# Patient Record
Sex: FEMALE | Race: WHITE | NOT HISPANIC OR LATINO | Employment: FULL TIME
[De-identification: names, ages, dates, MRNs, and addresses within clinical notes are randomized per-mention and may not be internally consistent; named-entity substitution may affect disease eponyms.]

---

## 2017-08-26 ENCOUNTER — HEALTH MAINTENANCE LETTER (OUTPATIENT)
Age: 32
End: 2017-08-26

## 2017-09-25 ASSESSMENT — ANXIETY QUESTIONNAIRES
7. FEELING AFRAID AS IF SOMETHING AWFUL MIGHT HAPPEN: NOT AT ALL
GAD7 TOTAL SCORE: 0
1. FEELING NERVOUS, ANXIOUS, OR ON EDGE: NOT AT ALL
7. FEELING AFRAID AS IF SOMETHING AWFUL MIGHT HAPPEN: NOT AT ALL
6. BECOMING EASILY ANNOYED OR IRRITABLE: NOT AT ALL
GAD7 TOTAL SCORE: 0
5. BEING SO RESTLESS THAT IT IS HARD TO SIT STILL: NOT AT ALL
GAD7 TOTAL SCORE: 0
4. TROUBLE RELAXING: NOT AT ALL
3. WORRYING TOO MUCH ABOUT DIFFERENT THINGS: NOT AT ALL
2. NOT BEING ABLE TO STOP OR CONTROL WORRYING: NOT AT ALL

## 2017-09-26 ENCOUNTER — OFFICE VISIT (OUTPATIENT)
Dept: OBGYN | Facility: CLINIC | Age: 32
End: 2017-09-26
Attending: OBSTETRICS & GYNECOLOGY
Payer: COMMERCIAL

## 2017-09-26 VITALS
HEIGHT: 64 IN | WEIGHT: 138.7 LBS | DIASTOLIC BLOOD PRESSURE: 71 MMHG | HEART RATE: 82 BPM | BODY MASS INDEX: 23.68 KG/M2 | SYSTOLIC BLOOD PRESSURE: 112 MMHG

## 2017-09-26 DIAGNOSIS — N95.2 ATROPHIC VAGINITIS: Primary | ICD-10-CM

## 2017-09-26 PROCEDURE — 99212 OFFICE O/P EST SF 10 MIN: CPT | Mod: ZF

## 2017-09-26 ASSESSMENT — ANXIETY QUESTIONNAIRES
GAD7 TOTAL SCORE: 0
6. BECOMING EASILY ANNOYED OR IRRITABLE: NOT AT ALL
7. FEELING AFRAID AS IF SOMETHING AWFUL MIGHT HAPPEN: NOT AT ALL
5. BEING SO RESTLESS THAT IT IS HARD TO SIT STILL: NOT AT ALL
1. FEELING NERVOUS, ANXIOUS, OR ON EDGE: NOT AT ALL
GAD7 TOTAL SCORE: 0
3. WORRYING TOO MUCH ABOUT DIFFERENT THINGS: NOT AT ALL
2. NOT BEING ABLE TO STOP OR CONTROL WORRYING: NOT AT ALL

## 2017-09-26 ASSESSMENT — PATIENT HEALTH QUESTIONNAIRE - PHQ9
SUM OF ALL RESPONSES TO PHQ QUESTIONS 1-9: 0
5. POOR APPETITE OR OVEREATING: NOT AT ALL

## 2017-09-26 NOTE — LETTER
9/26/2017       RE: Lesley Mahmood  [address] [facility] [address]     Dear Colleague,    Thank you for referring your patient, Lesley Mahmood, to the [facility] at [facility]. Please see a copy of my visit note below.    CC/HPI:   Lesley Mahmood is a 32 year old  who presents today for her annual exam. She is currently using IUD Mirena and would like to continue with this method of birth control. Considering another pregnancy in ~ 1-2 years.     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     Notes some vaginal irritation w/ IC.     HISTORIES:  Patient Active Problem List   Diagnosis     Vitamin D deficiency     Past Medical History:   Diagnosis Date     NO ACTIVE PROBLEMS (aka NONE)      Past Surgical History:   Procedure Laterality Date     birthmark removal  2001     EXTRACTION(S) DENTAL  1995     Current Outpatient Prescriptions   Medication Sig Dispense Refill     Nutritional Supplements (VITAMIN D BOOSTER PO) Take 5,000 Units by mouth       No Known Allergies  Social History     Social History     Marital status:      Spouse name: Mauro Pena     Number of children: N/A     Years of education: N/A     Occupational History     endocrine fellow      Social History Main Topics     Smoking status: Never Smoker     Smokeless tobacco: Never Used     Alcohol use 0.6 oz/week     1 Standard drinks or equivalent per week      Comment: stopped in preganncy     Drug use: No     Sexual activity: Yes     Partners: Male     Other Topics Concern      Service No     Blood Transfusions No     Caffeine Concern Yes     1 cup      Occupational Exposure No     Hobby Hazards No     Sleep Concern No     Stress Concern No     Weight Concern No     Special Diet No     Back Care No     Exercise Yes     yoga 2 times and walking     Bike Helmet Yes     Seat Belt Yes     Self-Exams Yes     occ     Social History Narrative    How much  "exercise per week? Yoga and walking    How much calcium per day? In foods and supplements       How much caffeine per day? 1    How much vitamin D per day? In foods    Do you/your family wear seatbelts?  Yes    Do you/your family use safety helmets? Yes    Do you/your family use sunscreen? Yes    Do you/your family keep firearms in the home? No    Do you/your family have a smoke detector(s)? Yes        Do you feel safe in your home? Yes    Has anyone ever touched you in an unwanted manner? No    Reviewed cmkim n  6-         Family History   Problem Relation Age of Onset     CANCER Father      CLL     Cardiovascular Maternal Grandmother      strokes     CEREBROVASCULAR DISEASE Paternal Grandmother      Other Cancer Paternal Grandfather      CLL     Hypertension Mother      Hypertension Maternal Grandmother      Hypertension Maternal Grandfather      Hypertension Paternal Grandmother      Hypertension Paternal Grandfather           Gyn Hx:   No LMP recorded. Patient is not currently having periods (Reason: IUD).  Menses:   Light in past 2 months.  Still nursing occasionally.     Review Of Systems:  C: NEGATIVE for fever, chills  E: NEGATIVE for vision changes   R: NEGATIVE for significant cough or SOB  CV: NEGATIVE for chest pain, palpitations   GI: NEGATIVE for nausea, abdominal pain, heartburn, or change in bowel habits  : NEGATIVE for frequency, dysuria, or hematuria  M: NEGATIVE for significant arthralgias or myalgia  N: NEGATIVE for weakness, dizziness or paresthesias or headache    EXAM:  /71  Pulse 82  Ht 1.626 m (5' 4.02\")  Wt 62.9 kg (138 lb 11.2 oz)  BMI 23.79 kg/m2  Body mass index is 23.79 kg/(m^2).    General - pleasant female in no acute distress.  Skin - no suspicious lesions or rashes  EENT-  PERRLA, euthyroid with out palpable nodules  Neck - supple without lymphadenopathy.  Lungs - non labored breathing.  Breasts- symmetrical . No dominant fixed or suspicious masses noted.  No " skin or nipple changes or axillary nodes.   Abdomen - soft, nontender, nondistended, no masses or organomegaly noted.  Musculoskeletal - no gross deformities.  Neurological - normal strength, sensation, and mental status.  Pelvic - EG: normal  female, vulva reveals no erythema or lesions.   BUS: within normal limits.  Vagina: thin and atrophic, no lesions polyps or suspicious discharge.     Cervix: no lesions, polyps discharge or CMT. IUD strings present.   Uterus: firm, anteverted, normal size and nontender.  Adnexa: no masses or tenderness.  Anus- normal, no lesions.  Rectovaginal - deferred.    ASSESSMENT/PLAN  31 yo , here for Annual preventative exam.     Atrophic vaginitis.     Additional teaching done at this visit regarding calcium (1200 mg per day), birth control and preconception. I have discussed with patient the harms and  benefits of medications, treatment options.     The patient will be notified of any results via [employer].    Hansa Beasley MD, FACOG  [facility]  OB/GYN    9/26/2017  2:11 PM

## 2017-09-26 NOTE — MR AVS SNAPSHOT
"              After Visit Summary   9/26/2017    Lesley Mahmood    MRN: [id number]           Patient Information     Date Of Birth          1985        Visit Information        Provider Department      9/26/2017 9:00 AM Hansa Beasley MD [facility]        Today's Diagnoses     Atrophic vaginitis    -  1       Follow-ups after your visit        Who to contact     Please call your clinic at [phone] to:    Ask questions about your health    Make or cancel appointments    Discuss your medicines    Learn about your test results    Speak to your doctor   If you have compliments or concerns about an experience at your clinic, or if you wish to file a complaint, please contact [facility] Patient Relations at [phone] or email us at [email or web address]         Additional Information About Your Visit        MyChart Information     "[employer]t gives you secure access to your electronic health record. If you see a primary care provider, you can also send messages to your care team and make appointments. If you have questions, please call your primary care clinic.  If you do not have a primary care provider, please call [phone] and they will assist you.      [email or web address] is an electronic gateway that provides easy, online access to your medical records. With [email or web address], you can request a clinic appointment, read your test results, renew a prescription or communicate with your care team.     To access your existing account, please contact your [facility] or call [phone] for assistance.        Care EveryWhere ID     This is your Care EveryWhere ID. This could be used by other organizations to access your [place] medical records  [id number]        Your Vitals Were     Pulse Height BMI (Body Mass Index)             82 1.626 m (5' 4.02\") 23.79 kg/m2          Blood Pressure from Last 3 Encounters:   09/26/17 112/71 "   04/05/16 108/67   03/04/16 112/72    Weight from Last 3 Encounters:   09/26/17 62.9 kg (138 lb 11.2 oz)   04/05/16 64.6 kg (142 lb 8 oz)   03/04/16 65.5 kg (144 lb 8 oz)              Today, you had the following     No orders found for display         Today's Medication Changes          These changes are accurate as of: 9/26/17  2:13 PM.  If you have any questions, ask your nurse or doctor.               Start taking these medicines.        Dose/Directions    conjugated estrogens cream   Commonly known as:  PREMARIN   Used for:  Atrophic vaginitis   Started by:  Hansa Beasley MD        Dose:  0.5 g   Start taking on:  9/28/2017   Place 0.5 g vaginally twice a week   Quantity:  30 g   Refills:  3         Stop taking these medicines if you haven't already. Please contact your care team if you have questions.     prenatal multivitamin plus iron 27-0.8 MG Tabs per tablet   Stopped by:  Hansa Beasley MD                Where to get your medicines      These medications were sent to [id number] - [place], [place] - [address] AT [facility] [address]  [address], [place] [address]     Phone:  [phone]     conjugated estrogens cream                Primary Care Provider    None Specified       No primary provider on file.        Equal Access to Services     [facility] : Matheus Slater, waaxda luqadaha, qaybta kaalshaheen saunders, liss boyce. So [facility] [phone].    ATENCIÓN: Si habla español, tiene a reyna disposición servicios gratuitos de asistencia lingüística. Noah al [phone].    We comply with applicable federal civil rights laws and [place] laws. We do not discriminate on the basis of race, color, national origin, age, disability sex, sexual orientation or gender identity.            Thank you!     Thank you for choosing [facility]  for your care. Our goal is always to provide you with excellent  care. Hearing back from our patients is one way we can continue to improve our services. Please take a few minutes to complete the written survey that you may receive in the mail after your visit with us. Thank you!             Your Updated Medication List - Protect others around you: Learn how to safely use, store and throw away your medicines at www.disposemymeds.org.          This list is accurate as of: 9/26/17  2:13 PM.  Always use your most recent med list.                   Brand Name Dispense Instructions for use Diagnosis    conjugated estrogens cream   Start taking on:  9/28/2017    PREMARIN    30 g    Place 0.5 g vaginally twice a week    Atrophic vaginitis       VITAMIN D BOOSTER PO      Take 5,000 Units by mouth

## 2017-09-26 NOTE — PROGRESS NOTES
CC/HPI:   Lesley Mahmood is a 32 year old  who presents today for her annual exam. She is currently using IUD Mirena and would like to continue with this method of birth control. Considering another pregnancy in ~ 1-2 years.     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     Notes some vaginal irritation w/ IC.     HISTORIES:  Patient Active Problem List   Diagnosis     Vitamin D deficiency     Past Medical History:   Diagnosis Date     NO ACTIVE PROBLEMS (aka NONE)      Past Surgical History:   Procedure Laterality Date     birthmark removal  2001     EXTRACTION(S) DENTAL  1995     Current Outpatient Prescriptions   Medication Sig Dispense Refill     Nutritional Supplements (VITAMIN D BOOSTER PO) Take 5,000 Units by mouth       No Known Allergies  Social History     Social History     Marital status:      Spouse name: Mauro Pena     Number of children: N/A     Years of education: N/A     Occupational History     endocrine fellow      Social History Main Topics     Smoking status: Never Smoker     Smokeless tobacco: Never Used     Alcohol use 0.6 oz/week     1 Standard drinks or equivalent per week      Comment: stopped in preganncy     Drug use: No     Sexual activity: Yes     Partners: Male     Other Topics Concern      Service No     Blood Transfusions No     Caffeine Concern Yes     1 cup      Occupational Exposure No     Hobby Hazards No     Sleep Concern No     Stress Concern No     Weight Concern No     Special Diet No     Back Care No     Exercise Yes     yoga 2 times and walking     Bike Helmet Yes     Seat Belt Yes     Self-Exams Yes     occ     Social History Narrative    How much exercise per week? Yoga and walking    How much calcium per day? In foods and supplements       How much caffeine per day? 1    How much vitamin D per day? In foods    Do you/your family wear seatbelts?  Yes    Do you/your family use safety helmets? Yes    Do you/your family use  "sunscreen? Yes    Do you/your family keep firearms in the home? No    Do you/your family have a smoke detector(s)? Yes        Do you feel safe in your home? Yes    Has anyone ever touched you in an unwanted manner? No    Reviewed cmkim sebastian  6-         Family History   Problem Relation Age of Onset     CANCER Father      CLL     Cardiovascular Maternal Grandmother      strokes     CEREBROVASCULAR DISEASE Paternal Grandmother      Other Cancer Paternal Grandfather      CLL     Hypertension Mother      Hypertension Maternal Grandmother      Hypertension Maternal Grandfather      Hypertension Paternal Grandmother      Hypertension Paternal Grandfather           Gyn Hx:   No LMP recorded. Patient is not currently having periods (Reason: IUD).  Menses:   Light in past 2 months.  Still nursing occasionally.     Review Of Systems:  C: NEGATIVE for fever, chills  E: NEGATIVE for vision changes   R: NEGATIVE for significant cough or SOB  CV: NEGATIVE for chest pain, palpitations   GI: NEGATIVE for nausea, abdominal pain, heartburn, or change in bowel habits  : NEGATIVE for frequency, dysuria, or hematuria  M: NEGATIVE for significant arthralgias or myalgia  N: NEGATIVE for weakness, dizziness or paresthesias or headache    EXAM:  /71  Pulse 82  Ht 1.626 m (5' 4.02\")  Wt 62.9 kg (138 lb 11.2 oz)  BMI 23.79 kg/m2  Body mass index is 23.79 kg/(m^2).    General - pleasant female in no acute distress.  Skin - no suspicious lesions or rashes  EENT-  PERRLA, euthyroid with out palpable nodules  Neck - supple without lymphadenopathy.  Lungs - non labored breathing.  Breasts- symmetrical . No dominant fixed or suspicious masses noted.  No skin or nipple changes or axillary nodes.   Abdomen - soft, nontender, nondistended, no masses or organomegaly noted.  Musculoskeletal - no gross deformities.  Neurological - normal strength, sensation, and mental status.  Pelvic - EG: normal  female, vulva reveals no erythema or " lesions.   BUS: within normal limits.  Vagina: thin and atrophic, no lesions polyps or suspicious discharge.     Cervix: no lesions, polyps discharge or CMT. IUD strings present.   Uterus: firm, anteverted, normal size and nontender.  Adnexa: no masses or tenderness.  Anus- normal, no lesions.  Rectovaginal - deferred.    ASSESSMENT/PLAN  31 yo , here for Annual preventative exam.     Atrophic vaginitis.     Additional teaching done at this visit regarding calcium (1200 mg per day), birth control and preconception. I have discussed with patient the harms and  benefits of medications, treatment options.     The patient will be notified of any results via [employer].    Hansa Beasley MD, FACOG  [facility]  OB/GYN    9/26/2017  2:11 PM      Answers for HPI/ROS submitted by the patient on 9/25/2017   DOLORES 7 TOTAL SCORE: 0  PHQ-2 Score: 0  General Symptoms: No  Skin Symptoms: No  HENT Symptoms: No  EYE SYMPTOMS: No  HEART SYMPTOMS: No  LUNG SYMPTOMS: No  INTESTINAL SYMPTOMS: No  URINARY SYMPTOMS: No  GYNECOLOGIC SYMPTOMS: No  BREAST SYMPTOMS: No  SKELETAL SYMPTOMS: No  BLOOD SYMPTOMS: No  NERVOUS SYSTEM SYMPTOMS: No  MENTAL HEALTH SYMPTOMS: No

## 2017-09-28 ENCOUNTER — ALLIED HEALTH/NURSE VISIT (OUTPATIENT)
Dept: NURSING | Facility: CLINIC | Age: 32
End: 2017-09-28
Payer: COMMERCIAL

## 2017-09-28 DIAGNOSIS — Z23 NEED FOR PROPHYLACTIC VACCINATION AND INOCULATION AGAINST INFLUENZA: Primary | ICD-10-CM

## 2017-09-28 PROCEDURE — 90471 IMMUNIZATION ADMIN: CPT

## 2017-09-28 PROCEDURE — 99207 ZZC NO CHARGE NURSE ONLY: CPT

## 2017-09-28 PROCEDURE — 90686 IIV4 VACC NO PRSV 0.5 ML IM: CPT

## 2017-09-28 NOTE — PROGRESS NOTES
Injectable Influenza Immunization Documentation    1.  Is the person to be vaccinated sick today?   No    2. Does the person to be vaccinated have an allergy to a component   of the vaccine?   No    3. Has the person to be vaccinated ever had a serious reaction   to influenza vaccine in the past?   No    4. Has the person to be vaccinated ever had Guillain-Barré syndrome?   No    Form completed by Self

## 2017-09-28 NOTE — MR AVS SNAPSHOT
After Visit Summary   9/28/2017    Lesley Mahmood    MRN: [id number]           Patient Information     Date Of Birth          1985        Visit Information        Provider Department      9/28/2017 8:25 AM CARE COORDINATOR [facility]        Today's Diagnoses     Need for prophylactic vaccination and inoculation against influenza    -  1       Follow-ups after your visit        Who to contact     If you have questions or need follow up information about today's clinic visit or your schedule please contact [facility] directly at [phone].  Normal or non-critical lab and imaging results will be communicated to you by [employer] Personal[employer] Diagnosticshart, letter or phone within 4 business days after the clinic has received the results. If you do not hear from us within 7 days, please contact the clinic through [employer] or phone. If you have a critical or abnormal lab result, we will notify you by phone as soon as possible.  Submit refill requests through [employer] or call your pharmacy and they will forward the refill request to us. Please allow 3 business days for your refill to be completed.          Additional Information About Your Visit        MyChart Information     [employer] gives you secure access to your electronic health record. If you see a primary care provider, you can also send messages to your care team and make appointments. If you have questions, please call your primary care clinic.  If you do not have a primary care provider, please call [phone] and they will assist you.        Care EveryWhere ID     This is your Care EveryWhere ID. This could be used by other organizations to access your [place] medical records  [id number]         Blood Pressure from Last 3 Encounters:   09/26/17 112/71   04/05/16 108/67   03/04/16 112/72    Weight from Last 3 Encounters:   09/26/17 138 lb 11.2 oz (62.9 kg)   04/05/16 142 lb 8 oz (64.6 kg)   03/04/16 144 lb 8 oz  (65.5 kg)              We Performed the Following     FLU VAC, SPLIT VIRUS IM > 3 YO (QUADRIVALENT) [52179]     Vaccine Administration, Initial [14304]        Primary Care Provider    None Specified       No primary provider on file.        Equal Access to Services     MAXIMILIAN MCKEON : Hadii aad ku hadandrea Slater, wavinayda luqadaha, qaybta kaalmada nicky, liss goranin hayaalida rios samantha ankita boyce. So [facility] [phone].    ATENCIÓN: Si habla español, tiene a reyna disposición servicios gratuitos de asistencia lingüística. Llame al [phone].    We comply with applicable federal civil rights laws and [place] laws. We do not discriminate on the basis of race, color, national origin, age, disability sex, sexual orientation or gender identity.            Thank you!     Thank you for choosing [facility]  for your care. Our goal is always to provide you with excellent care. Hearing back from our patients is one way we can continue to improve our services. Please take a few minutes to complete the written survey that you may receive in the mail after your visit with us. Thank you!             Your Updated Medication List - Protect others around you: Learn how to safely use, store and throw away your medicines at www.disposemymeds.org.          This list is accurate as of: 9/28/17  8:36 AM.  Always use your most recent med list.                   Brand Name Dispense Instructions for use Diagnosis    conjugated estrogens cream    PREMARIN    30 g    Place 0.5 g vaginally twice a week    Atrophic vaginitis       VITAMIN D BOOSTER PO      Take 5,000 Units by mouth

## 2018-11-02 ASSESSMENT — ANXIETY QUESTIONNAIRES
GAD7 TOTAL SCORE: 0
6. BECOMING EASILY ANNOYED OR IRRITABLE: NOT AT ALL
2. NOT BEING ABLE TO STOP OR CONTROL WORRYING: NOT AT ALL
7. FEELING AFRAID AS IF SOMETHING AWFUL MIGHT HAPPEN: NOT AT ALL
5. BEING SO RESTLESS THAT IT IS HARD TO SIT STILL: NOT AT ALL
1. FEELING NERVOUS, ANXIOUS, OR ON EDGE: NOT AT ALL
7. FEELING AFRAID AS IF SOMETHING AWFUL MIGHT HAPPEN: NOT AT ALL
3. WORRYING TOO MUCH ABOUT DIFFERENT THINGS: NOT AT ALL
GAD7 TOTAL SCORE: 0
4. TROUBLE RELAXING: NOT AT ALL

## 2018-11-03 ASSESSMENT — ANXIETY QUESTIONNAIRES: GAD7 TOTAL SCORE: 0

## 2018-11-05 ENCOUNTER — ALLIED HEALTH/NURSE VISIT (OUTPATIENT)
Dept: NURSING | Facility: CLINIC | Age: 33
End: 2018-11-05
Payer: COMMERCIAL

## 2018-11-05 DIAGNOSIS — Z23 NEED FOR PROPHYLACTIC VACCINATION AND INOCULATION AGAINST INFLUENZA: Primary | ICD-10-CM

## 2018-11-05 PROCEDURE — 90471 IMMUNIZATION ADMIN: CPT

## 2018-11-05 PROCEDURE — 90686 IIV4 VACC NO PRSV 0.5 ML IM: CPT

## 2018-11-05 PROCEDURE — 99207 ZZC NO CHARGE NURSE ONLY: CPT

## 2018-11-05 NOTE — PROGRESS NOTES

## 2018-11-05 NOTE — MR AVS SNAPSHOT
After Visit Summary   11/5/2018    Lesley Mahmood    MRN: [id number]           Patient Information     Date Of Birth          1985        Visit Information        Provider Department      11/5/2018 9:15 AM RD YOUSIF MA/LPN [facility]        Today's Diagnoses     Need for prophylactic vaccination and inoculation against influenza    -  1       Follow-ups after your visit        Your next 10 appointments already scheduled     Nov 07, 2018 11:00 AM [facility]   [facility] Annual Physical with Hansa Beasley MD   [facility] ([facility])    [facility] [address]  [address]  [facility] [address] [phone]              Who to contact     If you have questions or need follow up information about today's clinic visit or your schedule please contact [facility] directly at [phone].  Normal or non-critical lab and imaging results will be communicated to you by ROBLOXhart, letter or phone within 4 business days after the clinic has received the results. If you do not hear from us within 7 days, please contact the clinic through ROBLOXhart or phone. If you have a critical or abnormal lab result, we will notify you by phone as soon as possible.  Submit refill requests through [employer] or call your pharmacy and they will forward the refill request to us. Please allow 3 business days for your refill to be completed.          Additional Information About Your Visit        MyChart Information     [employer] gives you secure access to your electronic health record. If you see a primary care provider, you can also send messages to your care team and make appointments. If you have questions, please call your primary care clinic.  If you do not have a primary care provider, please call [phone] and they will assist you.        Care EveryWhere ID     This is your Care EveryWhere ID. This could be used by other  organizations to access your [place] medical records  [id number]         Blood Pressure from Last 3 Encounters:   09/26/17 112/71   04/05/16 108/67   03/04/16 112/72    Weight from Last 3 Encounters:   09/26/17 138 lb 11.2 oz (62.9 kg)   04/05/16 142 lb 8 oz (64.6 kg)   03/04/16 144 lb 8 oz (65.5 kg)              We Performed the Following     FLU VACCINE, SPLIT VIRUS, IM (QUADRIVALENT) [83755]- >3 YRS     Vaccine Administration, Initial [44880]        Primary Care Provider Fax #    Physician No Ref-Primary [phone]       No address on file        Equal Access to Services     MAXIMILIAN MCKEON : Matheus Slater, james johns, nasra saunders, liss boyce. So [facility] [phone].    ATENCIÓN: Si habla español, tiene a reyna disposición servicios gratuitos de asistencia lingüística. Llame al [phone].    We comply with applicable federal civil rights laws and [place] laws. We do not discriminate on the basis of race, color, national origin, age, disability, sex, sexual orientation, or gender identity.            Thank you!     Thank you for choosing [facility]  for your care. Our goal is always to provide you with excellent care. Hearing back from our patients is one way we can continue to improve our services. Please take a few minutes to complete the written survey that you may receive in the mail after your visit with us. Thank you!             Your Updated Medication List - Protect others around you: Learn how to safely use, store and throw away your medicines at www.disposemymeds.org.          This list is accurate as of 11/5/18  9:25 AM.  Always use your most recent med list.                   Brand Name Dispense Instructions for use Diagnosis    conjugated estrogens cream    PREMARIN    30 g    Place 0.5 g vaginally twice a week    Atrophic vaginitis       VITAMIN D BOOSTER PO      Take 5,000 Units by mouth

## 2018-11-07 ENCOUNTER — OFFICE VISIT (OUTPATIENT)
Dept: OBGYN | Facility: CLINIC | Age: 33
End: 2018-11-07
Attending: OBSTETRICS & GYNECOLOGY
Payer: COMMERCIAL

## 2018-11-07 VITALS
HEIGHT: 64 IN | WEIGHT: 138.7 LBS | SYSTOLIC BLOOD PRESSURE: 115 MMHG | BODY MASS INDEX: 23.68 KG/M2 | HEART RATE: 75 BPM | DIASTOLIC BLOOD PRESSURE: 79 MMHG

## 2018-11-07 DIAGNOSIS — Z30.432 ENCOUNTER FOR IUD REMOVAL: ICD-10-CM

## 2018-11-07 DIAGNOSIS — Z00.00 HEALTH CARE MAINTENANCE: Primary | ICD-10-CM

## 2018-11-07 DIAGNOSIS — E55.9 HYPOVITAMINOSIS D: ICD-10-CM

## 2018-11-07 PROCEDURE — 58301 REMOVE INTRAUTERINE DEVICE: CPT | Mod: ZF | Performed by: OBSTETRICS & GYNECOLOGY

## 2018-11-07 PROCEDURE — 80061 LIPID PANEL: CPT | Performed by: OBSTETRICS & GYNECOLOGY

## 2018-11-07 PROCEDURE — G0463 HOSPITAL OUTPT CLINIC VISIT: HCPCS | Mod: ZF

## 2018-11-07 NOTE — NURSING NOTE
Chief Complaint   Patient presents with     Physical     Possible Mirena IUD removal. Planning pregnancy.       See JOSAFAT Branch 11/7/2018

## 2018-11-07 NOTE — MR AVS SNAPSHOT
"              After Visit Summary   11/7/2018    Lesley Mahmood    MRN: [id number]           Patient Information     Date Of Birth          1985        Visit Information        Provider Department      11/7/2018 11:00 AM Hansa Beasley MD [facility]        Today's Diagnoses     Health care maintenance    -  1    Hypovitaminosis D        Encounter for IUD removal           Follow-ups after your visit        Future tests that were ordered for you today     Open Future Orders        Priority Expected Expires Ordered    Lipid Profile Routine  11/7/2019 11/7/2018    25- OH-Vitamin D Routine  11/7/2019 11/7/2018            Who to contact     Please call your clinic at [phone] to:    Ask questions about your health    Make or cancel appointments    Discuss your medicines    Learn about your test results    Speak to your doctor            Additional Information About Your Visit        Etohumhart Information     P[employer] gives you secure access to your electronic health record. If you see a primary care provider, you can also send messages to your care team and make appointments. If you have questions, please call your primary care clinic.  If you do not have a primary care provider, please call [phone] and they will assist you.      P[employer] is an electronic gateway that provides easy, online access to your medical records. With [employer], you can request a clinic appointment, read your test results, renew a prescription or communicate with your care team.     To access your existing account, please contact your [facility] or call [phone] for assistance.        Care EveryWhere ID     This is your Care EveryWhere ID. This could be used by other organizations to access your [place] medical records  [id number]        Your Vitals Were     Pulse Height Breastfeeding? BMI (Body Mass Index)          75 1.626 m (5' 4\") No 23.81 kg/m2         Blood Pressure " from Last 3 Encounters:   11/07/18 115/79   09/26/17 112/71   04/05/16 108/67    Weight from Last 3 Encounters:   11/07/18 62.9 kg (138 lb 11.2 oz)   09/26/17 62.9 kg (138 lb 11.2 oz)   04/05/16 64.6 kg (142 lb 8 oz)              We Performed the Following     REMOVE INTRAUTERINE DEVICE        Primary Care Provider Fax #    Physician No Ref-Primary [phone]       No address on file        Equal Access to Services     MAXIMILIAN MCKEON : Hadii aldo coffeyo Sogabriela, waaxda luqadaha, qaybta kaalmada blancayasina, liss luciano . So [facility] [phone].    ATENCIÓN: Si habla español, tiene a reyna disposición servicios gratuitos de asistencia lingüística. Llame al [phone].    We comply with applicable federal civil rights laws and [place] laws. We do not discriminate on the basis of race, color, national origin, age, disability, sex, sexual orientation, or gender identity.            Thank you!     Thank you for choosing [facility]  for your care. Our goal is always to provide you with excellent care. Hearing back from our patients is one way we can continue to improve our services. Please take a few minutes to complete the written survey that you may receive in the mail after your visit with us. Thank you!             Your Updated Medication List - Protect others around you: Learn how to safely use, store and throw away your medicines at www.disposemymeds.org.          This list is accurate as of 11/7/18  2:54 PM.  Always use your most recent med list.                   Brand Name Dispense Instructions for use Diagnosis    levonorgestrel 20 MCG/24HR IUD    MIRENA     1 each by Intrauterine route once        PRENATAL VITAMIN PO           VITAMIN D BOOSTER PO      Take 5,000 Units by mouth

## 2018-11-07 NOTE — LETTER
11/7/2018       RE: Lesley Mahmood  [address] [facility] [address]     Dear Colleague,    Thank you for referring your patient, Lesley Mahmood, to the [facility] at [facility]. Please see a copy of my visit note below.    CC/HPI:   Lesley Mahmood is a 33 year old female  who presents today for her annual exam. She is currently using IUD Mirena for birth control. She is planning to get pregnant in the near future and would like to have her Mirena removed today. She does have prenatal supplements, including folate at home, but has not started taking them yet.    She denies complaints today. Is still occasionally breast feeding her nearly 3 year old.    Has not had regular periods.     Is sexually active with her male partner.     Her work in very busy, but recently got some funding!  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     HISTORIES:  Patient Active Problem List   Diagnosis     Vitamin D deficiency     Past Medical History:   Diagnosis Date     NO ACTIVE PROBLEMS (aka NONE)      Past Surgical History:   Procedure Laterality Date     birthmark removal  2001     EXTRACTION(S) DENTAL  1995     Current Outpatient Prescriptions   Medication Sig Dispense Refill     levonorgestrel (MIRENA) 20 MCG/24HR IUD 1 each by Intrauterine route once       Nutritional Supplements (VITAMIN D BOOSTER PO) Take 5,000 Units by mouth       Prenatal Vit-Fe Fumarate-FA (PRENATAL VITAMIN PO)        No Known Allergies  Social History     Social History     Marital status:      Spouse name: Mauro Pena     Number of children: N/A     Years of education: N/A     Occupational History     endocrine fellow      Social History Main Topics     Smoking status: Never Smoker     Smokeless tobacco: Never Used     Alcohol use 0.6 oz/week     1 Standard drinks or equivalent per week      Comment: stopped in preganncy     Drug use: No     Sexual activity: Yes      "Partners: Male     Birth control/ protection: IUD      Comment: Mirena     Other Topics Concern      Service No     Blood Transfusions No     Caffeine Concern Yes     1 cup      Occupational Exposure No     Hobby Hazards No     Sleep Concern No     Stress Concern No     Weight Concern No     Special Diet No     Back Care No     Exercise Yes     yoga 2 times and walking     Bike Helmet Yes     Seat Belt Yes     Self-Exams Yes     occ     Social History Narrative    How much exercise per week? Yoga and walking    How much calcium per day? In foods and supplements       How much caffeine per day? 1    How much vitamin D per day? In foods    Do you/your family wear seatbelts?  Yes    Do you/your family use safety helmets? Yes    Do you/your family use sunscreen? Yes    Do you/your family keep firearms in the home? No    Do you/your family have a smoke detector(s)? Yes        Do you feel safe in your home? Yes    Has anyone ever touched you in an unwanted manner? No    [facility]  6-         Family History   Problem Relation Age of Onset     Cancer Father      CLL     Cardiovascular Maternal Grandmother      strokes     Hypertension Maternal Grandmother      Cerebrovascular Disease Paternal Grandmother      Hypertension Paternal Grandmother      Other Cancer Paternal Grandfather      CLL     Hypertension Paternal Grandfather      Hypertension Mother      Hypertension Maternal Grandfather           Gyn Hx:   No LMP recorded. Patient is not currently having periods (Reason: IUD).  Menses: No active menses      EXAM:  /79 (BP Location: Left arm, Patient Position: Chair)  Pulse 75  Ht 1.626 m (5' 4\")  Wt 62.9 kg (138 lb 11.2 oz)  Breastfeeding? No  BMI 23.81 kg/m2  Body mass index is 23.81 kg/(m^2).    General - pleasant female in no acute distress.  Skin - no suspicious lesions or rashes  Lungs - clear to auscultation bilaterally.  Heart - regular rate and rhythm without murmur.  Breasts- " symmetrical . No dominant fixed or suspicious masses noted.  No skin or nipple changes or axillary nodes.   Abdomen - soft, nontender, nondistended, no masses or organomegaly noted.  Musculoskeletal - no gross deformities.  Neurological - normal strength, sensation, and mental status.  Pelvic - EG: normal  female, vulva reveals no erythema or lesions.   BUS: within normal limits.  Vagina: well rugated, no lesions polyps or suspicious  discharge.     Cervix: no lesions, polyps discharge or CMT. IUD string present. Grasped with ring forceps and removed intact.  Regained resting shape immediately after removal.   Uterus: firm,  anteverted, normal size and nontender.  Adnexa: no masses or tenderness.  Anus- normal, no lesions.  Rectovaginal - deferred.    ASSESSMENT/PLAN  (Z00.00) Health care maintenance  (primary encounter diagnosis)  Comment:  Plan:  Lipid Profile,   Plan: 25- OH-Vitamin D  Encounter for IUD removal  Plan: REMOVE INTRAUTERINE DEVICE          Pap/HPV due 2019.   Rec PNV daily.  Rec waiting at least one normal menstrual cycle before conception after removing IUD.     Additional teaching done at this visit regarding exercise, preconception, mental health and weight/diet. I have discussed with patient the harms and  benefits of  medications, treatment options.     The patient will be notified of any results via [employer].    Morris Montano, MS-3    I was present with the medical student who participated in the service and in the documentation of the note. I have verified the history and personally performed the physical exam and medical decision making. I agree with the assessment and plan of care as documented in the note.    Hansa Beasley MD, FACOG  [facility]  OB/GYN    11/7/2018  2:54 PM

## 2018-11-07 NOTE — PROGRESS NOTES
CC/HPI:   Lesley Mahmood is a 33 year old female  who presents today for her annual exam. She is currently using IUD Mirena for birth control. She is planning to get pregnant in the near future and would like to have her Mirena removed today. She does have prenatal supplements, including folate at home, but has not started taking them yet.    She denies complaints today. Is still occasionally breast feeding her nearly 3 year old.    Has not had regular periods.     Is sexually active with her male partner.     Her work in very busy, but recently got some funding!  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     HISTORIES:  Patient Active Problem List   Diagnosis     Vitamin D deficiency     Past Medical History:   Diagnosis Date     NO ACTIVE PROBLEMS (aka NONE)      Past Surgical History:   Procedure Laterality Date     birthmark removal  2001     EXTRACTION(S) DENTAL  1995     Current Outpatient Prescriptions   Medication Sig Dispense Refill     levonorgestrel (MIRENA) 20 MCG/24HR IUD 1 each by Intrauterine route once       Nutritional Supplements (VITAMIN D BOOSTER PO) Take 5,000 Units by mouth       Prenatal Vit-Fe Fumarate-FA (PRENATAL VITAMIN PO)        No Known Allergies  Social History     Social History     Marital status:      Spouse name: Mauro Pena     Number of children: N/A     Years of education: N/A     Occupational History     endocrine fellow      Social History Main Topics     Smoking status: Never Smoker     Smokeless tobacco: Never Used     Alcohol use 0.6 oz/week     1 Standard drinks or equivalent per week      Comment: stopped in preganncy     Drug use: No     Sexual activity: Yes     Partners: Male     Birth control/ protection: IUD      Comment: Mirena     Other Topics Concern      Service No     Blood Transfusions No     Caffeine Concern Yes     1 cup      Occupational Exposure No     Hobby Hazards No     Sleep Concern No     Stress Concern No     Weight Concern  "No     Special Diet No     Back Care No     Exercise Yes     yoga 2 times and walking     Bike Helmet Yes     Seat Belt Yes     Self-Exams Yes     occ     Social History Narrative    How much exercise per week? Yoga and walking    How much calcium per day? In foods and supplements       How much caffeine per day? 1    How much vitamin D per day? In foods    Do you/your family wear seatbelts?  Yes    Do you/your family use safety helmets? Yes    Do you/your family use sunscreen? Yes    Do you/your family keep firearms in the home? No    Do you/your family have a smoke detector(s)? Yes        Do you feel safe in your home? Yes    Has anyone ever touched you in an unwanted manner? No    Reviewed cmkim lpn  6-         Family History   Problem Relation Age of Onset     Cancer Father      CLL     Cardiovascular Maternal Grandmother      strokes     Hypertension Maternal Grandmother      Cerebrovascular Disease Paternal Grandmother      Hypertension Paternal Grandmother      Other Cancer Paternal Grandfather      CLL     Hypertension Paternal Grandfather      Hypertension Mother      Hypertension Maternal Grandfather           Gyn Hx:   No LMP recorded. Patient is not currently having periods (Reason: IUD).  Menses: No active menses    Review Of Systems:  CONSTITUTIONAL: NEGATIVE for fever, chills  EYES: NEGATIVE for vision changes   RESP: NEGATIVE for significant cough or SOB  CV: NEGATIVE for chest pain, palpitations   GI: NEGATIVE for nausea, abdominal pain, heartburn, or change in bowel habits  : NEGATIVE for frequency, dysuria, or hematuria  MUSCULOSKELETAL: NEGATIVE for significant arthralgias or myalgia  NEURO: NEGATIVE for weakness, dizziness or paresthesias or headache  Answers for HPI/ROS submitted by the patient on 11/2/2018   DOLORES 7 TOTAL SCORE: 0  PHQ-2 Score: 0      EXAM:  /79 (BP Location: Left arm, Patient Position: Chair)  Pulse 75  Ht 1.626 m (5' 4\")  Wt 62.9 kg (138 lb 11.2 oz)  " Breastfeeding? No  BMI 23.81 kg/m2  Body mass index is 23.81 kg/(m^2).    General - pleasant female in no acute distress.  Skin - no suspicious lesions or rashes  Lungs - clear to auscultation bilaterally.  Heart - regular rate and rhythm without murmur.  Breasts- symmetrical . No dominant fixed or suspicious masses noted.  No skin or nipple changes or axillary nodes.   Abdomen - soft, nontender, nondistended, no masses or organomegaly noted.  Musculoskeletal - no gross deformities.  Neurological - normal strength, sensation, and mental status.  Pelvic - EG: normal  female, vulva reveals no erythema or lesions.   BUS: within normal limits.  Vagina: well rugated, no lesions polyps or suspicious  discharge.     Cervix: no lesions, polyps discharge or CMT. IUD string present. Grasped with ring forceps and removed intact.  Regained resting shape immediately after removal.   Uterus: firm,  anteverted, normal size and nontender.  Adnexa: no masses or tenderness.  Anus- normal, no lesions.  Rectovaginal - deferred.    ASSESSMENT/PLAN  (Z00.00) Health care maintenance  (primary encounter diagnosis)  Comment:  Plan:  Lipid Profile,   Plan: 25- OH-Vitamin D  Encounter for IUD removal  Plan: REMOVE INTRAUTERINE DEVICE          Pap/HPV due 2019.   Rec PNV daily.  Rec waiting at least one normal menstrual cycle before conception after removing IUD.     Additional teaching done at this visit regarding exercise, preconception, mental health and weight/diet. I have discussed with patient the harms and  benefits of  medications, treatment options.     The patient will be notified of any results via [employer].    Morris Montano, MS-3    I was present with the medical student who participated in the service and in the documentation of the note. I have verified the history and personally performed the physical exam and medical decision making. I agree with the assessment and plan of care as documented in the note.    Hansa  WOO Beasley MD, FACOG  [facility]  OB/GYN    11/7/2018  2:54 PM

## 2018-11-20 DIAGNOSIS — E55.9 HYPOVITAMINOSIS D: ICD-10-CM

## 2018-11-20 DIAGNOSIS — Z00.00 HEALTH CARE MAINTENANCE: ICD-10-CM

## 2018-11-20 LAB
CHOLEST SERPL-MCNC: 159 MG/DL
DEPRECATED CALCIDIOL+CALCIFEROL SERPL-MC: 37 UG/L (ref 20–75)
HDLC SERPL-MCNC: 73 MG/DL
LDLC SERPL CALC-MCNC: 67 MG/DL
NONHDLC SERPL-MCNC: 86 MG/DL
TRIGL SERPL-MCNC: 93 MG/DL

## 2019-07-09 ENCOUNTER — TELEPHONE (OUTPATIENT)
Dept: OBGYN | Facility: CLINIC | Age: 34
End: 2019-07-09

## 2019-07-09 NOTE — TELEPHONE ENCOUNTER
----- Message from Sona Godinez sent at 7/9/2019  2:39 PM CDT -----  Regarding: New OB appt   Contact: [phone]  Patient just found out she is pregnant, she is needing to schedule her New OB appt.      Thanks   Sona

## 2019-07-25 ENCOUNTER — OFFICE VISIT (OUTPATIENT)
Dept: OBGYN | Facility: CLINIC | Age: 34
End: 2019-07-25
Attending: ADVANCED PRACTICE MIDWIFE
Payer: COMMERCIAL

## 2019-07-25 ENCOUNTER — ANCILLARY PROCEDURE (OUTPATIENT)
Dept: ULTRASOUND IMAGING | Facility: CLINIC | Age: 34
End: 2019-07-25
Attending: ADVANCED PRACTICE MIDWIFE
Payer: COMMERCIAL

## 2019-07-25 VITALS
HEIGHT: 64 IN | DIASTOLIC BLOOD PRESSURE: 73 MMHG | SYSTOLIC BLOOD PRESSURE: 109 MMHG | WEIGHT: 140 LBS | BODY MASS INDEX: 23.9 KG/M2 | HEART RATE: 66 BPM

## 2019-07-25 DIAGNOSIS — Z34.81 ENCOUNTER FOR SUPERVISION OF OTHER NORMAL PREGNANCY IN FIRST TRIMESTER: Primary | ICD-10-CM

## 2019-07-25 DIAGNOSIS — Z34.91 ENCOUNTER FOR SUPERVISION OF NORMAL PREGNANCY IN FIRST TRIMESTER, UNSPECIFIED GRAVIDITY: ICD-10-CM

## 2019-07-25 LAB
ABO + RH BLD: NORMAL
ABO + RH BLD: NORMAL
BASOPHILS # BLD AUTO: 0 10E9/L (ref 0–0.2)
BASOPHILS NFR BLD AUTO: 0.4 %
BLD GP AB SCN SERPL QL: NORMAL
BLOOD BANK CMNT PATIENT-IMP: NORMAL
DEPRECATED CALCIDIOL+CALCIFEROL SERPL-MC: 30 UG/L (ref 20–75)
DIFFERENTIAL METHOD BLD: NORMAL
EOSINOPHIL # BLD AUTO: 0.1 10E9/L (ref 0–0.7)
EOSINOPHIL NFR BLD AUTO: 0.8 %
ERYTHROCYTE [DISTWIDTH] IN BLOOD BY AUTOMATED COUNT: 12.1 % (ref 10–15)
HBV SURFACE AG SERPL QL IA: NONREACTIVE
HCT VFR BLD AUTO: 39.8 % (ref 35–47)
HCV AB SERPL QL IA: NONREACTIVE
HGB BLD-MCNC: 13.1 G/DL (ref 11.7–15.7)
HIV 1+2 AB+HIV1 P24 AG SERPL QL IA: NONREACTIVE
IMM GRANULOCYTES # BLD: 0 10E9/L (ref 0–0.4)
IMM GRANULOCYTES NFR BLD: 0.5 %
LYMPHOCYTES # BLD AUTO: 2 10E9/L (ref 0.8–5.3)
LYMPHOCYTES NFR BLD AUTO: 26.1 %
MCH RBC QN AUTO: 28.6 PG (ref 26.5–33)
MCHC RBC AUTO-ENTMCNC: 32.9 G/DL (ref 31.5–36.5)
MCV RBC AUTO: 87 FL (ref 78–100)
MONOCYTES # BLD AUTO: 0.5 10E9/L (ref 0–1.3)
MONOCYTES NFR BLD AUTO: 6.5 %
NEUTROPHILS # BLD AUTO: 5 10E9/L (ref 1.6–8.3)
NEUTROPHILS NFR BLD AUTO: 65.7 %
NRBC # BLD AUTO: 0 10*3/UL
NRBC BLD AUTO-RTO: 0 /100
PLATELET # BLD AUTO: 283 10E9/L (ref 150–450)
RBC # BLD AUTO: 4.58 10E12/L (ref 3.8–5.2)
SPECIMEN EXP DATE BLD: NORMAL
WBC # BLD AUTO: 7.7 10E9/L (ref 4–11)

## 2019-07-25 PROCEDURE — 36415 COLL VENOUS BLD VENIPUNCTURE: CPT | Performed by: MIDWIFE

## 2019-07-25 PROCEDURE — 76801 OB US < 14 WKS SINGLE FETUS: CPT

## 2019-07-25 PROCEDURE — 82306 VITAMIN D 25 HYDROXY: CPT | Performed by: MIDWIFE

## 2019-07-25 PROCEDURE — 87340 HEPATITIS B SURFACE AG IA: CPT | Performed by: MIDWIFE

## 2019-07-25 PROCEDURE — 86900 BLOOD TYPING SEROLOGIC ABO: CPT | Performed by: MIDWIFE

## 2019-07-25 PROCEDURE — 86850 RBC ANTIBODY SCREEN: CPT | Performed by: MIDWIFE

## 2019-07-25 PROCEDURE — 86803 HEPATITIS C AB TEST: CPT | Performed by: MIDWIFE

## 2019-07-25 PROCEDURE — 86901 BLOOD TYPING SEROLOGIC RH(D): CPT | Performed by: MIDWIFE

## 2019-07-25 PROCEDURE — 87086 URINE CULTURE/COLONY COUNT: CPT | Performed by: MIDWIFE

## 2019-07-25 PROCEDURE — 86762 RUBELLA ANTIBODY: CPT | Performed by: MIDWIFE

## 2019-07-25 PROCEDURE — 85025 COMPLETE CBC W/AUTO DIFF WBC: CPT | Performed by: MIDWIFE

## 2019-07-25 PROCEDURE — 87389 HIV-1 AG W/HIV-1&-2 AB AG IA: CPT | Performed by: MIDWIFE

## 2019-07-25 PROCEDURE — 86780 TREPONEMA PALLIDUM: CPT | Performed by: MIDWIFE

## 2019-07-25 PROCEDURE — G0463 HOSPITAL OUTPT CLINIC VISIT: HCPCS | Mod: ZF

## 2019-07-25 ASSESSMENT — MIFFLIN-ST. JEOR: SCORE: 1310.04

## 2019-07-25 NOTE — LETTER
2019       RE: Lesley Mahmood  [address] [facility] [address]     Dear Colleague,    Thank you for referring your patient, Lesley Mahmood, to the [facility] at [facility]. Please see a copy of my visit note below.    S OB Intake note  Subjective   34 year old woman presents to clinic for initiation of OB care. Patient's last menstrual period was 2019.  at 8w0d by Estimated Date of Delivery: Mar 5, 2020 based on LMP. Reviewed dating ultrasound. Pregnancy is planned.    Symptoms since LMP include spotting. Patient has tried these relief measures: diet modification, small frequent meals, increased rest and increased fluids.    - Genetic/Infection questionnaire completed, risks [id number] cousin hemochromatosis  FOB father has bicuspid aortic valve FOB had normal ECHO  does not have a recent known exposure to Parvo or CMV so IgG/IgM testing WILL NOT be ordered.   Have you traveled during the pregnancy?No  Have your sexual partner(s) travelled during the pregnancy?No      - Current Medications PNV    Current Outpatient Medications   Medication Sig Dispense Refill     Nutritional Supplements (VITAMIN D BOOSTER PO) Take 5,000 Units by mouth       Prenatal Vit-Fe Fumarate-FA (PRENATAL VITAMIN PO)            - Co-morbids AMA   Past Medical History:   Diagnosis Date     NO ACTIVE PROBLEMS (aka NONE)      - Risk for GDM -  does not  have Personal history of GDM, BMI>30, h/o prediabetes/glucose intolerance, first degree relative with GDM or DM    WILL NOT have an early GCT and possible Hgb A1C    - High Risk for Pre E- meets one of following criteria The patient does not h/o Pre Eclampsia, Current multi fetal gestation, Pre Gestational Diabetes (Type 1 or Type 2), chronic hypertension, renal disease, Autoimmune disease (systematic lupus erythematosus, antiphospholipid syndrome) so WILL NOT start low dose aspirin (81mg) starting between 12 and  28 weeks to prevent early onset preeclampsia.    - Moderate risk - meets more than one of several moderate risk facrtors for Pre E - Nulliparity, Obesity (body mass index >30 kg/m2),Family history of preeclampsia (mother or sister), Sociodemographic characteristics ( race, low socioeconomic status), Age ?35 years, Personal history factors (e.g., low birthweight or small for gestational age, previous adverse pregnancy outcome, >10-year pregnancy interval)  so WILL NOT consider starting low dose aspirin (81mg) starting between 12 and 28 weeks to prevent early onset preeclampsia.    - The patient  does not have a history of spontaneous  birth so  WILL NOT consider progesterone starting at 16-20 weeks and/or serial transvaginal cervical length ultrasounds from 16-24 weeks.     -The patient does not have a history of immunosuppresion or HIV so Toxoplasma IgG/IgM WILL NOT be ordered.     PERSONAL/SOCIAL HISTORY    lives with their family.  Employment: Full time. 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    Diagnoses and all orders for this visit:    Encounter for supervision of other normal pregnancy in first trimester        34 year old  8w0d weeks of pregnancy with JIE of Mar 5, 2020 by LMP of Patient's last menstrual period was 2019.. Ultrasound confirms.   Outpatient Encounter Medications as of 2019   Medication Sig Dispense Refill     Nutritional Supplements (VITAMIN D BOOSTER PO) Take 5,000 Units by mouth       Prenatal Vit-Fe Fumarate-FA (PRENATAL VITAMIN PO)        [DISCONTINUED] levonorgestrel (MIRENA) 20  MCG/24HR IUD 1 each by Intrauterine route once       No facility-administered encounter medications on file as of 7/25/2019.     No orders of the defined types were placed in this encounter.    Orders Placed This Encounter   Procedures     25- OH-Vitamin D     CBC with Platelets Differential     Hepatitis B Surface Antigen     HIV Antigen Antibody Combo     Rubella Antibody IgG Quantitative     Treponema Abs w Reflex to RPR and Titer     Hepatitis C antibody     [facility] REFERRAL-PREGNANCY     ABO/Rh Type and Screen                 - Oriented to Practice, types of care, and how to reach a provider.  Pt prefers  MD Beasley .  - Patient received 1st trimester new OB education packet complete with aide of The Expectant Family booklet including information on genetic screening test options.  - Patient desires 1st trimester screening which was ordered.  - Educational handout on the prevention of infections diseases during pregnancy provided.  - Patient was encouraged to start prenatal vitamins as tolerated.    - Patient was sent to lab for routine OB labs including .  Hepatitis C screening was ordered .   -- Pregnancy concerns to be addressed by provider at new OB exam include: none.  Pt is due for pap/HPV screen at [facility] PE     Pt to RTO for NOB visit in 3 weeks and prn if questions or concerns    LEONARD Henning CNM

## 2019-07-25 NOTE — PROGRESS NOTES
[facility] OB Intake note  Subjective   34 year old woman presents to clinic for initiation of OB care. Patient's last menstrual period was 2019.  at 8w0d by Estimated Date of Delivery: Mar 5, 2020 based on LMP. Reviewed dating ultrasound. Pregnancy is planned.    Symptoms since LMP include spotting. Patient has tried these relief measures: diet modification, small frequent meals, increased rest and increased fluids.    - Genetic/Infection questionnaire completed, risks [id number] cousin hemochromatosis  FOB father has bicuspid aortic valve FOB had normal ECHO  does not have a recent known exposure to Parvo or CMV so IgG/IgM testing WILL NOT be ordered.   Have you traveled during the pregnancy?No  Have your sexual partner(s) travelled during the pregnancy?No      - Current Medications PNV    Current Outpatient Medications   Medication Sig Dispense Refill     Nutritional Supplements (VITAMIN D BOOSTER PO) Take 5,000 Units by mouth       Prenatal Vit-Fe Fumarate-FA (PRENATAL VITAMIN PO)            - Co-morbids AMA   Past Medical History:   Diagnosis Date     NO ACTIVE PROBLEMS (aka NONE)      - Risk for GDM -  does not  have Personal history of GDM, BMI>30, h/o prediabetes/glucose intolerance, first degree relative with GDM or DM    WILL NOT have an early GCT and possible Hgb A1C    - High Risk for Pre E- meets one of following criteria The patient does not h/o Pre Eclampsia, Current multi fetal gestation, Pre Gestational Diabetes (Type 1 or Type 2), chronic hypertension, renal disease, Autoimmune disease (systematic lupus erythematosus, antiphospholipid syndrome) so WILL NOT start low dose aspirin (81mg) starting between 12 and 28 weeks to prevent early onset preeclampsia.    - Moderate risk - meets more than one of several moderate risk facrtors for Pre E - Nulliparity, Obesity (body mass index >30 kg/m2),Family history of preeclampsia (mother or sister), Sociodemographic characteristics (  race, low socioeconomic status), Age ?35 years, Personal history factors (e.g., low birthweight or small for gestational age, previous adverse pregnancy outcome, >10-year pregnancy interval)  so WILL NOT consider starting low dose aspirin (81mg) starting between 12 and 28 weeks to prevent early onset preeclampsia.    - The patient  does not have a history of spontaneous  birth so  WILL NOT consider progesterone starting at 16-20 weeks and/or serial transvaginal cervical length ultrasounds from 16-24 weeks.     -The patient does not have a history of immunosuppresion or HIV so Toxoplasma IgG/IgM WILL NOT be ordered.     PERSONAL/SOCIAL HISTORY    lives with their family.  Employment: Full time. 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    Diagnoses and all orders for this visit:    Encounter for supervision of other normal pregnancy in first trimester        34 year old  8w0d weeks of pregnancy with JIE of Mar 5, 2020 by LMP of Patient's last menstrual period was 2019.. Ultrasound confirms.   Outpatient Encounter Medications as of 2019   Medication Sig Dispense Refill     Nutritional Supplements (VITAMIN D BOOSTER PO) Take 5,000 Units by mouth       Prenatal Vit-Fe Fumarate-FA (PRENATAL VITAMIN PO)        [DISCONTINUED] levonorgestrel (MIRENA) 20 MCG/24HR IUD 1 each by Intrauterine route once       No facility-administered encounter medications on file as of 2019.     No orders of the defined types were placed in this encounter.    Orders Placed This Encounter   Procedures     25- OH-Vitamin D     CBC with Platelets  Differential     Hepatitis B Surface Antigen     HIV Antigen Antibody Combo     Rubella Antibody IgG Quantitative     Treponema Abs w Reflex to RPR and Titer     Hepatitis C antibody     [facility] REFERRAL-PREGNANCY     ABO/Rh Type and Screen                 - Oriented to Practice, types of care, and how to reach a provider.  Pt prefers  MD [facility] .  - Patient received 1st trimester new OB education packet complete with aide of [facility] Expect[facility] booklet including information on genetic screening test options.  - Patient desires 1st trimester screening which was ordered.  - Educational handout on the prevention of infections diseases during pregnancy provided.  - Patient was encouraged to start prenatal vitamins as tolerated.    - Patient was sent to lab for routine OB labs including .  Hepatitis C screening was ordered .   -- Pregnancy concerns to be addressed by provider at new OB exam include: none.  Pt is due for pap/HPV screen at NOB PE     Pt to RTO for NOB visit in 3 weeks and prn if questions or concerns    LEOANRD Henning CNM

## 2019-07-26 LAB
BACTERIA SPEC CULT: NORMAL
Lab: NORMAL
RUBV IGG SERPL IA-ACNC: 33 IU/ML
SPECIMEN SOURCE: NORMAL
T PALLIDUM AB SER QL: NONREACTIVE

## 2019-08-05 ENCOUNTER — TELEPHONE (OUTPATIENT)
Dept: OBGYN | Facility: CLINIC | Age: 34
End: 2019-08-05

## 2019-08-05 ENCOUNTER — OFFICE VISIT (OUTPATIENT)
Dept: INTERNAL MEDICINE | Facility: CLINIC | Age: 34
End: 2019-08-05
Attending: INTERNAL MEDICINE
Payer: COMMERCIAL

## 2019-08-05 VITALS
HEART RATE: 82 BPM | DIASTOLIC BLOOD PRESSURE: 74 MMHG | HEIGHT: 64 IN | WEIGHT: 140.2 LBS | BODY MASS INDEX: 23.93 KG/M2 | SYSTOLIC BLOOD PRESSURE: 114 MMHG

## 2019-08-05 DIAGNOSIS — J02.9 SORE THROAT: Primary | ICD-10-CM

## 2019-08-05 LAB
DEPRECATED S PYO AG THROAT QL EIA: NORMAL
SPECIMEN SOURCE: NORMAL

## 2019-08-05 PROCEDURE — 87880 STREP A ASSAY W/OPTIC: CPT | Performed by: INTERNAL MEDICINE

## 2019-08-05 PROCEDURE — 87081 CULTURE SCREEN ONLY: CPT | Performed by: INTERNAL MEDICINE

## 2019-08-05 ASSESSMENT — MIFFLIN-ST. JEOR: SCORE: 1310.94

## 2019-08-05 NOTE — LETTER
2019       RE: Lesley Mahmood  [address] [id number]     Dear Colleague,    Thank you for referring your patient, Lesley Mahmood, to the [facility]  at [facility]. Please see a copy of my visit note below.                           SUBJECTIVE:  Lesley Mahmood is a 34 year old  female at 9w4d who comes in for feeling poorly. She was on service last week so was more fatigued than normal. Then, over the weekend, she started feeling worse with fatigue and nausea. Also had a sore throat last night and was achy. Does not think she's had a fever. Also had dry heaves this AM and couldn't go to clinic. Her son is in  and there is strep in his room, but he only has a runny nose without a sore throat.     Past Medical History:   Diagnosis Date     NO ACTIVE PROBLEMS (aka NONE)      Past Surgical History:   Procedure Laterality Date     birthmark removal       EXTRACTION(S) DENTAL       Family History   Problem Relation Age of Onset     Cancer Father         CLL stable      Cardiovascular Maternal Grandmother         strokes     Hypertension Maternal Grandmother      Cerebrovascular Disease Paternal Grandmother      Hypertension Paternal Grandmother      Other Cancer Paternal Grandfather         CLL     Hypertension Paternal Grandfather      Hypertension Mother      Hypertension Maternal Grandfather      Hemochromatosis Cousin      Social History     Tobacco Use     Smoking status: Never Smoker     Smokeless tobacco: Never Used   Substance Use Topics     Alcohol use: Yes     Alcohol/week: 0.6 oz     Types: 1 Standard drinks or equivalent per week     Comment: stopped in preganncy     Drug use: No   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     ROS:   Constitutional, neuro, ENT, endocrine, pulmonary, cardiac, gastrointestinal, genitourinary, musculoskeletal, integument and psychiatric  "systems are negative, except as otherwise noted.    OBJECTIVE:    /74   Pulse 82   Ht 1.626 m (5' 4\")   Wt 63.6 kg (140 lb 3.2 oz)   LMP 05/30/2019   BMI 24.07 kg/m      Wt Readings from Last 1 Encounters:   08/05/19 63.6 kg (140 lb 3.2 oz)     Gen: Pleasant female, in NAD  ENT: Unable to visualize oropharynx due to inability to tolerate exam; TMs normal.   Neck: No LAD, but is tender in anterior chains  Resp: lungs CAB  CV: Heart RRR, no MRG  Neuro: AOx3, no focal deficits  Skin: warm, dry, no rash  ExT: WWP, no LE edema     ASSESSMENT/PLAN:    Lesley was seen today for pharyngitis.    Diagnoses and all orders for this visit:    Sore throat. Unable to visualize posterior oropharynx, but was able to get adequate swab for strep. Likely viral illness though.   -     Beta strep group B r/o culture; Future  -     Rapid strep screen    Morning sickness  Can trial Vit B6 and taking only folate (not tolerating PNV well)     Pt should return to clinic for f/u with me in PRN      Maria Elena Sam MD  08/05/19        "

## 2019-08-05 NOTE — TELEPHONE ENCOUNTER
Spoke to Lesley who is about 9 wks GA who has a sore throat and one of her children did have strep throat. She reports not sure if she can get in with her PCP so will see IM today at [facility].Pt indicated understanding and agreed with plan.

## 2019-08-05 NOTE — PROGRESS NOTES
"                           SUBJECTIVE:  Lesley Mahmood is a 34 year old  female at 9w4d who comes in for feeling poorly. She was on service last week so was more fatigued than normal. Then, over the weekend, she started feeling worse with fatigue and nausea. Also had a sore throat last night and was achy. Does not think she's had a fever. Also had dry heaves this AM and couldn't go to clinic. Her son is in  and there is strep in his room, but he only has a runny nose without a sore throat.     Past Medical History:   Diagnosis Date     NO ACTIVE PROBLEMS (aka NONE)      Past Surgical History:   Procedure Laterality Date     birthmark removal       EXTRACTION(S) DENTAL       Family History   Problem Relation Age of Onset     Cancer Father         CLL stable      Cardiovascular Maternal Grandmother         strokes     Hypertension Maternal Grandmother      Cerebrovascular Disease Paternal Grandmother      Hypertension Paternal Grandmother      Other Cancer Paternal Grandfather         CLL     Hypertension Paternal Grandfather      Hypertension Mother      Hypertension Maternal Grandfather      Hemochromatosis Cousin      Social History     Tobacco Use     Smoking status: Never Smoker     Smokeless tobacco: Never Used   Substance Use Topics     Alcohol use: Yes     Alcohol/week: 0.6 oz     Types: 1 Standard drinks or equivalent per week     Comment: stopped in preganncy     Drug use: No   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     ROS:   Constitutional, neuro, ENT, endocrine, pulmonary, cardiac, gastrointestinal, genitourinary, musculoskeletal, integument and psychiatric systems are negative, except as otherwise noted.    OBJECTIVE:    /74   Pulse 82   Ht 1.626 m (5' 4\")   Wt 63.6 kg (140 lb 3.2 oz)   LMP 2019   BMI 24.07 kg/m     Wt Readings from Last 1 Encounters:   19 63.6 kg (140 lb 3.2 oz)     Gen: Pleasant female, in " NAD  ENT: Unable to visualize oropharynx due to inability to tolerate exam; TMs normal.   Neck: No LAD, but is tender in anterior chains  Resp: lungs CAB  CV: Heart RRR, no MRG  Neuro: AOx3, no focal deficits  Skin: warm, dry, no rash  ExT: WWP, no LE edema     ASSESSMENT/PLAN:    Lesley was seen today for pharyngitis.    Diagnoses and all orders for this visit:    Sore throat. Unable to visualize posterior oropharynx, but was able to get adequate swab for strep. Likely viral illness though.   -     Beta strep group B r/o culture; Future  -     Rapid strep screen    Morning sickness  Can trial Vit B6 and taking only folate (not tolerating PNV well)       Pt should return to clinic for f/u with me in PRN      Maria Elena Sam MD  08/05/19

## 2019-08-07 LAB
BACTERIA SPEC CULT: NORMAL
Lab: NORMAL
SPECIMEN SOURCE: NORMAL

## 2019-08-15 ENCOUNTER — OFFICE VISIT (OUTPATIENT)
Dept: OBGYN | Facility: CLINIC | Age: 34
End: 2019-08-15
Attending: ADVANCED PRACTICE MIDWIFE
Payer: COMMERCIAL

## 2019-08-15 VITALS
WEIGHT: 139.7 LBS | DIASTOLIC BLOOD PRESSURE: 70 MMHG | SYSTOLIC BLOOD PRESSURE: 111 MMHG | HEART RATE: 72 BPM | HEIGHT: 64 IN | BODY MASS INDEX: 23.85 KG/M2

## 2019-08-15 DIAGNOSIS — O09.521 MULTIGRAVIDA OF ADVANCED MATERNAL AGE IN FIRST TRIMESTER: ICD-10-CM

## 2019-08-15 DIAGNOSIS — Z12.4 SCREENING FOR MALIGNANT NEOPLASM OF CERVIX: ICD-10-CM

## 2019-08-15 DIAGNOSIS — O09.529 SUPERVISION OF HIGH-RISK PREGNANCY OF ELDERLY MULTIGRAVIDA: ICD-10-CM

## 2019-08-15 DIAGNOSIS — Z34.81 ENCOUNTER FOR SUPERVISION OF OTHER NORMAL PREGNANCY IN FIRST TRIMESTER: ICD-10-CM

## 2019-08-15 PROCEDURE — 87591 N.GONORRHOEAE DNA AMP PROB: CPT | Performed by: ADVANCED PRACTICE MIDWIFE

## 2019-08-15 PROCEDURE — 87491 CHLMYD TRACH DNA AMP PROBE: CPT | Performed by: ADVANCED PRACTICE MIDWIFE

## 2019-08-15 PROCEDURE — G0463 HOSPITAL OUTPT CLINIC VISIT: HCPCS

## 2019-08-15 ASSESSMENT — ANXIETY QUESTIONNAIRES
6. BECOMING EASILY ANNOYED OR IRRITABLE: NOT AT ALL
1. FEELING NERVOUS, ANXIOUS, OR ON EDGE: NOT AT ALL
5. BEING SO RESTLESS THAT IT IS HARD TO SIT STILL: NOT AT ALL
3. WORRYING TOO MUCH ABOUT DIFFERENT THINGS: NOT AT ALL
2. NOT BEING ABLE TO STOP OR CONTROL WORRYING: NOT AT ALL
IF YOU CHECKED OFF ANY PROBLEMS ON THIS QUESTIONNAIRE, HOW DIFFICULT HAVE THESE PROBLEMS MADE IT FOR YOU TO DO YOUR WORK, TAKE CARE OF THINGS AT HOME, OR GET ALONG WITH OTHER PEOPLE: NOT DIFFICULT AT ALL
7. FEELING AFRAID AS IF SOMETHING AWFUL MIGHT HAPPEN: NOT AT ALL
GAD7 TOTAL SCORE: 0

## 2019-08-15 ASSESSMENT — PAIN SCALES - GENERAL: PAINLEVEL: NO PAIN (0)

## 2019-08-15 ASSESSMENT — MIFFLIN-ST. JEOR: SCORE: 1308.68

## 2019-08-15 ASSESSMENT — PATIENT HEALTH QUESTIONNAIRE - PHQ9
SUM OF ALL RESPONSES TO PHQ QUESTIONS 1-9: 0
5. POOR APPETITE OR OVEREATING: NOT AT ALL

## 2019-08-15 NOTE — PROGRESS NOTES
SUBJECTIVE:   Lesley is a 34 year old female who presents to clinic for a new OB visit.   at 11w0d with Estimated Date of Delivery: Mar 5, 2020 based on US confirms. Feels well. Has  started PNV.     She has not had bleeding since her LMP.   She has had mild nausea. Weight loss has not occurred.   This was a planned pregnancy.   FOB is involved,       OTHER CONCERNS:     ===========================================   ROS: 10 point ROS neg other than the symptoms noted above in the HPI.      PSYCHIATRIC:  Denies mood changes, depression or anxiety  PHQ-9 score:    PHQ-9 SCORE 8/15/2019   PHQ-9 Total Score 0     DOLORES-7 SCORE 2017 2018 8/15/2019   Total Score - 0 (minimal anxiety) -   Total Score 0 0 0         Past History:  Her past medical history   Past Medical History:   Diagnosis Date     NO ACTIVE PROBLEMS (aka NONE)    .   Her past pregnancies have been uncomplicated  Since her last LMP she denies use of alcohol, tobacco and street drugs.  HISTORY:  Family History   Problem Relation Age of Onset     Cancer Father         CLL stable      Cardiovascular Maternal Grandmother         strokes     Hypertension Maternal Grandmother      Cerebrovascular Disease Paternal Grandmother      Hypertension Paternal Grandmother      Other Cancer Paternal Grandfather         CLL     Hypertension Paternal Grandfather      Hypertension Mother      Hypertension Maternal Grandfather      Hemochromatosis Cousin      Unknown/Adopted Sister      Unknown/Adopted Brother      Social History     Socioeconomic History     Marital status:      Spouse name: Mauro Pena     Number of children: 1     Years of education: None     Highest education level: None   Occupational History     Occupation: endocrine      Comment: FT   Social Needs     Financial resource strain: None     Food insecurity:     Worry: None     Inability: None     Transportation needs:     Medical: None     Non-medical: None   Tobacco Use      Smoking status: Never Smoker     Smokeless tobacco: Never Used   Substance and Sexual Activity     Alcohol use: Yes     Alcohol/week: 0.6 oz     Types: 1 Standard drinks or equivalent per week     Comment: stopped in preganncy     Drug use: No     Sexual activity: Yes     Partners: Male     Birth control/protection: IUD     Comment: Mirena   Lifestyle     Physical activity:     Days per week: None     Minutes per session: None     Stress: None   Relationships     Social connections:     Talks on phone: None     Gets together: None     Attends [employer] service: None     Active member of club or organization: None     Attends meetings of clubs or organizations: None     Relationship status: None     Intimate partner violence:     Fear of current or ex partner: None     Emotionally abused: None     Physically abused: None     Forced sexual activity: None   Other Topics Concern     Parent/sibling w/ CABG, MI or angioplasty before 65F 55M? Not Asked      Service No     Blood Transfusions No     Caffeine Concern Yes     Comment: 1 cup      Occupational Exposure No     Hobby Hazards No     Sleep Concern No     Stress Concern No     Weight Concern No     Special Diet No     Back Care No     Exercise Yes     Comment: yoga 2 times and walking     Bike Helmet Yes     Seat Belt Yes     Self-Exams Yes     Comment: occ   Social History Narrative    How much exercise per week? Yoga and walking    How much calcium per day? In foods and supplements       How much caffeine per day? 1    How much vitamin D per day? In foods    Do you/your family wear seatbelts?  Yes    Do you/your family use safety helmets? Yes    Do you/your family use sunscreen? Yes    Do you/your family keep firearms in the home? No    Do you/your family have a smoke detector(s)? Yes        Do you feel safe in your home? Yes    Has anyone ever touched you in an unwanted manner? No    Reviewed alivia cortes  6-     Current Outpatient Medications  "  Medication Sig     Nutritional Supplements (VITAMIN D BOOSTER PO) Take 5,000 Units by mouth     Prenatal Vit-Fe Fumarate-FA (PRENATAL VITAMIN PO)      No current facility-administered medications for this visit.      No Known Allergies    ============================================  MEDICAL HISTORY  Past Medical History:   Diagnosis Date     NO ACTIVE PROBLEMS (aka NONE)      Past Surgical History:   Procedure Laterality Date     birthmark removal       EXTRACTION(S) DENTAL         OB History    Para Term  AB Living   2 1 1 0 0 1   SAB TAB Ectopic Multiple Live Births   0 0 0 0 1      # Outcome Date GA Lbr Luis/2nd Weight Sex Delivery Anes PTL Lv   2 Current            1 Term 16 40w4d 02:00 / 01:14 3.374 kg (7 lb 7 oz) M Vag-Spont Pud N JUAN LUIS      Name: Darryl       Apgar1: 9  Apgar5: 9      Obstetric Comments   2nd degree lac well healed  NO GDM or HTN          GYN History-  NO Abnormal Pap Smears                        Cervical procedures: none                        History of STI: none    I personally reviewed the past social/family/medical and surgical history on the date of service.   I reviewed lab work done at Intake visit with patient.    EXAM:  /70   Pulse 72   Ht 1.626 m (5' 4\")   Wt 63.4 kg (139 lb 11.2 oz)   LMP 2019   BMI 23.98 kg/m     EXAM:  GENERAL:  Pleasant pregnant female, alert, cooperative  and well groomed.  SKIN:  Warm and dry, without lesions or rashes  HEAD: Symmetrical features.  MOUTH:  Buccal mucosa pink, moist without lesions.  Teeth in good repair.    NECK:  Thyroid without enlargement and nodules.  Lymph nodes not palpable.   LUNGS:  Clear to auscultation.  BREAST:    No dominant, fixed or suspicious masses are noted.  No skin or nipple changes or axillary nodes.   Nipples everted.      HEART:  RRR without murmur.  ABDOMEN: Soft without masses , tenderness or organomegaly.  No CVA tenderness.  Uterus palpable at size equal to dates.  No " scars noted.. Fetal heart tones present.  MUSCULOSKELETAL:  Full range of motion  EXTREMITIES:  No edema. No significant varicosities.   PELVIC EXAM:  GENITALIA: EGBUS  External genitalia, Bartholin's glands, urethra & Moultrie's glands:normal. Vulva reveals no erythema or lesions.         VAGINA:  pink, normal rugae and discharge, no lesions, good tone.   CERVIX:  smooth, without discharge or CMT.                UTERUS: Anteverted,  nontender 11 week size.   ADNEXA:  Without masses or tenderness.   RECTAL:  Normal appearance.  Digital exam deferred.  WET PREP:Not done  GC/CHLAMYDIA CULTURE OBTAINED:YES    Lab Results   Component Value Date    PAP NIL 2014          ASSESSMENT:  34 year old , 11w0d weeks of pregnancy with JIE of Mar 5, 2020 by US confirms  Intrauterine pregnancy 11w0d size  consistent with dates  Genetic Screening: First Trimester Screen    ICD-10-CM    1. Encounter for supervision of other normal pregnancy in first trimester Z34.81        PLAN:  - Reviewed use of triage nurse line and contacting the on-call provider after hours for an urgent need such as fever, vagina bleeding, bladder or vaginal infection, rupture of membranes,  or term labor.    - Reviewed best evidence for: weight gain for her weight and height for pregnancy:  Based on pre-pregnancy weight  Body mass index is 23.98 kg/m . RECOMMENDED WEIGHT GAIN: 25-35 lbs.    - Reviewed healthy diet and foods to avoid; exercise and activity during pregnancy; avoiding exposure to toxoplasmosis; and maintenance of a generally healthy lifestyle.   - Discussed the harms, benefits, side effects and alternative therapies for current prescribed and OTC medications.      - All pt's questions discussed and answered.  Pt verbalized understanding of and agreement to plan of care.     - Continue scheduled prenatal care and prn if questions or concerns    LEONARD Thomas CNM

## 2019-08-15 NOTE — LETTER
8/15/2019       RE: Lesley Mahmood  [address] [facility] [address]     Dear Colleague,    Thank you for referring your patient, Lesley Mahmood, to the [facility] at [facility]. Please see a copy of my visit note below.    SUBJECTIVE:   Lesley is a 34 year old female who presents to clinic for a new OB visit.   at 11w0d with Estimated Date of Delivery: Mar 5, 2020 based on US confirms. Feels well. Has  started PNV.     She has not had bleeding since her LMP.   She has had mild nausea. Weight loss has not occurred.   This was a planned pregnancy.   FOB is involved,       OTHER CONCERNS:     ===========================================  ROS: 10 point ROS neg other than the symptoms noted above in the HPI.    PSYCHIATRIC:  Denies mood changes, depression or anxiety  PHQ-9 score:    PHQ-9 SCORE 8/15/2019   PHQ-9 Total Score 0     DOLORES-7 SCORE 2017 2018 8/15/2019   Total Score - 0 (minimal anxiety) -   Total Score 0 0 0         Past History:  Her past medical history   Past Medical History:   Diagnosis Date     NO ACTIVE PROBLEMS (aka NONE)    .   Her past pregnancies have been uncomplicated  Since her last LMP she denies use of alcohol, tobacco and street drugs.  HISTORY:  Family History   Problem Relation Age of Onset     Cancer Father         CLL stable      Cardiovascular Maternal Grandmother         strokes     Hypertension Maternal Grandmother      Cerebrovascular Disease Paternal Grandmother      Hypertension Paternal Grandmother      Other Cancer Paternal Grandfather         CLL     Hypertension Paternal Grandfather      Hypertension Mother      Hypertension Maternal Grandfather      Hemochromatosis Cousin      Unknown/Adopted Sister      Unknown/Adopted Brother      Social History     Socioeconomic History     Marital status:      Spouse name: Mauro Pena     Number of children: 1     Years of education: None      Highest education level: None   Occupational History     Occupation: endocrine      Comment: FT   Social Needs     Financial resource strain: None     Food insecurity:     Worry: None     Inability: None     Transportation needs:     Medical: None     Non-medical: None   Tobacco Use     Smoking status: Never Smoker     Smokeless tobacco: Never Used   Substance and Sexual Activity     Alcohol use: Yes     Alcohol/week: 0.6 oz     Types: 1 Standard drinks or equivalent per week     Comment: stopped in preganncy     Drug use: No     Sexual activity: Yes     Partners: Male     Birth control/protection: IUD     Comment: Mirena   Lifestyle     Physical activity:     Days per week: None     Minutes per session: None     Stress: None   Relationships     Social connections:     Talks on phone: None     Gets together: None     Attends Jehovah's witness service: None     Active member of club or organization: None     Attends meetings of clubs or organizations: None     Relationship status: None     Intimate partner violence:     Fear of current or ex partner: None     Emotionally abused: None     Physically abused: None     Forced sexual activity: None   Other Topics Concern     Parent/sibling w/ CABG, MI or angioplasty before 65F 55M? Not Asked      Service No     Blood Transfusions No     Caffeine Concern Yes     Comment: 1 cup      Occupational Exposure No     Hobby Hazards No     Sleep Concern No     Stress Concern No     Weight Concern No     Special Diet No     Back Care No     Exercise Yes     Comment: yoga 2 times and walking     Bike Helmet Yes     Seat Belt Yes     Self-Exams Yes     Comment: occ   Social History Narrative    How much exercise per week? Yoga and walking    How much calcium per day? In foods and supplements       How much caffeine per day? 1    How much vitamin D per day? In foods    Do you/your family wear seatbelts?  Yes    Do you/your family use safety helmets? Yes    Do you/your family use  "sunscreen? Yes    Do you/your family keep firearms in the home? No    Do you/your family have a smoke detector(s)? Yes        Do you feel safe in your home? Yes    Has anyone ever touched you in an unwanted manner? No    Reviewed ki sebastian  2015     Current Outpatient Medications   Medication Sig     Nutritional Supplements (VITAMIN D BOOSTER PO) Take 5,000 Units by mouth     Prenatal Vit-Fe Fumarate-FA (PRENATAL VITAMIN PO)      No current facility-administered medications for this visit.      No Known Allergies  ============================================  MEDICAL HISTORY  Past Medical History:   Diagnosis Date     NO ACTIVE PROBLEMS (aka NONE)      Past Surgical History:   Procedure Laterality Date     birthmark removal       EXTRACTION(S) DENTAL       OB History    Para Term  AB Living   2 1 1 0 0 1   SAB TAB Ectopic Multiple Live Births   0 0 0 0 1      # Outcome Date GA Lbr Luis/2nd Weight Sex Delivery Anes PTL Lv   2 Current            1 Term 16 40w4d 02:00 / 01:14 3.374 kg (7 lb 7 oz) M Vag-Spont Pud N JUAN LUIS      Name: Darryl       Apgar1: 9  Apgar5: 9      Obstetric Comments   2nd degree lac well healed  NO GDM or HTN        GYN History-  NO Abnormal Pap Smears                        Cervical procedures: none                        History of STI: none    I personally reviewed the past social/family/medical and surgical history on the date of service.   I reviewed lab work done at Intake visit with patient.    EXAM:  /70   Pulse 72   Ht 1.626 m (5' 4\")   Wt 63.4 kg (139 lb 11.2 oz)   LMP 2019   BMI 23.98 kg/m      EXAM:  GENERAL:  Pleasant pregnant female, alert, cooperative  and well groomed.  SKIN:  Warm and dry, without lesions or rashes  HEAD: Symmetrical features.  MOUTH:  Buccal mucosa pink, moist without lesions.  Teeth in good repair.    NECK:  Thyroid without enlargement and nodules.  Lymph nodes not palpable.   LUNGS:  Clear to auscultation.  BREAST: "    No dominant, fixed or suspicious masses are noted.  No skin or nipple changes or axillary nodes.   Nipples everted.      HEART:  RRR without murmur.  ABDOMEN: Soft without masses , tenderness or organomegaly.  No CVA tenderness.  Uterus palpable at size equal to dates.  No scars noted.. Fetal heart tones present.  MUSCULOSKELETAL:  Full range of motion  EXTREMITIES:  No edema. No significant varicosities.   PELVIC EXAM:  GENITALIA: EGBUS  External genitalia, Bartholin's glands, urethra & Elkmont's glands:normal. Vulva reveals no erythema or lesions.         VAGINA:  pink, normal rugae and discharge, no lesions, good tone.   CERVIX:  smooth, without discharge or CMT.                UTERUS: Anteverted,  nontender 11 week size.   ADNEXA:  Without masses or tenderness.   RECTAL:  Normal appearance.  Digital exam deferred.  WET PREP:Not done  GC/CHLAMYDIA CULTURE OBTAINED:YES    Lab Results   Component Value Date    PAP NIL 2014        ASSESSMENT:  34 year old , 11w0d weeks of pregnancy with JIE of Mar 5, 2020 by US confirms  Intrauterine pregnancy 11w0d size  consistent with dates  Genetic Screening: First Trimester Screen    ICD-10-CM    1. Encounter for supervision of other normal pregnancy in first trimester Z34.81        PLAN:  - Reviewed use of triage nurse line and contacting the on-call provider after hours for an urgent need such as fever, vagina bleeding, bladder or vaginal infection, rupture of membranes,  or term labor.    - Reviewed best evidence for: weight gain for her weight and height for pregnancy:  Based on pre-pregnancy weight  Body mass index is 23.98 kg/m . RECOMMENDED WEIGHT GAIN: 25-35 lbs.    - Reviewed healthy diet and foods to avoid; exercise and activity during pregnancy; avoiding exposure to toxoplasmosis; and maintenance of a generally healthy lifestyle.   - Discussed the harms, benefits, side effects and alternative therapies for current prescribed and OTC  medications.      - All pt's questions discussed and answered.  Pt verbalized understanding of and agreement to plan of care.     - Continue scheduled prenatal care and prn if questions or concerns    Izzy Berrios, LEONARD STOKES

## 2019-08-16 LAB
C TRACH DNA SPEC QL NAA+PROBE: NEGATIVE
N GONORRHOEA DNA SPEC QL NAA+PROBE: NEGATIVE
SPECIMEN SOURCE: NORMAL
SPECIMEN SOURCE: NORMAL

## 2019-08-16 ASSESSMENT — ANXIETY QUESTIONNAIRES: GAD7 TOTAL SCORE: 0

## 2019-08-21 ENCOUNTER — PRE VISIT (OUTPATIENT)
Dept: MATERNAL FETAL MEDICINE | Facility: CLINIC | Age: 34
End: 2019-08-21

## 2019-08-23 ENCOUNTER — OFFICE VISIT (OUTPATIENT)
Dept: MATERNAL FETAL MEDICINE | Facility: CLINIC | Age: 34
End: 2019-08-23
Attending: MIDWIFE
Payer: COMMERCIAL

## 2019-08-23 ENCOUNTER — HOSPITAL ENCOUNTER (OUTPATIENT)
Dept: ULTRASOUND IMAGING | Facility: CLINIC | Age: 34
Discharge: HOME | End: 2019-08-23
Attending: MIDWIFE | Admitting: MIDWIFE
Payer: COMMERCIAL

## 2019-08-23 DIAGNOSIS — O09.521 SUPERVISION OF ELDERLY MULTIGRAVIDA IN FIRST TRIMESTER: Primary | ICD-10-CM

## 2019-08-23 DIAGNOSIS — Z34.81 ENCOUNTER FOR SUPERVISION OF OTHER NORMAL PREGNANCY IN FIRST TRIMESTER: ICD-10-CM

## 2019-08-23 DIAGNOSIS — O26.90 PREGNANCY RELATED CONDITION, ANTEPARTUM: ICD-10-CM

## 2019-08-23 DIAGNOSIS — O09.521 SUPERVISION OF ELDERLY MULTIGRAVIDA IN FIRST TRIMESTER: ICD-10-CM

## 2019-08-23 PROCEDURE — 36415 COLL VENOUS BLD VENIPUNCTURE: CPT | Performed by: OBSTETRICS & GYNECOLOGY

## 2019-08-23 PROCEDURE — 40000791 ZZHCL STATISTIC VERIFI PRENATAL TRISOMY 21,18,13: Performed by: OBSTETRICS & GYNECOLOGY

## 2019-08-23 PROCEDURE — 40000072 ZZH STATISTIC GENETIC COUNSELING, < 16 MIN: Mod: ZF | Performed by: GENETIC COUNSELOR, MS

## 2019-08-23 PROCEDURE — 76813 OB US NUCHAL MEAS 1 GEST: CPT

## 2019-08-23 NOTE — PROGRESS NOTES
Please refer to ultrasound report under 'Imaging' Studies of 'Chart Review' tabs.    Morris España M.D.

## 2019-08-23 NOTE — PROGRESS NOTES
[facility]  Genetic Counseling Consult    Patient: Lesley Mahmood YOB: 1985   Date of Service: 19      Lesley Mahmood was seen at [facility] for genetic consultation to discuss the options for screening and testing for fetal chromosome abnormalities.  The indication for genetic counseling is advanced maternal age. The patient was accompanied by her partner, Mauro to today's visit.        Impression/Plan:   1.  Lesley had an ultrasound and blood draw for NIPT (Innatal test through [facility]).  Results are expected within 7-10 days, and will be available in [employer].  We will contact her to discuss the results, and a copy will be forwarded to the office of the referring OB provider. Lesley provided verbal permission for results to be left on her voicemail at [phone]. She requested the results  include the sex.     2.  Maternal serum AFP (single marker screen) is recommended after 15 weeks to screen for open neural tube defects. A quad screen should not be performed.    3.  An 18-20 week comprehensive ultrasound is standard of care for all women 35 or older at delivery. Scheduled for 10/15/19.    Pregnancy History:   /Parity:    Age at Delivery: 34 year old  JIE: 3/5/2020, by Last Menstrual Period  Gestational Age: 12w1d    No significant complications or exposures were reported in the current pregnancy.    Lesley hdz pregnancy history is significant for one term vaginal delivery of a healthy boy.    Medical History:   Lesley hdz reported medical history is not expected to impact pregnancy management or risks to fetal development.       Family History:   A three-generation pedigree was previously obtained, see genetic counseling note from 07/07/15. The family history was updated today and is scanned under the  Media  tab.   The following significant findings were reported by Lesley:    It was reported that Mauro's  father has a history of bicuspid aortic valve. Bicuspid aortic valve is one of the most common types of congenital heart disease, affecting approximately one percent of the population. We discussed that there is a strong genetic component to bicuspid aortic valve disease and it is recommended that first degree relatives of an individual with a bicuspid aortic valve undergo cardiac screening. Mauro himself has reportedly had a normal echocardiogram as a child. The couple was encouraged to share this family history information with their pediatrician.  We reviewed the family history of cancer in Mauro's family that was noted at their previous genetic counseling visit. Mauro's mother was found to have colon cancer at age 51 and she recently passed away at age 59. Mauro's maternal grandmother was reported to have breast cancer diagnosed at 71 and his maternal grandfather was reportedly found to have melanoma in his 70's. No other family history of cancer was reported. We discussed how most cancer seen in families occurs sporadically, but some may be due to an underlying genetic etiology. The couple was made aware of the [facility] cancer risk management program if they are interested in more information.     It was reported that Lesley's paternal first cousin was found to have hemochromatosis. We discussed that hereditary hemochromatosis, most commonly due to mutations in the HFE gene, is a disorder in which increased intestinal iron absorption can lead to total-body iron overload; it is among the most common genetic disorders in the world with a carrier frequency in the  population of 1/9. We discussed how Lesley has a 1/4 chance of being a carrier given the relationship of the affected relative in the family, Mauro has a 1/9 chance of being a carrier based on population frequency and therefore the couple has a 1/72 chance of having a child with hereditary hemochromatosis. Importantly, the  majority of individuals with biallelic pathogenic variants in the HFE gene do not develop symptoms and this condition is considered to have low clinical penetrance. Prenatal testing is not usually performed because HFE hemochromatosis is an adult-onset, treatable disorder with low clinical penetrance. Molecular genetic testing is most useful to confirm a diagnosis in an individual with clinical symptoms, however it is available for at risk individuals and most informative when the familial pathogenic variants have been idetnified. We discussed the benefits and limitations to this carrier testing option including clinical utility. At this time, the couple shared they feel comfortable not pursuing genetic testing and were encouraged to follow up if they are interested in the future.     Otherwise, the reported family history is negative for multiple miscarriages, stillbirths, birth defects, intellectual disability, known genetic conditions, and consanguinity.       Carrier Screening:   The patient reports that she and the father of the pregnancy have  ancestry:     Cystic fibrosis is an autosomal recessive genetic condition that occurs with increased frequency in individuals of  ancestry and carrier screening for this condition is available.  In addition,  screening in the [facility] includes cystic fibrosis.      Expanded carrier screening for mutations in a large panel of genes associated with autosomal recessive conditions including cystic fibrosis, spinal muscular atrophy, and others, is now available.      The patient was not certain about whether to pursue carrier screening today.  She was provided with a brochure about her testing options, and contact information if she has questions or wishes to pursue screening.       Risk Assessment for Chromosome Conditions:   We explained that the risk for fetal chromosome abnormalities increases with maternal age. We discussed specific  features of common chromosome abnormalities, including Down syndrome, trisomy 13, trisomy 18, and sex chromosome trisomies.      - At age 34 at midtrimester, the risk to have a baby with Down syndrome is 1 in 216.     - At age 34 at midtrimester, the risk to have a baby with any chromosome abnormality is 1 in 105.     Testing Options:   We discussed the following options:   First trimester screening    First trimester ultrasound with nuchal translucency and nasal bone assessments, maternal plasma hCG, BUZZ-A, and AFP measurement    Screens for fetal trisomy 21, trisomy 13, and trisomy 18    Cannot screen for open neural tube defects; maternal serum AFP after 15 weeks is recommended       Non-invasive Prenatal Testing (NIPT)    Maternal plasma cell-free DNA testing; first trimester ultrasound with nuchal translucency and nasal bone assessment is recommended, when appropriate    Screens for fetal trisomy 21, trisomy 13, trisomy 18, and sex chromosome aneuploidy    Cannot screen for open neural tube defects; maternal serum AFP after 15 weeks is recommended      We reviewed the benefits and limitations of this testing.  Screening tests provide a risk assessment specific to the pregnancy for certain fetal chromosome abnormalities, but cannot definitively diagnose or exclude a fetal chromosome abnormality.  Follow-up genetic counseling and consideration of diagnostic testing is recommended with any abnormal screening result.     Diagnostic tests carry inherent risks- including risk of miscarriage- that require careful consideration.  These tests can detect fetal chromosome abnormalities with greater than 99% certainty.  Results can be compromised by maternal cell contamination or mosaicism, and are limited by the resolution of cytogenetic G-banding technology.  There is no screening nor diagnostic test that can detect all forms of birth defects or mental disability.     It was a pleasure to be involved with Lesley [facility].  Face-to-face time of the meeting was 45 minutes.    Vania Mata MS,   Maternal Fetal Medicine  [facility]  Ph: [phone]  [email or web address]    Patient seen, evaluated and discussed with the Genetic Counseling Intern. I have verified the content of the note, which accurately reflects my assessment of the patient and the plan of care.  Supervising Genetic Counselor  Tamara Cifuentes MS, [facility]  Maternal Fetal Medicine  [facility]  Phone: [phone]  Email: [email or web address]

## 2019-08-27 PROBLEM — O09.521 MULTIGRAVIDA OF ADVANCED MATERNAL AGE IN FIRST TRIMESTER: Status: ACTIVE | Noted: 2019-08-27

## 2019-08-30 ENCOUNTER — TELEPHONE (OUTPATIENT)
Dept: MATERNAL FETAL MEDICINE | Facility: CLINIC | Age: 34
End: 2019-08-30

## 2019-08-30 DIAGNOSIS — O09.521 MULTIGRAVIDA OF ADVANCED MATERNAL AGE IN FIRST TRIMESTER: ICD-10-CM

## 2019-08-30 DIAGNOSIS — Z34.81 ENCOUNTER FOR SUPERVISION OF OTHER NORMAL PREGNANCY IN FIRST TRIMESTER: ICD-10-CM

## 2019-08-30 NOTE — TELEPHONE ENCOUNTER
August 30, 2019  Left a message for Lesley regarding her NIPT results.     Results indicate NO ANEUPLOIDY DETECTED for chromosomes 21, 18, 13, or sex chromosomes (XX).     This puts her current pregnancy at low risk for Down syndrome, trisomy 18, trisomy 13 and sex chromosome abnormalities. This test is reported to have the following sensitivities: Down syndrome- 99%, trisomy 18- 98%, and trisomy 13- 98%. Although these results are reassuring, this does not replace a standard chromosome analysis from a chorionic villus sampling or amniocentesis.     Level II ultrasound is recommended, given AMA.     MSAFP is the appropriate second trimester screening test for open neural tube defects; the maternal quad screen is not recommended.    Her results are available in her Epic chart for her primary OB to review.     Vania Mata MS,   Genetic Counselor  Phone: [phone]  Pager: [phone]

## 2019-09-10 LAB — LAB SCANNED RESULT: NORMAL

## 2019-09-17 ENCOUNTER — TELEPHONE (OUTPATIENT)
Dept: OBGYN | Facility: CLINIC | Age: 34
End: 2019-09-17

## 2019-09-17 NOTE — TELEPHONE ENCOUNTER
Spoke with Lesley and informed her that pap needed to be recollected due to paperwork mistake. She indicated understanding and stated she will talk with Dr. Beasley about it at her next visit. Nurse agreed with plan.  Informed her we would give her a free parking card for her inconvenience.

## 2019-09-17 NOTE — TELEPHONE ENCOUNTER
----- Message from LEONARD Giraldo CNM sent at 9/16/2019 11:52 AM CDT -----  Regarding: RE: HPV cancelled  Woo Hernandez,  It would be great if you could let this patient know. 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  The sample was sent with the wrong paper work, the correct name was on the pap.    Izzy Wilks    ----- Message -----  From: Shu Page RN  Sent: 8/27/2019  11:06 AM  To: LEONARD Giraldo CNM  Subject: HPV cancelled                                    Julieta Magana, This was cancelled due to improper labeling of specimen. The lab completed an I Care. We will need to recollect. Do you want me to call patient to inform her?    Mary    ----- Message -----  From: Izzy Berrios APRN CNM  Sent: 8/27/2019   9:57 AM  To: [facility]    Could you call lab to f/u on this pap?  I don't know why it showed that the HPV ordered was canceled. We did a pap and should have had an HPV as well.  Thank you!

## 2019-09-17 NOTE — TELEPHONE ENCOUNTER
Called patient to discuss error with specimen and will need to recollect but reached voicemail. Left message to call clinic.

## 2019-09-24 ENCOUNTER — OFFICE VISIT (OUTPATIENT)
Dept: OBGYN | Facility: CLINIC | Age: 34
End: 2019-09-24
Attending: OBSTETRICS & GYNECOLOGY
Payer: COMMERCIAL

## 2019-09-24 VITALS
WEIGHT: 148.7 LBS | HEIGHT: 69 IN | SYSTOLIC BLOOD PRESSURE: 119 MMHG | HEART RATE: 85 BPM | DIASTOLIC BLOOD PRESSURE: 77 MMHG | BODY MASS INDEX: 22.02 KG/M2

## 2019-09-24 DIAGNOSIS — O09.529 SUPERVISION OF HIGH-RISK PREGNANCY OF ELDERLY MULTIGRAVIDA: ICD-10-CM

## 2019-09-24 DIAGNOSIS — Z36.3 SCREENING, ANTENATAL, FOR MALFORMATION BY ULTRASOUND: Primary | ICD-10-CM

## 2019-09-24 PROCEDURE — 25000128 H RX IP 250 OP 636: Mod: ZF

## 2019-09-24 PROCEDURE — G0008 ADMIN INFLUENZA VIRUS VAC: HCPCS

## 2019-09-24 PROCEDURE — 82105 ALPHA-FETOPROTEIN SERUM: CPT | Performed by: OBSTETRICS & GYNECOLOGY

## 2019-09-24 PROCEDURE — 36415 COLL VENOUS BLD VENIPUNCTURE: CPT | Performed by: OBSTETRICS & GYNECOLOGY

## 2019-09-24 PROCEDURE — G0463 HOSPITAL OUTPT CLINIC VISIT: HCPCS

## 2019-09-24 PROCEDURE — 90686 IIV4 VACC NO PRSV 0.5 ML IM: CPT | Mod: ZF

## 2019-09-24 ASSESSMENT — MIFFLIN-ST. JEOR: SCORE: 1428.88

## 2019-09-24 NOTE — PROGRESS NOTES
"Doing great.  N/v is improving.      /77   Pulse 85   Ht 1.753 m (5' 9\")   Wt 67.4 kg (148 lb 11.2 oz)   LMP 2019   BMI 21.96 kg/m    See flow    A/p: 35 yo  at 16+5 for ESTEFANY.     1. PNC- UTD aside from pap smear. Due . Pt declines and will plan to repeat PP w/ HPV cotesting.  Wt gain is sufficient. Flu shot today.   2. AFP only today per request after discussing utility  3. Level 2 scheduled  4. Follow-up here in 4 wks.     Hansa Beasley MD, FACOG  [facility]  OB/GYN    2019  9:29 AM      "

## 2019-09-24 NOTE — LETTER
"2019       RE: Lesley Mahmood  [address]  [facility] [facility] [address]     Dear Colleague,    Thank you for referring your patient, Lesley Mahmood, to the [facility] at [facility]. Please see a copy of my visit note below.    Doing great.  N/v is improving.      /77   Pulse 85   Ht 1.753 m (5' 9\")   Wt 67.4 kg (148 lb 11.2 oz)   LMP 2019   BMI 21.96 kg/m     See flow    A/p: 33 yo  at 16+5 for ESTEFANY.     1. PNC- UTD aside from pap smear. Due . Pt declines and will plan to repeat PP w/ HPV cotesting.  Wt gain is sufficient. Flu shot today.   2. AFP only today per request after discussing utility  3. Level 2 scheduled  4. Follow-up here in 4 wks.     Hansa Beasley MD, FACOG  [facility]  OB/GYN    2019  9:29 AM            "

## 2019-09-26 LAB
# FETUSES US: NORMAL
# FETUSES: NORMAL
AFP ADJ MOM AMN: 1.54
AFP SERPL-MCNC: 56 NG/ML
AGE - REPORTED: 36.9 YR
CURRENT SMOKER: NO
CURRENT SMOKER: NO
DIABETES STATUS PATIENT: NO
FAMILY MEMBER DISEASES HX: NO
FAMILY MEMBER DISEASES HX: NO
GA METHOD: NORMAL
GA METHOD: NORMAL
GA: NORMAL WK
IDDM PATIENT QL: NO
INTEGRATED SCN PATIENT-IMP: NORMAL
LMP START DATE: NORMAL
MONOCHORIONIC TWINS: NO
SERVICE CMNT-IMP: NO
SPECIMEN DRAWN SERPL: NORMAL
VALPROIC/CARBAMAZEPINE STATUS: NO
WEIGHT UNITS: NORMAL

## 2019-10-04 ENCOUNTER — HEALTH MAINTENANCE LETTER (OUTPATIENT)
Age: 34
End: 2019-10-04

## 2019-10-15 ENCOUNTER — OFFICE VISIT (OUTPATIENT)
Dept: MATERNAL FETAL MEDICINE | Facility: CLINIC | Age: 34
End: 2019-10-15
Attending: MIDWIFE
Payer: COMMERCIAL

## 2019-10-15 ENCOUNTER — HOSPITAL ENCOUNTER (OUTPATIENT)
Dept: ULTRASOUND IMAGING | Facility: CLINIC | Age: 34
Discharge: HOME | End: 2019-10-15
Attending: MIDWIFE | Admitting: MIDWIFE
Payer: COMMERCIAL

## 2019-10-15 DIAGNOSIS — O09.522 MULTIGRAVIDA OF ADVANCED MATERNAL AGE IN SECOND TRIMESTER: Primary | ICD-10-CM

## 2019-10-15 DIAGNOSIS — O26.90 PREGNANCY RELATED CONDITION, ANTEPARTUM: ICD-10-CM

## 2019-10-15 PROCEDURE — 76811 OB US DETAILED SNGL FETUS: CPT

## 2019-10-15 NOTE — PROGRESS NOTES
"Please see \"Imaging\" tab under \"Chart Review\" for details of today's ultrasound.    Edmundo Jose M.D.  Specialist in Maternal-Fetal Medicine     "

## 2019-10-22 ENCOUNTER — OFFICE VISIT (OUTPATIENT)
Dept: OBGYN | Facility: CLINIC | Age: 34
End: 2019-10-22
Attending: OBSTETRICS & GYNECOLOGY
Payer: COMMERCIAL

## 2019-10-22 VITALS
DIASTOLIC BLOOD PRESSURE: 67 MMHG | BODY MASS INDEX: 26.14 KG/M2 | HEIGHT: 64 IN | SYSTOLIC BLOOD PRESSURE: 100 MMHG | WEIGHT: 153.1 LBS | HEART RATE: 65 BPM

## 2019-10-22 DIAGNOSIS — O09.529 SUPERVISION OF HIGH-RISK PREGNANCY OF ELDERLY MULTIGRAVIDA: Primary | ICD-10-CM

## 2019-10-22 ASSESSMENT — MIFFLIN-ST. JEOR: SCORE: 1369.46

## 2019-10-22 NOTE — LETTER
"10/22/2019       RE: Lesley Mahmood  [address]  [facility] [address]     Dear Colleague,    Thank you for referring your patient, Lesley Mahmood, to the [facility] at [facility]. Please see a copy of my visit note below.    Doing well. Work is busy and trying to stack calls to complete before delivery. Level 2 normal earlier this month.     /67   Pulse 65   Ht 1.626 m (5' 4\")   Wt 69.4 kg (153 lb 1.6 oz)   LMP 2019   BMI 26.28 kg/m     See flow    A/p: 35yo  at 20+5 wks, ESTEFANY.    1.PNC, UTD.  28wk labs routine time  2. F/u 4 wks    Hansa Beasley MD, FACOG  [facility]  OB/GYN  10/22/2019  11:55 AM            "

## 2019-10-22 NOTE — PROGRESS NOTES
"Doing well. Work is busy and trying to stack calls to complete before delivery. Level 2 normal earlier this month.     /67   Pulse 65   Ht 1.626 m (5' 4\")   Wt 69.4 kg (153 lb 1.6 oz)   LMP 2019   BMI 26.28 kg/m    See flow    A/p: 33yo  at 20+5 wks, ESTEFANY.    1.PNC, UTD.  28wk labs routine time  2. F/u 4 wks    Hansa Beasley MD, FACOG  [facility]  OB/GYN    10/22/2019  11:55 AM      "

## 2019-11-19 ENCOUNTER — OFFICE VISIT (OUTPATIENT)
Dept: OBGYN | Facility: CLINIC | Age: 34
End: 2019-11-19
Attending: OBSTETRICS & GYNECOLOGY
Payer: COMMERCIAL

## 2019-11-19 VITALS
SYSTOLIC BLOOD PRESSURE: 115 MMHG | HEIGHT: 64 IN | HEART RATE: 86 BPM | WEIGHT: 160.9 LBS | BODY MASS INDEX: 27.47 KG/M2 | DIASTOLIC BLOOD PRESSURE: 72 MMHG

## 2019-11-19 DIAGNOSIS — O09.529 SUPERVISION OF HIGH-RISK PREGNANCY OF ELDERLY MULTIGRAVIDA: Primary | ICD-10-CM

## 2019-11-19 PROCEDURE — G0463 HOSPITAL OUTPT CLINIC VISIT: HCPCS | Mod: ZF

## 2019-11-19 ASSESSMENT — MIFFLIN-ST. JEOR: SCORE: 1404.84

## 2019-11-19 NOTE — PROGRESS NOTES
"SUBJECTIVE   She is doing well. The nausea she had early in pregnancy has resolved. Denies vaginal bleeding or leakage of fluid. + fetal movement. Denies any contractions. Notes feeling abdominal tightness yesterday while ambulating, which resolved with rest and she has not felt this today.  Denies HA, changes in vision, epigastric pain, or RUQ pain.    OBJECTIVE   /72   Pulse 86   Ht 1.626 m (5' 4\")   Wt 73 kg (160 lb 14.4 oz)   LMP 2019   BMI 27.62 kg/m    See Ob Flowsheet    ASSESSMENT & PLAN   34 year old  at 24w5d by LMP, c/w U/S at 8w0d, presenting for ESTEFANY.    1. PNC: routine ob labs reviewed   Rh pos, Rubella immune   Negative NTD screening completed last visit at last visit.   GCT at next visit.     F/U in 4 weeks.    Staffed with Dr. Manju Sloan, MS3  OB/GYN Staff -- Pt seen and examined by me along side student. Agree with note as above.  MD Tara      "

## 2019-11-19 NOTE — LETTER
"2019       RE: Leslye Mahmood  [address]  [facility] [address]     Dear Colleague,    Thank you for referring your patient, Lesley Mahmood, to the [facility] at [facility]. Please see a copy of my visit note below.    SUBJECTIVE   She is doing well. The nausea she had early in pregnancy has resolved. Denies vaginal bleeding or leakage of fluid. + fetal movement. Denies any contractions. Notes feeling abdominal tightness yesterday while ambulating, which resolved with rest and she has not felt this today.  Denies HA, changes in vision, epigastric pain, or RUQ pain.    OBJECTIVE   /72   Pulse 86   Ht 1.626 m (5' 4\")   Wt 73 kg (160 lb 14.4 oz)   LMP 2019   BMI 27.62 kg/m     See Ob Flowsheet    ASSESSMENT & PLAN   34 year old  at 24w5d by LMP, c/w U/S at 8w0d, presenting for ESTEFANY.    1. PNC: routine ob labs reviewed   Rh pos, Rubella immune   Negative NTD screening completed last visit at last visit.   GCT at next visit.     F/U in 4 weeks.    Staffed with Dr. Manju Sloan, MS3    OB/GYN Staff -- Pt seen and examined by me along side student. Agree with note as above.  MD Tara      "

## 2019-12-16 NOTE — PROGRESS NOTES
34 year old, , 28w4d, denies LOF, vaginal bleeding, uterine ctx or decrease in fetal movement. Is a patient of our MD colleagues. Reports lots of fetal movement.  The patient presents with the following concerns: Having some tightening. Mostly associated with being on her feet all day at work. Gets better with rest. Pt states she had a fast labor with her first baby.     PHQ-9 SCORE 2017 8/15/2019 2019   PHQ-9 Total Score 0 0 1     Education completed today includes breast feeding, [facility] hand out, counting movements, signs of pre-term labor and when to present to birthplace.  Birth preferences reviewed: Undetermined, but may interested in un-medicated - possibly trying nitrous.  Labor support:     [place] Feeding plans :  -  first baby for 3 years.    Contraception planned:  Unsure at this time.   The following labs were ordered today:       GCT, CBC w platelets, Vitamin D and Anti-treponema  Water birth consent form was not given.    Blood type:   ABO   Date Value Ref Range Status   2019 A  Final     RH(D)   Date Value Ref Range Status   2019 Pos  Final     Antibody Screen   Date Value Ref Range Status   2019 Neg  Final   Rhogam  was notgiven.  TDAP  Was given.    A/P:  Encounter Diagnoses   Name Primary?     Need for vaccination Yes     Encounter for supervision of other normal pregnancy in first trimester      Orders Placed This Encounter   Procedures     TDAP VACCINE (BOOSTRIX)     CBC with platelets     25- OH-Vitamin D     Treponema Abs w Reflex to RPR and Titer     Glucose 1 Hour     No orders of the defined types were placed in this encounter.    - Reviewed labor signs and reasons to call on call midwife. Reviewed daily fetal kick counts and to call if any concerns about fetal movement or warning signs.   - Reviewed recommended TWG of 25-35lbs. Encouraged good nutrition and exercise.   - Discussed resources for parenting, childbirth, and  breastfeeding classes.    Continue scheduled prenatal care, RTC in 4 week or as needed.     I, Tonya Park, am serving as a scribe; to document services personally performed by LEONARD Carlton, DIVYA based on data collection and the provider's statements to me.       I agree with the PFSH and ROS as completed by the student, except for changes made by me. The remainder of the encounter was performed by me and scribed by the student. The scribed note accurately reflects my personal services and decisions made by me.  Alyssa Beard, RINKU, DIVYA, LEONARD

## 2019-12-17 ENCOUNTER — OFFICE VISIT (OUTPATIENT)
Dept: OBGYN | Facility: CLINIC | Age: 34
End: 2019-12-17
Attending: ADVANCED PRACTICE MIDWIFE
Payer: COMMERCIAL

## 2019-12-17 VITALS
BODY MASS INDEX: 27.57 KG/M2 | HEART RATE: 71 BPM | DIASTOLIC BLOOD PRESSURE: 64 MMHG | SYSTOLIC BLOOD PRESSURE: 100 MMHG | WEIGHT: 160.6 LBS

## 2019-12-17 DIAGNOSIS — Z34.81 ENCOUNTER FOR SUPERVISION OF OTHER NORMAL PREGNANCY IN FIRST TRIMESTER: ICD-10-CM

## 2019-12-17 DIAGNOSIS — O99.810 ABNORMAL O'SULLIVAN GLUCOSE CHALLENGE TEST, ANTEPARTUM: Primary | ICD-10-CM

## 2019-12-17 DIAGNOSIS — Z23 NEED FOR VACCINATION: Primary | ICD-10-CM

## 2019-12-17 PROBLEM — O09.523 MULTIGRAVIDA OF ADVANCED MATERNAL AGE IN THIRD TRIMESTER: Status: ACTIVE | Noted: 2019-08-27

## 2019-12-17 LAB
ERYTHROCYTE [DISTWIDTH] IN BLOOD BY AUTOMATED COUNT: 12.8 % (ref 10–15)
GLUCOSE 1H P 50 G GLC PO SERPL-MCNC: 139 MG/DL (ref 60–129)
HCT VFR BLD AUTO: 36.1 % (ref 35–47)
HGB BLD-MCNC: 12 G/DL (ref 11.7–15.7)
MCH RBC QN AUTO: 29.6 PG (ref 26.5–33)
MCHC RBC AUTO-ENTMCNC: 33.2 G/DL (ref 31.5–36.5)
MCV RBC AUTO: 89 FL (ref 78–100)
PLATELET # BLD AUTO: 210 10E9/L (ref 150–450)
RBC # BLD AUTO: 4.05 10E12/L (ref 3.8–5.2)
WBC # BLD AUTO: 8.4 10E9/L (ref 4–11)

## 2019-12-17 PROCEDURE — 82950 GLUCOSE TEST: CPT | Performed by: ADVANCED PRACTICE MIDWIFE

## 2019-12-17 PROCEDURE — 36415 COLL VENOUS BLD VENIPUNCTURE: CPT | Performed by: ADVANCED PRACTICE MIDWIFE

## 2019-12-17 PROCEDURE — 82306 VITAMIN D 25 HYDROXY: CPT | Performed by: ADVANCED PRACTICE MIDWIFE

## 2019-12-17 PROCEDURE — 85027 COMPLETE CBC AUTOMATED: CPT | Performed by: ADVANCED PRACTICE MIDWIFE

## 2019-12-17 PROCEDURE — 86780 TREPONEMA PALLIDUM: CPT | Performed by: ADVANCED PRACTICE MIDWIFE

## 2019-12-17 PROCEDURE — 90471 IMMUNIZATION ADMIN: CPT | Mod: ZF

## 2019-12-17 PROCEDURE — G0463 HOSPITAL OUTPT CLINIC VISIT: HCPCS | Mod: ZF

## 2019-12-17 PROCEDURE — 90715 TDAP VACCINE 7 YRS/> IM: CPT | Mod: ZF

## 2019-12-17 PROCEDURE — 25000128 H RX IP 250 OP 636: Mod: ZF

## 2019-12-17 ASSESSMENT — PAIN SCALES - GENERAL: PAINLEVEL: NO PAIN (0)

## 2019-12-17 ASSESSMENT — ANXIETY QUESTIONNAIRES
3. WORRYING TOO MUCH ABOUT DIFFERENT THINGS: SEVERAL DAYS
7. FEELING AFRAID AS IF SOMETHING AWFUL MIGHT HAPPEN: NOT AT ALL
2. NOT BEING ABLE TO STOP OR CONTROL WORRYING: NOT AT ALL
5. BEING SO RESTLESS THAT IT IS HARD TO SIT STILL: NOT AT ALL
GAD7 TOTAL SCORE: 3
1. FEELING NERVOUS, ANXIOUS, OR ON EDGE: SEVERAL DAYS
6. BECOMING EASILY ANNOYED OR IRRITABLE: SEVERAL DAYS

## 2019-12-17 ASSESSMENT — PATIENT HEALTH QUESTIONNAIRE - PHQ9
SUM OF ALL RESPONSES TO PHQ QUESTIONS 1-9: 1
5. POOR APPETITE OR OVEREATING: NOT AT ALL

## 2019-12-17 NOTE — LETTER
2019       RE: Lesley Mahmood  [address]  [facility] [address]     Dear Colleague,    Thank you for referring your patient, Lesley Mahmood, to the [facility] at [facility]. Please see a copy of my visit note below.    34 year old, , 28w4d, denies LOF, vaginal bleeding, uterine ctx or decrease in fetal movement. Is a patient of our MD colleagues. Reports lots of fetal movement.  The patient presents with the following concerns: Having some tightening. Mostly associated with being on her feet all day at work. Gets better with rest. Pt states she had a fast labor with her first baby.     PHQ-9 SCORE 2017 8/15/2019 2019   PHQ-9 Total Score 0 0 1     Education completed today includes breast feeding, [facility] hand out, counting movements, signs of pre-term labor and when to present to birthplace.  Birth preferences reviewed: Undetermined, but may interested in un-medicated - possibly trying nitrous.  Labor support:      Feeding plans :  -  first baby for 3 years.    Contraception planned:  Unsure at this time.   The following labs were ordered today:       GCT, CBC w platelets, Vitamin D and Anti-treponema  Water birth consent form was not given.    Blood type:   ABO   Date Value Ref Range Status   2019 A  Final     RH(D)   Date Value Ref Range Status   2019 Pos  Final     Antibody Screen   Date Value Ref Range Status   2019 Neg  Final   Rhogam  was notgiven.  TDAP  Was given.    A/P:  Encounter Diagnoses   Name Primary?     Need for vaccination Yes     Encounter for supervision of other normal pregnancy in first trimester      Orders Placed This Encounter   Procedures     TDAP VACCINE (BOOSTRIX)     CBC with platelets     25- OH-Vitamin D     Treponema Abs w Reflex to RPR and Titer     Glucose 1 Hour     No orders of the defined types were placed in this encounter.    - Reviewed labor  signs and reasons to call on call midwife. Reviewed daily fetal kick counts and to call if any concerns about fetal movement or warning signs.   - Reviewed recommended TWG of 25-35lbs. Encouraged good nutrition and exercise.   - Discussed resources for parenting, childbirth, and breastfeeding classes.    Continue scheduled prenatal care, RTC in 4 week or as needed.     I, Tonya Park, am serving as a scribe; to document services personally performed by LEONARD Carlton, DIVYA based on data collection and the provider's statements to me.       I agree with the PFSH and ROS as completed by the student, except for changes made by me. The remainder of the encounter was performed by me and scribed by the student. The scribed note accurately reflects my personal services and decisions made by me.    Alyssa Beard, RINKU, DIVYA, APRN

## 2019-12-18 LAB
DEPRECATED CALCIDIOL+CALCIFEROL SERPL-MC: 45 UG/L (ref 20–75)
T PALLIDUM AB SER QL: NONREACTIVE

## 2019-12-18 ASSESSMENT — ANXIETY QUESTIONNAIRES: GAD7 TOTAL SCORE: 3

## 2019-12-23 DIAGNOSIS — Z34.81 ENCOUNTER FOR SUPERVISION OF OTHER NORMAL PREGNANCY IN FIRST TRIMESTER: ICD-10-CM

## 2019-12-23 DIAGNOSIS — O99.810 ABNORMAL O'SULLIVAN GLUCOSE CHALLENGE TEST, ANTEPARTUM: ICD-10-CM

## 2019-12-23 LAB
GLUCOSE 1H P 100 G GLC PO SERPL-MCNC: 162 MG/DL (ref 60–179)
GLUCOSE 2H P 100 G GLC PO SERPL-MCNC: 123 MG/DL (ref 60–154)
GLUCOSE 3H P 100 G GLC PO SERPL-MCNC: 119 MG/DL (ref 60–139)
GLUCOSE BLDC GLUCOMTR-MCNC: 85 MG/DL (ref 70–99)
GLUCOSE P FAST SERPL-MCNC: 89 MG/DL (ref 60–94)

## 2019-12-23 PROCEDURE — 36415 COLL VENOUS BLD VENIPUNCTURE: CPT | Performed by: ADVANCED PRACTICE MIDWIFE

## 2019-12-23 PROCEDURE — 82962 GLUCOSE BLOOD TEST: CPT

## 2019-12-23 PROCEDURE — 82952 GTT-ADDED SAMPLES: CPT | Performed by: ADVANCED PRACTICE MIDWIFE

## 2019-12-23 PROCEDURE — 82951 GLUCOSE TOLERANCE TEST (GTT): CPT | Performed by: ADVANCED PRACTICE MIDWIFE

## 2020-01-14 ENCOUNTER — ANCILLARY PROCEDURE (OUTPATIENT)
Dept: ULTRASOUND IMAGING | Facility: CLINIC | Age: 35
End: 2020-01-14
Attending: OBSTETRICS & GYNECOLOGY
Payer: COMMERCIAL

## 2020-01-14 ENCOUNTER — OFFICE VISIT (OUTPATIENT)
Dept: OBGYN | Facility: CLINIC | Age: 35
End: 2020-01-14
Attending: INTERNAL MEDICINE
Payer: COMMERCIAL

## 2020-01-14 VITALS
HEIGHT: 64 IN | HEART RATE: 88 BPM | WEIGHT: 164 LBS | DIASTOLIC BLOOD PRESSURE: 74 MMHG | SYSTOLIC BLOOD PRESSURE: 118 MMHG | BODY MASS INDEX: 28 KG/M2

## 2020-01-14 DIAGNOSIS — O36.60X0 EXCESSIVE FETAL GROWTH AFFECTING MANAGEMENT OF PREGNANCY, ANTEPARTUM, SINGLE OR UNSPECIFIED FETUS: Primary | ICD-10-CM

## 2020-01-14 DIAGNOSIS — O40.1XX0 POLYHYDRAMNIOS IN FIRST TRIMESTER, SINGLE OR UNSPECIFIED FETUS: ICD-10-CM

## 2020-01-14 DIAGNOSIS — O36.60X0 EXCESSIVE FETAL GROWTH AFFECTING MANAGEMENT OF PREGNANCY, ANTEPARTUM, SINGLE OR UNSPECIFIED FETUS: ICD-10-CM

## 2020-01-14 PROCEDURE — G0463 HOSPITAL OUTPT CLINIC VISIT: HCPCS

## 2020-01-14 PROCEDURE — 76816 OB US FOLLOW-UP PER FETUS: CPT

## 2020-01-14 ASSESSMENT — MIFFLIN-ST. JEOR: SCORE: 1418.9

## 2020-01-14 NOTE — LETTER
2020       RE: Lesley Mahmood  [address] [facility] [address]     Dear Colleague,    Thank you for referring your patient, Lesley Mahmood, to the [facility] at [facility]. Please see a copy of my visit note below.    Feeling well. Failed GCT, passed GTT. 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 Will query tomorrow for values.     O: see flow    A/P; 33 yo  at 32+5 wks, S>D.    1. PNC: utd.  2. S>D.  Checking BS values tomorrow. U.s today for s>D  3. Considering nitrous vs epidural vs fentanyl  4. F/u 2 wks    Hansa Beasley MD, FACOG  [facility]  OB/GYN  2020

## 2020-01-14 NOTE — PROGRESS NOTES
Feeling well. Failed GCT, passed GTT. 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 Will query tomorrow for values.     O: see flow    A/P; 35 yo  at 32+5 wks, S>D.    1. PNC: utd.  2. S>D.  Checking BS values tomorrow. U.s today for s>D  3. Considering nitrous vs epidural vs fentanyl  4. F/u 2 wks    Hansa Beasley MD, FACOG  [facility]  OB/GYN    2020

## 2020-01-17 ENCOUNTER — TELEPHONE (OUTPATIENT)
Dept: OBGYN | Facility: CLINIC | Age: 35
End: 2020-01-17

## 2020-01-17 NOTE — TELEPHONE ENCOUNTER
----- Message from Stacy Hernández sent at 1/17/2020  8:48 AM CST -----  Patient need clarification on scheduling, pt would like to know if she should schedule weekly bpp;s?

## 2020-01-17 NOTE — TELEPHONE ENCOUNTER
Clarified per chart review, patient needs weekly BPP and should f/u in clinic in 2 weeks for ESTEFANY. Pt expressed understanding and agrees with plan. Transferred to  to schedule

## 2020-01-22 ENCOUNTER — ANCILLARY PROCEDURE (OUTPATIENT)
Dept: ULTRASOUND IMAGING | Facility: CLINIC | Age: 35
End: 2020-01-22
Attending: OBSTETRICS & GYNECOLOGY
Payer: COMMERCIAL

## 2020-01-22 DIAGNOSIS — O40.1XX0 POLYHYDRAMNIOS IN FIRST TRIMESTER, SINGLE OR UNSPECIFIED FETUS: ICD-10-CM

## 2020-01-22 PROCEDURE — 76819 FETAL BIOPHYS PROFIL W/O NST: CPT

## 2020-01-27 ENCOUNTER — OFFICE VISIT (OUTPATIENT)
Dept: OBGYN | Facility: CLINIC | Age: 35
End: 2020-01-27
Attending: OBSTETRICS & GYNECOLOGY
Payer: COMMERCIAL

## 2020-01-27 ENCOUNTER — ANCILLARY PROCEDURE (OUTPATIENT)
Dept: ULTRASOUND IMAGING | Facility: CLINIC | Age: 35
End: 2020-01-27
Attending: OBSTETRICS & GYNECOLOGY
Payer: COMMERCIAL

## 2020-01-27 VITALS
HEART RATE: 75 BPM | SYSTOLIC BLOOD PRESSURE: 102 MMHG | HEIGHT: 64 IN | DIASTOLIC BLOOD PRESSURE: 66 MMHG | WEIGHT: 165.9 LBS | BODY MASS INDEX: 28.32 KG/M2

## 2020-01-27 DIAGNOSIS — O40.1XX0 POLYHYDRAMNIOS IN FIRST TRIMESTER, SINGLE OR UNSPECIFIED FETUS: ICD-10-CM

## 2020-01-27 DIAGNOSIS — O09.529 SUPERVISION OF HIGH-RISK PREGNANCY OF ELDERLY MULTIGRAVIDA: ICD-10-CM

## 2020-01-27 DIAGNOSIS — O40.9XX0 POLYHYDRAMNIOS AFFECTING PREGNANCY: Primary | ICD-10-CM

## 2020-01-27 PROCEDURE — G0463 HOSPITAL OUTPT CLINIC VISIT: HCPCS | Mod: ZF

## 2020-01-27 PROCEDURE — 76819 FETAL BIOPHYS PROFIL W/O NST: CPT

## 2020-01-27 ASSESSMENT — MIFFLIN-ST. JEOR: SCORE: 1427.52

## 2020-01-27 NOTE — NURSING NOTE
Chief Complaint   Patient presents with     Prenatal Care     34w4d       See JOSAF[facility] Branch 1/27/2020

## 2020-01-27 NOTE — LETTER
Date:January 29, 2020      Patient was self referred, no letter generated. Do not send.        [facility] Health Information

## 2020-01-27 NOTE — LETTER
"2020       RE: Lesley Mahomod  [address]  [place] [facility] [address]     Dear Colleague,    Thank you for referring your patient, Lesley Mahmood, to the [facility] at [facility]. Please see a copy of my visit note below.    Doing well. Brings BS log showing all normal BS over past week.  Feeling +FM.  More tired, and concern for going into call next week.     /66 (BP Location: Left arm, Patient Position: Chair)   Pulse 75   Ht 1.626 m (5' 4\")   Wt 75.3 kg (165 lb 14.4 oz)   LMP 2019   Breastfeeding No   BMI 28.48 kg/m     See flow    A/p; 33yo  at 34+4 wks, moderate polyhydramnios on f/u today.      1. PNC: utd.  S/p Tdap and flu vaccine.  GBS next visit    2. Polyhydramnios now moderate (30cm). Reviewed normal anatomy scan. Will get MFM f/u comp next week with BPP. Likely idiopathic.  If all normal, will f/u with weekly BPPs. If remains moderate, rec IOL at 39 wks.      3. F/u 2 wks    Hansa Beasley MD, FACOG  [facility]  OB/GYN    2020  12:45 PM        Again, thank you for allowing me to participate in the care of your patient.      Sincerely,    Hansa Beasley MD      "

## 2020-01-28 NOTE — PROGRESS NOTES
"Doing well. Brings BS log showing all normal BS over past week.  Feeling +FM.  More tired, and concern for going into call next week.     /66 (BP Location: Left arm, Patient Position: Chair)   Pulse 75   Ht 1.626 m (5' 4\")   Wt 75.3 kg (165 lb 14.4 oz)   LMP 2019   Breastfeeding No   BMI 28.48 kg/m    See flow    A/p; 33yo  at 34+4 wks, moderate polyhydramnios on f/u today.      1. PNC: utd.  S/p Tdap and flu vaccine.  GBS next visit    2. Polyhydramnios now moderate (30cm). Reviewed normal anatomy scan. Will get MFM f/u comp next week with BPP. Likely idiopathic.  If all normal, will f/u with weekly BPPs. If remains moderate, rec IOL at 39 wks.      3. F/u 2 wks    Hansa Beasley MD, FACOG  [facility]  OB/GYN    2020  12:45 PM      "

## 2020-01-29 ENCOUNTER — TELEPHONE (OUTPATIENT)
Dept: OBGYN | Facility: CLINIC | Age: 35
End: 2020-01-29

## 2020-01-29 DIAGNOSIS — O40.9XX0 POLYHYDRAMNIOS AFFECTING PREGNANCY: Primary | ICD-10-CM

## 2020-01-29 NOTE — TELEPHONE ENCOUNTER
Received call from Dina at [facility] stating there are ultrasound order for MFM placed but they need to be in the referral order.  Orders re-done.

## 2020-02-05 ENCOUNTER — HOSPITAL ENCOUNTER (OUTPATIENT)
Dept: ULTRASOUND IMAGING | Facility: CLINIC | Age: 35
Discharge: HOME | End: 2020-02-05
Attending: OBSTETRICS & GYNECOLOGY | Admitting: SOCIAL WORKER
Payer: COMMERCIAL

## 2020-02-05 ENCOUNTER — OFFICE VISIT (OUTPATIENT)
Dept: MATERNAL FETAL MEDICINE | Facility: CLINIC | Age: 35
End: 2020-02-05
Attending: OBSTETRICS & GYNECOLOGY
Payer: COMMERCIAL

## 2020-02-05 DIAGNOSIS — O09.523 MULTIGRAVIDA OF ADVANCED MATERNAL AGE IN THIRD TRIMESTER: ICD-10-CM

## 2020-02-05 DIAGNOSIS — O26.90 PREGNANCY RELATED CONDITION, ANTEPARTUM: ICD-10-CM

## 2020-02-05 DIAGNOSIS — O40.3XX0 POLYHYDRAMNIOS IN THIRD TRIMESTER COMPLICATION, SINGLE OR UNSPECIFIED FETUS: Primary | ICD-10-CM

## 2020-02-05 PROCEDURE — 76816 OB US FOLLOW-UP PER FETUS: CPT

## 2020-02-05 NOTE — PROGRESS NOTES
Please see ultrasound report under imaging tab for details on ultrasound performed today.    Elaine Zuñiga MD  , OB/GYN  Maternal-Fetal Medicine  [email or web address]  [phone] (Academic office)  [phone] (Pager)

## 2020-02-08 ENCOUNTER — HEALTH MAINTENANCE LETTER (OUTPATIENT)
Age: 35
End: 2020-02-08

## 2020-02-10 ENCOUNTER — OFFICE VISIT (OUTPATIENT)
Dept: OBGYN | Facility: CLINIC | Age: 35
End: 2020-02-10
Attending: OBSTETRICS & GYNECOLOGY
Payer: COMMERCIAL

## 2020-02-10 VITALS
DIASTOLIC BLOOD PRESSURE: 73 MMHG | WEIGHT: 166.8 LBS | HEART RATE: 71 BPM | BODY MASS INDEX: 28.63 KG/M2 | SYSTOLIC BLOOD PRESSURE: 116 MMHG

## 2020-02-10 DIAGNOSIS — O09.529 SUPERVISION OF HIGH-RISK PREGNANCY OF ELDERLY MULTIGRAVIDA: ICD-10-CM

## 2020-02-10 DIAGNOSIS — Z34.81 ENCOUNTER FOR SUPERVISION OF OTHER NORMAL PREGNANCY IN FIRST TRIMESTER: Primary | ICD-10-CM

## 2020-02-10 PROCEDURE — G0463 HOSPITAL OUTPT CLINIC VISIT: HCPCS | Mod: ZF

## 2020-02-10 PROCEDURE — 87653 STREP B DNA AMP PROBE: CPT | Performed by: OBSTETRICS & GYNECOLOGY

## 2020-02-10 ASSESSMENT — PATIENT HEALTH QUESTIONNAIRE - PHQ9
SUM OF ALL RESPONSES TO PHQ QUESTIONS 1-9: 0
5. POOR APPETITE OR OVEREATING: NOT AT ALL

## 2020-02-10 ASSESSMENT — ANXIETY QUESTIONNAIRES
2. NOT BEING ABLE TO STOP OR CONTROL WORRYING: NOT AT ALL
3. WORRYING TOO MUCH ABOUT DIFFERENT THINGS: NOT AT ALL
7. FEELING AFRAID AS IF SOMETHING AWFUL MIGHT HAPPEN: NOT AT ALL
5. BEING SO RESTLESS THAT IT IS HARD TO SIT STILL: NOT AT ALL
6. BECOMING EASILY ANNOYED OR IRRITABLE: NOT AT ALL
GAD7 TOTAL SCORE: 0
1. FEELING NERVOUS, ANXIOUS, OR ON EDGE: NOT AT ALL

## 2020-02-10 ASSESSMENT — PAIN SCALES - GENERAL: PAINLEVEL: NO PAIN (0)

## 2020-02-10 NOTE — NURSING NOTE
Chief Complaint   Patient presents with     Prenatal Care     ESTEFANY 36 weeks and 4 days   Angy Wang LPN

## 2020-02-10 NOTE — LETTER
Date:February 12, 2020      Patient was self referred, no letter generated. Do not send.        [facility] Health Information

## 2020-02-10 NOTE — LETTER
2/10/2020       RE: Lesley Mahmood  [address]  [facility] [address]     Dear Colleague,    Thank you for referring your patient, Lesley Mahmood, to the [facility] at [facility]. Please see a copy of my visit note below.    Feeling well. Glad call is done now until after delivery.  Had MFM f/u for poly.    MFM recs: No change in management is recommended for mild (SHUBHAM 24-29 cm) idiopathic polyhydramnios, with the exception of reevaluation. If there is progression to moderate (SHUBHAM 30-34 cm) polyhydramnios delivery at 39 weeks is recommendedalong with weekly  testing. Management of severe (35+ cm) polyhydramnios is individualized and should prompt referral back to [facility].    /73   Pulse 71   Wt 75.7 kg (166 lb 12.8 oz)   LMP 2019   Breastfeeding No   BMI 28.63 kg/m     See flow    A/p: 35 yo  at 36+4 wks, with polyhydramnios.    1. PNC: gbs today  2. Poly. Weekly BPPs with recs as above  3. Labor precautions reviewed    Hansa Beasley MD, [facility] Specialists Staff  OB/GYN    2020  2:50 PM      Again, thank you for allowing me to participate in the care of your patient.      Sincerely,    Hansa Beasley MD

## 2020-02-11 LAB
GP B STREP DNA SPEC QL NAA+PROBE: NEGATIVE
SPECIMEN SOURCE: NORMAL

## 2020-02-11 ASSESSMENT — ANXIETY QUESTIONNAIRES: GAD7 TOTAL SCORE: 0

## 2020-02-11 NOTE — PROGRESS NOTES
Feeling well. Glad call is done now until after delivery.  Had MFM f/u for poly.    [facility] recs: No change in management is recommended for mild (SHUBHAM 24-29 cm) idiopathic polyhydramnios, with the exception of reevaluation. If there is progression to moderate (SHUBHAM 30-34 cm) polyhydramnios delivery at 39 weeks is recommendedalong with weekly  testing. Management of severe (35+ cm) polyhydramnios is individualized and should prompt referral back to [facility].    /73   Pulse 71   Wt 75.7 kg (166 lb 12.8 oz)   LMP 2019   Breastfeeding No   BMI 28.63 kg/m    See flow    A/p: 33 yo  at 36+4 wks, with polyhydramnios.    1. PNC: gbs today  2. Poly. Weekly BPPs with recs as above  3. Labor precautions reviewed    Hansa Beasley MD, [facility] Specialists Staff  OB/GYN    2020  2:50 PM

## 2020-02-12 ENCOUNTER — ANCILLARY PROCEDURE (OUTPATIENT)
Dept: ULTRASOUND IMAGING | Facility: CLINIC | Age: 35
End: 2020-02-12
Attending: OBSTETRICS & GYNECOLOGY
Payer: COMMERCIAL

## 2020-02-12 DIAGNOSIS — Z34.90 SUPERVISION OF NORMAL PREGNANCY: ICD-10-CM

## 2020-02-12 PROCEDURE — 76819 FETAL BIOPHYS PROFIL W/O NST: CPT

## 2020-02-17 ENCOUNTER — ANCILLARY PROCEDURE (OUTPATIENT)
Dept: ULTRASOUND IMAGING | Facility: CLINIC | Age: 35
End: 2020-02-17
Attending: OBSTETRICS & GYNECOLOGY
Payer: COMMERCIAL

## 2020-02-17 ENCOUNTER — OFFICE VISIT (OUTPATIENT)
Dept: OBGYN | Facility: CLINIC | Age: 35
End: 2020-02-17
Attending: OBSTETRICS & GYNECOLOGY
Payer: COMMERCIAL

## 2020-02-17 VITALS
BODY MASS INDEX: 28.84 KG/M2 | SYSTOLIC BLOOD PRESSURE: 110 MMHG | WEIGHT: 168 LBS | HEART RATE: 79 BPM | DIASTOLIC BLOOD PRESSURE: 69 MMHG

## 2020-02-17 DIAGNOSIS — O40.9XX0 POLYHYDRAMNIOS AFFECTING PREGNANCY: Primary | ICD-10-CM

## 2020-02-17 DIAGNOSIS — Z34.90 SUPERVISION OF NORMAL PREGNANCY: ICD-10-CM

## 2020-02-17 PROCEDURE — 76819 FETAL BIOPHYS PROFIL W/O NST: CPT

## 2020-02-17 PROCEDURE — G0463 HOSPITAL OUTPT CLINIC VISIT: HCPCS | Mod: ZF

## 2020-02-17 NOTE — PROGRESS NOTES
Doing well. Tired, but otherwise well.  No ctx, LOF or bleeding. Uls today w/ continued mild polyhydramnios.      /69   Pulse 79   Wt 76.2 kg (168 lb)   LMP 2019   Breastfeeding No   BMI 28.84 kg/m    See flow    A/p: 33 yo  at 37+4 wks. preg c/b polyhydramnios.    1. PNC: UTD.  Neg GBS. S/p flu and TDap.     2. Poly. Weekly BPPs    3. F/u weekly. Labor precautions reviewed.    Hansa Beasley MD, [facility] Specialists Staff  OB/GYN    2020  2:16 PM

## 2020-02-17 NOTE — LETTER
2020       RE: Lesley Mahmood  [address]  [place] [facility] [address]     Dear Colleague,    Thank you for referring your patient, Lesley Mahmood, to the [facility] at [facility]. Please see a copy of my visit note below.    Doing well. Tired, but otherwise well.  No ctx, LOF or bleeding. Uls today w/ continued mild polyhydramnios.      /69   Pulse 79   Wt 76.2 kg (168 lb)   LMP 2019   Breastfeeding No   BMI 28.84 kg/m     See flow    A/p: 33 yo  at 37+4 wks. preg c/b polyhydramnios.    1. PNC: UTD.  Neg GBS. S/p flu and TDap.     2. Poly. Weekly BPPs    3. F/u weekly. Labor precautions reviewed.    Hansa Beasley MD, [facility] Specialists Staff  OB/GYN  2020  2:16 PM

## 2020-02-17 NOTE — NURSING NOTE
Chief Complaint   Patient presents with     Prenatal Care     ESTEFANY 37 weeks and 4 days   Angy Wang LPN

## 2020-02-24 ENCOUNTER — ANCILLARY PROCEDURE (OUTPATIENT)
Dept: ULTRASOUND IMAGING | Facility: CLINIC | Age: 35
End: 2020-02-24
Attending: OBSTETRICS & GYNECOLOGY
Payer: COMMERCIAL

## 2020-02-24 DIAGNOSIS — Z34.90 ENCOUNTER FOR SUPERVISION OF NORMAL PREGNANCY, ANTEPARTUM, UNSPECIFIED GRAVIDITY: ICD-10-CM

## 2020-02-24 PROCEDURE — 76819 FETAL BIOPHYS PROFIL W/O NST: CPT

## 2020-02-25 ENCOUNTER — OFFICE VISIT (OUTPATIENT)
Dept: OBGYN | Facility: CLINIC | Age: 35
End: 2020-02-25
Attending: INTERNAL MEDICINE
Payer: COMMERCIAL

## 2020-02-25 VITALS
WEIGHT: 168.9 LBS | HEIGHT: 64 IN | BODY MASS INDEX: 28.83 KG/M2 | HEART RATE: 80 BPM | DIASTOLIC BLOOD PRESSURE: 66 MMHG | SYSTOLIC BLOOD PRESSURE: 101 MMHG

## 2020-02-25 DIAGNOSIS — O09.523 MULTIGRAVIDA OF ADVANCED MATERNAL AGE IN THIRD TRIMESTER: Primary | ICD-10-CM

## 2020-02-25 PROBLEM — O99.810 ABNORMAL O'SULLIVAN GLUCOSE CHALLENGE TEST, ANTEPARTUM: Chronic | Status: ACTIVE | Noted: 2019-12-17

## 2020-02-25 PROCEDURE — G0463 HOSPITAL OUTPT CLINIC VISIT: HCPCS | Mod: ZF

## 2020-02-25 ASSESSMENT — MIFFLIN-ST. JEOR: SCORE: 1441.13

## 2020-02-25 NOTE — NURSING NOTE
Chief Complaint   Patient presents with     Prenatal Care     38w5d       See JOSAF[facility] Branch 2/25/2020

## 2020-02-25 NOTE — LETTER
2020       RE: Lesley Mahmood  [address]  [facility] [facility] [address]     Dear Colleague,    Thank you for referring your patient, Lesley Mahmood, to the [facility] at [facility]. Please see a copy of my visit note below.    [facility]  Return OB Visit    S: Ms. Mahmood is a 34-year-old  at 38w5d by LMP c/w 7w4d US here for a return OB visit. She feels more fatigued but is otherwise doing well. She reports good fetal movement and only very occasional contractions. No vaginal bleeding or loss of fluid. Denies headache, changes in visions, chest pain, and shortness of breath.    Ms. Mahmood started checked her blood sugars at home in the context of polyhyramnios. She noticed a few were out of target at the start of the week. With dietary modifications (decreased carbs with increased protein and fat), she now has all values in goal with the exception of one postprandial. All fasting values have been < 95.     Pregnancy notable for:   - Failed GCT, passed GTT  - Polyhydramnios    O: LMP 2019   Gen: Well-appearing, NAD  See OB Flowsheet    A/P:  Lesley Mahmood is a 34 year old  at 38w5d by LMP c/w 7w4d US here for a return OB visit. Pregnancy is complicated by polyhydramnios, which is resolved on the latest BPP.    #Prenatal care  - GBS negative  - AMA, normal NIPT  - Immunizations: s/p Flu, TDaP  - Needs pap/HPV postpartum  - Pain management in labor: considering nitrous, fentanyl, and/or epidural. Reviewed options today.    #Polyhydramnios, resolved on most recent ultrasound  - Weekly BPPs  - SHUBHAM 21 on   - Per [facility] recommendations: No change in management for mild polydramnios (SHUBHAM 24-29 cm) other than following with weekly BPPs. If there is progression to moderate polyp (SHUBHAM 30-34 cm), delivery at 39 weeks is recommended.    Return to clinic: in 1 week. Reviewed signs/symptoms of labor and when to call with questions or concerns.      Urmila Alvarado MD  OB/GYN, PGY4  02/25/20  I agree with note as above. The patient was seen in continuity clinic by the resident doctor.  Assessment and plan were jointly made.  Catalina Nunes MD        Again, thank you for allowing me to participate in the care of your patient.      Sincerely,    Urmila Alvarado MD

## 2020-02-25 NOTE — PROGRESS NOTES
[facility]  Return OB Visit    S: Ms. Mahmood is a 34-year-old  at 38w5d by LMP c/w 7w4d US here for a return OB visit. She feels more fatigued but is otherwise doing well. She reports good fetal movement and only very occasional contractions. No vaginal bleeding or loss of fluid. Denies headache, changes in visions, chest pain, and shortness of breath.    Ms. Mahmood started checked her blood sugars at home in the context of polyhyramnios. She noticed a few were out of target at the start of the week. With dietary modifications (decreased carbs with increased protein and fat), she now has all values in goal with the exception of one postprandial. All fasting values have been < 95.     Pregnancy notable for:   - Failed GCT, passed GTT  - Polyhydramnios    O: LMP 2019   Gen: Well-appearing, NAD  See OB Flowsheet    A/P:  Lesley Mahmood is a 34 year old  at 38w5d by LMP c/w 7w4d US here for a return OB visit. Pregnancy is complicated by polyhydramnios, which is resolved on the latest BPP.    #Prenatal care  - GBS negative  - AMA, normal NIPT  - Immunizations: s/p Flu, TDaP  - Needs pap/HPV postpartum  - Pain management in labor: considering nitrous, fentanyl, and/or epidural. Reviewed options today.    #Polyhydramnios, resolved on most recent ultrasound  - Weekly BPPs  - SHUBHAM 21 on   - Per [facility] recommendations: No change in management for mild polydramnios (SHUBHAM 24-29 cm) other than following with weekly BPPs. If there is progression to moderate polyp (SHUBHAM 30-34 cm), delivery at 39 weeks is recommended.    Return to clinic: in 1 week. Reviewed signs/symptoms of labor and when to call with questions or concerns.     Urmila Alvarado MD  OB/GYN, PGY4  20  I agree with note as above. The patient was seen in continuity clinic by the resident doctor.  Assessment and plan were jointly made.  Catalina Nunes MD

## 2020-03-01 ENCOUNTER — HOSPITAL ENCOUNTER (INPATIENT)
Facility: CLINIC | Age: 35
LOS: 2 days | Discharge: HOME HEALTH | End: 2020-03-03
Attending: OBSTETRICS & GYNECOLOGY | Admitting: OBSTETRICS & GYNECOLOGY
Payer: COMMERCIAL

## 2020-03-01 ENCOUNTER — ANESTHESIA EVENT (OUTPATIENT)
Dept: OBGYN | Facility: CLINIC | Age: 35
End: 2020-03-01
Payer: COMMERCIAL

## 2020-03-01 ENCOUNTER — ANESTHESIA (OUTPATIENT)
Dept: OBGYN | Facility: CLINIC | Age: 35
End: 2020-03-01
Payer: COMMERCIAL

## 2020-03-01 PROBLEM — Z36.89 ENCOUNTER FOR TRIAGE IN PREGNANT PATIENT: Status: ACTIVE | Noted: 2020-03-01

## 2020-03-01 PROBLEM — Z37.9 NORMAL LABOR: Status: ACTIVE | Noted: 2020-03-01

## 2020-03-01 LAB
ABO + RH BLD: NORMAL
ABO + RH BLD: NORMAL
BASOPHILS # BLD AUTO: 0 10E9/L (ref 0–0.2)
BASOPHILS NFR BLD AUTO: 0.1 %
BLD GP AB SCN SERPL QL: NORMAL
BLOOD BANK CMNT PATIENT-IMP: NORMAL
DIFFERENTIAL METHOD BLD: NORMAL
EOSINOPHIL # BLD AUTO: 0.1 10E9/L (ref 0–0.7)
EOSINOPHIL NFR BLD AUTO: 0.8 %
ERYTHROCYTE [DISTWIDTH] IN BLOOD BY AUTOMATED COUNT: 13 % (ref 10–15)
HCT VFR BLD AUTO: 38.4 % (ref 35–47)
HGB BLD-MCNC: 13.2 G/DL (ref 11.7–15.7)
IMM GRANULOCYTES # BLD: 0.1 10E9/L (ref 0–0.4)
IMM GRANULOCYTES NFR BLD: 0.9 %
LYMPHOCYTES # BLD AUTO: 1.4 10E9/L (ref 0.8–5.3)
LYMPHOCYTES NFR BLD AUTO: 14.8 %
MCH RBC QN AUTO: 29.8 PG (ref 26.5–33)
MCHC RBC AUTO-ENTMCNC: 34.4 G/DL (ref 31.5–36.5)
MCV RBC AUTO: 87 FL (ref 78–100)
MONOCYTES # BLD AUTO: 0.7 10E9/L (ref 0–1.3)
MONOCYTES NFR BLD AUTO: 7.1 %
NEUTROPHILS # BLD AUTO: 7.1 10E9/L (ref 1.6–8.3)
NEUTROPHILS NFR BLD AUTO: 76.3 %
NRBC # BLD AUTO: 0 10*3/UL
NRBC BLD AUTO-RTO: 0 /100
PLATELET # BLD AUTO: 191 10E9/L (ref 150–450)
RBC # BLD AUTO: 4.43 10E12/L (ref 3.8–5.2)
SPECIMEN EXP DATE BLD: NORMAL
WBC # BLD AUTO: 9.3 10E9/L (ref 4–11)

## 2020-03-01 PROCEDURE — G0463 HOSPITAL OUTPT CLINIC VISIT: HCPCS

## 2020-03-01 PROCEDURE — 12000001 ZZH R&B MED SURG/OB UMMC

## 2020-03-01 PROCEDURE — 25800030 ZZH RX IP 258 OP 636

## 2020-03-01 PROCEDURE — 25000125 ZZHC RX 250: Performed by: ANESTHESIOLOGY

## 2020-03-01 PROCEDURE — 86850 RBC ANTIBODY SCREEN: CPT | Performed by: STUDENT IN AN ORGANIZED HEALTH CARE EDUCATION/TRAINING PROGRAM

## 2020-03-01 PROCEDURE — 59409 OBSTETRICAL CARE: CPT | Mod: GC | Performed by: OBSTETRICS & GYNECOLOGY

## 2020-03-01 PROCEDURE — 3E0R3BZ INTRODUCTION OF ANESTHETIC AGENT INTO SPINAL CANAL, PERCUTANEOUS APPROACH: ICD-10-PCS | Performed by: ANESTHESIOLOGY

## 2020-03-01 PROCEDURE — 25800030 ZZH RX IP 258 OP 636: Performed by: ANESTHESIOLOGY

## 2020-03-01 PROCEDURE — 25000125 ZZHC RX 250

## 2020-03-01 PROCEDURE — 86780 TREPONEMA PALLIDUM: CPT | Performed by: STUDENT IN AN ORGANIZED HEALTH CARE EDUCATION/TRAINING PROGRAM

## 2020-03-01 PROCEDURE — 86900 BLOOD TYPING SEROLOGIC ABO: CPT | Performed by: STUDENT IN AN ORGANIZED HEALTH CARE EDUCATION/TRAINING PROGRAM

## 2020-03-01 PROCEDURE — 0KQM0ZZ REPAIR PERINEUM MUSCLE, OPEN APPROACH: ICD-10-PCS | Performed by: OBSTETRICS & GYNECOLOGY

## 2020-03-01 PROCEDURE — 25000132 ZZH RX MED GY IP 250 OP 250 PS 637: Performed by: STUDENT IN AN ORGANIZED HEALTH CARE EDUCATION/TRAINING PROGRAM

## 2020-03-01 PROCEDURE — 85025 COMPLETE CBC W/AUTO DIFF WBC: CPT | Performed by: STUDENT IN AN ORGANIZED HEALTH CARE EDUCATION/TRAINING PROGRAM

## 2020-03-01 PROCEDURE — 00HU33Z INSERTION OF INFUSION DEVICE INTO SPINAL CANAL, PERCUTANEOUS APPROACH: ICD-10-PCS | Performed by: ANESTHESIOLOGY

## 2020-03-01 PROCEDURE — 86901 BLOOD TYPING SEROLOGIC RH(D): CPT | Performed by: STUDENT IN AN ORGANIZED HEALTH CARE EDUCATION/TRAINING PROGRAM

## 2020-03-01 PROCEDURE — 10907ZC DRAINAGE OF AMNIOTIC FLUID, THERAPEUTIC FROM PRODUCTS OF CONCEPTION, VIA NATURAL OR ARTIFICIAL OPENING: ICD-10-PCS | Performed by: OBSTETRICS & GYNECOLOGY

## 2020-03-01 PROCEDURE — 25000128 H RX IP 250 OP 636: Performed by: ANESTHESIOLOGY

## 2020-03-01 PROCEDURE — 72200001 ZZH LABOR CARE VAGINAL DELIVERY SINGLE

## 2020-03-01 RX ORDER — EPHEDRINE SULFATE 50 MG/ML
5 INJECTION, SOLUTION INTRAMUSCULAR; INTRAVENOUS; SUBCUTANEOUS
Status: DISCONTINUED | OUTPATIENT
Start: 2020-03-01 | End: 2020-03-01

## 2020-03-01 RX ORDER — SODIUM CHLORIDE, SODIUM LACTATE, POTASSIUM CHLORIDE, CALCIUM CHLORIDE 600; 310; 30; 20 MG/100ML; MG/100ML; MG/100ML; MG/100ML
INJECTION, SOLUTION INTRAVENOUS
Status: COMPLETED
Start: 2020-03-01 | End: 2020-03-01

## 2020-03-01 RX ORDER — AMOXICILLIN 250 MG
1 CAPSULE ORAL 2 TIMES DAILY
Status: DISCONTINUED | OUTPATIENT
Start: 2020-03-01 | End: 2020-03-03 | Stop reason: HOSPADM

## 2020-03-01 RX ORDER — ACETAMINOPHEN 325 MG/1
650 TABLET ORAL EVERY 4 HOURS PRN
Status: DISCONTINUED | OUTPATIENT
Start: 2020-03-01 | End: 2020-03-01

## 2020-03-01 RX ORDER — IBUPROFEN 800 MG/1
800 TABLET, FILM COATED ORAL
Status: COMPLETED | OUTPATIENT
Start: 2020-03-01 | End: 2020-03-01

## 2020-03-01 RX ORDER — OXYTOCIN/0.9 % SODIUM CHLORIDE 30/500 ML
340 PLASTIC BAG, INJECTION (ML) INTRAVENOUS CONTINUOUS PRN
Status: DISCONTINUED | OUTPATIENT
Start: 2020-03-01 | End: 2020-03-03 | Stop reason: HOSPADM

## 2020-03-01 RX ORDER — CARBOPROST TROMETHAMINE 250 UG/ML
250 INJECTION, SOLUTION INTRAMUSCULAR
Status: DISCONTINUED | OUTPATIENT
Start: 2020-03-01 | End: 2020-03-03 | Stop reason: HOSPADM

## 2020-03-01 RX ORDER — ONDANSETRON 2 MG/ML
4 INJECTION INTRAMUSCULAR; INTRAVENOUS EVERY 6 HOURS PRN
Status: DISCONTINUED | OUTPATIENT
Start: 2020-03-01 | End: 2020-03-01

## 2020-03-01 RX ORDER — NALOXONE HYDROCHLORIDE 0.4 MG/ML
.1-.4 INJECTION, SOLUTION INTRAMUSCULAR; INTRAVENOUS; SUBCUTANEOUS
Status: DISCONTINUED | OUTPATIENT
Start: 2020-03-01 | End: 2020-03-01

## 2020-03-01 RX ORDER — LIDOCAINE HYDROCHLORIDE AND EPINEPHRINE 15; 5 MG/ML; UG/ML
3 INJECTION, SOLUTION EPIDURAL
Status: DISCONTINUED | OUTPATIENT
Start: 2020-03-01 | End: 2020-03-01

## 2020-03-01 RX ORDER — ACETAMINOPHEN 325 MG/1
650 TABLET ORAL EVERY 4 HOURS PRN
Status: DISCONTINUED | OUTPATIENT
Start: 2020-03-01 | End: 2020-03-03 | Stop reason: HOSPADM

## 2020-03-01 RX ORDER — METHYLERGONOVINE MALEATE 0.2 MG/ML
200 INJECTION INTRAVENOUS
Status: DISCONTINUED | OUTPATIENT
Start: 2020-03-01 | End: 2020-03-01

## 2020-03-01 RX ORDER — OXYTOCIN/0.9 % SODIUM CHLORIDE 30/500 ML
100 PLASTIC BAG, INJECTION (ML) INTRAVENOUS CONTINUOUS
Status: DISCONTINUED | OUTPATIENT
Start: 2020-03-01 | End: 2020-03-03 | Stop reason: HOSPADM

## 2020-03-01 RX ORDER — FENTANYL/BUPIVACAINE/NS/PF 2-1250MCG
PLASTIC BAG, INJECTION (ML) INJECTION
Status: DISCONTINUED
Start: 2020-03-01 | End: 2020-03-02 | Stop reason: HOSPADM

## 2020-03-01 RX ORDER — MISOPROSTOL 200 UG/1
TABLET ORAL
Status: DISCONTINUED
Start: 2020-03-01 | End: 2020-03-01 | Stop reason: WASHOUT

## 2020-03-01 RX ORDER — LIDOCAINE HYDROCHLORIDE 10 MG/ML
INJECTION, SOLUTION EPIDURAL; INFILTRATION; INTRACAUDAL; PERINEURAL
Status: COMPLETED
Start: 2020-03-01 | End: 2020-03-01

## 2020-03-01 RX ORDER — MISOPROSTOL 200 UG/1
400 TABLET ORAL
Status: DISCONTINUED | OUTPATIENT
Start: 2020-03-01 | End: 2020-03-03 | Stop reason: HOSPADM

## 2020-03-01 RX ORDER — CARBOPROST TROMETHAMINE 250 UG/ML
250 INJECTION, SOLUTION INTRAMUSCULAR
Status: DISCONTINUED | OUTPATIENT
Start: 2020-03-01 | End: 2020-03-01

## 2020-03-01 RX ORDER — METHYLERGONOVINE MALEATE 0.2 MG/ML
200 INJECTION INTRAVENOUS
Status: DISCONTINUED | OUTPATIENT
Start: 2020-03-01 | End: 2020-03-03 | Stop reason: HOSPADM

## 2020-03-01 RX ORDER — NALOXONE HYDROCHLORIDE 0.4 MG/ML
.1-.4 INJECTION, SOLUTION INTRAMUSCULAR; INTRAVENOUS; SUBCUTANEOUS
Status: DISCONTINUED | OUTPATIENT
Start: 2020-03-01 | End: 2020-03-03 | Stop reason: HOSPADM

## 2020-03-01 RX ORDER — IBUPROFEN 800 MG/1
800 TABLET, FILM COATED ORAL EVERY 6 HOURS PRN
Status: DISCONTINUED | OUTPATIENT
Start: 2020-03-01 | End: 2020-03-03 | Stop reason: HOSPADM

## 2020-03-01 RX ORDER — OXYTOCIN 10 [USP'U]/ML
10 INJECTION, SOLUTION INTRAMUSCULAR; INTRAVENOUS
Status: DISCONTINUED | OUTPATIENT
Start: 2020-03-01 | End: 2020-03-01

## 2020-03-01 RX ORDER — FENTANYL CITRATE 50 UG/ML
50-100 INJECTION, SOLUTION INTRAMUSCULAR; INTRAVENOUS
Status: DISCONTINUED | OUTPATIENT
Start: 2020-03-01 | End: 2020-03-01

## 2020-03-01 RX ORDER — OXYTOCIN/0.9 % SODIUM CHLORIDE 30/500 ML
PLASTIC BAG, INJECTION (ML) INTRAVENOUS
Status: DISCONTINUED
Start: 2020-03-01 | End: 2020-03-02 | Stop reason: HOSPADM

## 2020-03-01 RX ORDER — BISACODYL 10 MG
10 SUPPOSITORY, RECTAL RECTAL DAILY PRN
Status: DISCONTINUED | OUTPATIENT
Start: 2020-03-03 | End: 2020-03-03 | Stop reason: HOSPADM

## 2020-03-01 RX ORDER — FENTANYL CITRATE 50 UG/ML
INJECTION, SOLUTION INTRAMUSCULAR; INTRAVENOUS PRN
Status: DISCONTINUED | OUTPATIENT
Start: 2020-03-01 | End: 2020-03-02 | Stop reason: HOSPADM

## 2020-03-01 RX ORDER — FENTANYL CITRATE 50 UG/ML
INJECTION, SOLUTION INTRAMUSCULAR; INTRAVENOUS
Status: COMPLETED
Start: 2020-03-01 | End: 2020-03-01

## 2020-03-01 RX ORDER — AMOXICILLIN 250 MG
2 CAPSULE ORAL 2 TIMES DAILY
Status: DISCONTINUED | OUTPATIENT
Start: 2020-03-01 | End: 2020-03-03 | Stop reason: HOSPADM

## 2020-03-01 RX ORDER — EPHEDRINE SULFATE 50 MG/ML
INJECTION, SOLUTION INTRAMUSCULAR; INTRAVENOUS; SUBCUTANEOUS
Status: DISCONTINUED
Start: 2020-03-01 | End: 2020-03-02 | Stop reason: HOSPADM

## 2020-03-01 RX ORDER — OXYTOCIN/0.9 % SODIUM CHLORIDE 30/500 ML
100-340 PLASTIC BAG, INJECTION (ML) INTRAVENOUS CONTINUOUS PRN
Status: DISCONTINUED | OUTPATIENT
Start: 2020-03-01 | End: 2020-03-01

## 2020-03-01 RX ORDER — OXYTOCIN 10 [USP'U]/ML
INJECTION, SOLUTION INTRAMUSCULAR; INTRAVENOUS
Status: DISCONTINUED
Start: 2020-03-01 | End: 2020-03-01 | Stop reason: WASHOUT

## 2020-03-01 RX ORDER — OXYTOCIN 10 [USP'U]/ML
10 INJECTION, SOLUTION INTRAMUSCULAR; INTRAVENOUS
Status: DISCONTINUED | OUTPATIENT
Start: 2020-03-01 | End: 2020-03-03 | Stop reason: HOSPADM

## 2020-03-01 RX ORDER — LANOLIN 100 %
OINTMENT (GRAM) TOPICAL
Status: DISCONTINUED | OUTPATIENT
Start: 2020-03-01 | End: 2020-03-03 | Stop reason: HOSPADM

## 2020-03-01 RX ORDER — OXYCODONE AND ACETAMINOPHEN 5; 325 MG/1; MG/1
1 TABLET ORAL
Status: DISCONTINUED | OUTPATIENT
Start: 2020-03-01 | End: 2020-03-01

## 2020-03-01 RX ORDER — HYDROCORTISONE 2.5 %
CREAM (GRAM) TOPICAL 3 TIMES DAILY PRN
Status: DISCONTINUED | OUTPATIENT
Start: 2020-03-01 | End: 2020-03-03 | Stop reason: HOSPADM

## 2020-03-01 RX ADMIN — FENTANYL CITRATE 25 MCG: 50 INJECTION, SOLUTION INTRAMUSCULAR; INTRAVENOUS at 18:40

## 2020-03-01 RX ADMIN — BUPIVACAINE HYDROCHLORIDE: 7.5 INJECTION, SOLUTION EPIDURAL; RETROBULBAR at 18:56

## 2020-03-01 RX ADMIN — SODIUM CHLORIDE, POTASSIUM CHLORIDE, SODIUM LACTATE AND CALCIUM CHLORIDE: 600; 310; 30; 20 INJECTION, SOLUTION INTRAVENOUS at 17:53

## 2020-03-01 RX ADMIN — Medication 2 ML: at 18:40

## 2020-03-01 RX ADMIN — IBUPROFEN 800 MG: 800 TABLET, FILM COATED ORAL at 23:04

## 2020-03-01 RX ADMIN — LIDOCAINE HYDROCHLORIDE 20 ML: 10 INJECTION, SOLUTION EPIDURAL; INFILTRATION; INTRACAUDAL; PERINEURAL at 22:23

## 2020-03-01 NOTE — H&P
[facility]  OB History and Physical      Lesley Mahmood MRN# [id number]   Age: 34 year old YOB: 1985     CC:  Contractions    HPI:  Ms. Lesley Mahmood is a 34 year old  at 39w3d by LMP consistent with 7w4d US who presents with 3 hours of regular painful contractions.  The patient was pruning bushes outside today when she noted onset of regular painful contractions around 1200.  They have become increasingly frequent starting at 10 minutes apart and now 5 minutes apart.  Rates her pain at a 2 out of 10 in severity and describes the contractions as similar to her last labor.  The patient delivered within 1 hour of arrival for her first vaginal delivery and so decided to come in sooner this time.  Denies any vaginal bleeding, leaking of clear fluid, abnormal vaginal discharge or dysuria. + Normal fetal movement.  Has not had any headaches, right upper quadrant pain, shortness of breath, or significant swelling.  Reports occasional constipation but her last bowel movement was today.  Denies nausea/vomiting, diarrhea, or recent illnesses.      Pregnancy Complications:  -Polyhydramnios, now resolved  -Failed GCT/past GTT  -AMA, normal NIPT    Prenatal Labs:   Lab Results   Component Value Date    ABO A 2019    RH Pos 2019    AS Neg 2019    HEPBANG Nonreactive 2019    CHPCRT Negative 08/15/2019    GCPCRT Negative 08/15/2019    TREPAB Negative 2015    HGB 12.0 2019    HIV Negative 2009       GBS Status:   Lab Results   Component Value Date    GBS Negative 02/10/2020       Ultrasounds  Level 2 MF ultrasound, 2020  1) Arthur intrauterine pregnancy at 35 & 6/7 weeks gestational age.  2) None of the anomalies commonly detected by ultrasound were evident in the limited fetal anatomic survey as described above, anatomy limited by gestational age and  fetal lie.  3) Growth parameters and estimated fetal weight were consistent with  established dates, the AC is at the 85th percentile.  4) Mild polyhydramnios is confirmed.  5) Normal fetal activity for gestational age.      OB History  OB History    Para Term  AB Living   2 1 1 0 0 1   SAB TAB Ectopic Multiple Live Births   0 0 0 0 1      # Outcome Date GA Lbr Luis/2nd Weight Sex Delivery Anes PTL Lv   2 Current            1 Term 16 40w4d 02:00 / 01:14 3.374 kg (7 lb 7 oz) M Vag-Spont Pud N JUAN LUIS      Name: Darryl       Apgar1: 9  Apgar5: 9      Obstetric Comments   2nd degree lac well healed  NO GDM or HTN        PMHx:   Past Medical History:   Diagnosis Date     NO ACTIVE PROBLEMS (aka NONE)      PSHx:   Past Surgical History:   Procedure Laterality Date     birthmark removal       EXTRACTION(S) DENTAL       Meds:   Medications Prior to Admission   Medication Sig Dispense Refill Last Dose     Prenatal Vit-Fe Fumarate-FA (PRENATAL VITAMIN PO)    2020 at 2200     Nutritional Supplements (VITAMIN D BOOSTER PO) Take 5,000 Units by mouth   3/1/2020 at 0800     Allergies:  No Known Allergies   FmHx:   Family History   Problem Relation Age of Onset     Cancer Father         CLL stable      Cardiovascular Maternal Grandmother         strokes     Hypertension Maternal Grandmother      Cerebrovascular Disease Paternal Grandmother      Hypertension Paternal Grandmother      Other Cancer Paternal Grandfather         CLL     Hypertension Paternal Grandfather      Hypertension Mother      Hypertension Maternal Grandfather      Hemochromatosis Cousin      Unknown/Adopted Sister      Unknown/Adopted Brother      SocHx: She denies any tobacco, alcohol, or other drug use during this pregnancy.    ROS:   Complete 10-point ROS negative except as noted in HPI.     PE:  Vit:   Patient Vitals for the past 4 hrs:   BP Temp Temp src Pulse Resp   20 1603 126/68 98  F (36.7  C) Oral 68 18      Gen: Well-appearing, NAD, comfortable appearing in between contractions, mild discomfort  during contractions  CV: rrr, no mrg   Pulm: Ctab, no wheezes or crackles   Abd: Soft, gravid, non-tender  Ext: No LE edema b/l    Pres:  Cephalic by BSUS  EFW:  7# by Leopold's  Memb: Intact              FHT: Baseline 120, moderate variability, positive accelerations, no decelerations   [place]: 3-4 contractions in 10 minutes      Assessment  Ms. Lesley Mahmood is a 34 year old , at 39w3d by 7w4d US who presents in spontaneous labor.  Pregnancy is complicated by polyhydramnios, which is resolved on the latest BPP and AMA.    Plan  Admit to L&D  Labor: Anticipate   FWB: Category 1 FHT.  EFW 7#. Continue EFM and toco   Mild polyhydramnios (now resolved) monitored closely during pregnancy with weekly BPPs, no MFM recommendations for changes in management  Pain: Desires epidural for analgesia  PNC: Rh positive, Rubella immune, GBS negative, GCT failed, passed GTT, Placenta anterior  Fen/GI: Regular diet insulin active labor, clear liquid diet and IVF once in active labor    The patient was discussed with Dr. Nunes who is in agreement with the treatment plan.      Mindy Triana MD  OBGYN PGY-1  5:05 PM 3/1/2020  I saw and evaluated the patient. I agree with the   findings and the plan of care as documented in the resident's note.  MD Tara

## 2020-03-01 NOTE — PROVIDER NOTIFICATION
20 1610   Provider Notification   Provider Name/Title Dr. Myhre   Method of Notification In Department   Request Evaluate in Person   Notification Reason Patient Arrived     Notified provider that patient arrived. 39w3d, , r/o labor. Hx of fast labor. Denies LOF/bleeding. FHR category 1. Provider stated she will assess patient at bedside.

## 2020-03-02 LAB — HGB BLD-MCNC: 12.6 G/DL (ref 11.7–15.7)

## 2020-03-02 PROCEDURE — 36415 COLL VENOUS BLD VENIPUNCTURE: CPT | Performed by: STUDENT IN AN ORGANIZED HEALTH CARE EDUCATION/TRAINING PROGRAM

## 2020-03-02 PROCEDURE — 12000001 ZZH R&B MED SURG/OB UMMC

## 2020-03-02 PROCEDURE — 25000132 ZZH RX MED GY IP 250 OP 250 PS 637: Performed by: STUDENT IN AN ORGANIZED HEALTH CARE EDUCATION/TRAINING PROGRAM

## 2020-03-02 PROCEDURE — 85018 HEMOGLOBIN: CPT | Performed by: STUDENT IN AN ORGANIZED HEALTH CARE EDUCATION/TRAINING PROGRAM

## 2020-03-02 RX ORDER — ACETAMINOPHEN 325 MG/1
650 TABLET ORAL EVERY 4 HOURS PRN
Qty: 60 TABLET | Refills: 0 | Status: SHIPPED | OUTPATIENT
Start: 2020-03-02 | End: 2021-11-10

## 2020-03-02 RX ORDER — IBUPROFEN 600 MG/1
600 TABLET, FILM COATED ORAL EVERY 6 HOURS PRN
Qty: 60 TABLET | Refills: 0 | Status: SHIPPED | OUTPATIENT
Start: 2020-03-02 | End: 2020-06-22

## 2020-03-02 RX ORDER — AMOXICILLIN 250 MG
1 CAPSULE ORAL 2 TIMES DAILY
Qty: 60 TABLET | Refills: 0 | Status: SHIPPED | OUTPATIENT
Start: 2020-03-02 | End: 2020-06-22

## 2020-03-02 RX ADMIN — IBUPROFEN 800 MG: 800 TABLET, FILM COATED ORAL at 23:43

## 2020-03-02 RX ADMIN — SENNOSIDES AND DOCUSATE SODIUM 1 TABLET: 8.6; 5 TABLET ORAL at 20:44

## 2020-03-02 RX ADMIN — IBUPROFEN 800 MG: 800 TABLET, FILM COATED ORAL at 11:25

## 2020-03-02 RX ADMIN — IBUPROFEN 800 MG: 800 TABLET, FILM COATED ORAL at 17:33

## 2020-03-02 RX ADMIN — IBUPROFEN 800 MG: 800 TABLET, FILM COATED ORAL at 05:33

## 2020-03-02 RX ADMIN — SENNOSIDES AND DOCUSATE SODIUM 1 TABLET: 8.6; 5 TABLET ORAL at 10:46

## 2020-03-02 RX ADMIN — ACETAMINOPHEN 650 MG: 325 TABLET, FILM COATED ORAL at 20:44

## 2020-03-02 RX ADMIN — ACETAMINOPHEN 650 MG: 325 TABLET, FILM COATED ORAL at 10:46

## 2020-03-02 RX ADMIN — ACETAMINOPHEN 650 MG: 325 TABLET, FILM COATED ORAL at 01:58

## 2020-03-02 RX ADMIN — ACETAMINOPHEN 650 MG: 325 TABLET, FILM COATED ORAL at 16:31

## 2020-03-02 NOTE — PLAN OF CARE
Patient arrived to [facility] unit via wheelchair at 0055,with belongings, accompanied by spouse/ significant other, with infant in arms. Received report from PRATEEK Walsh and checked bands. Unit and room orientation completd. Call light given; no concerns present at this time. Continue with plan of care.

## 2020-03-02 NOTE — PROGRESS NOTES
OB Staff    Patient comfortable with epidural.  Agrees to exam and SROM if possible.    O:   Patient Vitals for the past 24 hrs:   BP Temp Temp src Pulse Resp SpO2   20 1905 115/54 -- -- -- 16 99 %   20 1900 117/58 -- -- -- 16 99 %   20 1854 114/64 -- -- -- 16 100 %   20 1852 96/52 -- -- -- 16 100 %   20 1850 110/55 -- -- -- 16 100 %   20 1848 98/55 -- -- -- 16 100 %   20 1846 113/58 -- -- -- 16 100 %   20 1843 124/69 -- -- -- 16 100 %   20 1603 126/68 98  F (36.7  C) Oral 68 18 --     Gen'l: comfortable, resting on back  FHT: baseline 125, moderate variability, accels present, no decelerations  TOco: 4 in 10    SVE 80/-2, bloody show after AROM for clear fluid /-2    Assessment/Plan:  35 yo  at 39w3d presents in spontaneous labor    - continue expectant management, now s/p AROM, anticipate NSVE  - Fetal status: category 1 tracing, GBS negative, EFW 7#    Catalina Orta MD

## 2020-03-02 NOTE — PLAN OF CARE
Data: Patient presented to [facility] at 1545.   Reason for maternal/fetal assessment per patient is Rule Out Labor  .  Patient is a . Prenatal record reviewed.      OB History    Para Term  AB Living   2 1 1 0 0 1   SAB TAB Ectopic Multiple Live Births   0 0 0 0 1      # Outcome Date GA Lbr Luis/2nd Weight Sex Delivery Anes PTL Lv   2 Current            1 Term 16 40w4d 02:00 / 01:14 3.374 kg (7 lb 7 oz) M Vag-Spont Pud N JUAN LUIS      Name: Darryl       Apgar1: 9  Apgar5: 9      Obstetric Comments   2nd degree lac well healed  NO GDM or HTN    . Medical history:   Past Medical History:   Diagnosis Date     NO ACTIVE PROBLEMS (aka NONE)    . Gestational Age 39w3d. VSS. Fetal movement present. Patient denies  vaginal discharge, pelvic pressure, UTI symptoms, GI problems, bloody show, vaginal bleeding, edema, headache, visual disturbances, epigastric or URQ pain, rupture of membranes. Support person,  Mauro, present.  Action: Verbal consent for EFM. Triage assessment completed. EFM applied upon arrival. Uterine assessment showed contractions q2-4min. Fetal assessment: Presumed adequate fetal oxygenation documented (see flow record).   Response: Dr. Nunes and Dr. Myhre informed of patients arrival. SVE /-2 at 1640. Plan per provider is to admit patient for labor. Patient verbalized agreement with plan. Patient transferred to room 444 ambulatory, oriented to room and call light.

## 2020-03-02 NOTE — PLAN OF CARE
Data: VSS, postpartum assessments WNL. She is voiding without difficulty, up ad joey, passing gas, eating and drinking normally. Perineum appears to be healing well, lochia WNL, no s/s infection. She is independent with self and infant cares. Breastfeeding infant with minimal assistance. Taking ibuprofen for pain and using the following non-pharmacologic methods: tucks (didn't like the juan ice pack) bath salts are in the room. Reports good pain management.   Action: Education provided on adequate signs of  input, signs the  may be wanting to spit up  Response: Pt is agreeable with her plan of care. Positive attachment behaviors observed with infant. Support person not present overnight. Anticipate discharge per  care plan.

## 2020-03-02 NOTE — PLAN OF CARE
Patient's vss, postpartum assessment is WDL. Patient's cramping is well managed with Ibuprofen and Tylenol. Noted patient to be bonding well with infant. Plan of care reviewed with patient. Will continue with plan of care.

## 2020-03-02 NOTE — L&D DELIVERY NOTE
DELIVERY SUMMARY    Lesley Mahmood MRN# [id number]   Age: 34 year old YOB: 1985     Delivery Summary  Ms. Lesley Mahmood is a 34 year old now  at 39w3d, admitted for spontaneous labor. She received epidural for pain. Patient complete at  and began pushing.  Infant delivered at 22:05 on 3/1/20. Shoulders delivered easily. Infant with spontaneous cry. Placed on mother's chest. APGARs 8/9 at 1 and 5 minutes. Weight 3220 grams. Placenta delivered with gentle cord traction; noted to be intact with 3 vessel cord. Small second degree perineal laceration noted. Lidocaine 1% used for anesthetic. Second degree repaired in standard fashion using 3-0 Vicryl. She received IV pitocin for uterotonics. Fundus firm and lochia minimal at the end of the case.  cc. Dr. Reed present for entire delivery     Carrie Locke MD   OB/GYN Resident PGY-3  3/1/2020 10:56 PM    Gestational Age:  Gestational Age: 39w3d     Maternal temperature range:  Temp  Av  F (36.7  C)  Min: 98  F (36.7  C)  Max: 98.1  F (36.7  C)    Membranes ruptured for:   (Delivered) Hours: 2 Minutes: 55     GBS status:  Lab Results   Component Value Date    GBS Negative 02/10/2020       Antibiotic Status:  Antibiotics         IV Antibiotic Given         Additional Management      Fetal Status Prior to  Delivery Category 2    Fetal Status Comments         Sepsis Prebirth Score:       Sepsis Postbirth Score:       Determination based on clinical exam after birth:       Disposition:           Labor Event Times    Labor onset date:  3/1/20 Onset time:   4:40 PM   Dilation complete date:  3/1/20 Complete time:   9:15 PM   Start pushing date/time:  3/1/2020 2122      Labor Events     labor?:  No   steroids:  None  Labor Type:  Spontaneous  Predominate monitoring during 1st stage:  continuous electronic fetal monitoring     Antibiotics received during labor?:  No     Rupture date/time: 3/1/20 1910   Rupture  levothyroxin 137mcg     Last Written Prescription Date: 11/29/16  Last Quantity: 30, # refills: 0  Last Office Visit with [facility], [facility] or [facility] prescribing provider: 02/04/16        TSH   Date Value Ref Range Status   12/10/2016 0.62 0.40 - 4.00 mU/L Final        "type:  Artificial Rupture of Membranes  Fluid color:  Clear     Delivery/Placenta Date and Time    Delivery Date:  3/1/20 Delivery Time:  10:05 PM   Placenta Date/Time:  3/1/2020 10:11 PM  Oxytocin given at the time of delivery:  after delivery of baby     Vaginal Counts     Initial count performed by 2 team members:   Two Team Members   Shelby Locke       Needles Suture Leon Sponges Instruments   Initial counts 2 0 5    Added to count 0 1 0    Final counts 2 1 5    Placed during labor Accounted for at the end of labor   No    No    No     Final count performed by 2 team members:   Two Team Members   Shelby Locke      Final count correct?:  Yes     Apgars    Living status:  Living   1 Minute 5 Minute 10 Minute 15 Minute 20 Minute   Skin color: 1  1       Heart rate: 2  2       Reflex irritability: 2  2       Muscle tone: 2  2       Respiratory effort: 1  2       Total: 8  9       Apgars assigned by:  DELISA WILLIAM RN     Cord    Vessels:  3 Vessels Complications:  None   Cord Blood Disposition:  Lab Gases Sent?:  No      [place] Resuscitation    Methods:  None   Care at Delivery:   baby girl to mother's abdomen, dried and stimulated with good lusty cry. APGARS 8 & 9. Baby bonding well with mother.    Addendum: NNP called to room due to baby grunting. Oximeter applied and baby's sats were 96% and 100% with good lusty cry. Infant returned to mom after NNP examination.  See NNP note for details.      [place] Measurements    Weight:  7 lb 1.6 oz Length:  1' 7.5\"   Head circumference:  34.3 cm       Skin to Skin and Feeding Plan    Skin to skin initiation date/time: 1/3/1841    Skin to skin with:  Mother  Skin to skin end date/time:     Breastfeeding initiated date/time:  3/1/2020 2230  How do you plan to feed your baby:  Breastfeeding     Labor Events and Shoulder Dystocia    Fetal Tracing Prior to Delivery:  Category 2  Shoulder dystocia present?:  Neg     Delivery " (Maternal) (Provider to Complete) ([id number])    Episiotomy:  None  Perineal lacerations:  2nd Repaired?:  Yes      Blood Loss  Mother: Lesley Mahmood #[id number]   Start of Mother's Information    IO Blood Loss  03/01/20 1640 - 03/02/20 0036    Delivery QBL (mL) Hospital Encounter 131 mL    Total  131 mL         End of Mother's Information  Mother: Lesley Mahmood #[id number]         Delivery - Provider to Complete ([id number])    Delivering clinician:  Elaine Reed MD  Attempted Delivery Types (Choose all that apply):  Spontaneous Vaginal Delivery  Delivery Type (Choose the 1 that will go to the Birth History):  Vaginal, Spontaneous   Other personnel:   Provider Role   Shelby Vo, RN Delivery Nurse   Carrie Locke MD Resident   Elaine Reed MD Obstetrician   Sushma Santos, RN Delivery Nurse         Placenta    Delayed Cord Clamping:  Done  Date/Time:  3/1/2020 10:11 PM  Removal:  Spontaneous  Disposition:  Hospital disposal     Anesthesia    Method:  Epidural  Cervical dilation at placement:  4-7          Presentation and Position    Presentation:  Vertex   Occiput Anterior           Carrie Locke MD

## 2020-03-02 NOTE — ANESTHESIA PREPROCEDURE EVALUATION
"Anesthesia Pre-Procedure Evaluation    Patient: Lesley Mahmood   MRN:     [id number] Gender:   female   Age:    34 year old :      1985        Preoperative Diagnosis: * No pre-op diagnosis entered *   * No procedures listed *     LABS:  CBC:   Lab Results   Component Value Date    WBC 9.3 2020    WBC 8.4 2019    HGB 13.2 2020    HGB 12.0 2019    HCT 38.4 2020    HCT 36.1 2019     2020     2019     BMP: No results found for: NA, POTASSIUM, CHLORIDE, CO2, BUN, CR, GLC  COAGS: No results found for: PTT, INR, FIBR  POC:   Lab Results   Component Value Date    BGM 85 2019     OTHER:   Lab Results   Component Value Date    TSH 3.33 06/15/2006        Preop Vitals    BP Readings from Last 3 Encounters:   20 126/68   20 101/66   20 110/69    Pulse Readings from Last 3 Encounters:   20 68   20 80   20 79      Resp Readings from Last 3 Encounters:   20 18   16 18   14 16    SpO2 Readings from Last 3 Encounters:   14 100%      Temp Readings from Last 1 Encounters:   20 36.7  C (98  F) (Oral)    Ht Readings from Last 1 Encounters:   20 1.626 m (5' 4\")      Wt Readings from Last 1 Encounters:   20 76.6 kg (168 lb 14.4 oz)    Estimated body mass index is 28.99 kg/m  as calculated from the following:    Height as of 20: 1.626 m (5' 4\").    Weight as of 20: 76.6 kg (168 lb 14.4 oz).     LDA:  Peripheral IV 20 Right Lower forearm (Active)   Number of days: 0       Intrathecal/Epidural Catheter 20 (Active)   Number of days: 0        Past Medical History:   Diagnosis Date     NO ACTIVE PROBLEMS (aka NONE)       Past Surgical History:   Procedure Laterality Date     birthmark removal       EXTRACTION(S) DENTAL        No Known Allergies     Anesthesia Evaluation       history and physical reviewed .             ROS/MED HX    ENT/Pulmonary:  - neg pulmonary " ROS     Neurologic:  - neg neurologic ROS     Cardiovascular:  - neg cardiovascular ROS       METS/Exercise Tolerance:     Hematologic:         Musculoskeletal:         GI/Hepatic:  - neg GI/hepatic ROS       Renal/Genitourinary:         Endo:         Psychiatric:         Infectious Disease:         Malignancy:         Other:                     ABIGAIL SUAREZ AN PHYSICAL EXAM    Assessment:   ASA SCORE: 2 emergent           PONV Management: Adult Risk Factors: Female             neg OB ROS             Nunu Amor MD

## 2020-03-02 NOTE — PLAN OF CARE
VSS. Pt laboring spontaneously. Epidural placed and started at 1855. SVE at 1910: 4/80/-2 and AROM (clear). At 1930 pt had possible prolonged deceleration - tracing not connected. At this time interventions included repositioning, LR bolus, O2 applied, provider notified. Dr. Howard came to bedside to assess, SVE unchanged from 1910. FHR returned to baseline after interventions implemented. Will continue to monitor.

## 2020-03-02 NOTE — ANESTHESIA PROCEDURE NOTES
Combined Spinal/Epidural procedure note    Staff  Anesthesiologist:  Nunu Amor MD  Location:  OB  Procedure start time:  3/1/2020 6:26 PM  Procedure end time:  3/1/2020 6:45 PM    Timeout  patient identified, IV checked, site marked, risks and benefits discussed, informed consent, monitors and equipment checked, pre-op evaluation, at physician/surgeon's request and post-op pain management    Correct Patient: Yes    Correct Position: Yes    Correct Site: Yes    Correct Procedure: Yes    Correct Laterality:  Yes  Site Marked:  Yes    Procedure details  Procedure:  Combined Spinal/Epidural (CSE)  Position:  Sitting  ASA:  2 and Emergent  Diagnosis:  Labor  Sterile Prep: chloraprep, patient draped, mask and sterile gloves    Local skin infiltration:  1% lidocaine  amount (mL):  3  Approach:  Midline  Epidural Needle Type:  Fidelina  Needle gauge (G):  17  Needle length (in):  3.5  Injection Technique:  LORT saline  JEREMIE at (cm):  4.5  Attempts:  1  Redirects:  0  Spinal Needle (G):  27  Spinal Needle Type:  Pencan  Spinal Needle Length (in):  4 11/16  Catheter gauge (G):  19  Catheter threaded easily: Yes    Threaded to skin (cm) :  10  Threaded in epidural space (cm):  5.5  Paresthesias:  No  CSF with Epidural needle: No    CSF with Spinal needle: Yes    Aspiration negative for Heme or CSF: Yes

## 2020-03-02 NOTE — DISCHARGE SUMMARY
VAGINAL DELIVERY DISCHARGE SUMMARY    Admit date: 3/1/2020  Discharge date: 20     Admit Dx:   - 34 year old  at 39w3d  - Polyhydramnios, now resolved  - AMA    Discharge Dx:  - Same as above, s/p     Procedures:  - Spontaneous vaginal delivery  - Epidural analgesia    Admit HPI:  Ms. Lesley Mahmood is a 34 year old  at 39w3d by LMP consistent with 7w4d US who presents with 3 hours of regular painful contractions.  The patient was pruning [employer] outside today when she noted onset of regular painful contractions around 1200.  They have become increasingly frequent starting at 10 minutes apart and now 5 minutes apart.  Rates her pain at a 2 out of 10 in severity and describes the contractions as similar to her last labor.  The patient delivered within 1 hour of arrival for her first vaginal delivery and so decided to come in sooner this time.  Denies any vaginal bleeding, leaking of clear fluid, abnormal vaginal discharge or dysuria. + Normal fetal movement.  Has not had any headaches, right upper quadrant pain, shortness of breath, or significant swelling.  Reports occasional constipation but her last bowel movement was today.  Denies nausea/vomiting, diarrhea, or recent illnesses. Please see her admit H&P for full details of her PMH, PSH, Meds, Allergies and exam on admit.    Hospital course:  Ms. Lesley Mahmood is a 34 year old now  at 39w3d, admitted for spontaneous labor. She received epidural for pain. Patient complete at 2115 and began pushing.  Infant delivered at 22:05 on 3/1/20. Shoulders delivered easily. Infant with spontaneous cry. Placed on mother's chest. APGARs 8/9 at 1 and 5 minutes. Weight 3220g. Placenta delivered with gentle cord traction; noted to be intact with 3 vessel cord. Small second degree perineal laceration noted. Lidocaine 1% used for anesthetic. Second degree repaired in standard fashion using 3-0 Vicryl. She received IV pitocin for uterotonics. Fundus firm  and lochia minimal at the end of the case.  cc. Please see her Delivery Summary for full details regarding her delivery.    Her postpartum course was uncomplicated. On PPD#2, she was meeting all of her postpartum goals and deemed stable for discharge. She was voiding without difficulty, tolerating a regular diet without nausea and vomiting, her pain was well controlled on oral pain medicines and her lochia was appropriate. Her hemoglobin prior to delivery was 13.2 and after delivery was 12.6. Her Rh status was positive, and Rhogam was not indicated.     Discharge Medications:  Current Discharge Medication List      START taking these medications    Details   !! acetaminophen (TYLENOL) 325 MG tablet Take 2 tablets (650 mg) by mouth every 6 hours as needed for mild pain Start after Delivery.  Qty: 100 tablet, Refills: 0    Associated Diagnoses:  (normal spontaneous vaginal delivery)      !! acetaminophen (TYLENOL) 325 MG tablet Take 2 tablets (650 mg) by mouth every 4 hours as needed for mild pain or fever (greater than or equal to 38  C /100.4  F (oral) or 38.5  C/ 101.4  F (core).)  Qty: 60 tablet, Refills: 0    Associated Diagnoses:  (normal spontaneous vaginal delivery)      !! ibuprofen (ADVIL/MOTRIN) 600 MG tablet Take 1 tablet (600 mg) by mouth every 6 hours as needed for moderate pain Start after delivery  Qty: 60 tablet, Refills: 0    Associated Diagnoses:  (normal spontaneous vaginal delivery)      !! ibuprofen (ADVIL/MOTRIN) 600 MG tablet Take 1 tablet (600 mg) by mouth every 6 hours as needed for moderate pain  Qty: 60 tablet, Refills: 0    Associated Diagnoses:  (normal spontaneous vaginal delivery)      !! senna-docusate (SENOKOT-S/PERICOLACE) 8.6-50 MG tablet Take 1 tablet by mouth daily Start after delivery.  Qty: 100 tablet, Refills: 0    Associated Diagnoses:  (normal spontaneous vaginal delivery)      !! senna-docusate (SENOKOT-S/PERICOLACE) 8.6-50 MG tablet Take 1 tablet  by mouth 2 times daily  Qty: 60 tablet, Refills: 0    Associated Diagnoses:  (normal spontaneous vaginal delivery)       !! - Potential duplicate medications found. Please discuss with provider.      CONTINUE these medications which have NOT CHANGED    Details   Prenatal Vit-Fe Fumarate-FA (PRENATAL VITAMIN PO)       Nutritional Supplements (VITAMIN D BOOSTER PO) Take 5,000 Units by mouth             Discharge/Disposition:  Lesley Mahmood was discharged to home in stable condition with the following instructions/medications:  1) Call for temperature > 100.4, bright red vaginal bleeding >1 pad an hour x 2 hours, foul smelling vaginal discharge, pain not controlled by usual oral pain meds, persistent nausea and vomiting not controlled on medications  2) She desired mirena IUD for contraception.  3) For feeding she decided to breast feed.  4) She was instructed to follow-up with her primary OB in 6 weeks for a routine postpartum visit.    Carrie Locke MD   OB/GYN Resident PGY-3  3/3/2020 8:04 AM  I saw and evaluated the patient. I agree with the   findings and the plan of care as documented in the resident's note.  MD Tara

## 2020-03-02 NOTE — PROGRESS NOTES
Post Partum Progress Note  PPD#1    Subjective:  She complains of mild cramping. Says overall no issues overnight. Pain is improving and well controlled on current medication regimen. She is tolerating PO intake. Lochia present and similar to light period.  She is voiding without difficulty. She is ambulating without dizziness or difficulty.  She denies headache, changes in vision, nausea/vomiting, chest pain, shortness of breath, RUQ pain, or worsening edema.  Plans to breast feed. Planning mirena IUD.    Objective:  Vitals:    20 0000 20 0015 20 0100 20 0500   BP: 113/68 92/67 122/81 105/75   Pulse:   66 75   Resp: 18 18 16 14   Temp:   97.9  F (36.6  C) 97.8  F (36.6  C)   TempSrc:   Oral Oral   SpO2: 99% 99% 100% 100%     General: NAD. A&Ox3.  CV: Regular rate, well perfused.   Pulm: Normal respiratory effort.  Abd: Soft, non-tender, non-distended. Fundus is firm and 1 cm above the umbilicus.    Ext: No lower extremity edema bilaterally. No calf tenderness.    Assessment/Plan:  Lesley Mahmood is a 34 year old  female who is  PPD#1 s/p .    - Encourage routine post-operative goals including ambulation and incentive spirometry  - PNC: Rh positive. Rubella immune. No intervention indicated.  - Pain: controlled on oral medications  - Heme: Hgb 13.2> >12.6  - GI: continue anti-emetics and stool softeners as needed.  - : Voiding spontaneously .  - Infant: Stable in room  - Feeding: Plans on breast feeding.  - BC: Plans on mirena IUD    Discharge to home on PPD#2    Carrie Locke MD   OB/GYN Resident PGY-3  3/2/2020 8:02 AM    OBGYN Attending Addendum     Ms. Lesley Mahmood was seen and examined by me separately from the team.  I have reviewed and agree with the above note by Dr. Locke.    I personally reviewed the following: vitals, meds, labs.     I agree with the plan for discharge tomorrow.    Tamera Blood MD, MSCI  Date of Service: 3/2/2020

## 2020-03-02 NOTE — PLAN OF CARE
Pt was complete at  and began pushing at .  at . Placenta at . 2nd degree repaired. QBL 131ml. Ibuprofen given at 230. VSS. Fundus midline/firm/UU with scant bleeding. Report given to Gisela LOZANO

## 2020-03-02 NOTE — PROGRESS NOTES
Called by RN for prolonged deceleration of 4.5 minutes with jaja to 70 bpm. Upon entry to room, patient was on her right side, and FHT returned to 140s, there was then a second deceleration lasting two minutes. On exam patient is unchanged at 5/90/-2. There was recovery to baseline of 120's with moderate variability. Discussed indications for emergent  section, and patient consented. Recommend continue to closely monitor at this time.     Jane Howard MD  OB/GYN PGY-2  20 7:52 PM

## 2020-03-02 NOTE — PLAN OF CARE
Data: Lesley Mahmood transferred to [id number] via wheelchair at 0055. Baby transferred via parent's arms.  Action: Receiving unit notified of transfer: Yes. Patient and family notified of room change. Report given to BLAKE Francisco at 0025. Belongings sent to receiving unit. Accompanied by Registered Nurse. Oriented patient to surroundings. Call light within reach. ID bands double-checked with receiving RN.  Response: Patient tolerated transfer and is stable.

## 2020-03-03 VITALS
OXYGEN SATURATION: 100 % | RESPIRATION RATE: 16 BRPM | HEART RATE: 83 BPM | DIASTOLIC BLOOD PRESSURE: 78 MMHG | TEMPERATURE: 98.3 F | SYSTOLIC BLOOD PRESSURE: 135 MMHG

## 2020-03-03 LAB — T PALLIDUM AB SER QL: NONREACTIVE

## 2020-03-03 PROCEDURE — 25000132 ZZH RX MED GY IP 250 OP 250 PS 637: Performed by: STUDENT IN AN ORGANIZED HEALTH CARE EDUCATION/TRAINING PROGRAM

## 2020-03-03 RX ORDER — ACETAMINOPHEN 325 MG/1
650 TABLET ORAL EVERY 6 HOURS PRN
Qty: 100 TABLET | Refills: 0 | Status: SHIPPED | OUTPATIENT
Start: 2020-03-03 | End: 2020-06-22

## 2020-03-03 RX ORDER — AMOXICILLIN 250 MG
1 CAPSULE ORAL DAILY
Qty: 100 TABLET | Refills: 0 | Status: SHIPPED | OUTPATIENT
Start: 2020-03-03 | End: 2020-06-22

## 2020-03-03 RX ORDER — IBUPROFEN 600 MG/1
600 TABLET, FILM COATED ORAL EVERY 6 HOURS PRN
Qty: 60 TABLET | Refills: 0 | Status: SHIPPED | OUTPATIENT
Start: 2020-03-03 | End: 2020-06-22

## 2020-03-03 RX ADMIN — IBUPROFEN 800 MG: 800 TABLET, FILM COATED ORAL at 06:04

## 2020-03-03 NOTE — PROGRESS NOTES
Post Partum Progress Note  PPD#2    Subjective:  She is resting comfortably in bed this morning. She complains of mild cramping with breast feeding. Pain is improving and well controlled on current medication regimen. She is tolerating PO intake. Lochia present and light.  She is voiding without difficulty. She has passed flatus and has had a BM. She is ambulating without dizziness or difficulty.  She denies headache, changes in vision, nausea/vomiting, chest pain, shortness of breath, RUQ pain, or worsening edema.  Plans to breast feed. Feels ready for discharge home.     Objective:  Vitals:    20 0100 20 0500 20 1042 20 1947   BP: 122/81 105/75 101/67 116/73   Pulse: 66 75 75 82   Resp: 16 14 16 16   Temp: 97.9  F (36.6  C) 97.8  F (36.6  C) 98.3  F (36.8  C) 98.1  F (36.7  C)   TempSrc: Oral Oral Oral Oral   SpO2: 100% 100%       General: NAD. A&Ox3.  CV: Regular rate, well perfused.   Pulm: Normal respiratory effort.  Abd: Soft, non-tender, non-distended. Fundus is firm and 1 cm above the umbilicus.    Ext: No lower extremity edema bilaterally. No calf tenderness.    Assessment/Plan:  Lesley Mahmood is a 34 year old  female who is  PPD#2 s/p .    - Encourage routine post-operative goals including ambulation and incentive spirometry  - PNC: Rh positive. Rubella immune. No intervention indicated.  - Pain: controlled on oral medications  - Heme: Hgb 13.2> >12.6  - GI: continue anti-emetics and stool softeners as needed.  - : Voiding spontaneously .  - Infant: Stable in room  - Feeding: Plans on breast feeding.  - BC: Plans on mirena IUD    Discharge to home today    Carrie Locke MD   OB/GYN Resident PGY-3  3/3/2020 8:03 AM  I saw and evaluated the patient. I agree with the   findings and the plan of care as documented in the resident's note.  MD Tara

## 2020-03-03 NOTE — PLAN OF CARE
Patients vitals have been stable. Postpartum assessment WDL. States that she is voiding without difficulties. Declines headache, dizziness, and blurred vision. Is taking ibuprofen and tylenol for pain. Is independent with self cares as well as baby cares. Will continue to monitor for adequate pain control.

## 2020-03-03 NOTE — PLAN OF CARE
Data: Vital signs within normal limits. Postpartum checks within normal limits - see flow record. Patient eating and drinking normally. Patient able to empty bladder independently and is up ambulating. Patient performing self cares and is able to care for infant.  Action: Patient is ready for discharge. Discharge instructions reviewed with patient. Patient education done about discharge and follow up.   Response: Positive attachment behaviors observed with infant. Support persons.   Plan: Patient discharged to home.

## 2020-03-03 NOTE — PLAN OF CARE
Data: VSS, postpartum assessments WNL. She is voiding without difficulty, up ad joey, passing gas, eating and drinking normally. Perineum appears to be healing well, lochia WNL, no s/s infection. She is independent with self and infant cares. Breastfeeding infant with minimal assistance. Taking ibuprofen for pain and not currently using any non-pharmacologic methods.. Reports good pain management.   Action: Education provided on hand expression  Response: Pt is agreeable with her plan of care. Positive attachment behaviors observed with infant.  Anticipate discharge per care plan.

## 2020-04-17 ENCOUNTER — TELEPHONE (OUTPATIENT)
Dept: OBGYN | Facility: CLINIC | Age: 35
End: 2020-04-17

## 2020-04-17 NOTE — TELEPHONE ENCOUNTER
Discussed in-person post partum visit versus telephone. Pt living with parents >70y.o. and infant - desires to postpone to post-covid. Nurse agreed. Pt will call to schedule this in 2-3 months

## 2020-04-20 ENCOUNTER — VIRTUAL VISIT (OUTPATIENT)
Dept: OBGYN | Facility: CLINIC | Age: 35
End: 2020-04-20
Attending: OBSTETRICS & GYNECOLOGY
Payer: COMMERCIAL

## 2020-04-20 NOTE — PROGRESS NOTES
"Lesley Mahmood is a 35 year old female who is being evaluated via a billable telephone visit.      The patient has been notified of following:     \"This telephone visit will be conducted via a call between you and your physician/provider. We have found that certain health care needs can be provided without the need for a physical exam.  This service lets us provide the care you need with a short phone conversation.  If a prescription is necessary we can send it directly to your pharmacy.  If lab work is needed we can place an order for that and you can then stop by our lab to have the test done at a later time.    Telephone visits are billed at different rates depending on your insurance coverage. During this emergency period, for some insurers they may be billed the same as an in-person visit.  Please reach out to your insurance provider with any questions.    If during the course of the call the physician/provider feels a telephone visit is not appropriate, you will not be charged for this service.\"    Patient has given verbal consent for Telephone visit?  Yes    - Reinforced COVID-19 precautions including: social distancing of at least 6 feet, avoiding gatherings of 10 persons or more, frequent hand hygiene, avoiding discretionary travel, avoiding touching of face, and cleaning and disinfecting frequently touched surfaces daily.  Encouraged household members to follow the same guidelines.    - If suspected exposure to COVID-19 or onset of fever and symptoms, such as cough or difficulty breathing visit [email or web address] promptly to be directed to the appropriate care and, if pregnant, call [phone] to notify your healthcare team.       - Reviewed cohort scheduling of ObGyns, CNMS, and residents which may cause a change in provider at future scheduled clinic appointments.      She is breast feeding, going well.     Plans condoms until IUD/pap. Opts to wait until post covid. "     ================================================================  ROS: 10 point ROS neg other than the symptoms noted above in the HPI.     EXAM:  LMP 2019     General: healthy, alert and no distress  Psych: NEGATIVE  Last PHQ-9 score on record= No Value exists for the : HP#PHQ9    ASSESSMENT:   Post partum after     PLAN:  Plans IUD and pap/HPV post covid. Pt will call to schedule.     Hansa Beasley MD, FACOG  [facility]  OB/GYN    2020  2:05 PM

## 2020-05-18 ENCOUNTER — MEDICAL CORRESPONDENCE (OUTPATIENT)
Dept: HEALTH INFORMATION MANAGEMENT | Facility: CLINIC | Age: 35
End: 2020-05-18

## 2020-06-22 ENCOUNTER — OFFICE VISIT (OUTPATIENT)
Dept: OBGYN | Facility: CLINIC | Age: 35
End: 2020-06-22
Attending: OBSTETRICS & GYNECOLOGY
Payer: COMMERCIAL

## 2020-06-22 VITALS
HEIGHT: 64 IN | BODY MASS INDEX: 24.57 KG/M2 | SYSTOLIC BLOOD PRESSURE: 108 MMHG | HEART RATE: 64 BPM | DIASTOLIC BLOOD PRESSURE: 73 MMHG | WEIGHT: 143.9 LBS

## 2020-06-22 DIAGNOSIS — Z12.4 SCREENING FOR MALIGNANT NEOPLASM OF CERVIX: Primary | ICD-10-CM

## 2020-06-22 DIAGNOSIS — Z30.430 ENCOUNTER FOR IUD INSERTION: ICD-10-CM

## 2020-06-22 LAB
HCG UR QL: NEGATIVE
INTERNAL QC OK POCT: YES

## 2020-06-22 PROCEDURE — G0463 HOSPITAL OUTPT CLINIC VISIT: HCPCS | Mod: ZF

## 2020-06-22 PROCEDURE — G0145 SCR C/V CYTO,THINLAYER,RESCR: HCPCS | Performed by: OBSTETRICS & GYNECOLOGY

## 2020-06-22 PROCEDURE — 25000128 H RX IP 250 OP 636: Mod: ZF | Performed by: OBSTETRICS & GYNECOLOGY

## 2020-06-22 PROCEDURE — 87624 HPV HI-RISK TYP POOLED RSLT: CPT | Performed by: OBSTETRICS & GYNECOLOGY

## 2020-06-22 PROCEDURE — 81025 URINE PREGNANCY TEST: CPT | Mod: ZF | Performed by: OBSTETRICS & GYNECOLOGY

## 2020-06-22 RX ADMIN — LEVONORGESTREL 20 MCG: 52 INTRAUTERINE DEVICE INTRAUTERINE at 16:47

## 2020-06-22 ASSESSMENT — MIFFLIN-ST. JEOR: SCORE: 1322.73

## 2020-06-22 NOTE — LETTER
"6/22/2020       RE: Lesley Mahmood  [address]  [facility] [address]     Dear Colleague,    Thank you for referring your patient, Lesley Mahmood, to the [facility] at [facility]. Please see a copy of my visit note below.    Nursing Notes:   Stas Branch, [facility]  6/22/2020  3:15 PM  Signed  Chief Complaint   Patient presents with     IUD     Mirena IUD insert       See Sarina [facility] 6/22/2020     =============================================================  Lesley is here for PP visit. Doing well. Happy to have had such a nice maternity leave.      ROS: 10 point ROS neg other than the symptoms noted above in the HPI.      EXAM:  /73 (BP Location: Left arm, Patient Position: Chair)   Pulse 64   Ht 1.626 m (5' 4\")   Wt 65.3 kg (143 lb 14.4 oz)   Breastfeeding Yes   BMI 24.70 kg/m      General: healthy, alert and no distress  Psych: NEGATIVE  Last PHQ-9 score on record= No Value exists for the : HP#PHQ9  Breasts:  Lactating and Nipples intact with no lesions  Abdomen: Benign, Soft, flat, non-tender, No masses, organomegaly and Diastasis less than 1-2 FB  Incision:  None   Vulva:  Normal genitalia and Bartholin's, Urethra, Boswell's normal  Vagina:  normal with good muscle tone  Cervix:  Multiparous, and no lesions.    Uterus:  fully involuted and non-tender    Adnexa:  Within normal limits and No masses, nodularity, tenderness  Recto-vaginal:   anus normal      IUD Insertion Note:      Time Out - \"Pause for the Cause\"  Just before the procedure begins, through verbal and active participation of team members, verify:    Initials   Patient Name    smd   Patient Date of Birth smd   Procedure to be performed  smd     Consent:  Verbal consent obtained from patient. , Risks, benefits of treatment, and no treatment were discussed.  Patient's questions were elicited and answered.  and Written consent signed and scanned into medical record.    After informed " consent was obtained from the patient, a speculum was placed in the vagina to visualized the cervix.  The cervix was then swabbed with a betadine prep x 3.   Tenaculum was placed at the 12 o'clock position on the cervix and the uterus sounded to 7.5cm.  The Mirena  IUD was then placed in the usual fashion under sterile technique.  Strings were clipped about 2 cm from the cervical os.  Tenaculum was removed and cervix was hemostatic.  There were no complications.  The patient tolerated the procedure well.    ASSESSMENT:   Encounter Diagnoses   Name Primary?     Screening for malignant neoplasm of cervix Yes     Encounter for IUD insertion       Normal postpartum exam after   Pregnancy was complicated by:  none.      PLAN:  Orders Placed This Encounter   Procedures     Obtaining, preparing and conveyance of cervical or vaginal smear to laboratory.     Pap imaged thin layer screen with HPV - recommended age 30 - 65 years (select HPV order below)     HPV High Risk Types DNA Cervical     hCG qual urine POCT      Orders Placed This Encounter   Medications     levonorgestrel (MIRENA) 20 MCG/24HR IUD 20 mcg        Risks and benefits of prescribed medications discussed.  Medication instructions reviewed.  Discussed calcium intake, vitamins and supplements including Vitamin D  Exercise encouraged  Follow up in 1 year  Pap/HPV today.     [facility] staff:    Hansa Beasley MD, FACOG  2020  2:49 PM      Again, thank you for allowing me to participate in the care of your patient.      Sincerely,    Hansa Beasley MD

## 2020-06-22 NOTE — LETTER
Date:June 29, 2020      Patient was self referred, no letter generated. Do not send.        [facility] Health Information

## 2020-06-22 NOTE — NURSING NOTE
Chief Complaint   Patient presents with     IUD     Mirena IUD insert       See JOSAFAT Branch 6/22/2020

## 2020-06-24 LAB
COPATH REPORT: NORMAL
PAP: NORMAL

## 2020-06-25 LAB
FINAL DIAGNOSIS: NORMAL
HPV HR 12 DNA CVX QL NAA+PROBE: NEGATIVE
HPV16 DNA SPEC QL NAA+PROBE: NEGATIVE
HPV18 DNA SPEC QL NAA+PROBE: NEGATIVE
SPECIMEN DESCRIPTION: NORMAL
SPECIMEN SOURCE CVX/VAG CYTO: NORMAL

## 2020-06-26 NOTE — PROGRESS NOTES
"Nursing Notes:   Stas Branch [facility]  6/22/2020  3:15 PM  Signed  Chief Complaint   Patient presents with     IUD     Mirena IUD insert       See Sarina [facility] 6/22/2020     =============================================================  Lesley is here for PP visit. Doing well. Happy to have had such a nice maternity leave.      ROS: 10 point ROS neg other than the symptoms noted above in the HPI.      EXAM:  /73 (BP Location: Left arm, Patient Position: Chair)   Pulse 64   Ht 1.626 m (5' 4\")   Wt 65.3 kg (143 lb 14.4 oz)   Breastfeeding Yes   BMI 24.70 kg/m      General: healthy, alert and no distress  Psych: NEGATIVE  Last PHQ-9 score on record= No Value exists for the : HP#PHQ9  Breasts:  Lactating and Nipples intact with no lesions  Abdomen: Benign, Soft, flat, non-tender, No masses, organomegaly and Diastasis less than 1-2 FB  Incision:  None   Vulva:  Normal genitalia and Bartholin's, Urethra, [place]'s normal  Vagina:  normal with good muscle tone  Cervix:  Multiparous, and no lesions.    Uterus:  fully involuted and non-tender    Adnexa:  Within normal limits and No masses, nodularity, tenderness  Recto-vaginal:   anus normal      IUD Insertion Note:      Time Out - \"Pause for the Cause\"  Just before the procedure begins, through verbal and active participation of team members, verify:    Initials   Patient Name    [facility]   Patient Date of Birth [facility]   Procedure to be performed  [facility]     Consent:  Verbal consent obtained from patient. , Risks, benefits of treatment, and no treatment were discussed.  Patient's questions were elicited and answered.  and Written consent signed and scanned into medical record.    After informed consent was obtained from the patient, a speculum was placed in the vagina to visualized the cervix.  The cervix was then swabbed with a betadine prep x 3.   Tenaculum was placed at the 12 o'clock position on the cervix and the uterus sounded to 7.5cm.  The Mirena  IUD was then placed in the " usual fashion under sterile technique.  Strings were clipped about 2 cm from the cervical os.  Tenaculum was removed and cervix was hemostatic.  There were no complications.  The patient tolerated the procedure well.    ASSESSMENT:   Encounter Diagnoses   Name Primary?     Screening for malignant neoplasm of cervix Yes     Encounter for IUD insertion       Normal postpartum exam after   Pregnancy was complicated by:  none.      PLAN:  Orders Placed This Encounter   Procedures     Obtaining, preparing and conveyance of cervical or vaginal smear to laboratory.     Pap imaged thin layer screen with HPV - recommended age 30 - 65 years (select HPV order below)     HPV High Risk Types DNA Cervical     hCG qual urine POCT      Orders Placed This Encounter   Medications     levonorgestrel (MIRENA) 20 MCG/24HR IUD 20 mcg        Risks and benefits of prescribed medications discussed.  Medication instructions reviewed.  Discussed calcium intake, vitamins and supplements including Vitamin D  Exercise encouraged  Follow up in 1 year  Pap/HPV today.     [facility] staff:    Hansa Beasley MD, FACOG  2020  2:49 PM

## 2020-07-14 ENCOUNTER — MEDICAL CORRESPONDENCE (OUTPATIENT)
Dept: HEALTH INFORMATION MANAGEMENT | Facility: CLINIC | Age: 35
End: 2020-07-14

## 2020-10-23 ENCOUNTER — MEDICAL CORRESPONDENCE (OUTPATIENT)
Dept: HEALTH INFORMATION MANAGEMENT | Facility: CLINIC | Age: 35
End: 2020-10-23

## 2020-11-08 ENCOUNTER — HEALTH MAINTENANCE LETTER (OUTPATIENT)
Age: 35
End: 2020-11-08

## 2021-03-28 ENCOUNTER — HEALTH MAINTENANCE LETTER (OUTPATIENT)
Age: 36
End: 2021-03-28

## 2021-09-11 ENCOUNTER — HEALTH MAINTENANCE LETTER (OUTPATIENT)
Age: 36
End: 2021-09-11

## 2021-09-28 ENCOUNTER — IMMUNIZATION (OUTPATIENT)
Dept: PEDIATRICS | Facility: CLINIC | Age: 36
End: 2021-09-28
Payer: COMMERCIAL

## 2021-09-28 PROCEDURE — 90686 IIV4 VACC NO PRSV 0.5 ML IM: CPT

## 2021-09-28 PROCEDURE — 90471 IMMUNIZATION ADMIN: CPT

## 2021-11-10 ENCOUNTER — OFFICE VISIT (OUTPATIENT)
Dept: OBGYN | Facility: CLINIC | Age: 36
End: 2021-11-10
Attending: OBSTETRICS & GYNECOLOGY
Payer: COMMERCIAL

## 2021-11-10 VITALS
BODY MASS INDEX: 25.27 KG/M2 | DIASTOLIC BLOOD PRESSURE: 72 MMHG | HEART RATE: 79 BPM | WEIGHT: 148 LBS | HEIGHT: 64 IN | SYSTOLIC BLOOD PRESSURE: 115 MMHG

## 2021-11-10 DIAGNOSIS — N93.9 ABNORMAL UTERINE BLEEDING (AUB): Primary | ICD-10-CM

## 2021-11-10 PROCEDURE — G0463 HOSPITAL OUTPT CLINIC VISIT: HCPCS

## 2021-11-10 PROCEDURE — 99395 PREV VISIT EST AGE 18-39: CPT | Performed by: OBSTETRICS & GYNECOLOGY

## 2021-11-10 ASSESSMENT — PAIN SCALES - GENERAL: PAINLEVEL: NO PAIN (0)

## 2021-11-10 ASSESSMENT — MIFFLIN-ST. JEOR: SCORE: 1336.32

## 2021-11-10 NOTE — PROGRESS NOTES
Chief Complaint   Patient presents with     Physical     Annual exam   Angy Wang LPN    CC/HPI:   Lesley Mahmood is a 36 year old  female who presents today for her annual exam. She is currently using IUD Mirena  and would like to continue with this method of birth control.    Notes a recent increase in her bleeding, which she attributes to breast feeding changes. Currently feeding 2x per night.     Work has been stressful d/t covid, high volumes, staffing issues.  Trying to focus on self care.  Had work review today that was positive.     Has checked bs outside of pregnancy several times, all normal.     HISTORIES:  Patient Active Problem List   Diagnosis     Encounter for supervision of other normal pregnancy in first trimester     Multigravida of advanced maternal age in third trimester     Abnormal 1 hour glucose test, NEEDS 3 HOUR     Encounter for triage in pregnant patient     Normal labor      (normal spontaneous vaginal delivery)     Past Medical History:   Diagnosis Date     NO ACTIVE PROBLEMS (aka NONE)      Past Surgical History:   Procedure Laterality Date     birthmark removal       EXTRACTION(S) DENTAL       Current Outpatient Medications   Medication Sig Dispense Refill     Nutritional Supplements (VITAMIN D BOOSTER PO) Take 5,000 Units by mouth       Prenatal Vit-Fe Fumarate-FA (PRENATAL VITAMIN PO)        No Known Allergies  Social History     Socioeconomic History     Marital status:      Spouse name: Mauro Pena     Number of children: 1     Years of education: Not on file     Highest education level: Not on file   Occupational History     Occupation: endocrine      Comment: FT   Tobacco Use     Smoking status: Never Smoker     Smokeless tobacco: Never Used   Substance and Sexual Activity     Alcohol use: Yes     Alcohol/week: 1.0 standard drink     Types: 1 Standard drinks or equivalent per week     Comment: stopped in preganncy     Drug use: No     Sexual  activity: Yes     Partners: Male   Other Topics Concern     Parent/sibling w/ CABG, MI or angioplasty before 65F 55M? Not Asked      Service No     Blood Transfusions No     Caffeine Concern Yes     Comment: 1 cup      Occupational Exposure No     Hobby Hazards No     Sleep Concern No     Stress Concern No     Weight Concern No     Special Diet No     Back Care No     Exercise Yes     Comment: yoga 2 times and walking     Bike Helmet Yes     Seat Belt Yes     Self-Exams Yes     Comment: occ   Social History Narrative    How much exercise per week? Yoga and walking    How much calcium per day? In foods and supplements       How much caffeine per day? 1    How much vitamin D per day? In foods    Do you/your family wear seatbelts?  Yes    Do you/your family use safety helmets? Yes    Do you/your family use sunscreen? Yes    Do you/your family keep firearms in the home? No    Do you/your family have a smoke detector(s)? Yes        Do you feel safe in your home? Yes    Has anyone ever touched you in an unwanted manner? No    Reviewed [facility]  6-     Social Determinants of Health     Financial Resource Strain: Not on file   Food Insecurity: Not on file   Transportation Needs: Not on file   Physical Activity: Not on file   Stress: Not on file   Social Connections: Not on file   Intimate Partner Violence: Not on file   Housing Stability: Not on file     Family History   Problem Relation Age of Onset     Cancer Father         CLL stable      Cardiovascular Maternal Grandmother         strokes     Hypertension Maternal Grandmother      Cerebrovascular Disease Paternal Grandmother      Hypertension Paternal Grandmother      Other Cancer Paternal Grandfather         CLL     Hypertension Paternal Grandfather      Hypertension Mother      Hypertension Maternal Grandfather      Hemochromatosis Cousin      Unknown/Adopted Sister      Unknown/Adopted Brother         Gyn Hx:   Patient's last menstrual period was  "10/30/2021.  Menses: some bleeding now, which is different than prior.     Review Of Systems:   ROS: 10 point ROS neg other than the symptoms noted above in the HPI.    EXAM:  /72   Pulse 79   Ht 1.626 m (5' 4\")   Wt 67.1 kg (148 lb)   LMP 10/30/2021   Breastfeeding Yes   BMI 25.40 kg/m    Body mass index is 25.4 kg/m .    General - pleasant female in no acute distress.  Skin - no suspicious lesions or rashes  EENT-  PERRLA, euthyroid with out palpable nodules  Neck - supple without lymphadenopathy.  Lungs - clear to auscultation bilaterally.  Heart - regular rate and rhythm without murmur.  Breasts- symmetrical . No dominant fixed or suspicious masses noted.  No skin or nipple changes or axillary nodes  Abdomen - soft, nontender, nondistended, no masses or organomegaly noted.  Musculoskeletal - no gross deformities.  Neurological - normal strength, sensation, and mental status.  Pelvic - EG: normal  female, vulva reveals no erythema or lesions.   BUS: within normal limits.  Vagina: well rugated, no lesions polyps or suspicious  discharge.     Cervix: no lesions, polyps discharge or CMT. IUD strings mid vagina.   Uterus: firm, anteverted, normal size and nontender.  Adnexa: no masses or tenderness.  Anus- normal, no lesions.  Rectovaginal - deferred.    ASSESSMENT/PLAN  (N93.9) Abnormal uterine bleeding (AUB)  (primary encounter diagnosis)  Comment:   Plan: US Pelvic Complete with Transvaginal          Pap due 5 years from last.     Discussed checking Lipids/vit D after done breast feeding.  Given planned lactation, consider at next year annual exam.     The patient will be notified of any results via [employer] [facility] [employer].      Hansa Beasley MD, FACOG  11/12/2021  10:26 AM    "

## 2021-11-10 NOTE — LETTER
11/10/2021       RE: Lesley Mahmood  [address]  [facility] [address]     Dear Colleague,    Thank you for referring your patient, Lesley Mahmood, to the [facility] [place] at [facility]. Please see a copy of my visit note below.    Chief Complaint   Patient presents with     Physical     Annual exam   Angy Wang LPN    CC/HPI:   Lesley Mahmood is a 36 year old  female who presents today for her annual exam. She is currently using IUD Mirena  and would like to continue with this method of birth control.    Notes a recent increase in her bleeding, which she attributes to breast feeding changes. Currently feeding 2x per night.     Work has been stressful d/t covid, high volumes, staffing issues.  Trying to focus on self care.  Had work review today that was positive.     Has checked bs outside of pregnancy several times, all normal.     HISTORIES:  Patient Active Problem List   Diagnosis     Encounter for supervision of other normal pregnancy in first trimester     Multigravida of advanced maternal age in third trimester     Abnormal 1 hour glucose test, NEEDS 3 HOUR     Encounter for triage in pregnant patient     Normal labor      (normal spontaneous vaginal delivery)     Past Medical History:   Diagnosis Date     NO ACTIVE PROBLEMS (aka NONE)      Past Surgical History:   Procedure Laterality Date     birthmark removal       EXTRACTION(S) DENTAL       Current Outpatient Medications   Medication Sig Dispense Refill     Nutritional Supplements (VITAMIN D BOOSTER PO) Take 5,000 Units by mouth       Prenatal Vit-Fe Fumarate-FA (PRENATAL VITAMIN PO)        No Known Allergies  Social History     Socioeconomic History     Marital status:      Spouse name: Mauro Rbuinthomason     Number of children: 1     Years of education: Not on file     Highest education level: Not on file   Occupational History     Occupation:  endocrine      Comment: FT   Tobacco Use     Smoking status: Never Smoker     Smokeless tobacco: Never Used   Substance and Sexual Activity     Alcohol use: Yes     Alcohol/week: 1.0 standard drink     Types: 1 Standard drinks or equivalent per week     Comment: stopped in preganncy     Drug use: No     Sexual activity: Yes     Partners: Male   Other Topics Concern     Parent/sibling w/ CABG, MI or angioplasty before 65F 55M? Not Asked      Service No     Blood Transfusions No     Caffeine Concern Yes     Comment: 1 cup      Occupational Exposure No     Hobby Hazards No     Sleep Concern No     Stress Concern No     Weight Concern No     Special Diet No     Back Care No     Exercise Yes     Comment: yoga 2 times and walking     Bike Helmet Yes     Seat Belt Yes     Self-Exams Yes     Comment: occ   Social History Narrative    How much exercise per week? Yoga and walking    How much calcium per day? In foods and supplements       How much caffeine per day? 1    How much vitamin D per day? In foods    Do you/your family wear seatbelts?  Yes    Do you/your family use safety helmets? Yes    Do you/your family use sunscreen? Yes    Do you/your family keep firearms in the home? No    Do you/your family have a smoke detector(s)? Yes        Do you feel safe in your home? Yes    Has anyone ever touched you in an unwanted manner? No    [facility]     Social Determinants of Health     Financial Resource Strain: Not on file   Food Insecurity: Not on file   Transportation Needs: Not on file   Physical Activity: Not on file   Stress: Not on file   Social Connections: Not on file   Intimate Partner Violence: Not on file   Housing Stability: Not on file     Family History   Problem Relation Age of Onset     Cancer Father         CLL stable      Cardiovascular Maternal Grandmother         strokes     Hypertension Maternal Grandmother      Cerebrovascular Disease Paternal Grandmother      Hypertension  "Paternal Grandmother      Other Cancer Paternal Grandfather         CLL     Hypertension Paternal Grandfather      Hypertension Mother      Hypertension Maternal Grandfather      Hemochromatosis Cousin      Unknown/Adopted Sister      Unknown/Adopted Brother         Gyn Hx:   Patient's last menstrual period was 10/30/2021.  Menses: some bleeding now, which is different than prior.     Review Of Systems:   ROS: 10 point ROS neg other than the symptoms noted above in the HPI.    EXAM:  /72   Pulse 79   Ht 1.626 m (5' 4\")   Wt 67.1 kg (148 lb)   LMP 10/30/2021   Breastfeeding Yes   BMI 25.40 kg/m    Body mass index is 25.4 kg/m .    General - pleasant female in no acute distress.  Skin - no suspicious lesions or rashes  EENT-  PERRLA, euthyroid with out palpable nodules  Neck - supple without lymphadenopathy.  Lungs - clear to auscultation bilaterally.  Heart - regular rate and rhythm without murmur.  Breasts- symmetrical . No dominant fixed or suspicious masses noted.  No skin or nipple changes or axillary nodes  Abdomen - soft, nontender, nondistended, no masses or organomegaly noted.  Musculoskeletal - no gross deformities.  Neurological - normal strength, sensation, and mental status.  Pelvic - EG: normal  female, vulva reveals no erythema or lesions.   BUS: within normal limits.  Vagina: well rugated, no lesions polyps or suspicious  discharge.     Cervix: no lesions, polyps discharge or CMT. IUD strings mid vagina.   Uterus: firm, anteverted, normal size and nontender.  Adnexa: no masses or tenderness.  Anus- normal, no lesions.  Rectovaginal - deferred.    ASSESSMENT/PLAN  (N93.9) Abnormal uterine bleeding (AUB)  (primary encounter diagnosis)  Comment:   Plan: US Pelvic Complete with Transvaginal          Pap due 5 years from last.     Discussed checking Lipids/vit D after done breast feeding.  Given planned lactation, consider at next year annual exam.     The patient will be notified of any results " via [employer].      Hansa Beasley MD, FACOG  11/12/2021  10:26 AM        Again, thank you for allowing me to participate in the care of your patient.      Sincerely,    Hansa Beasley MD

## 2021-11-10 NOTE — LETTER
Date:December 3, 2021      Patient was self referred, no letter generated. Do not send.        [facility] Health Information

## 2021-11-15 ENCOUNTER — ANCILLARY PROCEDURE (OUTPATIENT)
Dept: ULTRASOUND IMAGING | Facility: CLINIC | Age: 36
End: 2021-11-15
Attending: OBSTETRICS & GYNECOLOGY
Payer: COMMERCIAL

## 2021-11-15 DIAGNOSIS — N93.9 ABNORMAL UTERINE BLEEDING (AUB): ICD-10-CM

## 2021-11-15 PROCEDURE — 76830 TRANSVAGINAL US NON-OB: CPT

## 2021-11-15 PROCEDURE — 76830 TRANSVAGINAL US NON-OB: CPT | Mod: 26 | Performed by: OBSTETRICS & GYNECOLOGY

## 2021-11-17 ENCOUNTER — IMMUNIZATION (OUTPATIENT)
Dept: NURSING | Facility: CLINIC | Age: 36
End: 2021-11-17
Payer: COMMERCIAL

## 2021-11-17 PROCEDURE — 0004A PR COVID VAC PFIZER DIL RECON 30 MCG/0.3 ML IM: CPT

## 2021-11-17 PROCEDURE — 91300 PR COVID VAC PFIZER DIL RECON 30 MCG/0.3 ML IM: CPT

## 2022-10-24 ENCOUNTER — IMMUNIZATION (OUTPATIENT)
Dept: PEDIATRICS | Facility: CLINIC | Age: 37
End: 2022-10-24
Payer: COMMERCIAL

## 2022-10-24 PROCEDURE — 90471 IMMUNIZATION ADMIN: CPT

## 2022-10-24 PROCEDURE — 0124A COVID-19,PF,PFIZER BOOSTER BIVALENT: CPT

## 2022-10-24 PROCEDURE — 91312 COVID-19,PF,PFIZER BOOSTER BIVALENT: CPT

## 2022-10-24 PROCEDURE — 90686 IIV4 VACC NO PRSV 0.5 ML IM: CPT

## 2022-12-17 ENCOUNTER — HEALTH MAINTENANCE LETTER (OUTPATIENT)
Age: 37
End: 2022-12-17

## 2023-03-08 ENCOUNTER — OFFICE VISIT (OUTPATIENT)
Dept: OPTOMETRY | Facility: CLINIC | Age: 38
End: 2023-03-08
Payer: COMMERCIAL

## 2023-03-08 DIAGNOSIS — H52.13 MYOPIA OF BOTH EYES WITH ASTIGMATISM: Primary | ICD-10-CM

## 2023-03-08 DIAGNOSIS — H52.203 MYOPIA OF BOTH EYES WITH ASTIGMATISM: Primary | ICD-10-CM

## 2023-03-08 ASSESSMENT — REFRACTION_MANIFEST
OS_SPHERE: -1.00
OD_AXIS: 010
OS_CYLINDER: +0.75
OD_SPHERE: -2.50
OD_CYLINDER: +0.75
OS_AXIS: 077

## 2023-03-08 ASSESSMENT — CONF VISUAL FIELD
OD_NORMAL: 1
OS_INFERIOR_TEMPORAL_RESTRICTION: 0
OS_SUPERIOR_NASAL_RESTRICTION: 0
OD_SUPERIOR_NASAL_RESTRICTION: 0
OS_INFERIOR_NASAL_RESTRICTION: 0
OS_SUPERIOR_TEMPORAL_RESTRICTION: 0
OD_SUPERIOR_TEMPORAL_RESTRICTION: 0
OD_INFERIOR_TEMPORAL_RESTRICTION: 0
OD_INFERIOR_NASAL_RESTRICTION: 0
OS_NORMAL: 1

## 2023-03-08 ASSESSMENT — TONOMETRY
OD_IOP_MMHG: 15
OS_IOP_MMHG: 14
IOP_METHOD: ICARE

## 2023-03-08 ASSESSMENT — CUP TO DISC RATIO
OS_RATIO: 0.2
OD_RATIO: 0.2

## 2023-03-08 ASSESSMENT — REFRACTION_WEARINGRX
OS_SPHERE: -0.50
OD_AXIS: 005
OD_CYLINDER: +0.50
OS_CYLINDER: SPHERE
OD_SPHERE: -2.25

## 2023-03-08 ASSESSMENT — EXTERNAL EXAM - RIGHT EYE: OD_EXAM: NORMAL

## 2023-03-08 ASSESSMENT — VISUAL ACUITY
OS_CC: 20/25
OD_CC: 20/20
METHOD: SNELLEN - LINEAR

## 2023-03-08 ASSESSMENT — SLIT LAMP EXAM - LIDS
COMMENTS: NORMAL
COMMENTS: NORMAL

## 2023-03-08 ASSESSMENT — EXTERNAL EXAM - LEFT EYE: OS_EXAM: NORMAL

## 2023-03-08 NOTE — PROGRESS NOTES
A/P  1.) Myopia/Astigmatism OU  -Minimal change in spec Rx, updated today  -Dilated ocular health unremarkable OU    Monitor 1-2 years comprehensive, sooner prn

## 2023-04-24 ENCOUNTER — OFFICE VISIT (OUTPATIENT)
Dept: OBGYN | Facility: CLINIC | Age: 38
End: 2023-04-24
Attending: OBSTETRICS & GYNECOLOGY
Payer: COMMERCIAL

## 2023-04-24 VITALS — WEIGHT: 156 LBS | BODY MASS INDEX: 26.63 KG/M2 | HEIGHT: 64 IN

## 2023-04-24 DIAGNOSIS — Z00.00 ROUTINE GENERAL MEDICAL EXAMINATION AT A HEALTH CARE FACILITY: Primary | ICD-10-CM

## 2023-04-24 DIAGNOSIS — Z12.31 ENCOUNTER FOR SCREENING MAMMOGRAM FOR BREAST CANCER: ICD-10-CM

## 2023-04-24 PROCEDURE — G0463 HOSPITAL OUTPT CLINIC VISIT: HCPCS | Performed by: OBSTETRICS & GYNECOLOGY

## 2023-04-24 PROCEDURE — 99396 PREV VISIT EST AGE 40-64: CPT | Performed by: OBSTETRICS & GYNECOLOGY

## 2023-04-24 NOTE — PROGRESS NOTES
CC/HPI:   Lesley Mahmood is a 38 year old   who presents today for her annual exam. She is currently using vasectomy--current partner and would like to continue with this method of birth control.  She has a mirena and is happy with results of menstrual suppression.  She occasionally spots, more often if stressed. Still 4/week with nursing before bed. Sexually active with . No concerrns. No pain.      Has work trip and family trip coming up.      HISTORIES:  Patient Active Problem List   Diagnosis     Encounter for supervision of other normal pregnancy in first trimester     Multigravida of advanced maternal age in third trimester     Abnormal 1 hour glucose test, NEEDS 3 HOUR     Encounter for triage in pregnant patient     Normal labor      (normal spontaneous vaginal delivery)     Past Medical History:   Diagnosis Date     NO ACTIVE PROBLEMS (aka NONE)      Past Surgical History:   Procedure Laterality Date     birthmark removal       EXTRACTION(S) DENTAL       Current Outpatient Medications   Medication Sig Dispense Refill     Nutritional Supplements (VITAMIN D BOOSTER PO) Take 5,000 Units by mouth       No Known Allergies  Social History     Socioeconomic History     Marital status:      Spouse name: Mauro Pena     Number of children: 1     Years of education: Not on file     Highest education level: Not on file   Occupational History     Occupation: endocrine      Comment: FT   Tobacco Use     Smoking status: Never     Smokeless tobacco: Never   Vaping Use     Vaping status: Not on file   Substance and Sexual Activity     Alcohol use: Yes     Alcohol/week: 1.0 standard drink of alcohol     Types: 1 Standard drinks or equivalent per week     Comment: stopped in preganncy     Drug use: No     Sexual activity: Yes     Partners: Male   Other Topics Concern     Parent/sibling w/ CABG, MI or angioplasty before 65F 55M? Not Asked      Service No     Blood Transfusions No      Caffeine Concern Yes     Comment: 1 cup      Occupational Exposure No     Hobby Hazards No     Sleep Concern No     Stress Concern No     Weight Concern No     Special Diet No     Back Care No     Exercise Yes     Comment: yoga 2 times and walking     Bike Helmet Yes     Seat Belt Yes     Self-Exams Yes     Comment: occ   Social History Narrative    How much exercise per week? Yoga and walking    How much calcium per day? In foods and supplements       How much caffeine per day? 1    How much vitamin D per day? In foods    Do you/your family wear seatbelts?  Yes    Do you/your family use safety helmets? Yes    Do you/your family use sunscreen? Yes    Do you/your family keep firearms in the home? No    Do you/your family have a smoke detector(s)? Yes        Do you feel safe in your home? Yes    Has anyone ever touched you in an unwanted manner? No    Reviewed [facility]  6-     Social Determinants of Health     Financial Resource Strain: Not on file   Food Insecurity: Not on file   Transportation Needs: Not on file   Physical Activity: Not on file   Stress: Not on file   Social Connections: Not on file   Intimate Partner Violence: Not on file   Housing Stability: Not on file     Family History   Problem Relation Age of Onset     Hypertension Mother      Cancer Father         CLL stable      Cardiovascular Maternal Grandmother         strokes     Hypertension Maternal Grandmother      Macular Degeneration Maternal Grandfather      Hypertension Maternal Grandfather      Cerebrovascular Disease Paternal Grandmother      Hypertension Paternal Grandmother      Other Cancer Paternal Grandfather         CLL     Hypertension Paternal Grandfather      Unknown/Adopted Brother      Unknown/Adopted Sister      Hemochromatosis Cousin      Glaucoma No family hx of         Gyn Hx:   No LMP recorded. (Menstrual status: IUD).  Menses:   Mirena     Review Of Systems:  CONSTITUTIONAL: NEGATIVE for fever, chills  EYES: NEGATIVE  "for vision changes   RESP: NEGATIVE for significant cough or SOB  CV: NEGATIVE for chest pain, palpitations   GI: NEGATIVE for nausea, abdominal pain, heartburn, or change in bowel habits  : NEGATIVE for frequency, dysuria, or hematuria  MUSCULOSKELETAL: NEGATIVE for significant arthralgias or myalgia  NEURO: NEGATIVE for weakness, dizziness or paresthesias or headache  + Wt gain, 8 lbs since last years visit.     EXAM:  Ht 1.626 m (5' 4\")   Wt 70.8 kg (156 lb)   BMI 26.78 kg/m    Body mass index is 26.78 kg/m .    General - pleasant female in no acute distress.  Skin - no suspicious lesions or rashes  EENT-  PERRLA, euthyroid with out palpable nodules  Lungs - non labored breathing  Heart - well perfused  Breasts- symmetrical . No dominant fixed or suspicious masses noted.  No skin or nipple changes or axillary nodes.  Abdomen - soft, nontender, nondistended, no masses or organomegaly noted.  Musculoskeletal - no gross deformities.  Neurological - normal strengthand mental status.  Pelvic - EG: normal  female, vulva reveals no erythema or lesions.   BUS: within normal limits.  Vagina: well rugated, no lesions polyps or suspicious  discharge.     Cervix: no lesions, polyps discharge or CMT. IUD strings at 2 cm  Uterus: firm, normal size and nontender.  Adnexa: no masses or tenderness.  Anus- normal, no lesions.  Rectovaginal - deferred.    ASSESSMENT/PLAN: 37 yo  for annual well woman exam.    (Z00.00) Routine general medical examination at a health care facility  (primary encounter diagnosis)    Plan: Lipid panel reflex to direct LDL Fasting,         Glucose, TSH with free T4 reflex, 25-         OH-Vitamin D            (Z12.31) Encounter for screening mammogram for breast cancer    Plan: MA Screening Digital Bilateral        Additional teaching done at this visit regarding exercise and weight/diet. I have discussed with patient the harms and  benefits of  medications, treatment options.     The patient " will be notified of any results via [employer].    Hansa Beasley MD, FACOG  (she/her/hers)    Department of Ob/Gyn/Women's Health  [employer]  [facility]  [address] [place], [place] [address]. [place] [address]  [email or web address]  p. [phone]  f. [phone]

## 2023-04-24 NOTE — LETTER
2023       RE: Lesley Mahmood  [address] [facility] [address]     Dear Colleague,    Thank you for referring your patient, Lesley Mahmood, to the [facility] [place] at [facility]. Please see a copy of my visit note below.    CC/HPI:   Lesley Mahmood is a 38 year old   who presents today for her annual exam. She is currently using vasectomy--current partner and would like to continue with this method of birth control.  She has a mirena and is happy with results of menstrual suppression.  She occasionally spots, more often if stressed. Still 4/week with nursing before bed. Sexually active with . No concerrns. No pain.      Has work trip and family trip coming up.      HISTORIES:  Patient Active Problem List   Diagnosis     Encounter for supervision of other normal pregnancy in first trimester     Multigravida of advanced maternal age in third trimester     Abnormal 1 hour glucose test, NEEDS 3 HOUR     Encounter for triage in pregnant patient     Normal labor      (normal spontaneous vaginal delivery)     Past Medical History:   Diagnosis Date     NO ACTIVE PROBLEMS (aka NONE)      Past Surgical History:   Procedure Laterality Date     birthmark removal       EXTRACTION(S) DENTAL       Current Outpatient Medications   Medication Sig Dispense Refill     Nutritional Supplements (VITAMIN D BOOSTER PO) Take 5,000 Units by mouth       No Known Allergies  Social History     Socioeconomic History     Marital status:      Spouse name: Mauro Pena     Number of children: 1     Years of education: Not on file     Highest education level: Not on file   Occupational History     Occupation: endocrine      Comment: FT   Tobacco Use     Smoking status: Never     Smokeless tobacco: Never   Vaping Use     Vaping status: Not on file   Substance and Sexual Activity     Alcohol use: Yes     Alcohol/week: 1.0 standard drink  of alcohol     Types: 1 Standard drinks or equivalent per week     Comment: stopped in preganncy     Drug use: No     Sexual activity: Yes     Partners: Male   Other Topics Concern     Parent/sibling w/ CABG, MI or angioplasty before 65F 55M? Not Asked      Service No     Blood Transfusions No     Caffeine Concern Yes     Comment: 1 cup      Occupational Exposure No     Hobby Hazards No     Sleep Concern No     Stress Concern No     Weight Concern No     Special Diet No     Back Care No     Exercise Yes     Comment: yoga 2 times and walking     Bike Helmet Yes     Seat Belt Yes     Self-Exams Yes     Comment: occ   Social History Narrative    How much exercise per week? Yoga and walking    How much calcium per day? In foods and supplements       How much caffeine per day? 1    How much vitamin D per day? In foods    Do you/your family wear seatbelts?  Yes    Do you/your family use safety helmets? Yes    Do you/your family use sunscreen? Yes    Do you/your family keep firearms in the home? No    Do you/your family have a smoke detector(s)? Yes        Do you feel safe in your home? Yes    Has anyone ever touched you in an unwanted manner? No    [facility]  [facility]     Social Determinants of Health     Financial Resource Strain: Not on file   Food Insecurity: Not on file   Transportation Needs: Not on file   Physical Activity: Not on file   Stress: Not on file   Social Connections: Not on file   Intimate Partner Violence: Not on file   Housing Stability: Not on file     Family History   Problem Relation Age of Onset     Hypertension Mother      Cancer Father         CLL stable      Cardiovascular Maternal Grandmother         strokes     Hypertension Maternal Grandmother      Macular Degeneration Maternal Grandfather      Hypertension Maternal Grandfather      Cerebrovascular Disease Paternal Grandmother      Hypertension Paternal Grandmother      Other Cancer Paternal Grandfather         CLL      "Hypertension Paternal Grandfather      Unknown/Adopted Brother      Unknown/Adopted Sister      Hemochromatosis Cousin      Glaucoma No family hx of         Gyn Hx:   No LMP recorded. (Menstrual status: IUD).  Menses:   Mirena     Review Of Systems:  CONSTITUTIONAL: NEGATIVE for fever, chills  EYES: NEGATIVE for vision changes   RESP: NEGATIVE for significant cough or SOB  CV: NEGATIVE for chest pain, palpitations   GI: NEGATIVE for nausea, abdominal pain, heartburn, or change in bowel habits  : NEGATIVE for frequency, dysuria, or hematuria  MUSCULOSKELETAL: NEGATIVE for significant arthralgias or myalgia  NEURO: NEGATIVE for weakness, dizziness or paresthesias or headache  + Wt gain, 8 lbs since last years visit.     EXAM:  Ht 1.626 m (5' 4\")   Wt 70.8 kg (156 lb)   BMI 26.78 kg/m    Body mass index is 26.78 kg/m .    General - pleasant female in no acute distress.  Skin - no suspicious lesions or rashes  EENT-  PERRLA, euthyroid with out palpable nodules  Lungs - non labored breathing  Heart - well perfused  Breasts- symmetrical . No dominant fixed or suspicious masses noted.  No skin or nipple changes or axillary nodes.  Abdomen - soft, nontender, nondistended, no masses or organomegaly noted.  Musculoskeletal - no gross deformities.  Neurological - normal strengthand mental status.  Pelvic - EG: normal  female, vulva reveals no erythema or lesions.   BUS: within normal limits.  Vagina: well rugated, no lesions polyps or suspicious  discharge.     Cervix: no lesions, polyps discharge or CMT. IUD strings at 2 cm  Uterus: firm, normal size and nontender.  Adnexa: no masses or tenderness.  Anus- normal, no lesions.  Rectovaginal - deferred.    ASSESSMENT/PLAN: 39 yo  for annual well woman exam.    (Z00.00) Routine general medical examination at a health care facility  (primary encounter diagnosis)    Plan: Lipid panel reflex to direct LDL Fasting,         Glucose, TSH with free T4 reflex, 25-         " OH-Vitamin D            (Z12.31) Encounter for screening mammogram for breast cancer    Plan: MA Screening Digital Bilateral        Additional teaching done at this visit regarding exercise and weight/diet. I have discussed with patient the harms and  benefits of  medications, treatment options.     The patient will be notified of any results via [employer].    Hansa Beasley MD, FACOG  (she/her/hers)    Department of Ob/Gyn/Women's Health  [employer] [facility] [employer]  [place]  [address] [place] [address]. [place] [address]  [email or web address]  p. [phone]  f. [phone]        Again, thank you for allowing me to participate in the care of your patient.      Sincerely,    Hansa Beasley MD

## 2023-04-24 NOTE — PATIENT INSTRUCTIONS
Thank you for trusting us with your care!     If you need to contact us for questions about:  Symptoms, Scheduling & Medical Questions; Non-urgent (2-3 day response) Jesse message, Urgent (needing response today) [phone] (if after 3:30pm next day response)   Prescriptions: Please call your Pharmacy   Billing: [facility] [phone] or [facility]:[phone]

## 2023-05-02 ENCOUNTER — LAB (OUTPATIENT)
Dept: LAB | Facility: CLINIC | Age: 38
End: 2023-05-02
Payer: COMMERCIAL

## 2023-05-02 DIAGNOSIS — Z00.00 ROUTINE GENERAL MEDICAL EXAMINATION AT A HEALTH CARE FACILITY: ICD-10-CM

## 2023-05-02 LAB
CHOLEST SERPL-MCNC: 164 MG/DL
DEPRECATED CALCIDIOL+CALCIFEROL SERPL-MC: 40 UG/L (ref 20–75)
FASTING STATUS PATIENT QL REPORTED: YES
GLUCOSE SERPL-MCNC: 87 MG/DL (ref 70–99)
HDLC SERPL-MCNC: 61 MG/DL
LDLC SERPL CALC-MCNC: 87 MG/DL
NONHDLC SERPL-MCNC: 103 MG/DL
T4 FREE SERPL-MCNC: 1.07 NG/DL (ref 0.9–1.7)
TRIGL SERPL-MCNC: 79 MG/DL
TSH SERPL DL<=0.005 MIU/L-ACNC: 4.33 UIU/ML (ref 0.3–4.2)

## 2023-05-02 PROCEDURE — 80061 LIPID PANEL: CPT

## 2023-05-02 PROCEDURE — 84439 ASSAY OF FREE THYROXINE: CPT

## 2023-05-02 PROCEDURE — 84443 ASSAY THYROID STIM HORMONE: CPT

## 2023-05-02 PROCEDURE — 36415 COLL VENOUS BLD VENIPUNCTURE: CPT

## 2023-05-02 PROCEDURE — 82947 ASSAY GLUCOSE BLOOD QUANT: CPT

## 2023-05-02 PROCEDURE — 82306 VITAMIN D 25 HYDROXY: CPT

## 2023-08-28 ENCOUNTER — ANCILLARY PROCEDURE (OUTPATIENT)
Dept: MAMMOGRAPHY | Facility: CLINIC | Age: 38
End: 2023-08-28
Attending: OBSTETRICS & GYNECOLOGY
Payer: COMMERCIAL

## 2023-08-28 DIAGNOSIS — Z12.31 ENCOUNTER FOR SCREENING MAMMOGRAM FOR BREAST CANCER: ICD-10-CM

## 2023-08-28 PROCEDURE — 77067 SCR MAMMO BI INCL CAD: CPT | Performed by: RADIOLOGY

## 2023-09-20 ENCOUNTER — ANCILLARY PROCEDURE (OUTPATIENT)
Dept: MAMMOGRAPHY | Facility: CLINIC | Age: 38
End: 2023-09-20
Attending: OBSTETRICS & GYNECOLOGY
Payer: COMMERCIAL

## 2023-09-20 DIAGNOSIS — R92.8 ABNORMAL MAMMOGRAM OF RIGHT BREAST: ICD-10-CM

## 2023-09-20 PROCEDURE — 76642 ULTRASOUND BREAST LIMITED: CPT | Mod: RT | Performed by: RADIOLOGY

## 2023-09-20 PROCEDURE — 77065 DX MAMMO INCL CAD UNI: CPT | Mod: RT | Performed by: RADIOLOGY

## 2023-09-20 PROCEDURE — G0279 TOMOSYNTHESIS, MAMMO: HCPCS | Performed by: RADIOLOGY

## 2023-11-14 ENCOUNTER — IMMUNIZATION (OUTPATIENT)
Dept: FAMILY MEDICINE | Facility: CLINIC | Age: 38
End: 2023-11-14
Payer: COMMERCIAL

## 2023-11-14 PROCEDURE — 91320 SARSCV2 VAC 30MCG TRS-SUC IM: CPT

## 2023-11-14 PROCEDURE — 90480 ADMN SARSCOV2 VAC 1/ONLY CMP: CPT

## 2024-03-18 ENCOUNTER — OFFICE VISIT (OUTPATIENT)
Dept: OBGYN | Facility: CLINIC | Age: 39
End: 2024-03-18
Attending: OBSTETRICS & GYNECOLOGY
Payer: COMMERCIAL

## 2024-03-18 ENCOUNTER — LAB (OUTPATIENT)
Dept: LAB | Facility: CLINIC | Age: 39
End: 2024-03-18
Attending: OBSTETRICS & GYNECOLOGY
Payer: COMMERCIAL

## 2024-03-18 VITALS
HEIGHT: 64 IN | SYSTOLIC BLOOD PRESSURE: 121 MMHG | BODY MASS INDEX: 26.8 KG/M2 | DIASTOLIC BLOOD PRESSURE: 84 MMHG | HEART RATE: 80 BPM | WEIGHT: 157 LBS

## 2024-03-18 DIAGNOSIS — R79.89 ELEVATED TSH: Primary | ICD-10-CM

## 2024-03-18 DIAGNOSIS — R79.89 ELEVATED TSH: ICD-10-CM

## 2024-03-18 PROBLEM — O99.810 ABNORMAL O'SULLIVAN GLUCOSE CHALLENGE TEST, ANTEPARTUM: Chronic | Status: RESOLVED | Noted: 2019-12-17 | Resolved: 2024-03-18

## 2024-03-18 LAB — TSH SERPL DL<=0.005 MIU/L-ACNC: 3.1 UIU/ML (ref 0.3–4.2)

## 2024-03-18 PROCEDURE — 99396 PREV VISIT EST AGE 40-64: CPT | Performed by: OBSTETRICS & GYNECOLOGY

## 2024-03-18 PROCEDURE — 36415 COLL VENOUS BLD VENIPUNCTURE: CPT

## 2024-03-18 PROCEDURE — 99213 OFFICE O/P EST LOW 20 MIN: CPT | Performed by: OBSTETRICS & GYNECOLOGY

## 2024-03-18 PROCEDURE — 84443 ASSAY THYROID STIM HORMONE: CPT

## 2024-03-18 NOTE — LETTER
3/18/2024       RE: Lesley Mahmood  [address]  [facility] [address]     Dear Colleague,    Thank you for referring your patient, Lesley Mahmood, to the [facility] [place] at [facility]. Please see a copy of my visit note below.    CC/HPI:   Lesley Mahmood is a 38 year old  who presents today for her annual exam. She is currently using vasectomy--current partner  and would like to continue with this method of birth control. Has Mirena IUD for menstrual suppression, rarely has any bleeding. Mirena placed 2020.  No pain or dryness with sex.     HISTORIES:  Patient Active Problem List   Diagnosis    Encounter for supervision of other normal pregnancy in first trimester    Multigravida of advanced maternal age in third trimester    Encounter for triage in pregnant patient    Normal labor     (normal spontaneous vaginal delivery)     Past Medical History:   Diagnosis Date    NO ACTIVE PROBLEMS (aka NONE)      Past Surgical History:   Procedure Laterality Date    birthmark removal      EXTRACTION(S) DENTAL       Current Outpatient Medications   Medication Sig Dispense Refill    Nutritional Supplements (VITAMIN D BOOSTER PO) Take 5,000 Units by mouth       No Known Allergies      Family History   Problem Relation Age of Onset    Hypertension Mother     Cancer Father         CLL stable     Cardiovascular Maternal Grandmother         strokes    Hypertension Maternal Grandmother     Macular Degeneration Maternal Grandfather     Hypertension Maternal Grandfather     Cerebrovascular Disease Paternal Grandmother     Hypertension Paternal Grandmother     Other Cancer Paternal Grandfather         CLL    Hypertension Paternal Grandfather     Unknown/Adopted Brother     Unknown/Adopted Sister     Hemochromatosis Cousin     Glaucoma No family hx of         Gyn Hx:   No LMP recorded. (Menstrual status: IUD).    Review Of  "Systems:  CONSTITUTIONAL: NEGATIVE for fever, chills  EYES: NEGATIVE for vision changes   RESP: NEGATIVE for significant cough or SOB  CV: NEGATIVE for chest pain, palpitations   GI: NEGATIVE for nausea, abdominal pain, heartburn, or change in bowel habits  : NEGATIVE for frequency, dysuria, or hematuria  MUSCULOSKELETAL: NEGATIVE for significant arthralgias or myalgia  NEURO: NEGATIVE for weakness, dizziness or paresthesias or headache    EXAM:  /84   Pulse 80   Ht 1.626 m (5' 4\")   Wt 71.2 kg (157 lb)   BMI 26.95 kg/m    Body mass index is 26.95 kg/m .    General - pleasant female in no acute distress.  Skin - no suspicious lesions or rashes  EENT-  PERRLA, euthyroid with out palpable nodules  Neck - supple without lymphadenopathy.  Lungs - clear to auscultation bilaterally.  Heart - regular rate and rhythm without murmur.  Breasts- symmetrical . No dominant fixed or suspicious masses noted.  No skin or nipple changes or axillary nodes. Self exam is taught and encouraged monthly.  Abdomen - soft, nontender, nondistended, no masses or organomegaly noted.  Musculoskeletal - no gross deformities.  Neurological - normal strength, sensation, and mental status.  Pelvic - EG: normal  female, vulva reveals no erythema or lesions.   BUS: within normal limits.  Vagina: well rugated, no lesions polyps or suspicious  discharge.     Cervix: no lesions, polyps discharge or CMT. IUD strings 2 cm.   Uterus: firm, anteverted, normal size and nontender.  Adnexa: no masses or tenderness.  Anus- normal, no lesions.  Rectovaginal - deferred.    ASSESSMENT/PLAN:  39 yo  for annual examination.     Pap due 2025  Mammogram annually    --Elevated TSH at last annual exam.    Orders- TSH with free T4 reflex          Additional teaching done at this visit regarding perimenopause, weight/diet. I have discussed with patient the harms and  benefits of medications, treatment options.     The patient will be notified of any " results via [facility].      Hansa Beasley MD, FACOG  (she/her/hers)    Department of Ob/Gyn/Women's Health  [employer]  [place]  [address] [place] [address]. [place] [address]  [email or web address]  p. [phone]  f. [phone]

## 2024-03-18 NOTE — PROGRESS NOTES
CC/HPI:   Lesley Mahmood is a 38 year old  who presents today for her annual exam. She is currently using vasectomy--current partner  and would like to continue with this method of birth control. Has Mirena IUD for menstrual suppression, rarely has any bleeding. Mirena placed 2020.  No pain or dryness with sex.     HISTORIES:  Patient Active Problem List   Diagnosis    Encounter for supervision of other normal pregnancy in first trimester    Multigravida of advanced maternal age in third trimester    Encounter for triage in pregnant patient    Normal labor     (normal spontaneous vaginal delivery)     Past Medical History:   Diagnosis Date    NO ACTIVE PROBLEMS (aka NONE)      Past Surgical History:   Procedure Laterality Date    birthmark removal      EXTRACTION(S) DENTAL       Current Outpatient Medications   Medication Sig Dispense Refill    Nutritional Supplements (VITAMIN D BOOSTER PO) Take 5,000 Units by mouth       No Known Allergies      Family History   Problem Relation Age of Onset    Hypertension Mother     Cancer Father         CLL stable     Cardiovascular Maternal Grandmother         strokes    Hypertension Maternal Grandmother     Macular Degeneration Maternal Grandfather     Hypertension Maternal Grandfather     Cerebrovascular Disease Paternal Grandmother     Hypertension Paternal Grandmother     Other Cancer Paternal Grandfather         CLL    Hypertension Paternal Grandfather     Unknown/Adopted Brother     Unknown/Adopted Sister     Hemochromatosis Cousin     Glaucoma No family hx of         Gyn Hx:   No LMP recorded. (Menstrual status: IUD).    Review Of Systems:  CONSTITUTIONAL: NEGATIVE for fever, chills  EYES: NEGATIVE for vision changes   RESP: NEGATIVE for significant cough or SOB  CV: NEGATIVE for chest pain, palpitations   GI: NEGATIVE for nausea, abdominal pain, heartburn, or change in bowel habits  : NEGATIVE for frequency, dysuria, or hematuria  MUSCULOSKELETAL:  "NEGATIVE for significant arthralgias or myalgia  NEURO: NEGATIVE for weakness, dizziness or paresthesias or headache    EXAM:  /84   Pulse 80   Ht 1.626 m (5' 4\")   Wt 71.2 kg (157 lb)   BMI 26.95 kg/m    Body mass index is 26.95 kg/m .    General - pleasant female in no acute distress.  Skin - no suspicious lesions or rashes  EENT-  PERRLA, euthyroid with out palpable nodules  Neck - supple without lymphadenopathy.  Lungs - clear to auscultation bilaterally.  Heart - regular rate and rhythm without murmur.  Breasts- symmetrical . No dominant fixed or suspicious masses noted.  No skin or nipple changes or axillary nodes. Self exam is taught and encouraged monthly.  Abdomen - soft, nontender, nondistended, no masses or organomegaly noted.  Musculoskeletal - no gross deformities.  Neurological - normal strength, sensation, and mental status.  Pelvic - EG: normal  female, vulva reveals no erythema or lesions.   BUS: within normal limits.  Vagina: well rugated, no lesions polyps or suspicious  discharge.     Cervix: no lesions, polyps discharge or CMT. IUD strings 2 cm.   Uterus: firm, anteverted, normal size and nontender.  Adnexa: no masses or tenderness.  Anus- normal, no lesions.  Rectovaginal - deferred.    ASSESSMENT/PLAN:  37 yo  for annual examination.     Pap due 2025  Mammogram annually    --Elevated TSH at last annual exam.    Orders- TSH with free T4 reflex          Additional teaching done at this visit regarding perimenopause, weight/diet. I have discussed with patient the harms and  benefits of medications, treatment options.     The patient will be notified of any results via [employer].      Hansa Beasley MD, FACOG  (she/her/hers)    Department of Ob/Gyn/Women's Health  [employer]  [facility]  [address] [place], [place] [address] [address]  [email or web address]  p. [phone]  f. [phone]      "

## 2024-11-16 ENCOUNTER — IMMUNIZATION (OUTPATIENT)
Dept: PEDIATRICS | Facility: CLINIC | Age: 39
End: 2024-11-16
Payer: COMMERCIAL

## 2024-11-16 PROCEDURE — 90471 IMMUNIZATION ADMIN: CPT

## 2024-11-16 PROCEDURE — 90656 IIV3 VACC NO PRSV 0.5 ML IM: CPT

## 2024-11-16 PROCEDURE — 90480 ADMN SARSCOV2 VAC 1/ONLY CMP: CPT

## 2024-11-16 PROCEDURE — 91320 SARSCV2 VAC 30MCG TRS-SUC IM: CPT

## 2025-01-06 ENCOUNTER — ANCILLARY PROCEDURE (OUTPATIENT)
Dept: MAMMOGRAPHY | Facility: CLINIC | Age: 40
End: 2025-01-06
Attending: OBSTETRICS & GYNECOLOGY
Payer: COMMERCIAL

## 2025-01-06 DIAGNOSIS — Z12.31 VISIT FOR SCREENING MAMMOGRAM: ICD-10-CM

## 2025-01-06 PROCEDURE — 77067 SCR MAMMO BI INCL CAD: CPT | Mod: GC | Performed by: STUDENT IN AN ORGANIZED HEALTH CARE EDUCATION/TRAINING PROGRAM

## 2025-01-06 PROCEDURE — 77063 BREAST TOMOSYNTHESIS BI: CPT | Mod: GC | Performed by: STUDENT IN AN ORGANIZED HEALTH CARE EDUCATION/TRAINING PROGRAM

## 2025-01-10 ENCOUNTER — ANCILLARY PROCEDURE (OUTPATIENT)
Dept: MAMMOGRAPHY | Facility: CLINIC | Age: 40
End: 2025-01-10
Attending: OBSTETRICS & GYNECOLOGY
Payer: COMMERCIAL

## 2025-01-10 DIAGNOSIS — R92.8 ABNORMAL MAMMOGRAM OF LEFT BREAST: ICD-10-CM

## 2025-01-10 PROCEDURE — 76642 ULTRASOUND BREAST LIMITED: CPT | Mod: LT | Performed by: RADIOLOGY

## 2025-01-10 PROCEDURE — 77065 DX MAMMO INCL CAD UNI: CPT | Mod: LT | Performed by: RADIOLOGY

## 2025-01-13 ENCOUNTER — ANCILLARY PROCEDURE (OUTPATIENT)
Dept: MAMMOGRAPHY | Facility: CLINIC | Age: 40
End: 2025-01-13
Attending: OBSTETRICS & GYNECOLOGY
Payer: COMMERCIAL

## 2025-01-13 DIAGNOSIS — R92.8 ABNORMAL MAMMOGRAM OF LEFT BREAST: ICD-10-CM

## 2025-01-13 PROCEDURE — 88341 IMHCHEM/IMCYTCHM EA ADD ANTB: CPT | Mod: TC | Performed by: OBSTETRICS & GYNECOLOGY

## 2025-01-13 PROCEDURE — 88342 IMHCHEM/IMCYTCHM 1ST ANTB: CPT | Mod: TC | Performed by: OBSTETRICS & GYNECOLOGY

## 2025-01-13 PROCEDURE — 88341 IMHCHEM/IMCYTCHM EA ADD ANTB: CPT | Mod: 26 | Performed by: STUDENT IN AN ORGANIZED HEALTH CARE EDUCATION/TRAINING PROGRAM

## 2025-01-13 PROCEDURE — 19081 BX BREAST 1ST LESION STRTCTC: CPT | Mod: LT | Performed by: RADIOLOGY

## 2025-01-13 PROCEDURE — 19083 BX BREAST 1ST LESION US IMAG: CPT | Mod: LT | Performed by: RADIOLOGY

## 2025-01-13 PROCEDURE — 88377 M/PHMTRC ALYS ISHQUANT/SEMIQ: CPT | Performed by: OBSTETRICS & GYNECOLOGY

## 2025-01-13 PROCEDURE — 99000 SPECIMEN HANDLING OFFICE-LAB: CPT | Performed by: PATHOLOGY

## 2025-01-13 PROCEDURE — 88360 TUMOR IMMUNOHISTOCHEM/MANUAL: CPT | Mod: 26 | Performed by: STUDENT IN AN ORGANIZED HEALTH CARE EDUCATION/TRAINING PROGRAM

## 2025-01-13 PROCEDURE — 88377 M/PHMTRC ALYS ISHQUANT/SEMIQ: CPT | Mod: 26 | Performed by: MEDICAL GENETICS

## 2025-01-13 PROCEDURE — 88305 TISSUE EXAM BY PATHOLOGIST: CPT | Mod: 26 | Performed by: STUDENT IN AN ORGANIZED HEALTH CARE EDUCATION/TRAINING PROGRAM

## 2025-01-13 PROCEDURE — 88342 IMHCHEM/IMCYTCHM 1ST ANTB: CPT | Mod: 26 | Performed by: STUDENT IN AN ORGANIZED HEALTH CARE EDUCATION/TRAINING PROGRAM

## 2025-01-13 RX ORDER — LIDOCAINE HYDROCHLORIDE AND EPINEPHRINE 10; 10 MG/ML; UG/ML
10 INJECTION, SOLUTION INFILTRATION; PERINEURAL ONCE
Status: COMPLETED | OUTPATIENT
Start: 2025-01-13 | End: 2025-01-13

## 2025-01-13 RX ADMIN — LIDOCAINE HYDROCHLORIDE AND EPINEPHRINE 10 ML: 10; 10 INJECTION, SOLUTION INFILTRATION; PERINEURAL at 13:28

## 2025-01-20 ENCOUNTER — LAB (OUTPATIENT)
Dept: LAB | Facility: CLINIC | Age: 40
End: 2025-01-20
Payer: COMMERCIAL

## 2025-01-20 ENCOUNTER — PRE VISIT (OUTPATIENT)
Dept: ONCOLOGY | Facility: CLINIC | Age: 40
End: 2025-01-20
Payer: COMMERCIAL

## 2025-01-20 ENCOUNTER — PATIENT OUTREACH (OUTPATIENT)
Dept: ONCOLOGY | Facility: CLINIC | Age: 40
End: 2025-01-20

## 2025-01-20 ENCOUNTER — ONCOLOGY VISIT (OUTPATIENT)
Dept: ONCOLOGY | Facility: CLINIC | Age: 40
End: 2025-01-20
Attending: OBSTETRICS & GYNECOLOGY
Payer: COMMERCIAL

## 2025-01-20 VITALS
HEART RATE: 109 BPM | DIASTOLIC BLOOD PRESSURE: 79 MMHG | SYSTOLIC BLOOD PRESSURE: 131 MMHG | RESPIRATION RATE: 16 BRPM | BODY MASS INDEX: 26 KG/M2 | OXYGEN SATURATION: 100 % | HEIGHT: 64 IN | WEIGHT: 152.3 LBS

## 2025-01-20 DIAGNOSIS — C50.919 INVASIVE LOBULAR CARCINOMA OF BREAST IN FEMALE (H): ICD-10-CM

## 2025-01-20 DIAGNOSIS — C50.912 INVASIVE DUCTAL CARCINOMA OF BREAST, FEMALE, LEFT (H): Primary | ICD-10-CM

## 2025-01-20 DIAGNOSIS — C50.912 INVASIVE DUCTAL CARCINOMA OF BREAST, FEMALE, LEFT (H): ICD-10-CM

## 2025-01-20 LAB
INTERPRETATION SERPL IEP-IMP: NORMAL
INTERPRETATION SERPL IEP-IMP: NORMAL
INTERPRETATION: NORMAL
LAB PDF RESULT: NORMAL
LAB PDF RESULT: NORMAL
LAB TEST RESULTS REPORTED IN RPTPERIOD: NORMAL
LAB TEST RESULTS REPORTED IN RPTPERIOD: NORMAL
SEQUENCING METHOD PNL BLD/T: NORMAL
SEQUENCING METHOD PNL BLD/T: NORMAL
SPECIMEN TYPE: NORMAL
SPECIMEN TYPE: NORMAL

## 2025-01-20 PROCEDURE — 99213 OFFICE O/P EST LOW 20 MIN: CPT | Performed by: SURGERY

## 2025-01-20 PROCEDURE — 99204 OFFICE O/P NEW MOD 45 MIN: CPT | Performed by: SURGERY

## 2025-01-20 ASSESSMENT — PAIN SCALES - GENERAL: PAINLEVEL_OUTOF10: MILD PAIN (2)

## 2025-01-20 NOTE — TELEPHONE ENCOUNTER
RECORDS STATUS - BREAST    RECORDS REQUESTED FROM: [facility] - Internal records   DATE REQUESTED: 1/20

## 2025-01-20 NOTE — PROGRESS NOTES
Bessy is a 39-year-old woman with a new diagnosis of left breast cancer.  She underwent a screening mammogram on January 6 which demonstrated extensive area of microcalcifications in all quadrants of the breast.  There is also a possible asymmetry in the medial portion of the breast.  Her right breast was normal.  She underwent a diagnostic mammogram on January 10 which demonstrated calcifications in all 4 quadrants extending to the base of the nipple.  She had an ultrasound which demonstrated dilated ducts associated with microcalcifications in multiple areas of the breast.  By ultrasound her lymph nodes were negative.  She underwent a biopsy on January 13.  On the left breast 10 o'clock position 9 cm from the nipple she had invasive ductal cancer that was grade 3 ER positive MO positive and HER2 equivocal.  The greatest dimension of her invasive component was 7 mm.  There is also DCIS present.  Also on the left breast at the 3 o'clock position she had a biopsy that demonstrated invasive lobular breast cancer grade 2 that was ER positive MO positive HER2/joselyn negative.  The greatest dimension was 2 mm in size.  There was extensive DCIS in this biopsy as well.  Her major symptom has been some discomfort in her breast and she has been having some discharge in her nipple.  She has had no prior history of any breast problems.  Her past medical history significant for no major medical problems.  On physical examination her left breast is a little bit larger than her right breast.  She has no evidence of erythema and no definitive palpable mass.  She has no lymphadenopathy.    Impression invasive ductal cancer, invasive lobular cancer cancer, and DCIS of the left breast.    Plan: I have talked her about the treatment options.  She is not a candidate for breast conserving surgery.  I recommended a mastectomy with or without reconstruction.  I did recommend a sentinel lymph node biopsy but not an axillary node dissection.   I have informed her that endocrine therapy would be recommended but a decision regarding chemotherapy would be made until she met with the medical oncologist.  I have recommended genetic testing and we will start that today.  I recommended consultation with the plastic surgeon will get that set up as well.  After she meets with medical oncologist and her HER2 results are back we will decide on whether to proceed with a surgery first approach or neoadjuvant chemotherapy.    Total time was 45 minutes which included reviewing her imaging and the in person visit.

## 2025-01-20 NOTE — LETTER
1/20/2025      Lesley Mahmood  [address] [address]      Dear Colleague,    Thank you for referring your patient, Lesley Mahmood, to the [facility]. Please see a copy of my visit note below.    Bessy is a 39-year-old woman with a new diagnosis of left breast cancer.  She underwent a screening mammogram on January 6 which demonstrated extensive area of microcalcifications in all quadrants of the breast.  There is also a possible asymmetry in the medial portion of the breast.  Her right breast was normal.  She underwent a diagnostic mammogram on January 10 which demonstrated calcifications in all 4 quadrants extending to the base of the nipple.  She had an ultrasound which demonstrated dilated ducts associated with microcalcifications in multiple areas of the breast.  By ultrasound her lymph nodes were negative.  She underwent a biopsy on January 13.  On the left breast 10 o'clock position 9 cm from the nipple she had invasive ductal cancer that was grade 3 ER positive [place] positive and HER2 equivocal.  The greatest dimension of her invasive component was 7 mm.  There is also DCIS present.  Also on the left breast at the 3 o'clock position she had a biopsy that demonstrated invasive lobular breast cancer grade 2 that was ER positive [place] positive HER2/joselyn negative.  The greatest dimension was 2 mm in size.  There was extensive DCIS in this biopsy as well.  Her major symptom has been some discomfort in her breast and she has been having some discharge in her nipple.  She has had no prior history of any breast problems.  Her past medical history significant for no major medical problems.  On physical examination her left breast is a little bit larger than her right breast.  She has no evidence of erythema and no definitive palpable mass.  She has no lymphadenopathy.    Impression invasive ductal cancer, invasive lobular cancer cancer, and DCIS of the left breast.    Plan:  I have talked her about the treatment options.  She is not a candidate for breast conserving surgery.  I recommended a mastectomy with or without reconstruction.  I did recommend a sentinel lymph node biopsy but not an axillary node dissection.  I have informed her that endocrine therapy would be recommended but a decision regarding chemotherapy would be made until she met with the medical oncologist.  I have recommended genetic testing and we will start that today.  I recommended consultation with the plastic surgeon will get that set up as well.  After she meets with medical oncologist and her HER2 results are back we will decide on whether to proceed with a surgery first approach or neoadjuvant chemotherapy.    Total time was 45 minutes which included reviewing her imaging and the in person visit.      Again, thank you for allowing me to participate in the care of your patient.        Sincerely,        Jose Perea MD    Electronically signed

## 2025-01-20 NOTE — NURSING NOTE
"Oncology Rooming Note    January 20, 2025 11:12 AM   Lesley Mahmood is a 39 year old female who presents for:    Chief Complaint   Patient presents with    Oncology Clinic Visit     Invasive ductal carcinoma of breast, female, left (H),Invasive lobular carcinoma of breast in female (H)       Initial Vitals: BP (!) 154/94 (BP Location: Right arm, Patient Position: Sitting, Cuff Size: Adult Regular)   Pulse 109   Resp 16   Ht 1.626 m (5' 4\")   Wt 69.1 kg (152 lb 4.8 oz)   SpO2 100%   BMI 26.14 kg/m   Estimated body mass index is 26.14 kg/m  as calculated from the following:    Height as of this encounter: 1.626 m (5' 4\").    Weight as of this encounter: 69.1 kg (152 lb 4.8 oz). Body surface area is 1.77 meters squared.  Mild Pain (2) Comment: Data Unavailable   No LMP recorded. (Menstrual status: IUD).  Allergies reviewed: Yes  Medications reviewed: Yes    Medications: Medication refills not needed today.  Pharmacy name entered into [employer]:    [facility] [place], [place] - [address] [facility] [place], [place] - [address]    Frailty Screening:   Is the patient here for a new oncology consult visit in cancer care? 1. Yes. Over the past month, have you experienced difficulty or required a caregiver to assist with:   1. Balance, walking or general mobility (including any falls)? NO  2. Completion of self-care tasks such as bathing, dressing, toileting, grooming/hygiene?  NO  3. Concentration or memory that affects your daily life?  NO       Clinical concerns: Pt is anxious today.    Cherelle Roger, EMT  1/20/2025            "

## 2025-01-20 NOTE — PROGRESS NOTES
[facility]: Surgical Oncology Cancer Care Short Note                                     Discussion with Patient:                                                       Met with Lesley following her visit with Dr. Perea on 1/20/2025. Reviewed plan for visit with Medical Oncology, Dr. Moesr, on 1/23/2025 at 10:00 am. Lesley is aware, depending on her plan with Medical Oncology, she will have a return visit with Dr. Perea to confirm her surgery plan.     Referral for Plastics Consult was placed.     Breast Action Panel consent was reviewed with Lesley. Divine verbally consentedto genetic testing. Labs were drawn. Reviewed expected timing of results and that Dr. Perea would reach out to discuss any change in plan based on the results.      Writer answered all questions and concerns to the best of her ability and provided her contact information. Reviewed use of [employer] as alternative way to contact team.  Encouraged patient to contact with questions.         Felipa Johnson RNCC  [facility]  Surgical Onolcogy      Approximately 5 minutes was spent in discussion with patient.

## 2025-01-21 ENCOUNTER — PATIENT OUTREACH (OUTPATIENT)
Dept: ONCOLOGY | Facility: CLINIC | Age: 40
End: 2025-01-21
Payer: COMMERCIAL

## 2025-01-21 DIAGNOSIS — C50.912 INVASIVE DUCTAL CARCINOMA OF BREAST, FEMALE, LEFT (H): Primary | ICD-10-CM

## 2025-01-21 DIAGNOSIS — C50.919 INVASIVE LOBULAR CARCINOMA OF BREAST IN FEMALE (H): ICD-10-CM

## 2025-01-21 NOTE — PROGRESS NOTES
New Patient Oncology Nurse Navigator Note     Referring provider:     Jose Perea MD        Referring Clinic/Organization: [facility]      Referred to (specialty:) Genetic Counseling and Cancer Risk Management     Requested provider (if applicable): NA     Date Referral Received: January 21, 2025     Evaluation for:    C50.912 (ICD-10-CM) - Invasive ductal carcinoma of breast, female, left (H)   C50.919 (ICD-10-CM) - Invasive lobular carcinoma of breast in female (H)       Patient referred to CRMP by Dr. Perea/Surgery - BAP test in process - genetic counseling required for approval of testing.     Payor: MEDICA / Plan: MEDICA ESSENTIAL / Product Type: Indemnity /     January 21, 2025  Referral received and reviewed.   Sent to NPS to process.     Pinky GANDHIN, RN, OCN  Oncology Nurse Navigator   [facility]  Cancer Care Service Line   New Patient Hem/Onc Scheduling / Referrals: [phone] (fax: [phone] )

## 2025-01-22 PROBLEM — C50.812 MALIGNANT NEOPLASM OF OVERLAPPING SITES OF LEFT BREAST IN FEMALE, ESTROGEN RECEPTOR POSITIVE (H): Status: ACTIVE | Noted: 2025-01-22

## 2025-01-22 PROBLEM — Z17.0 MALIGNANT NEOPLASM OF OVERLAPPING SITES OF LEFT BREAST IN FEMALE, ESTROGEN RECEPTOR POSITIVE (H): Status: ACTIVE | Noted: 2025-01-22

## 2025-01-22 LAB — INTERPRETATION SERPL IEP-IMP: NORMAL

## 2025-01-22 NOTE — TELEPHONE ENCOUNTER
FUTURE VISIT INFORMATION      FUTURE VISIT INFORMATION:  Date: 1/28/25  Time: 8:30am  [place] [facility] [place]  REFERRAL INFORMATION:  Referring provider:  Dr Perea   Referring providers clinic:  [facility]  Reason for visit/diagnosis  New left breast DCIS    RECORDS REQUESTED FROM:       Clinic name Comments Records Status Imaging Status   [facility] Ov/referral 1/20/25 epic    Imaging MA 1/13/25  MA 1/10/25, MA 1/6/25 epic pac

## 2025-01-22 NOTE — PROGRESS NOTES
Oncology Consultation:  Date on this visit: 1/23/2025    Lesley Mahmood is referred by Dr.Suzanne Lisseth Beasley for an oncology consultation. She requires evaluation for left breast cancer.    Primary Physician: Hansa Beasley     History Of Present Illness:  Ms. Mahmood is a 39 year old female with left breast cancer.  Routine bilateral screening mammograms on 1/6/2025 showed fine pleomorphic calcifications throughout the left breast and possible medial breast focal asymmetry.  Ultrasound showed a mass at 10 o'clock, 9 cm from the nipple measuring 1 cm and multiple areas with conglomerate of ducts and dilated ducts with associated calcifications in all 4 quadrants of the breast. Ultrasound of the left axilla was without lymphadenopathy.  Biopsy of the mass at 10 o'clock was c/w grade 3 invasive ductal carcinoma with associated high grade DCIS.  Invasive carcinoma is ER positive (71-80%), VA positive (51-60%), and HER2 equivocal by IHC and positive by FISH (HER2 signals/cell of 11.1 and HER2/CEP17 ratio of 4.0).  Biopsy of calcifications at 3 o'clock is c/w grade 2 invasive lobular carcinoma with associated high grade DCIS and LCIS.  Invasive carcinoma is ER positive (%), VA positive (%), and HER2 negative (IHC 1+).    Patient denies prior breast issues or biopsies.  The mammogram that led to the diagnosis of her breast cancer was only her second mammogram.  She notes that she noted left breast to be slightly larger than the right last summer.  In the Fall, she developed an itching sensation adjacent to the nipple and was able to elicit a milky discharge from the nipple.  She notes overall left breast discomfort and her mammograms were painful.  She notes having had an IUD placed after having had her last child.  The IUD suppresses her menstrual periods.  She has no history of fertility treatments.  She has two children, ages 5 and 9 years old.  She  each for approximately 3 years; 1 year  intensive breast feeding, 2 years breastfeeding at night only.  No history of hormone replacement therapy.  No family history of breast or ovarian cancer.      She is in good health without chronic medical issues.      Past Medical/Surgical History:  Past Medical History:   Diagnosis Date    NO ACTIVE PROBLEMS (aka NONE)      Past Surgical History:   Procedure Laterality Date    birthmark removal  2001    EXTRACTION(S) DENTAL  1995     Allergies:  Allergies as of 01/23/2025    (No Known Allergies)     Current Medications:  Current Outpatient Medications   Medication Sig Dispense Refill    levonorgestrel (MIRENA) 52 MG (20 mcg/day) IUD by Intrauterine route once.      Nutritional Supplements (VITAMIN D BOOSTER PO) Take 1,000 Units by mouth daily.        Family History:  Family History   Problem Relation Age of Onset    Hypertension Mother     Cancer Father         CLL stable     Cardiovascular Maternal Grandmother         strokes    Hypertension Maternal Grandmother     Macular Degeneration Maternal Grandfather     Hypertension Maternal Grandfather     Cerebrovascular Disease Paternal Grandmother     Hypertension Paternal Grandmother     Other Cancer Paternal Grandfather         CLL    Hypertension Paternal Grandfather     Unknown/Adopted Brother     Unknown/Adopted Sister     Hemochromatosis Cousin     Glaucoma No family hx of      Social History:  Social History     Socioeconomic History    Marital status:      Spouse name: Mauro Pena    Number of children: 1    Years of education: Not on file    Highest education level: Not on file   Occupational History    Occupation: endocrine      Comment: FT   Tobacco Use    Smoking status: Never    Smokeless tobacco: Never   Substance and Sexual Activity    Alcohol use: Yes     Alcohol/week: 4.0 standard drinks of alcohol     Types: 4 Standard drinks or equivalent per week     Comment: stopped in preganncy    Drug use: No    Sexual activity: Yes     Partners: Male      Birth control/protection: I.U.D.   Other Topics Concern    Parent/sibling w/ CABG, MI or angioplasty before 65F 55M? Not Asked     Service No    Blood Transfusions No    Caffeine Concern Yes     Comment: 1 cup     Occupational Exposure No    Hobby Hazards No    Sleep Concern No    Stress Concern No    Weight Concern No    Special Diet No    Back Care No    Exercise Yes     Comment: yoga 2 times and walking    Bike Helmet Yes    Seat Belt Yes    Self-Exams Yes     Comment: occ   Social History Narrative    How much exercise per week? Yoga and walking    How much calcium per day? In foods and supplements       How much caffeine per day? 1    How much vitamin D per day? In foods    Do you/your family wear seatbelts?  Yes    Do you/your family use safety helmets? Yes    Do you/your family use sunscreen? Yes    Do you/your family keep firearms in the home? No    Do you/your family have a smoke detector(s)? Yes        Do you feel safe in your home? Yes    Has anyone ever touched you in an unwanted manner? No    Reviewed cmkim n  6-     Social Drivers of Health     Financial Resource Strain: Not on file   Food Insecurity: Not on file   Transportation Needs: Not on file   Physical Activity: Not on file   Stress: Not on file   Social Connections: Not on file   Interpersonal Safety: Not on file   Housing Stability: Not on file     Physical Exam:  There were no vitals taken for this visit.  {EXAM COMPLETE FEMALE:202409}    Laboratory/Imaging Studies  I personally reviewed the below images and pathology studies while in clinic today:    1/6/2025 Bilateral screening mammograms:  Findings: The breasts are heterogeneously dense, which may obscure small masses.     There are possible calcifications left breast calcifications, upper outer, lower outer and medial quadrants mid to posterior depth.  Possible medial breast focal asymmetry CC view mid depth. There is no suspicious finding of the right breast.                                                                    IMPRESSION: BI-RADS CATEGORY: 0 - Incomplete - Need additional imaging evaluation    1/10/2025 Left breast diagnostic mammogram and ultrasound:  FINDINGS  MAMMOGRAM:  BREAST DENSITY: The breasts are heterogeneously dense, which may  obscure small masses.     Technique: Standard mammographic views were performed with  tomosynthesis and synthetic mammogram and magnification   Segmental fine pleomorphic calcifications involving all four quadrants  of the breast and extends into the base of the nipple.     ULTRASOUND:  Targeted, real-time ultrasound evaluation was performed by the  technologist and radiologist.  In left breast at 10:00, 9 cm from nipple, there is an irregular  hypoechoic mass with spiculated margins measuring 1 x 0.5 x 0.7 cm.  In the left breast at 11:00, 7 cm the nipple, there is conglomerate of  ducts with calcifications measuring 0.9 x 0.4 x 1.3 cm.  In the left breast at 2:00, 7 cm from nipple, there is a conglomerate  of ducts with calcifications measuring 0.6 x 0.6 x 1 cm.  In the left breast at 8:00, 4 cm and the nipple, there is a  conglomerate of ducts with calcifications measuring 1 x 0.6 x 1 cm.  There are dilated ducts containing calcifications that correspond to  mammogram in all 4 quadrants. No suspicious left axillary lymph nodes.                                                                      IMPRESSION: BI-RADS CATEGORY: 4 - Suspicious.    1/13/2025 Left breast biopsies:  Final Diagnosis   A. LEFT breast, 10:00, 9 cm from nipple, ultrasound guided core biopsy:  -INVASIVE BREAST CARCINOMA OF NO SPECIAL TYPE (INVASIVE DUCTAL CARCINOMA), [place] grade 3  -Ductal carcinoma in situ (DCIS), nuclear grade 3, cribriform and solid types, with comedonecrosis  -Invasive carcinoma is estrogen receptor positive, progesterone receptor positive and HER2 equivocal (score 2+) by immunohistochemistry (see biomarker reporting template  below)  -HER2 FISH results will be reported separately by cytogenetics  -See comment     B. LEFT breast, 3:00,  calcifications, stereotactic core biopsy:  -INVASIVE LOBULAR CARCINOMA, [place] grade 2  -Ductal carcinoma in situ (DCIS), nuclear grade 3, cribriform and solid types, with comedonecrosis  -Lobular carcinoma in situ (LCIS), classic type  -Calcifications associated with DCIS  -Invasive carcinoma is estrogen receptor positive, progesterone receptor positive and HER2 negative (score 1+) by immunohistochemistry (see biomarker reporting template below)  -See comment   Electronically signed by Hesham Escalante MD on 1/17/2025 at 10:39 AM   Comment  [id number]   Specimen A: Invasive carcinoma involves multiple tissue cores, and is 7 mm in greatest dimension in a single core.  The Georgiana grade is 3 (tubule formation 3 + nuclear pleomorphism 3 + mitotic activity 2 = Georgiana score 8).      Specimen B: Invasive carcinoma involves multiple tissue cores, and is 2 mm in greatest dimension in a single core.  The Georgiana grade is 2 (tubule formation 3 + nuclear pleomorphism 2 + mitotic activity 1 = Georgiana score 6).   Synoptic Checklist   Breast Biomarker Reporting Template   Protocol posted: 12/13/2023BREAST BIOMARKER REPORTING TEMPLATE - A  Test(s) Performed     Estrogen Receptor (ER) Status  Positive (greater than 10% of cells demonstrate nuclear positivity)   Percentage of Cells with Nuclear Positivity  71-80%   Average Intensity of Staining  Moderate   Test Type  Food and Drug Administration (FDA) cleared (test / vendor): [place]   Primary Antibody  SP1   Scoring System  No separate scoring system used   Test(s) Performed     Progesterone Receptor (PgR) Status  Positive   Percentage of Cells with Nuclear Positivity  51-60%   Average Intensity of Staining  Moderate   Test Type  Food and Drug Administration (FDA) cleared (test / vendor): [place]   Primary Antibody  1E2   Scoring System  No separate  scoring system used   Test(s) Performed     HER2 by Immunohistochemistry  Equivocal (Score 2+)   Percentage of Cells with Uniform Intense Complete Membrane Staining  0 %   Test Type  Food and Drug Administration (FDA) cleared (test / vendor): [place]   Primary Antibody  4B5   Cold Ischemia and Fixation Times  Meet requirements specified in latest version of the ASCO / CAP Guidelines   Cold Ischemia Time (minutes)  0 min   Fixation Time (hours)  23 hours   Testing Performed on Block Number(s)  A2   METHODS   Fixative  Formalin   Image Analysis  Not performed   .   Breast Biomarker Reporting Template   Protocol posted: 12/13/2023BREAST BIOMARKER REPORTING TEMPLATE - B  Test(s) Performed     Estrogen Receptor (ER) Status  Positive (greater than 10% of cells demonstrate nuclear positivity)   Percentage of Cells with Nuclear Positivity  %   Average Intensity of Staining  Strong   Test Type  Food and Drug Administration (FDA) cleared (test / vendor): [place]   Primary Antibody  SP1   Scoring System  No separate scoring system used   Test(s) Performed     Progesterone Receptor (PgR) Status  Positive   Percentage of Cells with Nuclear Positivity  %   Average Intensity of Staining  Strong   Test Type  Food and Drug Administration (FDA) cleared (test / vendor): [place]   Primary Antibody  1E2   Scoring System  No separate scoring system used   Test(s) Performed     HER2 by Immunohistochemistry  Negative (Score 1+)        1/13/2025 HER2 FISH:  RESULTS:  Ratio of HER2/ISRAEL-17 signals  Lesley Mahmood: 4.0 (ANISH Positive) Avg. number HER2 signals/nucleus: 11.1  Avg. number ISRAEL-17 signals/nucleus: 2.7  **Interpretive guidelines per the American Society of Clinical Oncology/College of American Pathologists Clinical Practice  Guideline Update (Alma RUTHERFORD et al, 2023, Arch Pathol Lab Med 147:993):  -- Group 1: HER2/ISRAEL-17 ratio 2.0 or more -AND- avg. number HER2 signals/nucleus 4.0 or more (ANISH Positive)  -- Group 2: HER2/ISRAEL-17  ratio 2.0 or more -AND- avg. number HER2 signals/nucleus <4.0 (Additional work required)  -- Group 3: HER2/ISRAEL-17 ratio <2.0 -AND- avg. number HER2 signals/nucleus 6.0 or more (Additional work required)  -- Group 4: HER2/ISRAEL-17 ratio <2.0 -AND- avg. number HER2 signals/nucleus 4.0 or more and <6.0 (Additional work required)  -- Group 5: HER2/ISRAEL-17 ratio <2.0 -AND- avg. number HER2 signals/nucleus <4.0 (ANISH Negative)  INTERPRETATION: Group 1 (ANISH Positive)    ASSESSMENT/PLAN:      - recommend scheduling removal if IUD  - breast MRI to evaluate extent of disease, rule out contralateral breast findings.

## 2025-01-23 ENCOUNTER — MYC MEDICAL ADVICE (OUTPATIENT)
Dept: OBGYN | Facility: CLINIC | Age: 40
End: 2025-01-23
Payer: COMMERCIAL

## 2025-01-23 ENCOUNTER — ONCOLOGY VISIT (OUTPATIENT)
Dept: ONCOLOGY | Facility: CLINIC | Age: 40
End: 2025-01-23
Attending: OBSTETRICS & GYNECOLOGY
Payer: COMMERCIAL

## 2025-01-23 VITALS
SYSTOLIC BLOOD PRESSURE: 119 MMHG | WEIGHT: 152.5 LBS | RESPIRATION RATE: 16 BRPM | HEART RATE: 111 BPM | TEMPERATURE: 97.6 F | OXYGEN SATURATION: 100 % | BODY MASS INDEX: 26.03 KG/M2 | DIASTOLIC BLOOD PRESSURE: 83 MMHG | HEIGHT: 64 IN

## 2025-01-23 DIAGNOSIS — Z17.0 MALIGNANT NEOPLASM OF OVERLAPPING SITES OF LEFT BREAST IN FEMALE, ESTROGEN RECEPTOR POSITIVE (H): Primary | ICD-10-CM

## 2025-01-23 DIAGNOSIS — C50.912 INVASIVE DUCTAL CARCINOMA OF BREAST, FEMALE, LEFT (H): ICD-10-CM

## 2025-01-23 DIAGNOSIS — C50.812 MALIGNANT NEOPLASM OF OVERLAPPING SITES OF LEFT BREAST IN FEMALE, ESTROGEN RECEPTOR POSITIVE (H): Primary | ICD-10-CM

## 2025-01-23 DIAGNOSIS — C50.919 INVASIVE LOBULAR CARCINOMA OF BREAST IN FEMALE (H): ICD-10-CM

## 2025-01-23 LAB
PATH REPORT.ADDENDUM SPEC: ABNORMAL
PATH REPORT.COMMENTS IMP SPEC: ABNORMAL
PATH REPORT.COMMENTS IMP SPEC: YES
PATH REPORT.FINAL DX SPEC: ABNORMAL
PATH REPORT.GROSS SPEC: ABNORMAL
PATH REPORT.MICROSCOPIC SPEC OTHER STN: ABNORMAL
PATH REPORT.RELEVANT HX SPEC: ABNORMAL
PATHOLOGY SYNOPTIC REPORT: ABNORMAL
PHOTO IMAGE: ABNORMAL

## 2025-01-23 ASSESSMENT — PAIN SCALES - GENERAL: PAINLEVEL_OUTOF10: MILD PAIN (2)

## 2025-01-23 NOTE — NURSING NOTE
"Oncology Rooming Note    January 23, 2025 10:08 AM   Lesley Mahmood is a 39 year old female who presents for:    Chief Complaint   Patient presents with    Oncology Clinic Visit     Invasive Ductal Carcinoma of Left Breast     Initial Vitals: /83 (BP Location: Right arm, Patient Position: Sitting, Cuff Size: Adult Regular)   Pulse (!) 111   Temp 97.6  F (36.4  C) (Oral)   Resp 16   Ht 1.626 m (5' 4\")   Wt 69.2 kg (152 lb 8 oz)   SpO2 100%   Breastfeeding No   BMI 26.18 kg/m   Estimated body mass index is 26.18 kg/m  as calculated from the following:    Height as of this encounter: 1.626 m (5' 4\").    Weight as of this encounter: 69.2 kg (152 lb 8 oz). Body surface area is 1.77 meters squared.  Mild Pain (2) Comment: Left Breast   No LMP recorded. (Menstrual status: IUD).  Allergies reviewed: Yes  Medications reviewed: Yes    Medications: Medication refills not needed today.  Pharmacy name entered into [facility]:    [facility] [place], [place] - [address]  [facility] [place], [place] - [address]    Frailty Screening:   Is the patient here for a new oncology consult visit in cancer care? 2. No      Clinical concerns: Patient is new.       Corina Molina LPN  1/23/2025                "

## 2025-01-24 ENCOUNTER — TUMOR CONFERENCE (OUTPATIENT)
Dept: ONCOLOGY | Facility: CLINIC | Age: 40
End: 2025-01-24
Payer: COMMERCIAL

## 2025-01-27 ENCOUNTER — OFFICE VISIT (OUTPATIENT)
Dept: OBGYN | Facility: CLINIC | Age: 40
End: 2025-01-27
Payer: COMMERCIAL

## 2025-01-27 VITALS
HEART RATE: 77 BPM | WEIGHT: 154.2 LBS | HEIGHT: 64 IN | SYSTOLIC BLOOD PRESSURE: 135 MMHG | DIASTOLIC BLOOD PRESSURE: 84 MMHG | BODY MASS INDEX: 26.32 KG/M2 | OXYGEN SATURATION: 99 %

## 2025-01-27 DIAGNOSIS — Z12.4 SCREENING FOR MALIGNANT NEOPLASM OF CERVIX: ICD-10-CM

## 2025-01-27 DIAGNOSIS — Z30.432 ENCOUNTER FOR REMOVAL OF INTRAUTERINE CONTRACEPTIVE DEVICE: Primary | ICD-10-CM

## 2025-01-27 PROBLEM — Z37.9 NORMAL LABOR: Status: RESOLVED | Noted: 2020-03-01 | Resolved: 2025-01-27

## 2025-01-27 PROBLEM — Z36.89 ENCOUNTER FOR TRIAGE IN PREGNANT PATIENT: Status: RESOLVED | Noted: 2020-03-01 | Resolved: 2025-01-27

## 2025-01-27 PROBLEM — Z34.81 ENCOUNTER FOR SUPERVISION OF OTHER NORMAL PREGNANCY IN FIRST TRIMESTER: Status: RESOLVED | Noted: 2019-07-25 | Resolved: 2025-01-27

## 2025-01-27 PROBLEM — O09.523 MULTIGRAVIDA OF ADVANCED MATERNAL AGE IN THIRD TRIMESTER: Status: RESOLVED | Noted: 2019-08-27 | Resolved: 2025-01-27

## 2025-01-27 PROCEDURE — G0145 SCR C/V CYTO,THINLAYER,RESCR: HCPCS | Performed by: NURSE PRACTITIONER

## 2025-01-27 PROCEDURE — 58301 REMOVE INTRAUTERINE DEVICE: CPT | Performed by: NURSE PRACTITIONER

## 2025-01-27 PROCEDURE — 87624 HPV HI-RISK TYP POOLED RSLT: CPT | Performed by: NURSE PRACTITIONER

## 2025-01-27 NOTE — PROGRESS NOTES
"  Assessment & Plan     Encounter for removal of intrauterine contraceptive device  Removal procedure discussed with patient and she desires to proceed. Consent obtained. IUD easily removed intact with a ring forceps. Pt shown the intact IUD. Reportable signs and symptoms discussed. Report any fevers or excessive cramps.  Discussed return of menstrual cycles.   - REMOVE INTRAUTERINE DEVICE    Screening for malignant neoplasm of cervix  - HPV and Gynecologic Cytology Panel - Recommended Age 30-65 Years      Subjective   Lesley is a 39 year old, presenting for the following health issues:  IUD (Removal/)    HPI     Patient with a recent diagnosis of left breast cancer-grade 3 invasive ductal carcinoma. ER/ME positive, Her2 Equivocal.Additionally, grade 2 invasive lobular carcinoma-ER/ME positive and HER2 neg. Has a Mirena IUD in place and recommendation was made for removal. Spouse has vasectomy.  Patient is nearly due for pap smear and is amenable to having this completed today.    Review of Systems  Constitutional, HEENT, cardiovascular, pulmonary, gi and gu systems are negative, except as otherwise noted.      Objective    /84 (BP Location: Right arm, Patient Position: Sitting, Cuff Size: Adult Regular)   Pulse 77   Ht 1.626 m (5' 4\")   Wt 69.9 kg (154 lb 3.2 oz)   SpO2 99%   BMI 26.47 kg/m    Body mass index is 26.47 kg/m .  Physical Exam   GENERAL: alert and no distress   (female): normal female external genitalia, normal urethral meatus, normal vaginal mucosa. Normal appearing cervix. IUD strings visible and appropriate length.  MS: no gross musculoskeletal defects noted, no edema  SKIN: no suspicious lesions or rashes  PSYCH: mentation appears normal, affect normal/bright    Signed Electronically by: LEONARD Monaco CNP    "

## 2025-01-27 NOTE — PATIENT INSTRUCTIONS
If you have any questions regarding your visit, Please contact your care team.     [employer]: [phone]  To Schedule an Appointment 24/7  Call: [phone]. [facility] HOURS TELEPHONE NUMBER     Esperanza Victoria- APRN CNP      Geno Cheek-Surgery Scheduler  [place]-Surgery Scheduler         Monday 7:30am-2:00pm    Tuesday 7:30am-4:00pm    Wednesday 7:30am-2:00pm    Thursday 7:30am-11:00am    Friday 7:30am-2:00pm [address] [address]  Phone- [phone]   Fax- [phone]     Imaging Scheduling all locations  [phone]    [facility]  [address] [place] [address]   [place] [address] [phone]         Urgent Care locations:  [facility]   Monday-Friday  10 am - 8 pm  Saturday and Sunday   9 am - 5 pm     [phone]   If you need a medication refill, please contact your pharmacy. Please allow 3 business days for your refill to be completed.  As always, Thank you for trusting us with your healthcare needs!      see additional instructions from your care team below

## 2025-01-28 ENCOUNTER — OFFICE VISIT (OUTPATIENT)
Dept: PLASTIC SURGERY | Facility: CLINIC | Age: 40
End: 2025-01-28
Attending: PLASTIC SURGERY
Payer: COMMERCIAL

## 2025-01-28 ENCOUNTER — PRE VISIT (OUTPATIENT)
Dept: PLASTIC SURGERY | Facility: CLINIC | Age: 40
End: 2025-01-28
Payer: COMMERCIAL

## 2025-01-28 VITALS
DIASTOLIC BLOOD PRESSURE: 88 MMHG | RESPIRATION RATE: 20 BRPM | SYSTOLIC BLOOD PRESSURE: 155 MMHG | OXYGEN SATURATION: 100 % | WEIGHT: 152.6 LBS | TEMPERATURE: 97.7 F | HEIGHT: 64 IN | BODY MASS INDEX: 26.05 KG/M2 | HEART RATE: 76 BPM

## 2025-01-28 DIAGNOSIS — C50.912 INVASIVE DUCTAL CARCINOMA OF BREAST, FEMALE, LEFT (H): ICD-10-CM

## 2025-01-28 PROCEDURE — 99213 OFFICE O/P EST LOW 20 MIN: CPT | Performed by: PLASTIC SURGERY

## 2025-01-28 ASSESSMENT — PAIN SCALES - GENERAL: PAINLEVEL_OUTOF10: NO PAIN (0)

## 2025-01-28 NOTE — LETTER
2025      Lesley E Timoteo  [address]  [facility] [address]      Dear Colleague,    Thank you for referring your patient, Lesley Mahmood, to the [facility]. Please see a copy of my visit note below.    Referring Provider:  Jose Perea MD  [address]  [place] [address]     Primary Care Provider:  Hansa Beasley      RE: Lesley Mahmood.  : 1985.  THAI: 2025.    Reason for visit: Left breast cancer    HPI: Patient is diagnosed with Left breast cancer. Will need adjuvant chemo but XRT depends on path. Plan is Bilateral SSM. Per patient she is E cup and wants to be C cup. In other words wants to be 50% smaller.     Medical history:  Past Medical History:   Diagnosis Date     NO ACTIVE PROBLEMS (aka NONE)        Surgical history:  Past Surgical History:   Procedure Laterality Date     birthmark removal       EXTRACTION(S) DENTAL         Family history:  Family History   Problem Relation Age of Onset     Hypertension Mother      Cancer Father         CLL stable      Cardiovascular Maternal Grandmother         strokes     Hypertension Maternal Grandmother      Macular Degeneration Maternal Grandfather      Hypertension Maternal Grandfather      Cerebrovascular Disease Paternal Grandmother      Hypertension Paternal Grandmother      Other Cancer Paternal Grandfather         CLL     Hypertension Paternal Grandfather      Unknown/Adopted Brother      Unknown/Adopted Sister      Hemochromatosis Cousin      Glaucoma No family hx of        Medications:  Current Outpatient Medications   Medication Sig Dispense Refill     Nutritional Supplements (VITAMIN D BOOSTER PO) Take 1,000 Units by mouth daily.         Allergies:  No Known Allergies    Social history:   Social History     Tobacco Use     Smoking status: Never     Passive exposure: Never     Smokeless tobacco: Never   Substance Use Topics     Alcohol use: Yes     Alcohol/week: 4.0  "standard drinks of alcohol     Types: 4 Standard drinks or equivalent per week         Physical Examination:  BP (!) 155/88 (BP Location: Right arm, Patient Position: Sitting, Cuff Size: Adult Regular)   Pulse 76   Temp 97.7  F (36.5  C) (Oral)   Resp 20   Ht 1.626 m (5' 4\")   Wt 69.2 kg (152 lb 9.6 oz)   SpO2 100%   BMI 26.19 kg/m    Body mass index is 26.19 kg/m .    General: No acute distress.    Breasts: Grade 2 ptosis.     ABDOMEN: Enough tissue for B cups. No scars. No hernias.     BACK: No scars        ASSESMENT and PLAN:     Based upon the above findings, a diagnosis of breast cancer, planning mastectomy, interested in breast reconstruction was made.  I had a pete, detailed discussion with the patient, in the presence of my nurse, who was present from beginning to end.  I discussed with the patient the concept behind breast reconstruction, the elective nature of reconstruction, immediate versus delayed reconstruction, the staged nature of reconstruction, the different options of reconstruction, including implant based (expander-implant, versus direct to implant) versus autologous and the pros and cons of each. Additionally, the potential need for radiation therapy/chemotherapy as adjuvant therapy and the impact on reconstruction in terms of timing, complications, and choice of reconstruction was all discussed.     In Implant based reconstruction, the use of implants was discussed. The specific nature of implants, the fact that they need to be replaced in time, that they have potential specific complications of implant rupture, contracture, rippling, firmness, palpability, visibility, rippling, and the association with ROBY-ALCL/SCC were all discussed. Reviewed the FDA checklist for implant related risks.  Reiterated importance of implant surveillance, which includes MR/High Resolution US imaging study 5-6 years after the original operation, followed by MR/High Resolution US imaging every 3 years " thereafter.     In Autologous based reconstruction, the use of tissue from different sites (EYAD being most commonly used), the longevity of this form of reconstruction, the donor site complications, and the length and complexity of this form or reconstruction was explained.     The potential use of Liposuction and fat grafting was also discussed in detail.     Overview of the surgery and expectations of the surgery were explained. All risks, benefits and alternatives, including pain, infection, bleeding, scarring, asymmetry, seromas, hematomas, wound breakdown, wound dehiscence, loss of the implants/flaps, abdominal wall-healing issues, abdominal wall weakness, bulges, hernias, sensation loss, requirement of further staged procedures, Implant specific issues and complications as discussed above, removal of infected or exposed implants, pneumothoraces, contour abnormalities, cannula injuries to deeper structures, hernias, fat necrosis, lumps and bumps, loss of grafted material, DVT, PE, MI, CVA, pneumonia, renal failure and death, were explained. They were understood and agreed upon by the patient, they were acknowledged by the patient, all the patient's questions were answered in detail to the patient's fullest understanding that they acknowledged, the team approach for treatment in the operating room was agreed upon by the patient, and proceeding with surgery was agreed upon by the patient.    Per my exam, she is more like a D cup and 50% reduction would be a large B cup.     The patient will think about her options and let us know her final plan and we can then schedule a combined procedure with surg onc.     All her questions were answered. She was happy with the visit. I look forward to helping her out in the near future as indicated.       Total time spent in the encounter today including chart review, visit itself, and post-visit paperwork was 60 minutes.       Holly Taveras MD    Chief,  Division of Plastic Surgery  Department of Surgery  [facility]      CC: Jose Perea MD  [address] [facility] [address]  [place] [address]  CC: Hansa Beasley      Again, thank you for allowing me to participate in the care of your patient.        Sincerely,        IAN Taveras MD    Electronically signed

## 2025-01-28 NOTE — NURSING NOTE
"Oncology Rooming Note    January 28, 2025 8:54 AM   Lesley Mahmood is a 39 year old female who presents for:    Chief Complaint   Patient presents with    Oncology Clinic Visit     Invasive ductal carcinoma of breast, female, left     Initial Vitals: BP (!) 155/88 (BP Location: Right arm, Patient Position: Sitting, Cuff Size: Adult Regular)   Pulse 76   Temp 97.7  F (36.5  C) (Oral)   Resp 20   Ht 1.626 m (5' 4\")   Wt 69.2 kg (152 lb 9.6 oz)   SpO2 100%   BMI 26.19 kg/m   Estimated body mass index is 26.19 kg/m  as calculated from the following:    Height as of this encounter: 1.626 m (5' 4\").    Weight as of this encounter: 69.2 kg (152 lb 9.6 oz). Body surface area is 1.77 meters squared.  No Pain (0) Comment: Data Unavailable   No LMP recorded.  Allergies reviewed: Yes  Medications reviewed: Yes    Medications: Medication refills not needed today.  Pharmacy name entered into [facility]: [facility] - [place], [place] - [address] [place] [address]    Frailty Screening:   Is the patient here for a new oncology consult visit in cancer care? 1. Yes. Over the past month, have you experienced difficulty or required a caregiver to assist with:   1. Balance, walking or general mobility (including any falls)? NO  2. Completion of self-care tasks such as bathing, dressing, toileting, grooming/hygiene?  NO  3. Concentration or memory that affects your daily life?  NO       Clinical concerns: none.       Robe Barros"

## 2025-01-29 LAB
HPV HR 12 DNA CVX QL NAA+PROBE: NEGATIVE
HPV16 DNA CVX QL NAA+PROBE: NEGATIVE
HPV18 DNA CVX QL NAA+PROBE: NEGATIVE
HUMAN PAPILLOMA VIRUS FINAL DIAGNOSIS: NORMAL

## 2025-01-29 NOTE — PROGRESS NOTES
Referring Provider:  Jose Perea MD  [address]  [place] [address]     Primary Care Provider:  Hansa Beasley      RE: Lesley Mahmood.  : 1985.  THAI: 2025.    Reason for visit: Left breast cancer    HPI: Patient is diagnosed with Left breast cancer. Will need adjuvant chemo but XRT depends on path. Plan is Bilateral SSM. Per patient she is E cup and wants to be C cup. In other words wants to be 50% smaller.     Medical history:  Past Medical History:   Diagnosis Date    NO ACTIVE PROBLEMS (aka NONE)        Surgical history:  Past Surgical History:   Procedure Laterality Date    birthmark removal      EXTRACTION(S) DENTAL         Family history:  Family History   Problem Relation Age of Onset    Hypertension Mother     Cancer Father         CLL stable     Cardiovascular Maternal Grandmother         strokes    Hypertension Maternal Grandmother     Macular Degeneration Maternal Grandfather     Hypertension Maternal Grandfather     Cerebrovascular Disease Paternal Grandmother     Hypertension Paternal Grandmother     Other Cancer Paternal Grandfather         CLL    Hypertension Paternal Grandfather     Unknown/Adopted Brother     Unknown/Adopted Sister     Hemochromatosis Cousin     Glaucoma No family hx of        Medications:  Current Outpatient Medications   Medication Sig Dispense Refill    Nutritional Supplements (VITAMIN D BOOSTER PO) Take 1,000 Units by mouth daily.         Allergies:  No Known Allergies    Social history:   Social History     Tobacco Use    Smoking status: Never     Passive exposure: Never    Smokeless tobacco: Never   Substance Use Topics    Alcohol use: Yes     Alcohol/week: 4.0 standard drinks of alcohol     Types: 4 Standard drinks or equivalent per week         Physical Examination:  BP (!) 155/88 (BP Location: Right arm, Patient Position: Sitting, Cuff Size: Adult Regular)   Pulse 76   Temp 97.7  F (36.5  C) (Oral)   Resp 20   Ht 1.626 m  "(5' 4\")   Wt 69.2 kg (152 lb 9.6 oz)   SpO2 100%   BMI 26.19 kg/m    Body mass index is 26.19 kg/m .    General: No acute distress.    Breasts: Grade 2 ptosis.     ABDOMEN: Enough tissue for B cups. No scars. No hernias.     BACK: No scars        ASSESMENT and PLAN:     Based upon the above findings, a diagnosis of breast cancer, planning mastectomy, interested in breast reconstruction was made.  I had a pete, detailed discussion with the patient, in the presence of my nurse, who was present from beginning to end.  I discussed with the patient the concept behind breast reconstruction, the elective nature of reconstruction, immediate versus delayed reconstruction, the staged nature of reconstruction, the different options of reconstruction, including implant based (expander-implant, versus direct to implant) versus autologous and the pros and cons of each. Additionally, the potential need for radiation therapy/chemotherapy as adjuvant therapy and the impact on reconstruction in terms of timing, complications, and choice of reconstruction was all discussed.     In Implant based reconstruction, the use of implants was discussed. The specific nature of implants, the fact that they need to be replaced in time, that they have potential specific complications of implant rupture, contracture, rippling, firmness, palpability, visibility, rippling, and the association with ROBY-ALCL/SCC were all discussed. Reviewed the FDA checklist for implant related risks.  Reiterated importance of implant surveillance, which includes MR/High Resolution US imaging study 5-6 years after the original operation, followed by MR/High Resolution US imaging every 3 years thereafter.     In Autologous based reconstruction, the use of tissue from different sites (EYAD being most commonly used), the longevity of this form of reconstruction, the donor site complications, and the length and complexity of this form or reconstruction was explained. "     The potential use of Liposuction and fat grafting was also discussed in detail.     Overview of the surgery and expectations of the surgery were explained. All risks, benefits and alternatives, including pain, infection, bleeding, scarring, asymmetry, seromas, hematomas, wound breakdown, wound dehiscence, loss of the implants/flaps, abdominal wall-healing issues, abdominal wall weakness, bulges, hernias, sensation loss, requirement of further staged procedures, Implant specific issues and complications as discussed above, removal of infected or exposed implants, pneumothoraces, contour abnormalities, cannula injuries to deeper structures, hernias, fat necrosis, lumps and bumps, loss of grafted material, DVT, PE, MI, CVA, pneumonia, renal failure and death, were explained. They were understood and agreed upon by the patient, they were acknowledged by the patient, all the patient's questions were answered in detail to the patient's fullest understanding that they acknowledged, the team approach for treatment in the operating room was agreed upon by the patient, and proceeding with surgery was agreed upon by the patient.    Per my exam, she is more like a D cup and 50% reduction would be a large B cup.     The patient will think about her options and let us know her final plan and we can then schedule a combined procedure with surg onc.     All her questions were answered. She was happy with the visit. I look forward to helping her out in the near future as indicated.       Total time spent in the encounter today including chart review, visit itself, and post-visit paperwork was 60 minutes.       Holly Taveras MD    Chief, Division of Plastic Surgery  Department of Surgery  [facility] [place]      CC: Jose Perea MD  [address] [place] [id number]  CC: Hansa Beasley

## 2025-01-30 ENCOUNTER — VIRTUAL VISIT (OUTPATIENT)
Dept: ONCOLOGY | Facility: CLINIC | Age: 40
End: 2025-01-30
Attending: SURGERY
Payer: COMMERCIAL

## 2025-01-30 DIAGNOSIS — C50.919 INVASIVE LOBULAR CARCINOMA OF BREAST IN FEMALE (H): ICD-10-CM

## 2025-01-30 DIAGNOSIS — Z80.6 FAMILY HISTORY OF CLL (CHRONIC LYMPHOID LEUKEMIA): ICD-10-CM

## 2025-01-30 DIAGNOSIS — Z80.42 FAMILY HISTORY OF PROSTATE CANCER: ICD-10-CM

## 2025-01-30 DIAGNOSIS — C50.912 INVASIVE DUCTAL CARCINOMA OF BREAST, FEMALE, LEFT (H): Primary | ICD-10-CM

## 2025-01-30 LAB
BKR AP ASSOCIATED HPV REPORT: NORMAL
BKR LAB AP GYN ADEQUACY: NORMAL
BKR LAB AP GYN INTERPRETATION: NORMAL
BKR LAB AP PREVIOUS ABNORMAL: NORMAL
PATH REPORT.COMMENTS IMP SPEC: NORMAL
PATH REPORT.COMMENTS IMP SPEC: NORMAL
PATH REPORT.RELEVANT HX SPEC: NORMAL

## 2025-01-30 PROCEDURE — 96041 GENETIC COUNSELING SVC EA 30: CPT | Mod: GT,95 | Performed by: GENETIC COUNSELOR, MS

## 2025-01-30 NOTE — PROGRESS NOTES
Cancer Risk Management Program Genetic Counseling Note    1/30/2025    Referring Provider: Dr. Jose Perea    Presenting Information:   I had a video visit with Lesley Mahmood today for genetic counseling through the Cancer Risk Management Program, in order to discuss her recent diagnosis of two separate breast cancers and her family history of prostate cancer and chronic lymphocytic leukemia (CLL).  She presents today to review this history, cancer screening recommendations, and available genetic testing options.    Personal History:  Lesley is a 39 year old female. In January, Lesley underwent a screening mammogram, which noted left breast calcifications. Lesley does report that she had some left breast discomfort and nipple discharge prior to her imaging. Further imaging noted a few areas of concern in the left breast, and so Lesley underwent two biopsies on these suspicious areas. 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 Lesley is currently working with her oncology team to make surgery plans. Lesley is currently considering if she wants to pursue a unilateral or bilateral mastectomy; she is concerned about the possibility of developing a future breast cancer in her other breast, given that she has already developed two breast cancers in her left breast. Additional treatment plans will be made based on final pathology evaluation after surgery.     In other cancer screening, Lesley reports having an abnormal Pap smear many years ago; she states that no specific procedures were needed for that finding and that  repeat testing has been normal. Lesley reports that she has not yet needed to have a colonoscopy. She does state that she had an atypical birthmark on her head removed during childhood; she reports that it was thought that this may have been a potentially pre-cancerous type of birthmark.     Family History: (Please see scanned pedigree for detailed family history information)  As noted above, Lesley has been diagnosed with two separate primary breast cancers (as evidenced by different pathology findings from the two biopsy sites) at the age of 39.    Lesley is not aware of any family history of cancer on her mother's side of the family.  (She does report having a maternal uncle with idiopathic thrombocytopenic purpura (ITP) and that her maternal grandmother had polycythemia vera.)    On the other side of her family, Lesley reports that her father was diagnosed with chronic lymphocytic leukemia (CLL) in his mid-50s. She also reports that he was diagnosed with prostate cancer in his early 70s, for which he had his prostate removed; she does not think he has needed any additional treatment for that cancer.    Lesley reports that she has a paternal uncle that has also been diagnosed with CLL and prostate cancer. Lesley reports that she thinks his age of diagnoses was similar to her father's ages. She thinks that his prostate cancer was also treated with surgery only.    Lesley states that her paternal grandfather was aiso diagnosed with CLL. She reports that she thinks that he was first diagnosed around age 60 with CLL but that he underwent Broussard's transformation and, unfortunately,  of complications of that medical issue in his late 60s.    Lesley is not aware of anyone in her family having previously undergone hereditary cancer genetic testing.    In other medical family history, Lesley does report that she has a paternal first-cousin that has been diagnosed with hereditary hemochromatosis. She is not aware of any close family  "history of birth defects; she does report that her paternal grandmother had a sister with Down syndrome. Lesley is not aware of any family history of infant/childhood deaths. She is not aware of any family history of obvious skin abnormalities (other than her birthmark that she had removed).    Lesley reports that her mother's family is of [place]onegasque ancestry and that her father's family is of [place] ancestry. There is no known Ashkenazi Alevism ancestry on either side of her family. There is no reported consanguinity.    Discussion:  We reviewed the features of sporadic, familial, and hereditary cancers. In looking at Lesley's  history, it is possible that a cancer susceptibility gene mutation is present due to her own diagnosis of two separate primary early-onset breast cancers.  We reviewed that it is thought that about 10% of breast cancers are related to an inherited mutation in a known hereditary cancer gene mutation; however, it has been noted that individuals with early onset breast cancer and also individuals that have had more than one primary breast cancers are more likely to have a hereditary risk factor for breast cancer.  In addition, we talked about that there are several genes that can link breast cancer and prostate cancer, and so it's possible that the prostate cancer in her family could be related to her breast cancer. Lastly, we talked about that although most cases of CLL are thought to be \"sporadic\" and not related to a specific known genetic risk factor; however, we also talked about that the history of CLL in 3 close relatives (in two generations of the family) is unusual and may suggest a possible hereditary risk factor for CLL in the family.  .  We discussed the natural history and genetics of hereditary cancer syndromes in general. A detailed handout regarding some of these hereditary cancer syndromes associated with breast cancer and the information we discussed was provided to " Lesley after our appointment today and can be found in the after visit summary. Topics included: inheritance pattern, cancer risks, cancer screening recommendations, and also risks, benefits and limitations of testing.    Based on her personal history, Lesley meets current National Comprehensive Cancer Network (NCCN) criteria for genetic testing of high-penetrance breast and/or ovarian cancer susceptibility genes (including BRCA1, BRCA2, CDH1, PALB2, PTEN, and TP53), because of her diagnosis of two separate primary breast cancers diagnosed at age 39.      We discussed that there are additional genes besides those listed above that could cause increased risk for breast, prostate, and other cancer. As many of these genes present with overlapping features in a family and accurate cancer risk cannot always be established based upon the pedigree analysis alone, it would be reasonable for Lesley to consider panel genetic testing to analyze multiple genes at once.    Given her paternal family history of hematological cancers, we did also spend some time today talking about that the genetic risk factors associated with hematological cancers are not well-understood or completely characterized at this time:    - Rarely, hematological cancers can present as part of a known, classic hereditary cancer syndrome (such as Li Fraumeni syndrome).      - On the other hand, we talked about that there are some families described in the scientific literature for which there seems to be an increased incidence of hematological cancers, but the family history doesn't fit with one of the classic hereditary cancer syndromes. For most of these families, it is not known what the specific risk factors are that are causing the clustering of hematological cancers in a particular family. These risk factors could include both genetic and/or environmental risk factors. However, the exact genetic risk factors that may be related to this increased incidence  "are not yet well understood.  Some have theorized that the risk to develop hematological cancers may be influenced by the effects of many, small genetic risk factors (as opposed to a single genetic risk factor with a large affect, as is more typical of the currently known hereditary cancer syndromes). Others have postulated about shared environmental exposures/risk factors that could play a role.    - However, we talked about that there have been some studies that have identified some genes that may influence the risk of hematological cancers in families. We did talk about that there are a few genetic testing labs that have clinical genetic testing panels available that are focused on some of these potential genetic risk factors for hematological cancers. We reviewed that there are limitations to this information at this time, as the exact risks associated with many these genetic risk factors are not well known. Additionally, there are currently generally not clear screening recommendations regarding many of these suspected genetic risk factors and also no known ways to reduce cancer risks for those hematological cancers.  Because of this, there is currently not a general consensus about the clinical utility of genetic testing for these genes postulated to be associated with hematological cancers.       Lesley stated that she was interested in pursuing genetic testing through a panel of genes associated with hereditary cancer syndromes, and so we reviewed the various panel options and their potential benefits and limitations.  After this conversation, Lesley decided that she was interested a BRCA1/BRCA2 genetic analysis (followed with a subsequent reflex to the \"Breast Actionable,\" \"Common Hereditary Cancer,\" and the \"Hereditary Hematologic Malignancy Panel\" genetic testing panels through the [facility] at [facility]).    Medical Management: For Lesley, we reviewed that the information from genetic " "testing may determine:  Lesley's recommended surgery to treat her recent cancer diagnosis (ie. unilateal versus bilateral mastectomy),  additional cancer screening for which Lesley may qualify (eg. more frequent colonoscopies, hematological screening, pancreatic cancer screening, more frequent dermatologic exams, etc.),  options for risk-reducing surgeries Lesley could consider, if indicated/appropriate (eg. surgery to remove her ovaries and/or uterus, etc.),    and targeted therapies for Lesley's current cancer, or if she were to develop certain cancers in the future (i.e. immunotherapy for individuals with Rose syndrome, PARP inhibitors for HBOC syndromes, etc.).     These recommendations and possible targeted therapies will be discussed in detail once genetic testing is completed.     Plan:  1) Today, Lesley decided to proceed with a BRCA1/BRCA2 genetic analysis (followed with a subsequent reflex to the \"Breast Actionable,\" \"Common Hereditary Cancer,\" and the \"Hereditary Hematologic Malignancy Panel\" genetic testing panels through the [facility] at [facility]).  Therefore, consent was reviewed and verbally obtained for this testing.    2) A blood sample has been sent to the [facility] by her oncologist; the above genetic testing will be ordered from that sample. Test results are expected to be available within 4-6 weeks.  3) Lesley will either have a telephone visit or video visit to discuss the results.  Additional recommendations about screening will be made at that time.    Renu Ward MS, [facility]  Genetic Counselor  Cancer Risk Management Program        I spent 130 minutes on the date of the encounter doing chart review, history and exam, documentation and further activities as noted above.    Virtual Visit Details    Type of service:  Video Visit     Originating Location (pt. Location): Home  Distant Location (provider location):  Off-site  Platform used for Video Visit: Teodora"

## 2025-01-30 NOTE — NURSING NOTE
Current patient location: [address] [place] [id number]      Is the patient currently in the state of [place]? YES    Visit mode:VIDEO    If the visit is dropped, the patient can be reconnected by: VIDEO VISIT: Send to e-mail at: [email or web address]    Will anyone else be joining the visit? NO  (If patient encounters technical issues they should call [phone]    How would you like to obtain your AVS? MyChart    Are changes needed to the allergy or medication list? N/A    Reason for visit: Consult     Yceenia JAIMES

## 2025-01-30 NOTE — LETTER
1/30/2025      Lesley Mahmood  [address] [id number]      Dear Colleague,    Thank you for referring your patient, Lesley Mahmood, to the [facility]. Please see a copy of my visit note below.    Cancer Risk Management Program Genetic Counseling Note    1/30/2025    Referring Provider: Dr. Jose Perea    Presenting Information:   I had a video visit with Lesley Mahmood today for genetic counseling through the Cancer Risk Management Program, in order to discuss her recent diagnosis of two separate breast cancers and her family history of prostate cancer and chronic lymphocytic leukemia (CLL).  She presents today to review this history, cancer screening recommendations, and available genetic testing options.    Personal History:  Lesley is a 39 year old female. In January, Lesley underwent a screening mammogram, which noted left breast calcifications. Lesley does report that she had some left breast discomfort and nipple discharge prior to her imaging. Further imaging noted a few areas of concern in the left breast, and so Lesley underwent two biopsies on these suspicious areas. 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 Lesley is currently working with her oncology team to make surgery plans. Lesley is currently considering if she wants to pursue a unilateral or bilateral mastectomy; she is concerned about the possibility of developing a future breast cancer in her other breast, given that she has already developed two breast cancers in her left breast. Additional  treatment plans will be made based on final pathology evaluation after surgery.     In other cancer screening, Lesley reports having an abnormal Pap smear many years ago; she states that no specific procedures were needed for that finding and that repeat testing has been normal. Lesley reports that she has not yet needed to have a colonoscopy. She does state that she had an atypical birthmark on her head removed during childhood; she reports that it was thought that this may have been a potentially pre-cancerous type of birthmark.     Family History: (Please see scanned pedigree for detailed family history information)  As noted above, Lesley has been diagnosed with two separate primary breast cancers (as evidenced by different pathology findings from the two biopsy sites) at the age of 39.    Lesley is not aware of any family history of cancer on her mother's side of the family.  (She does report having a maternal uncle with idiopathic thrombocytopenic purpura (ITP) and that her maternal grandmother had polycythemia vera.)    On the other side of her family, Lesley reports that her father was diagnosed with chronic lymphocytic leukemia (CLL) in his mid-50s. She also reports that he was diagnosed with prostate cancer in his early 70s, for which he had his prostate removed; she does not think he has needed any additional treatment for that cancer.    Lesley reports that she has a paternal uncle that has also been diagnosed with CLL and prostate cancer. Lesley reports that she thinks his age of diagnoses was similar to her father's ages. She thinks that his prostate cancer was also treated with surgery only.    Lesley states that her paternal grandfather was aiso diagnosed with CLL. She reports that she thinks that he was first diagnosed around age 60 with CLL but that he underwent Broussard's transformation and, unfortunately,  of complications of that medical issue in his late 60s.    Lesley is not aware of anyone in her family  "having previously undergone hereditary cancer genetic testing.    In other medical family history, Lesley does report that she has a paternal first-cousin that has been diagnosed with hereditary hemochromatosis. She is not aware of any close family history of birth defects; she does report that her paternal grandmother had a sister with Down syndrome. Lesley is not aware of any family history of infant/childhood deaths. She is not aware of any family history of obvious skin abnormalities (other than her birthmark that she had removed).    Lesley reports that her mother's family is of [place] ancestry and that her father's family is of [place] ancestry. There is no known Ashkenazi Shinto ancestry on either side of her family. There is no reported consanguinity.    Discussion:  We reviewed the features of sporadic, familial, and hereditary cancers. In looking at Lesley's  history, it is possible that a cancer susceptibility gene mutation is present due to her own diagnosis of two separate primary early-onset breast cancers.  We reviewed that it is thought that about 10% of breast cancers are related to an inherited mutation in a known hereditary cancer gene mutation; however, it has been noted that individuals with early onset breast cancer and also individuals that have had more than one primary breast cancers are more likely to have a hereditary risk factor for breast cancer.  In addition, we talked about that there are several genes that can link breast cancer and prostate cancer, and so it's possible that the prostate cancer in her family could be related to her breast cancer. Lastly, we talked about that although most cases of CLL are thought to be \"sporadic\" and not related to a specific known genetic risk factor; however, we also talked about that the history of CLL in 3 close relatives (in two generations of the family) is unusual and may suggest a possible hereditary risk factor for CLL in the " family.  .  We discussed the natural history and genetics of hereditary cancer syndromes in general. A detailed handout regarding some of these hereditary cancer syndromes associated with breast cancer and the information we discussed was provided to Lesley after our appointment today and can be found in the after visit summary. Topics included: inheritance pattern, cancer risks, cancer screening recommendations, and also risks, benefits and limitations of testing.    Based on her personal history, Lesley meets current National Comprehensive Cancer Network (NCCN) criteria for genetic testing of high-penetrance breast and/or ovarian cancer susceptibility genes (including BRCA1, BRCA2, CDH1, PALB2, PTEN, and TP53), because of her diagnosis of two separate primary breast cancers diagnosed at age 39.      We discussed that there are additional genes besides those listed above that could cause increased risk for breast, prostate, and other cancer. As many of these genes present with overlapping features in a family and accurate cancer risk cannot always be established based upon the pedigree analysis alone, it would be reasonable for Lesley to consider panel genetic testing to analyze multiple genes at once.    Given her paternal family history of hematological cancers, we did also spend some time today talking about that the genetic risk factors associated with hematological cancers are not well-understood or completely characterized at this time:    - Rarely, hematological cancers can present as part of a known, classic hereditary cancer syndrome (such as Li Fraumeni syndrome).      - On the other hand, we talked about that there are some families described in the scientific literature for which there seems to be an increased incidence of hematological cancers, but the family history doesn't fit with one of the classic hereditary cancer syndromes. For most of these families, it is not known what the specific risk factors are  "that are causing the clustering of hematological cancers in a particular family. These risk factors could include both genetic and/or environmental risk factors. However, the exact genetic risk factors that may be related to this increased incidence are not yet well understood.  Some have theorized that the risk to develop hematological cancers may be influenced by the effects of many, small genetic risk factors (as opposed to a single genetic risk factor with a large affect, as is more typical of the currently known hereditary cancer syndromes). Others have postulated about shared environmental exposures/risk factors that could play a role.    - However, we talked about that there have been some studies that have identified some genes that may influence the risk of hematological cancers in families. We did talk about that there are a few genetic testing labs that have clinical genetic testing panels available that are focused on some of these potential genetic risk factors for hematological cancers. We reviewed that there are limitations to this information at this time, as the exact risks associated with many these genetic risk factors are not well known. Additionally, there are currently generally not clear screening recommendations regarding many of these suspected genetic risk factors and also no known ways to reduce cancer risks for those hematological cancers.  Because of this, there is currently not a general consensus about the clinical utility of genetic testing for these genes postulated to be associated with hematological cancers.       Lesley stated that she was interested in pursuing genetic testing through a panel of genes associated with hereditary cancer syndromes, and so we reviewed the various panel options and their potential benefits and limitations.  After this conversation, Lesley decided that she was interested a BRCA1/BRCA2 genetic analysis (followed with a subsequent reflex to the \"Breast " "Actionable,\" \"Common Hereditary Cancer,\" and the \"Hereditary Hematologic Malignancy Panel\" genetic testing panels through the [facility] at [facility]).    Medical Management: For Lesley, we reviewed that the information from genetic testing may determine:  Lesley's recommended surgery to treat her recent cancer diagnosis (ie. unilateal versus bilateral mastectomy),  additional cancer screening for which Lesley may qualify (eg. more frequent colonoscopies, hematological screening, pancreatic cancer screening, more frequent dermatologic exams, etc.),  options for risk-reducing surgeries Lesley could consider, if indicated/appropriate (eg. surgery to remove her ovaries and/or uterus, etc.),    and targeted therapies for Lesley's current cancer, or if she were to develop certain cancers in the future (i.e. immunotherapy for individuals with Rose syndrome, PARP inhibitors for HBOC syndromes, etc.).     These recommendations and possible targeted therapies will be discussed in detail once genetic testing is completed.     Plan:  1) Today, Lesley decided to proceed with a BRCA1/BRCA2 genetic analysis (followed with a subsequent reflex to the \"Breast Actionable,\" \"Common Hereditary Cancer,\" and the \"Hereditary Hematologic Malignancy Panel\" genetic testing panels through the [facility] at [facility]).  Therefore, consent was reviewed and verbally obtained for this testing.    2) A blood sample has been sent to the [facility] by her oncologist; the above genetic testing will be ordered from that sample. Test results are expected to be available within 4-6 weeks.  3) Lesley will either have a telephone visit or video visit to discuss the results.  Additional recommendations about screening will be made at that time.    Renu Ward MS, [facility]  Genetic Counselor  Cancer Risk Management Program        I spent 130 minutes on the date of the encounter doing chart review, history and exam, " documentation and further activities as noted above.    Virtual Visit Details    Type of service:  Video Visit     Originating Location (pt. Location): Home  Distant Location (provider location):  Off-site  Platform used for Video Visit: Teodora      Again, thank you for allowing me to participate in the care of your patient.        Sincerely,        Jessica Ward GC    Electronically signed

## 2025-01-31 ENCOUNTER — VIRTUAL VISIT (OUTPATIENT)
Dept: ONCOLOGY | Facility: CLINIC | Age: 40
End: 2025-01-31
Attending: SURGERY
Payer: COMMERCIAL

## 2025-01-31 ENCOUNTER — PATIENT OUTREACH (OUTPATIENT)
Dept: ONCOLOGY | Facility: CLINIC | Age: 40
End: 2025-01-31

## 2025-01-31 ENCOUNTER — LAB (OUTPATIENT)
Dept: LAB | Facility: CLINIC | Age: 40
End: 2025-01-31
Attending: SURGERY
Payer: COMMERCIAL

## 2025-01-31 VITALS — WEIGHT: 152 LBS | HEIGHT: 64 IN | BODY MASS INDEX: 25.95 KG/M2

## 2025-01-31 DIAGNOSIS — C50.912 INVASIVE DUCTAL CARCINOMA OF BREAST, FEMALE, LEFT (H): Primary | ICD-10-CM

## 2025-01-31 DIAGNOSIS — C50.919 INVASIVE LOBULAR CARCINOMA OF BREAST IN FEMALE (H): ICD-10-CM

## 2025-01-31 PROCEDURE — G0452 MOLECULAR PATHOLOGY INTERPR: HCPCS | Mod: 26 | Performed by: STUDENT IN AN ORGANIZED HEALTH CARE EDUCATION/TRAINING PROGRAM

## 2025-01-31 PROCEDURE — 81162 BRCA1&2 GEN FULL SEQ DUP/DEL: CPT | Performed by: SURGERY

## 2025-01-31 ASSESSMENT — PAIN SCALES - GENERAL: PAINLEVEL_OUTOF10: MILD PAIN (1)

## 2025-01-31 NOTE — PROGRESS NOTES
Today I had a phone visit with and.  Since I seen her last she has that with medical oncology, plastic surgery, and underwent a breast MRI.  She has genetic testing results that are pending.  Today we talked about surgical treatments.  At a minimum I recommended a left mastectomy and a sentinel lymph node biopsy she has decided to have a bilateral mastectomy with immediate reconstruction.  I have talked her about that decision.  She knows that there is no cancer benefits of a double mastectomy if she has no genetic mutation.  I have told her there are some potential noncancer benefits including avoidance of future screening and symmetry.  Have informed her that a double mastectomy is associated with higher postoperative complications.  We discussed this complications including bleeding infection and skin necrosis.  She wishes to proceed with the surgery we will schedule this at her convenience and with plastic surgery.    The phone call today lasted 13 minutes and the total time was 20 minutes which included reviewing her most recent imaging.

## 2025-01-31 NOTE — PATIENT INSTRUCTIONS
Assessing Cancer Risk  Cancer is a common diagnosis which impacts many families.  Individuals may develop cancer due to environmental factors (such as exposures and lifestyle), aging, genetic predisposition, or a combination of these factors.      Only about 5-10% of cancers are thought to be due to an inherited cancer susceptibility gene.    These families often have:  Several people with the same or related types of cancer  Cancers diagnosed at a young age (before age 50)  Individuals with more than one primary cancer  Multiple generations of the family affected with cancer    Comprehensive Breast and Gynecologic Cancer Panel  We each inherit two copies of every gene in our bodies: one from our mother, and one from our father. Each gene has a specific job to do.  When a gene has a mistake or  mutation  in it, it does not work like it should.     Some people may be candidates for genetic testing of more than one gene.  For these families, genetic testing using a cancer panel may be offered. These panels will test different genes at once known to increase the risk for breast, ovarian, uterine, and/or other cancers.    This handout will review common hereditary breast and gynecologic cancer syndromes. The genes that will be discussed in this handout are: DOMINIQUE, BRCA1, BRCA2, BRIP1, CDH1, CHEK2, MLH1, MSH2, MSH6, PMS2, EPCAM, PTEN, PALB2, RAD51C, RAD51D, and TP53.    The purpose of this handout is to serve as a brief summary of the breast and gynecologic cancer risk genes that have published clinical management guidelines for individuals who are found to carry a mutation. Inheriting a mutation does not mean a person will develop cancer, but it does significantly increase their risk above the general population risk.     ______________________________________________________________________________    Hereditary Breast and Ovarian Cancer Syndrome (BRCA1 and BRCA2)  A single mutation in one of the copies of BRCA1 or  BRCA2 increases the risk for breast and ovarian cancer, among others.  The risk for pancreatic cancer and melanoma may also be slightly increased in some families.  The chart below shows the chance that someone with a BRCA mutation would develop cancer in his or her lifetime1,2,3,4.       Lifetime Cancer Risks    General Population BRCA1  BRCA2   Breast  12% >60% >60%   Ovarian  1-2% 39-58% 13-29%   Prostate 12% 7-26% 19-61%   Male Breast 0.1% 0.2-1.2% 1.8-7.1%   Pancreas 1-2% Up to 5% 5-10%     A person s ethnic background is also important to consider, as individuals of Ashkenazi Denominational ancestry have a higher chance of having a BRCA gene mutation.  There are three BRCA mutations that occur more frequently in this population.      Rose Syndrome (MLH1, MSH2, MSH6, PMS2, and EPCAM)  Currently five genes are known to cause Rose Syndrome: MLH1, MSH2, MSH6, PMS2, and EPCAM.  A single mutation in one of the Rose Syndrome genes increases the risk for colon, endometrial, ovarian, and stomach cancers.  Other cancers that occur less commonly in Rose Syndrome include urinary tract, skin, and brain cancers.  The chart below shows the chance that a person with Rose syndrome would develop cancer in his or her lifetime5.      Lifetime Cancer Risks    General Population Rose Syndrome   Colon 5% 10-61%   Endometrial 3% 13-57%   Ovarian 1-2% 1-38%   Stomach <1% 1-9%   *Cancer risk varies depending on Rose syndrome gene found      Cowden Syndrome (PTEN)  Cowden syndrome is a hereditary condition that increases the risk for breast, thyroid, endometrial, colon, and kidney cancer.  Cowden syndrome is caused by a mutation in the PTEN gene.  A single mutation in one of the copies of PTEN causes Cowden syndrome and increases cancer risk.  The chart below shows the chance that someone with a PTEN mutation would develop cancer in their lifetime6,7.  Other benign features seen in some individuals with Cowden syndrome include benign  skin lesions (facial papules, keratoses, lipomas), learning disability, autism, thyroid nodules, colon polyps, and larger head size.     Lifetime Cancer Risks    General Population Cowden   Breast 12% 40-60%*   Thyroid 1% Up to 38%   Renal 1-2% Up to 35%   Endometrial 3% Up to 28%   Colon 5% Up to 9%   Melanoma 2-3% Up to 6%   *Emerging data suggests the risk for breast cancer could be greater than 60%               Li-Fraumeni Syndrome (TP53)  Li-Fraumeni Syndrome (LFS) is a cancer predisposition syndrome caused by a mutation in the TP53 gene. A single mutation in one of the copies of TP53 increases the risk for multiple cancers. Individuals with LFS are at an increased risk for developing cancer at a young age. The lifetime risk for development of a LFS-associated cancer is 50% by age 30 and 90% by age 60.   Core Cancers: Sarcomas, Breast, Brain, Lung, Leukemias/Lymphomas, Adrenocortical carcinomas  Other Cancers: Gastrointestinal, Thyroid, Skin, Genitourinary       Hereditary Diffuse Gastric Cancer (CDH1)  Currently, one gene is known to cause hereditary diffuse gastric cancer (HDGC): CDH1.  Individuals with HDGC are at increased risk for diffuse gastric cancer and lobular breast cancer. Of people diagnosed with HDGC, 30-50% have a mutation in the CDH1 gene.  This suggests there are likely other genes that may cause HDGC that have not been identified yet.      Lifetime Cancer Risks    General Population HDGC   Diffuse Gastric  <1% ~80%   Breast 12% 41-60%       Additional Genes    DOMINIQUE  DOMINIQUE is a moderate-risk breast cancer gene. Women who have a mutation in DOMINIQUE can have between a 2-4 fold increased risk for breast cancer compared to the general population8. DOMINIQUE mutations have also been associated with increased risk for pancreatic cancer between 5-10%9. Individuals who inherit two DOMINIQUE mutations have a condition called ataxia-telangiectasia (AT).  This rare autosomal recessive condition affects the nervous system  and immune system, and is associated with progressive cerebellar ataxia beginning in childhood. Individuals with ataxia-telangiectasia often have a weakened immune system and have an increased risk for childhood cancers.    PALB2  Mutations in PALB2 have been shown to increase the risk of breast cancer up to 41-60% in some families; where individuals fall within this risk range is dependent upon family [id number]. PALB2 mutations have also been associated with increased risk for pancreatic cancer between 5-10%.  Individuals who inherit two PALB2 mutations--one from their mother and one from their father--have a condition called Fanconi Anemia.  This rare autosomal recessive condition is associated with short stature, developmental delay, bone marrow failure, and increased risk for childhood cancers.    CHEK2   CHEK2 is a moderate-risk breast cancer gene.  Women who have a mutation in CHEK2 have around a 2-4 fold increased risk for breast cancer compared to the general population, and this risk may be higher depending upon family history.11,12,13 The risk of colon cancer may be twice as high as the general population risk of colon cancer of 5%. Mutations in CHEK2 have also been shown to increase the risk of other cancers, including prostate, however these cancer risks are currently not well understood.    BRIP1, RAD51C and RAD51D  Mutations in RAD51C and RAD51D have been shown to increase the risk of ovarian cancer and breast cancer 14,. Mutations in BRIP1 have been shown to increase the risk of ovarian cancer and possibly female breast cancer 15 .       Lifetime Cancer Risk    General Population        BRIP1   RAD51C  RAD51D   Breast 12% Not well defined 20-40% 20-40%   Ovarian 1-2% 5-15% 10-15% 10-20%     ______________________________________________________________  Inheritance  All of the cancer syndromes reviewed above are inherited in an autosomal dominant pattern.  This means that if a parent has a mutation,  each of their children will have a 50% chance of inheriting that same mutation. Therefore, each child --male or female-- would have a 50% chance of being at increased risk for developing cancer.    Image obtained from Genetics Home Reference, 2013     Mutations in some genes can occur de edu, which means that a person s mutation occurred for the first time in them and was not inherited from a parent.  Now that they have the mutation, however, it can be passed on to future generations.    Genetic Testing  Genetic testing involves a blood test and will look for any harmful mutations that are associated with increased cancer risk.  If possible, it is recommended that the person(s) who has had cancer be tested before other family members.  That person will give us the most useful information about whether or not a specific gene is associated with the cancer in the family.    Results  There are three possible results of genetic testing:  Positive--a harmful mutation was identified in one or more of the genes  Negative--no mutations were identified in any of the genes tested  Variant of unknown significance--a variation in one of the genes was identified, but it is unclear how this impacts cancer risk in the family    Advantages and Disadvantages   There are advantages and disadvantages to genetic testing.    Advantages  May clarify your cancer risk  Can help you make medical decisions  May explain the cancers in your family  May give useful information to your family members (if you share your results)    Disadvantages  Possible negative emotional impact of learning about inherited cancer risk  Uncertainty in interpreting a negative test result in some situations  Possible genetic discrimination concerns (see below)    Genetic Information Nondiscrimination Act (AKIL)  The Genetic Information Nondiscrimination Act of 2008 (AKIL) is a federal law that protects individuals from health insurance or employment discrimination  based on a genetic test result alone (with some exceptions, including employers with fewer than 15 employees, and ).  Although rare, AKIL  does not cover discrimination protections in terms of life insurance, long term care, or disability insurances.  Visit the National Human [employer] [place] website to learn more.    Reducing Cancer Risk  All of the genes described in this handout have nationally recognized cancer screening guidelines that would be recommended for individuals who test positive.  In addition to increased cancer screening, surgeries may be offered or recommended to reduce cancer risk.  Recommendations are based upon an individual s genetic test result as well as their personal and family history of cancer.    Questions to Think About Regarding Genetic Testing:  What effect will the test result have on me and my relationship with my family members if I have an inherited gene mutation?  If I don t have a gene mutation?  Should I share my test results, and how will my family react to this news, which may also affect them?  Are my children ready to learn new information that may one day affect their own health?    Hereditary Cancer Resources    FORCE: Facing Our Risk of Cancer Empowered facingourrisk.org   Bright Pink bebrightpink.org   Li-Fraumeni Syndrome Association lfsassociation.org   PTEN World PTENworld.com   No stomach for cancer, Inc. nostomachforcancer.org   Stomach cancer relief network Scrnet.org   Collaborative Group of the Americas on Inherited Colorectal Cancer (CGA) cgaicc.com    Cancer Care cancercare.org   American Cancer Society (ACS) cancer.org   National Cancer [place] (NCI) cancer.gov     Please call us if you have any questions or concerns.   Cancer Risk Management Program [phone] ([phone])  Jaxon Joseph, MS KARYNA Higgins, MS, CGC   Renu Ward, MS, KARYNA [email or web address]  Kamilah Terry, MS, CGC    Crissy Locke, MS, CGC    Johanna  Nathan, MS, [facility]   Alesia Young, MS, [facility]     References  Mo A, Randy PDP, Narod S, Risch HOLGUIN, Harlan JE, Kavitha JL, Julio N, Adrian H, Yoav O, John Paul A, Rashel B, Silvestre P, Manem S, Fredi DM, Hubbard N, Olah E, Luis Alberto-Eriberto H, Alvarado E, Lubinski J, Gronxena J, Tarah B, Tulinius H, Thorlacius S, Eerola H, Nevanlinna H, Kenisha K, Jesus OP. Average risks of breast and ovarian cancer associated with BRCA1 or BRCA2 mutations detected in case series unselected for family history: a combined analysis of 222 studies. Am J Hum Sabina. 2003;72:1117-30.  Tru N, Zaida SOSA, Meghann G.  BRCA1 and BRCA2 Hereditary Breast and Ovarian Cancer. Gene Reviews online. 2013.  Peña YC, Gustabo S, Brendan G, Perez S. Breast cancer risk among male BRCA1 and BRCA2 mutation carriers. J Natl Cancer Inst. 2007;99:1811-4.  Michel BALDERAS, Ancelmo I, Estevan J, Remy E, Lon ER, Crystal F. Risk of breast cancer in male BRCA2 carriers. J Med Sabina. 2010;47:710-1.  National Comprehensive Cancer Network. Clinical practice guidelines in oncology, colorectal cancer screening. Available online (registration required). 2015.  Moscoso MH, Elroy J, Katheryn J, Kieran LA, Irina MS, Eng C. Lifetime cancer risks in individuals with germline PTEN mutations. Clin Cancer Res. 2012;18:400-7.  Agusto DESIR. Cowden Syndrome: A Critical Review of the Clinical Literature. J Sabina . 2009:18:13-27.  Tim COLLIER, Drew D, Martha S, Linda P, Addison T, Nile M, Gordon B, Denver H, Jorge Alberto R, Sasha K, Reece L, Michel BALDERAS, Fredi SEBASTIAN, Hal DF, Garo MR, The Breast Cancer Susceptibility Collaboration (UK) & Ruben MUNOZ. DOMINIQUE mutations that cause ataxia-telangiectasia are breast cancer susceptibility alleles. Nature Genetics. 2006;38:873-875  Montano N , Zakia Y, Savi J, Domitila L, George GM , Fahad ML, Nimo S, Johnson AG, Gume S, Leonel ML, Ulises J , Nini R, Deep SZ, Dilma JR, Sammi VE, Stephanie M, Charlie B,  Mann N, Issac RH, Tino KW, and Kushal AP. DOMINIQUE mutations in patients with hereditary pancreatic cancer. Cancer Discover. 2012;2:41-46  Mo INFANTE., et al. Breast-Cancer Risk in Families with Mutations in PALB2. NEJM. 2014; 371(6):497-506.  CHEK2 Breast Cancer Case-Control Consortium. CHEK2*1100delC and susceptibility to breast cancer: A collaborative analysis involving 10,860 breast cancer cases and 9,065 controls from 10 studies. Am J Hum Sabina, 74 (2004), pp. 8515-9120  Zina T, Jona S, Neymar K, et al. Spectrum of Mutations in BRCA1, BRCA2, CHEK2, and TP53 in Families at High Risk of Breast Cancer. SHAQ. 2006;295(12):7534-3618.   Merry C, Pb D, Jairon A, et al. Risk of breast cancer in women with a CHEK2 mutation with and without a family history of breast cancer. J Clin Oncol. 2011;29:9947-1633.  Josiah H, Irineo E, Wisam SJ, et al. Contribution of germline mutations in the RAD51B, RAD51C, and RAD51D genes to ovarian cancer in the population. J Clin Oncol. 2015;33(26):7593-7979. Doi:10.1200/JCO.2015.61.2408.  Rogers T, Lori DF, Nevaeh P, et al. Mutations in BRIP1 confer high risk of ovarian cancer. Alina Sabina. 2011;43(11):4340-5955. doi:10.1038/ng.955.

## 2025-01-31 NOTE — LETTER
1/31/2025      Lesley Mahmood  [address] [address]      Dear Colleague,    Thank you for referring your patient, Lesley Mahmood, to the [facility]. Please see a copy of my visit note below.    Virtual Visit Details      Today I had a phone visit with and.  Since I seen her last she has that with medical oncology, plastic surgery, and underwent a breast MRI.  She has genetic testing results that are pending.  Today we talked about surgical treatments.  At a minimum I recommended a left mastectomy and a sentinel lymph node biopsy she has decided to have a bilateral mastectomy with immediate reconstruction.  I have talked her about that decision.  She knows that there is no cancer benefits of a double mastectomy if she has no genetic mutation.  I have told her there are some potential noncancer benefits including avoidance of future screening and symmetry.  Have informed her that a double mastectomy is associated with higher postoperative complications.  We discussed this complications including bleeding infection and skin necrosis.  She wishes to proceed with the surgery we will schedule this at her convenience and with plastic surgery.    The phone call today lasted 13 minutes and the total time was 20 minutes which included reviewing her most recent imaging.      Again, thank you for allowing me to participate in the care of your patient.        Sincerely,        Jose Perea MD    Electronically signed

## 2025-02-03 NOTE — PROGRESS NOTES
[facility]: Surgical Oncology Plan of Care Education Note                                     Discussion with Patient:                                                         Spoke with Lesley following her visit with Dr. Perea on 1/31/2025. Introduced self and explained role of RNCC.       Plan:                                                       Reviewed plan for bilateral mastectomy and left sentinel lynph node biopsy with immediate reconstruction with Dr. Taveras at the [facility]. Discussed need for H&P within 30 days of surgery. Lesley prefers to be scheduled with PACs.       Drain instructions will be mailed to Lesley. She will call at her convenience prior to her surgery to review drain and post-mastectomy eduction.       Informed patient they should get a call within the next three business days from our OR  with surgery date, H&P plan and date of post-op visit with their surgeon. Writer answered all questions and concerns to the best of her ability and provided her contact information. Reviewed use of [employer] as alternative way to contact team.  Encouraged patient to contact with questions.         Felipa Johnson, RNCC  [facility]  Surgical Onolcogy      Approximately 10 minutes was spent in discussion with patient.

## 2025-02-05 ENCOUNTER — PREP FOR PROCEDURE (OUTPATIENT)
Dept: PLASTIC SURGERY | Facility: CLINIC | Age: 40
End: 2025-02-05
Payer: COMMERCIAL

## 2025-02-05 ENCOUNTER — PATIENT OUTREACH (OUTPATIENT)
Dept: PLASTIC SURGERY | Facility: CLINIC | Age: 40
End: 2025-02-05
Payer: COMMERCIAL

## 2025-02-05 ENCOUNTER — TELEPHONE (OUTPATIENT)
Dept: ONCOLOGY | Facility: CLINIC | Age: 40
End: 2025-02-05
Payer: COMMERCIAL

## 2025-02-05 DIAGNOSIS — Z85.3 HISTORY OF LEFT BREAST CANCER: Primary | ICD-10-CM

## 2025-02-05 NOTE — PROGRESS NOTES
Pt states that she talked to Dr Perea about bilateral mastectomy last week, and has decided that she wants to have her reconstruction with EYAD flaps.      Discussed with he today, answered her questions.    She would like to move forward with the next steps of scheduling surgery.

## 2025-02-05 NOTE — TELEPHONE ENCOUNTER
Called to schedule surgery with: Dr. Perea & Dr. Taveras    Spoke with: Lesley     Date of Surgery: 2/17    Estimated Arrival time Discussed with Patient:   10 AM    Location of surgery: [facility]    Pre-operative H&P: video PAC visit 2/11 at 12:15 PM    Additional Appointments:    Consult with Dr. Taveras 2/11 at 8:15 AM     NUC med injection scheduled 2/17 at 10:30 AM to be delivered to OR - patient was informed this is NOT an actual appointment    Post-operative Appointment:   3/4 at 10:30 AM with Dr. Taveras  3/7 at 10:20 AM with Dr. Perea     Discussed with patient PAC RN will provide arrival time and instructions for surgery at the time of the appointment: [[place] locations only]: Yes    Standard Surgery Packet: Yes 2/5/25 sent via [employer]    All patients questions were answered and was instructed to contact the clinic with any questions or concerns.     Additional Comments: N/A     Pema Lenz on 2/5/2025 at 4:14 PM

## 2025-02-06 ENCOUNTER — PATIENT OUTREACH (OUTPATIENT)
Dept: ONCOLOGY | Facility: CLINIC | Age: 40
End: 2025-02-06
Payer: COMMERCIAL

## 2025-02-06 NOTE — TELEPHONE ENCOUNTER
FUTURE VISIT INFORMATION      SURGERY INFORMATION:  Date: 2/17/25  Location: [facility]  Surgeon:  Jose Perea MD Choudry, M Umar Hasan, MD   Anesthesia Type:  general with block  Procedure: SKIN-SPARING MASTECTOMY SENTINEL LYMPH NODE BIOPSY Bilateral breast reconstruction with expanders and SPY     RECORDS REQUESTED FROM:       Primary Care Provider: Hansa Beasley MD

## 2025-02-07 PROCEDURE — G0452 MOLECULAR PATHOLOGY INTERPR: HCPCS | Mod: 26 | Performed by: STUDENT IN AN ORGANIZED HEALTH CARE EDUCATION/TRAINING PROGRAM

## 2025-02-11 ENCOUNTER — TELEPHONE (OUTPATIENT)
Dept: PLASTIC SURGERY | Facility: CLINIC | Age: 40
End: 2025-02-11

## 2025-02-11 ENCOUNTER — PATIENT OUTREACH (OUTPATIENT)
Dept: ONCOLOGY | Facility: CLINIC | Age: 40
End: 2025-02-11

## 2025-02-11 ENCOUNTER — VIRTUAL VISIT (OUTPATIENT)
Dept: SURGERY | Facility: CLINIC | Age: 40
End: 2025-02-11
Payer: COMMERCIAL

## 2025-02-11 ENCOUNTER — ANESTHESIA EVENT (OUTPATIENT)
Dept: SURGERY | Facility: CLINIC | Age: 40
End: 2025-02-11
Payer: COMMERCIAL

## 2025-02-11 ENCOUNTER — PRE VISIT (OUTPATIENT)
Dept: SURGERY | Facility: CLINIC | Age: 40
End: 2025-02-11

## 2025-02-11 ENCOUNTER — OFFICE VISIT (OUTPATIENT)
Dept: PLASTIC SURGERY | Facility: CLINIC | Age: 40
End: 2025-02-11
Attending: PLASTIC SURGERY
Payer: COMMERCIAL

## 2025-02-11 VITALS — BODY MASS INDEX: 27.97 KG/M2 | HEIGHT: 62 IN | WEIGHT: 152 LBS

## 2025-02-11 VITALS
TEMPERATURE: 98 F | WEIGHT: 152 LBS | RESPIRATION RATE: 16 BRPM | OXYGEN SATURATION: 100 % | HEIGHT: 64 IN | BODY MASS INDEX: 25.95 KG/M2 | DIASTOLIC BLOOD PRESSURE: 74 MMHG | SYSTOLIC BLOOD PRESSURE: 114 MMHG | HEART RATE: 78 BPM

## 2025-02-11 DIAGNOSIS — C50.912 INVASIVE DUCTAL CARCINOMA OF BREAST, FEMALE, LEFT (H): ICD-10-CM

## 2025-02-11 DIAGNOSIS — C50.912 INVASIVE DUCTAL CARCINOMA OF BREAST, FEMALE, LEFT (H): Primary | ICD-10-CM

## 2025-02-11 DIAGNOSIS — Z01.818 PRE-OP EXAMINATION: Primary | ICD-10-CM

## 2025-02-11 PROCEDURE — 98001 SYNCH AUDIO-VIDEO NEW LOW 30: CPT | Performed by: PHYSICIAN ASSISTANT

## 2025-02-11 PROCEDURE — 99213 OFFICE O/P EST LOW 20 MIN: CPT | Performed by: PLASTIC SURGERY

## 2025-02-11 RX ORDER — CEFAZOLIN SODIUM 2 G/50ML
2 SOLUTION INTRAVENOUS
OUTPATIENT
Start: 2025-02-11

## 2025-02-11 RX ORDER — ENOXAPARIN SODIUM 100 MG/ML
40 INJECTION SUBCUTANEOUS
OUTPATIENT
Start: 2025-02-11

## 2025-02-11 RX ORDER — CEFAZOLIN SODIUM 2 G/50ML
2 SOLUTION INTRAVENOUS SEE ADMIN INSTRUCTIONS
OUTPATIENT
Start: 2025-02-11

## 2025-02-11 ASSESSMENT — PAIN SCALES - GENERAL
PAINLEVEL_OUTOF10: NO PAIN (0)
PAINLEVEL_OUTOF10: MILD PAIN (2)

## 2025-02-11 ASSESSMENT — ENCOUNTER SYMPTOMS: SEIZURES: 0

## 2025-02-11 ASSESSMENT — LIFESTYLE VARIABLES: TOBACCO_USE: 0

## 2025-02-11 NOTE — PROGRESS NOTES
PREOPERATIVE VISIT NOTE     PRESENTING COMPLAINT:  Preop visit for upcoming bilateral skin sparing mastectomy for left-sided breast cancer and stage I expander based reconstruction in preparation for stage II Abbi flap scheduled for 2/17/2025     HISTORY OF PRESENTING COMPLAINT: The patient is here for a pre-op visit for the patient's surgery.  The patient is being seen in the presence of my nurse. There is no change since the patient's consultation. Please see the consult note for details.     No change in history and physical exam.  Patient would like to be about 50% smaller than she currently is.  Will undergo adjuvant chemotherapy.    Overview of the first stage was explained in detail.  The major risk of infection versus delay of reconstruction based on blood flow to the skin flap was discussed in detail.     ASSESSMENT AND PLAN:  Based upon the above findings, the patient is here for a preop visit for upcoming surgery.  I had a pete, detailed discussion with the patient in the presence of my nurse (who was present from beginning to end) about the proposed surgery. I was very clear and detailed about overview of surgery, perioperative plans, and expectations. All risks, benefits and alternatives of the surgery including but not limited to pain, infection, bleeding, scarring, asymmetry, seromas, hematomas, wound breakdown, wound dehiscence, prominent scar, hypertrophic scar, keloid scar, requirement of further surgeries, skin/tissue necrosis, nerve/muscle/deeper structure injury, DVT, PE, MI, CVA, pneumonia, renal failure and death, were explained in detail. The patient agreed and understood them all and had all the questions answered to the patient's satisfaction, and the patient wants to proceed with surgery. I look forward to helping the patient out in the near future.  All questions were answered.  The patient was happy with the visit.    Total time spent in the encounter today including chart review, visit  itself, and post-visit paperwork was 30 minutes.

## 2025-02-11 NOTE — NURSING NOTE
Pre Op Teaching Note:       Pre and Post op Patient Education    Relevant Diagnosis:  breast cancer  Surgical procedure:  mastectomy with expander placements    Patient demonstrates understanding of the following:  Date of surgery:  2/17/25  Location of surgery:  [address] [place] [address]  History and Physical and any other testing necessary prior to surgery: Yes, discussed with pt need for pre-op within 30 days of surgery with PCP.    Patient demonstrates understanding of the following:    Pre-op showering/scrub information with PCMX Soap: Yes, soap given in clinic today  Blood thinner medications discussed and when to stop (if applicable):  Per PCP at pre-op.     Post-op follow-up:  Discussed how to contact the hospital, nurse, and clinic scheduling staff if necessary. (See packet information)    Instructional materials used/given/mailed:  [facility] Packet per surgery scheduler, post op teaching sheet, Soap.  Pt advised that final arrival information to come closer to the surgery date by [facility] nurses.

## 2025-02-11 NOTE — PATIENT INSTRUCTIONS
Preparing for Your Surgery      Name:  Lesley Mahmood   MRN:  [id number]   :  1985   Today's Date:  2025       Arriving for surgery:  Surgery date:    Arrival time:  10:00 am    Please come to:     Please come to:       [facility]    [address] [place]  [address]     The [facility] [place] Patient/Visitor Ramp is at [address]. Patients and visitors who self-park will receive the reduced hospital parking rate. If the Patient /Visitor Ramp is full, please follow the signs to the [employer] car park located at the main hospital entrance.       parking is available (24 hours/ 7 days a week)      Discounted parking pass options are available for patients and visitors. They can be purchased at the [employer] desk at the [facility] hospital entrance.     -    Stop at the security desk and they will direct surgery patients to the [facility] [phone]        - If you need directions, a wheelchair or an escort please stop at the Information/security desk in the lobby.     What can I eat or drink?  -  You may eat and drink normally up to 8 hours prior to arrival time. (Until 2:00 am)  -  You may have clear liquids until 2 hours prior to arrival time. (Until 8:00 am)    Examples of clear liquids:  Water  Clear broth  Juices (apple, white grape, white cranberry  and cider) without pulp  Noncarbonated, powder based beverages  (lemonade and Gary-Aid)  Sodas (Sprite, 7-Up, ginger ale and seltzer)  Coffee or tea (without milk or cream)  Gatorade    -  No Alcohol or cannabis products for at least 24 hours before surgery.     Which medicines can I take?    Hold Aspirin for 7 days before surgery.   Hold Multivitamins for 7 days before surgery.  Hold Supplements for 7 days before surgery.  Hold Ibuprofen (Advil, Motrin) for 3 day(s) before surgery--unless otherwise  directed by surgeon.  Hold Naproxen (Aleve) for 4 days before surgery.    -  DO NOT take these medications the day of surgery:  Vitamin D    How do I prepare myself?  - Please take 2 showers (one the night prior to surgery and one the morning of surgery) using Scrubcare or Hibiclens soap.    Use this soap only from the neck to your toes. Avoid genital area      Leave the soap on your skin for one minute--then rinse thoroughly.      You may use your own shampoo and conditioner. No other hair products.   - Please remove all jewelry and body piercings.  - No lotions, deodorants or fragrance.  - No makeup or fingernail polish.   - Bring your ID and insurance card.    -For patients being admitted to the [facility]  Family members are to take the patient belongings with them and place them in the lockers provided in the Family Lounge.  Please limit the items you bring to 1 bag as the lockers are small.      -If you use a CPAP machine, please bring the CPAP machine, tubing, and mask to hospital.    -If you have a Deep Brain Stimulator, Spinal Cord Stimulator, or any Neuro Stimulator device---you must bring the remote control to the hospital.      ALL PATIENTS GOING HOME THE SAME DAY OF SURGERY ARE REQUIRED TO HAVE A RESPONSIBLE ADULT TO DRIVE AND BE IN ATTENDANCE WITH THEM FOR 24 HOURS FOLLOWING SURGERY.    Covid testing policy as of 12/06/2022  Your surgeon will notify and schedule you for a COVID test if one is needed before surgery--please direct any questions or COVID symptoms to your surgeon      Questions or Concerns:    - For any questions regarding the day of surgery or your hospital stay, please contact the Pre Admission Nursing Office at [phone].       - If you have health changes between today and your surgery, please call your surgeon.       - For questions after surgery, please call your surgeons office.           Current Visitor Guidelines    2 adult visitors for adult patients in the pre op  area    If additional visitors come (beyond a patient care attendant or a group home caregiver), the additional visitors will be asked to wait in the main lobby of the hospital    Visiting hours: 8 a.m. to 8:30 p.m.    Patients confirmed or suspected to have symptoms of COVID 19 or flu:     No visitors allowed for adult patients.   Children (under age 18) can have 1 named visitor.     People who are sick or showing symptoms of COVID 19 or flu:    Are not allowed to visit patients--we can only make exceptions in special situations.       Please follow these guidelines for your visit:          Please maintain social distance          Masking is optional--however at times you may be asked to wear a mask for the safety of yourself and others     Clean your hands with alcohol hand . Do this when you arrive at and leave the building and patient room,    And again after you touch your mask or anything in the room.     Go directly to and from the room you are visiting.     Stay in the patient s room during your visit. Limit going to other places in the hospital as much as possible     Leave bags and jackets at home or in the car.     For everyone s health, please don t come and go during your visit. That includes for smoking   during your visit.

## 2025-02-11 NOTE — LETTER
2/11/2025      Lesley Mahmood  [address] [address]      Dear Colleague,    Thank you for referring your patient, Lesley Mahmood, to the [facility]. Please see a copy of my visit note below.    PREOPERATIVE VISIT NOTE     PRESENTING COMPLAINT:  Preop visit for upcoming bilateral skin sparing mastectomy for left-sided breast cancer and stage I expander based reconstruction in preparation for stage II Abbi flap scheduled for 2/17/2025     HISTORY OF PRESENTING COMPLAINT: The patient is here for a pre-op visit for the patient's surgery.  The patient is being seen in the presence of my nurse. There is no change since the patient's consultation. Please see the consult note for details.     No change in history and physical exam.  Patient would like to be about 50% smaller than she currently is.  Will undergo adjuvant chemotherapy.    Overview of the first stage was explained in detail.  The major risk of infection versus delay of reconstruction based on blood flow to the skin flap was discussed in detail.     ASSESSMENT AND PLAN:  Based upon the above findings, the patient is here for a preop visit for upcoming surgery.  I had a pete, detailed discussion with the patient in the presence of my nurse (who was present from beginning to end) about the proposed surgery. I was very clear and detailed about overview of surgery, perioperative plans, and expectations. All risks, benefits and alternatives of the surgery including but not limited to pain, infection, bleeding, scarring, asymmetry, seromas, hematomas, wound breakdown, wound dehiscence, prominent scar, hypertrophic scar, keloid scar, requirement of further surgeries, skin/tissue necrosis, nerve/muscle/deeper structure injury, DVT, PE, MI, CVA, pneumonia, renal failure and death, were explained in detail. The patient agreed and understood them all and had all the questions answered to the patient's satisfaction, and  the patient wants to proceed with surgery. I look forward to helping the patient out in the near future.  All questions were answered.  The patient was happy with the visit.    Total time spent in the encounter today including chart review, visit itself, and post-visit paperwork was 30 minutes.       Again, thank you for allowing me to participate in the care of your patient.        Sincerely,        IAN Taveras MD    Electronically signed

## 2025-02-11 NOTE — PROGRESS NOTES
----- Message from Zenaida Howe DO sent at 6/20/2018  2:33 PM CDT -----  Please call patient:cxr-no pneumonia     Lesley is a 39 year old who is being evaluated via a billable video visit.    How would you like to obtain your AVS? MyChart  If the video visit is dropped, the invitation should be resent by: Text to cell phone: [phone]    Subjective   Lesley is a 39 year old, presenting for the following health issues:  Pre-Op Exam    HPI         Physical Exam

## 2025-02-11 NOTE — NURSING NOTE
"Oncology Rooming Note    February 11, 2025 8:23 AM   Lesley Mahmood is a 39 year old female who presents for:    Chief Complaint   Patient presents with    Oncology Clinic Visit     Bilateral Breast Reconstruction     Initial Vitals: /74 (BP Location: Right arm, Patient Position: Sitting, Cuff Size: Adult Regular)   Pulse 78   Temp 98  F (36.7  C) (Oral)   Resp 16   Ht 1.626 m (5' 4\")   Wt 68.9 kg (152 lb)   SpO2 100%   BMI 26.09 kg/m   Estimated body mass index is 26.09 kg/m  as calculated from the following:    Height as of this encounter: 1.626 m (5' 4\").    Weight as of this encounter: 68.9 kg (152 lb). Body surface area is 1.76 meters squared.  No Pain (0) Comment: Data Unavailable   No LMP recorded.  Allergies reviewed: Yes  Medications reviewed: Yes    Medications: Medication refills not needed today.  Pharmacy name entered into [facility]: [facility] - [place], [place] - [address]    Frailty Screening:   Is the patient here for a new oncology consult visit in cancer care? 2. No      Clinical concerns: Surgery Consult       Corina Molina LPN  2/11/2025              "

## 2025-02-11 NOTE — H&P
Pre-Operative H & P     CC:  Preoperative exam to assess for increased cardiopulmonary risk while undergoing surgery and anesthesia.    Date of Encounter: 2/11/2025  Primary Care Physician:  Hansa Beasley     Reason for visit:   Encounter Diagnoses   Name Primary?    Pre-op examination Yes    Invasive ductal carcinoma of breast, female, left (H)        HPI  Lesley Mahmood is a 39 year old female who presents for pre-operative H & P in preparation for  Procedure Information       Case: [id number] Date/Time: 02/17/25 1205    Procedures:       SKIN-SPARING MASTECTOMY (Bilateral: Breast)      SENTINEL LYMPH NODE BIOPSY (Left: Axilla)      Bilateral breast reconstruction with expanders and SPY (Bilateral: Breast)    Anesthesia type: General    Diagnosis: Invasive ductal carcinoma of breast, female, left (H) [C50.912]    Pre-op diagnosis: Invasive ductal carcinoma of breast, female, left (H) [C50.912]    Location:  OR [address] [place] [address] OR    Providers: Jose Perea MD; IAN Taveras MD            The patient is a 39 year old woman who has no significant past medical history who was recently diagnosed left-sided invasive ductal carcinoma with associated high-grade DCIS.  She has met with oncology, Dr. Perea and Dr. Taveras to discuss her treatment options.  The patient is now scheduled for the procedure as above.    History is obtained from the patient and chart review    Hx of abnormal bleeding or anti-platelet use: none    Menstrual history: No LMP recorded. (Menstrual status: Irregular Periods).:      Past Medical History  Past Medical History:   Diagnosis Date    Invasive ductal carcinoma of breast, female, left (H)        Past Surgical History  Past Surgical History:   Procedure Laterality Date    birthmark removal  2001    EXTRACTION(S) DENTAL  1995       Prior to Admission Medications  Current Outpatient Medications   Medication Sig Dispense Refill    Nutritional Supplements (VITAMIN D BOOSTER PO) Take  1,000 Units by mouth every morning.         Allergies  No Known Allergies    Social History  Social History     Socioeconomic History    Marital status:      Spouse name: Mauro Pena    Number of children: 1    Years of education: Not on file    Highest education level: Not on file   Occupational History    Occupation: endocrine      Comment: FT   Tobacco Use    Smoking status: Never     Passive exposure: Never    Smokeless tobacco: Never   Substance and Sexual Activity    Alcohol use: Yes     Alcohol/week: 4.0 standard drinks of alcohol     Types: 4 Standard drinks or equivalent per week    Drug use: Not Currently    Sexual activity: Yes     Partners: Male     Birth control/protection: I.U.D.   Other Topics Concern    Parent/sibling w/ CABG, MI or angioplasty before 65F 55M? Not Asked     Service No    Blood Transfusions No    Caffeine Concern Yes     Comment: 1 cup     Occupational Exposure No    Hobby Hazards No    Sleep Concern No    Stress Concern No    Weight Concern No    Special Diet No    Back Care No    Exercise Yes     Comment: yoga 2 times and walking    Bike Helmet Yes    Seat Belt Yes    Self-Exams Yes     Comment: occ   Social History Narrative    How much exercise per week? Yoga and walking    How much calcium per day? In foods and supplements       How much caffeine per day? 1    How much vitamin D per day? In foods    Do you/your family wear seatbelts?  Yes    Do you/your family use safety helmets? Yes    Do you/your family use sunscreen? Yes    Do you/your family keep firearms in the home? No    Do you/your family have a smoke detector(s)? Yes        Do you feel safe in your home? Yes    Has anyone ever touched you in an unwanted manner? No    [facility]  [facility]-     Social Drivers of Health     Financial Resource Strain: Not on file   Food Insecurity: Not on file   Transportation Needs: Not on file   Physical Activity: Not on file   Stress: Not on file   Social  Connections: Not on file   Interpersonal Safety: Not on file   Housing Stability: Not on file       Family History  Family History   Problem Relation Age of Onset    Hypertension Mother     Cancer Father         CLL stable     Cardiovascular Maternal Grandmother         strokes    Hypertension Maternal Grandmother     Macular Degeneration Maternal Grandfather     Hypertension Maternal Grandfather     Cerebrovascular Disease Paternal Grandmother     Hypertension Paternal Grandmother     Other Cancer Paternal Grandfather         CLL    Hypertension Paternal Grandfather     Unknown/Adopted Brother     Unknown/Adopted Sister     Hemochromatosis Cousin     Glaucoma No family hx of        Review of Systems  The complete review of systems is negative other than noted in the HPI or here.   Anesthesia Evaluation   Pt has had prior anesthetic. Type: Regional (conscious sedation).    History of anesthetic complications  - motion sickness and PONV.      ROS/MED HX  ENT/Pulmonary:  - neg pulmonary ROS  (-) tobacco use   Neurologic:  - neg neurologic ROS  (-) no seizures, no CVA and no TIA   Cardiovascular:       METS/Exercise Tolerance: >4 METS    Hematologic:  - neg hematologic  ROS     Musculoskeletal:  - neg musculoskeletal ROS     GI/Hepatic:  - neg GI/hepatic ROS  (-) GERD   Renal/Genitourinary:  - neg Renal ROS     Endo:  - neg endo ROS     Psychiatric/Substance Use:  - neg psychiatric ROS     Infectious Disease:  - neg infectious disease ROS     Malignancy:   (+) Malignancy, History of Breast.    Other:  - neg other ROS          Virtual visit -  No vitals were obtained    Physical Exam  Constitutional: Awake, alert, cooperative, no apparent distress, and appears stated age.  Eyes: Pupils equal, glasses  HENT: Normocephalic  Respiratory: non labored breathing   Neurologic: Awake, alert, oriented to name, place and time.   Neuropsychiatric: Calm, cooperative. Normal affect.      Prior Labs/Diagnostic Studies   All labs and  "imaging personally reviewed     EKG/ stress test - if available please see in ROS above         The patient's records and results personally reviewed by this provider.     Outside records reviewed from: Care Everywhere      Assessment    Lesley Mahmood is a 39 year old female seen as a PAC referral for risk assessment and optimization for anesthesia.    Plan/Recommendations  Pt will be optimized for the proposed procedure.  See below for details on the assessment, risk, and preoperative recommendations    NEUROLOGY  - No history of TIA, CVA or seizure  -Post Op delirium risk factors:  No risk identified    ENT  - No current airway concerns.  Will need to be reassessed day of surgery.  Mallampati: Unable to assess  TM: Unable to assess    CARDIAC  - No history of CAD, Hypertension, and Afib  - METS (Metabolic Equivalents)  Patient performs 4 or more METS exercise without symptoms             Total Score: 0      RCRI-Very low risk: Class 1 0.4% complication rate             Total Score: 0        PULMONARY  - Obstructive Sleep Apnea    KAMINI Low Risk             Total Score: 0      - Denies asthma or inhaler use  - Tobacco History    History   Smoking Status    Never   Smokeless Tobacco    Never       GI  PONV High Risk  Total Score: 4           1 AN PONV: Pt is Female    1 AN PONV: Patient is not a current smoker    1 AN PONV: Patient has history of PONV    1 AN PONV: Intended Post Op Opioids    ~Patient reports mild nausea after having wisdom teeth extracted.  She does report is sometimes prone to motion sickness.    /RENAL  -Invasive ductal carcinoma of breast, female, left -procedure as above.  Will check baseline labs for the patient before surgery    ENDOCRINE    - BMI: Estimated body mass index is 27.8 kg/m  as calculated from the following:    Height as of this encounter: 1.575 m (5' 2\").    Weight as of this encounter: 68.9 kg (152 lb).  Overweight (BMI 25.0-29.9)  - No history of Diabetes Mellitus    HEME  VTE " Medium Risk 1.8%             Total Score: 5    VTE: Current cancer      - No history of abnormal bleeding or antiplatelet use.    MSK  Patient is NOT Frail             Total Score: 0      OTHER  ~The patient reports that time she can be difficult IV access    Different anesthesia methods/types have been discussed with the patient, but they are aware that the final plan will be decided by the assigned anesthesia provider on the date of service.    The patient is optimized for their procedure. AVS with information on surgery time/arrival time, meds and NPO status given by nursing staff. No further diagnostic testing indicated.    Please refer to the physical examination documented by the anesthesiologist in the anesthesia record on the day of surgery.    Video-Visit Details    Type of service:  Video Visit    Provider received verbal consent for a Video Visit from the patient? Yes     Originating Location (pt. Location): Home    Distant Location (provider location):  Off-site  Mode of Communication:  Video Conference via streamOnce  On the day of service:     Prep time: 4 minutes  Visit time: 12 minutes  Documentation time: 4 minutes  ------------------------------------------  Total time: 20 minutes      Lizzy De Los Santos PA-C  Preoperative Assessment Center  [facility]  Clinic and Surgery Center  Phone: [phone]  Fax: [phone]

## 2025-02-11 NOTE — PROGRESS NOTES
[facility]: Surgical Oncology Cancer Care Short Note                                     Discussion with Patient:                                                       INBOUND CALL:     Received call from Lesley and her . Completed mastectomy and drain teaching. Questions and concerns about what to expect following surgery were addressed to the best of my ability.     Encouraged Lesley to call with any further questions or concerns. Direct contact number provided.     Felipa Johnson RNCC  [facility]   Surgical Oncology     Approximately 15 minutes was spent in conversation with the patient/caregiver.

## 2025-02-11 NOTE — TELEPHONE ENCOUNTER
Called to schedule surgery with: Dr. Taveras    Spoke with: Lesley     Date of Surgery: 11/10    Estimated Arrival time Discussed with Patient:  No    Location of surgery: [facility]    Pre-operative H&P: clinic PAC visit 10/28 at 12:30 PM    Pre-surgical Consult:  10/28 at 11:00 AM    Additional Appointments: CTA 10/28 at 10:20 AM    Post-operative Appointment w/HANNAH or Surgeon: Dr. Taveras 11/25 at 10:30 AM    Discussed with patient PAC RN will provide arrival time and instructions for surgery at the time of the appointment: [[facility] only]: Yes    Standard Surgery Packet: Yes  02/11/25 via QuVIS Message    All patients questions were answered and was instructed to contact the clinic with any questions or concerns.     Additional Comments: N/A     Pema Lenz on 2/11/2025 at 2:16 PM

## 2025-02-13 ENCOUNTER — LAB (OUTPATIENT)
Dept: LAB | Facility: CLINIC | Age: 40
End: 2025-02-13
Payer: COMMERCIAL

## 2025-02-13 DIAGNOSIS — C50.912 INVASIVE DUCTAL CARCINOMA OF BREAST, FEMALE, LEFT (H): ICD-10-CM

## 2025-02-13 DIAGNOSIS — Z01.818 PRE-OP EXAMINATION: ICD-10-CM

## 2025-02-13 LAB
ERYTHROCYTE [DISTWIDTH] IN BLOOD BY AUTOMATED COUNT: 12.2 % (ref 10–15)
HCT VFR BLD AUTO: 43.6 % (ref 35–47)
HGB BLD-MCNC: 14.4 G/DL (ref 11.7–15.7)
MCH RBC QN AUTO: 28.9 PG (ref 26.5–33)
MCHC RBC AUTO-ENTMCNC: 33 G/DL (ref 31.5–36.5)
MCV RBC AUTO: 87 FL (ref 78–100)
PLATELET # BLD AUTO: 357 10E3/UL (ref 150–450)
RBC # BLD AUTO: 4.99 10E6/UL (ref 3.8–5.2)
WBC # BLD AUTO: 8 10E3/UL (ref 4–11)

## 2025-02-16 ASSESSMENT — ENCOUNTER SYMPTOMS: SEIZURES: 0

## 2025-02-16 ASSESSMENT — LIFESTYLE VARIABLES: TOBACCO_USE: 0

## 2025-02-16 NOTE — ANESTHESIA PREPROCEDURE EVALUATION
Anesthesia Pre-Procedure Evaluation    Patient: Lesley Mahmood   MRN: [id number] : 1985        Procedure : Procedure(s):  SKIN-SPARING MASTECTOMY  SENTINEL LYMPH NODE BIOPSY  Bilateral breast reconstruction with expanders and SPY          Past Medical History:   Diagnosis Date    Invasive ductal carcinoma of breast, female, left (H)       Past Surgical History:   Procedure Laterality Date    birthmark removal      EXTRACTION(S) DENTAL        No Known Allergies   Social History     Tobacco Use    Smoking status: Never     Passive exposure: Never    Smokeless tobacco: Never   Substance Use Topics    Alcohol use: Yes     Alcohol/week: 4.0 standard drinks of alcohol     Types: 4 Standard drinks or equivalent per week      Wt Readings from Last 1 Encounters:   25 68.9 kg (152 lb)        Anesthesia Evaluation   Pt has had prior anesthetic. Type: Regional (conscious sedation).    History of anesthetic complications  - motion sickness and PONV.      ROS/MED HX  ENT/Pulmonary:  - neg pulmonary ROS  (-) tobacco use   Neurologic:  - neg neurologic ROS  (-) no seizures, no CVA and no TIA   Cardiovascular:       METS/Exercise Tolerance: >4 METS    Hematologic:  - neg hematologic  ROS     Musculoskeletal:  - neg musculoskeletal ROS     GI/Hepatic:  - neg GI/hepatic ROS  (-) GERD   Renal/Genitourinary:  - neg Renal ROS     Endo:  - neg endo ROS     Psychiatric/Substance Use:  - neg psychiatric ROS     Infectious Disease:  - neg infectious disease ROS     Malignancy:   (+) Malignancy, History of Breast.    Other:  - neg other ROS          Physical Exam    Airway        Mallampati: II   TM distance: > 3 FB   Neck ROM: full   Mouth opening: > 3 cm    Respiratory Devices and Support         Dental     Comment: Top incisor crown    (+) Multiple crowns, permanant bridges      Cardiovascular   cardiovascular exam normal          Pulmonary   pulmonary exam normal                OUTSIDE LABS:  CBC:   Lab Results  PATIENT: Rosita Almodovar  MRN: [id number]  DATE: 9/10/2019      Diagnosis:   1. Small cell lung cancer, left    2. Dehydration        Chief Complaint: No chief complaint on file.      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  She was started on carboplatin, etoposide, and atezolizumab.  No focal neurologic deficits and thus WBRT was initially deferred.   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  See comment.  Comment:  The left lung biopsy shows a malignant proliferation of cells with increased nuclear cytoplasmic ratio, stippled chromatin pattern with small amount of cytoplasm. Some of cells show molding and crush artifact. The majority of the cells are relatively small in size, however there are a few areas with larger cells within increased amount of cytoplasm. The cells are arranged in sheets and nests with brisk mitotic activity and areas of necrosis. Immunohistochemical stains reveal the tumor cells to stain positively with cytokeratin AE1/AE3, synaptophysin, chromogranin and TTF-1. Immunohistochemical stain for p63 is negative within the tumor cells. Immunostain for cytokeratin 7 shows positive staining in the background lung but is negative within tumor cells. All stains have atisfactory positive negative controls. The morphology of the tumor with both small cells and larger cells with slightly increased cytoplasm and nested configuration together with the brisk mitotic rate, areas of necrosis and staining profile support the above diagnosis of a high-grade neuroendocrine carcinoma with features of small cell  "  Component Value Date    WBC 8.0 02/13/2025    WBC 9.3 03/01/2020    HGB 14.4 02/13/2025    HGB 12.6 03/02/2020    HCT 43.6 02/13/2025    HCT 38.4 03/01/2020     02/13/2025     03/01/2020     BMP:   Lab Results   Component Value Date     02/13/2025    POTASSIUM 4.3 02/13/2025    CHLORIDE 104 02/13/2025    CO2 26 02/13/2025    BUN 8.0 02/13/2025    CR 0.76 02/13/2025     (H) 02/13/2025    GLC 87 05/02/2023     COAGS: No results found for: \"PTT\", \"INR\", \"FIBR\"  POC:   Lab Results   Component Value Date    BGM 85 12/23/2019    HCG Negative 06/22/2020     HEPATIC: No results found for: \"ALBUMIN\", \"PROTTOTAL\", \"ALT\", \"AST\", \"GGT\", \"ALKPHOS\", \"BILITOTAL\", \"BILIDIRECT\", \"SERA\"  OTHER:   Lab Results   Component Value Date    GRECIA 9.5 02/13/2025    TSH 3.10 03/18/2024    T4 1.07 05/02/2023       Anesthesia Plan    ASA Status:  2    NPO Status:  NPO Appropriate    Anesthesia Type: General.     - Airway: ETT   Induction: Intravenous, Propofol.   Maintenance: TIVA.   Techniques and Equipment:     - Lines/Monitors: 2nd IV     Consents    Anesthesia Plan(s) and associated risks, benefits, and realistic alternatives discussed. Questions answered and patient/representative(s) expressed understanding.     - Discussed:     - Discussed with:  Patient      - Extended Intubation/Ventilatory Support Discussed: No.      - Patient is DNR/DNI Status: No     Use of blood products discussed: No .     Postoperative Care    Pain management: IV analgesics, Oral pain medications, Multi-modal analgesia.   PONV prophylaxis: Ondansetron (or other 5HT-3), Dexamethasone or Solumedrol, Background Propofol Infusion, Aprepitant     Comments:               Bravo Mena MD    I have reviewed the pertinent notes and labs in the chart from the past 30 days and (re)examined the patient.  Any updates or changes from those notes are reflected in this note.    Clinically Significant Risk Factors Present on Admission    " carcinoma and large cell neuroendocrine carcinoma. Correlate clinically.        Subjective:    Interval History: Ms. Almodovar is here for extensive stage small cell lung cancer s/p 4 cycles of carboplatin etoposide and is here for follow up prior to starting maintenance atezolizumab.    No changes since we last saw her.  Appetite is still low.  Denies fevers, chills, chest pain, or worsening shortness of breath.  Mostly suffers from fatigue.  Cyproheptadine makes her drowsy.    Her  accompanies her at this visit.     Past Medical History:   Past Medical History:   Diagnosis Date    Acquired hypothyroidism 5/8/2019    Brain tumor     COPD (chronic obstructive pulmonary disease) 5/8/2019    Gastric ulcer     Hilar mass     Kidney cysts     left    Pleural effusion        Past Surgical HIstory:   Past Surgical History:   Procedure Laterality Date    ANGIOGRAM, CORONARY, WITH LEFT HEART CATHETERIZATION      endoscope      QSXYFKDIG-MNAE-O-CATH LEFT NECK Left 6/4/2019    Performed by Reggie Franklin Jr., MD at [facility] OR [address] [place] [address]    kidney cyst excision and drainage         Family History: No family history on file.    Social History:  reports that she quit smoking about 5 years ago. Her smoking use included cigarettes. She has a 54.00 pack-year smoking history. She has never used smokeless tobacco. She reports that she does not drink alcohol or use drugs.    Allergies:  Review of patient's allergies indicates:   Allergen Reactions    Potassium Nausea Only       Medications:  Current Outpatient Medications   Medication Sig Dispense Refill    albuterol (VENTOLIN HFA) 90 mcg/actuation inhaler Ventolin HFA 90 mcg/actuation aerosol inhaler      budesonide-formoterol 160-4.5 mcg (SYMBICORT) 160-4.5 mcg/actuation HFAA Inhale 2 puffs into the lungs every 12 (twelve) hours. Controller 1 Inhaler 11    clonazePAM (KLONOPIN) 0.5 MG tablet TAKE ONE TABLET BY MOUTH THREE TIMES DAILY AS NEEDED FOR ANXIETY OR for  "                         # Overweight: Estimated body mass index is 27.8 kg/m  as calculated from the following:    Height as of 2/11/25: 1.575 m (5' 2\").    Weight as of 2/11/25: 68.9 kg (152 lb).                " panic attacks  3    cyproheptadine (PERIACTIN) 4 mg tablet Take 1 tablet (4 mg total) by mouth 4 (four) times daily. 90 tablet 2    DECARA 50,000 unit capsule Take 50,000 Units by mouth every 7 days.  3    levothyroxine (SYNTHROID) 50 MCG tablet Take 50 mcg by mouth every morning.  3    lidocaine-prilocaine (EMLA) cream Apply to port site 1 hour prior to chemotherapy and cover with saran wrap 30 g 3    lisinopril (PRINIVIL,ZESTRIL) 5 MG tablet Take 5 mg by mouth once daily.       ondansetron (ZOFRAN-ODT) 8 MG TbDL Dissolve 1 tablet (8 mg total) under tongue every 8 (eight) hours as needed (nausea). 30 tablet 3    pantoprazole (PROTONIX) 40 MG tablet TAKE ONE TABLET BY MOUTH EVERY DAY FOR stomach  3    pravastatin (PRAVACHOL) 20 MG tablet Take 20 mg by mouth once daily.       predniSONE (DELTASONE) 20 MG tablet TAKE ONE TABLET BY MOUTH EVERY DAY FOR 3 DAYS AND REPEAT FOR FLARE OF LUNGS AS DIRECTED  2    sotalol (BETAPACE) 80 MG tablet Take 0.5 tablets (40 mg total) by mouth once daily. 15 tablet 1    tiotropium bromide 2.5 mcg/actuation Mist Inhale 5 mcg into the lungs once daily. Controller 4 g 5    traMADol (ULTRAM) 50 mg tablet Take 50 mg by mouth every 12 (twelve) hours as needed.   3     No current facility-administered medications for this visit.      Facility-Administered Medications Ordered in Other Visits   Medication Dose Route Frequency Provider Last Rate Last Dose    alteplase injection 2 mg  2 mg Intra-Catheter PRN Stephan Atkinson MD   2 mg at 07/05/19 1408       Review of Systems   Constitutional: Positive for activity change, appetite change, fatigue and unexpected weight change. Negative for chills and fever.   HENT: Negative for nosebleeds.    Eyes: Negative for visual disturbance.   Respiratory: Positive for shortness of breath. Negative for cough.    Cardiovascular: Negative for chest pain and leg swelling.   Gastrointestinal: Positive for nausea. Negative for abdominal distention,  "abdominal pain, constipation and diarrhea.   Endocrine: Negative for cold intolerance and heat intolerance.   Genitourinary: Negative for dysuria.   Musculoskeletal: Positive for back pain (chronic unchanged).   Skin: Negative for color change and rash.   Neurological: Positive for weakness. Negative for dizziness, light-headedness and numbness.   Hematological: Negative for adenopathy.   Psychiatric/Behavioral: Negative for confusion.       ECOG Performance Status:  3  Objective:      Vitals:   Vitals:    09/11/19 0955   BP: (!) 122/59   Pulse: 87   Resp: 16   Temp: 98.2 °F (36.8 °C)   TempSrc: Oral   SpO2: (!) 94%   Weight: 49.7 kg (109 lb 9.1 oz)   Height: 5' 1" (1.549 m)     BMI: Body mass index is 20.7 kg/m².   Wt Readings from Last 10 Encounters:   09/06/19 50.1 kg (110 lb 7.2 oz)   09/05/19 49.5 kg (109 lb 2 oz)   08/22/19 50.7 kg (111 lb 12.4 oz)   08/21/19 50.7 kg (111 lb 12.4 oz)   08/21/19 50.7 kg (111 lb 12.4 oz)   08/07/19 51.5 kg (113 lb 8.6 oz)   08/02/19 51.4 kg (113 lb 5.1 oz)   08/01/19 51.4 kg (113 lb 5.1 oz)   07/24/19 51.4 kg (113 lb 5.1 oz)   07/08/19 54.1 kg (119 lb 3.2 oz)         Physical Exam   Constitutional: She appears well-developed.   HENT:   Head: Normocephalic.   Eyes: Pupils are equal, round, and reactive to light. EOM are normal. No scleral icterus.   Neck: Normal range of motion. No tracheal deviation present.   Cardiovascular: Normal rate, regular rhythm and normal heart sounds. Exam reveals no gallop and no friction rub.   No murmur heard.  Pulmonary/Chest: Effort normal. No respiratory distress. She has no wheezes. She has no rales.   Diminished breath sounds at left base; unchanged from prior exam.   Abdominal: Soft. Bowel sounds are normal. She exhibits no distension and no mass. There is no tenderness. There is no guarding.   Musculoskeletal: Normal range of motion. She exhibits no edema.   Lymphadenopathy:     She has no cervical adenopathy.   Neurological: She is alert. "   Skin: Skin is warm and dry.       Laboratory Data:     Recent Results (from the past 168 hour(s))   Complete Blood Count (CBC)    Collection Time: 09/04/19 11:47 AM   Result Value Ref Range    WBC 1.00 (LL) 3.90 - 12.70 K/uL    RBC 1.82 (L) 4.00 - 5.40 M/uL    Hemoglobin 5.3 (LL) 12.0 - 16.0 g/dL    Hematocrit 16.0 (LL) 37.0 - 48.5 %    Mean Corpuscular Volume 88 82 - 98 fL    Mean Corpuscular Hemoglobin 29.2 27.0 - 31.0 pg    Mean Corpuscular Hemoglobin Conc 33.2 32.0 - 36.0 g/dL    RDW 17.1 (H) 11.5 - 14.5 %    Platelets 31 (LL) 150 - 350 K/uL    MPV 9.0 (L) 9.2 - 12.9 fL    Gran% 5.0 (L) 38.0 - 73.0 %    Lymph% 62.0 (H) 18.0 - 48.0 %    Mono% 22.0 (H) 4.0 - 15.0 %    Eosinophil% 0.0 0.0 - 8.0 %    Basophil% 0.0 0.0 - 1.9 %    Bands 11.0 %    Toxic Granulation Present     Large/Giant Platelets Present     Differential Method Manual    Comprehensive Metabolic Panel (CMP)    Collection Time: 09/04/19 11:47 AM   Result Value Ref Range    Sodium 129 (L) 136 - 145 mmol/L    Potassium 3.9 3.5 - 5.1 mmol/L    Chloride 92 (L) 101 - 111 mmol/L    CO2 28 23 - 29 mmol/L    Glucose 110 74 - 118 mg/dL    BUN, Bld 16 8 - 23 mg/dL    Creatinine 1.0 0.5 - 1.4 mg/dL    Calcium 7.8 (L) 8.6 - 10.0 mg/dL    Total Protein 6.3 6.0 - 8.4 g/dL    Albumin 2.9 (L) 3.5 - 5.2 g/dL    Total Bilirubin 0.8 0.3 - 1.2 mg/dL    Alkaline Phosphatase 155 (H) 38 - 126 U/L    AST 13 (L) 15 - 41 U/L    ALT 7 (L) 14 - 54 U/L    Anion Gap 9 8 - 16 mmol/L    eGFR if African American >60.0 >60 mL/min/1.73 m^2    eGFR if non African American 54.5 (A) >60 mL/min/1.73 m^2   Brain natriuretic peptide    Collection Time: 09/04/19 11:47 AM   Result Value Ref Range     (H) 0 - 99 pg/mL   Troponin I    Collection Time: 09/04/19 11:47 AM   Result Value Ref Range    Troponin I 0.01 0.01 - 0.05 ng/mL   Type & Screen    Collection Time: 09/04/19 12:46 PM   Result Value Ref Range    ABO O     Rh Type POS     Indirect Idalmis GEL NEG    Prepare RBC 2 Units;  anemia    Collection Time: 09/04/19 12:46 PM   Result Value Ref Range    UNIT NUMBER [id number]     Product Code [id number]     DISPENSE STATUS TRANSFUSED     CODING SYSTEM ISPG142     Unit Blood Type Code 5100     Unit Blood Type O POS     Unit Expiration [id number]     UNIT NUMBER [id number]     Product Code [id number]     DISPENSE STATUS TRANSFUSED     CODING SYSTEM DWGF299     Unit Blood Type Code 5100     Unit Blood Type O POS     Unit Expiration [id number]    Blood culture #1 **CANNOT BE ORDERED STAT**    Collection Time: 09/04/19  1:28 PM   Result Value Ref Range    Blood Culture, Routine No growth after 5 days.    Blood culture #2 **CANNOT BE ORDERED STAT**    Collection Time: 09/04/19  1:32 PM   Result Value Ref Range    Blood Culture, Routine No growth after 5 days.    CBC auto differential    Collection Time: 09/05/19  2:32 AM   Result Value Ref Range    WBC 1.50 (LL) 3.90 - 12.70 K/uL    RBC 2.79 (L) 4.00 - 5.40 M/uL    Hemoglobin 8.1 (L) 12.0 - 16.0 g/dL    Hematocrit 23.9 (L) 37.0 - 48.5 %    Mean Corpuscular Volume 86 82 - 98 fL    Mean Corpuscular Hemoglobin 29.1 27.0 - 31.0 pg    Mean Corpuscular Hemoglobin Conc 33.9 32.0 - 36.0 g/dL    RDW 15.5 (H) 11.5 - 14.5 %    Platelets 38 (LL) 150 - 350 K/uL    MPV 8.7 (L) 9.2 - 12.9 fL    Gran # (ANC) 0.3 (L) 1.8 - 7.7 K/uL    Lymph # 0.7 (L) 1.0 - 4.8 K/uL    Mono # 0.5 0.3 - 1.0 K/uL    Eos # 0.0 0.0 - 0.5 K/uL    Baso # 0.00 0.00 - 0.20 K/uL    Gran% 21.3 (L) 38.0 - 73.0 %    Lymph% 45.6 18.0 - 48.0 %    Mono% 32.8 (H) 4.0 - 15.0 %    Eosinophil% 0.1 0.0 - 8.0 %    Basophil% 0.2 0.0 - 1.9 %    Platelet Estimate Decreased (A)     Aniso Slight     Poik Slight     Hypo Occasional     Large/Giant Platelets Present     Differential Method Automated    Comprehensive metabolic panel    Collection Time: 09/05/19  2:32 AM   Result Value Ref Range    Sodium 129 (L) 136 - 145 mmol/L    Potassium 3.4 (L) 3.5 - 5.1 mmol/L    Chloride 93 (L) 101 - 111 mmol/L     CO2 28 23 - 29 mmol/L    Glucose 94 74 - 118 mg/dL    BUN, Bld 14 8 - 23 mg/dL    Creatinine 1.0 0.5 - 1.4 mg/dL    Calcium 7.7 (L) 8.6 - 10.0 mg/dL    Total Protein 5.7 (L) 6.0 - 8.4 g/dL    Albumin 2.5 (L) 3.5 - 5.2 g/dL    Total Bilirubin 1.3 (H) 0.3 - 1.2 mg/dL    Alkaline Phosphatase 138 (H) 38 - 126 U/L    AST 11 (L) 15 - 41 U/L    ALT 6 (L) 14 - 54 U/L    Anion Gap 8 8 - 16 mmol/L    eGFR if African American >60.0 >60 mL/min/1.73 m^2    eGFR if non African American 54.5 (A) >60 mL/min/1.73 m^2   CBC auto differential    Collection Time: 09/06/19  7:46 AM   Result Value Ref Range    WBC 4.67 3.90 - 12.70 K/uL    RBC 3.19 (L) 4.00 - 5.40 M/uL    Hemoglobin 9.1 (L) 12.0 - 16.0 g/dL    Hematocrit 28.2 (L) 37.0 - 48.5 %    Mean Corpuscular Volume 88 82 - 98 fL    Mean Corpuscular Hemoglobin 28.5 27.0 - 31.0 pg    Mean Corpuscular Hemoglobin Conc 32.3 32.0 - 36.0 g/dL    RDW 16.1 (H) 11.5 - 14.5 %    Platelets 75 (L) 150 - 350 K/uL    MPV 12.0 9.2 - 12.9 fL    Immature Granulocytes 1.5 (H) 0.0 - 0.5 %    Gran # (ANC) 2.5 1.8 - 7.7 K/uL    Immature Grans (Abs) 0.07 (H) 0.00 - 0.04 K/uL    Lymph # 1.1 1.0 - 4.8 K/uL    Mono # 1.0 0.3 - 1.0 K/uL    Eos # 0.0 0.0 - 0.5 K/uL    Baso # 0.01 0.00 - 0.20 K/uL    nRBC 0 0 /100 WBC    Gran% 52.7 38.0 - 73.0 %    Lymph% 23.6 18.0 - 48.0 %    Mono% 21.8 (H) 4.0 - 15.0 %    Eosinophil% 0.2 0.0 - 8.0 %    Basophil% 0.2 0.0 - 1.9 %    Platelet Estimate Decreased (A)     Aniso Slight     Poik Slight     Poly Occasional     Ovalocytes Occasional     Toxic Granulation Present     Large/Giant Platelets Present     Differential Method Automated    Comprehensive metabolic panel    Collection Time: 09/10/19 10:33 AM   Result Value Ref Range    Sodium 131 (L) 136 - 145 mmol/L    Potassium 4.6 3.5 - 5.1 mmol/L    Chloride 96 (L) 101 - 111 mmol/L    CO2 27 23 - 29 mmol/L    Glucose 114 74 - 118 mg/dL    BUN, Bld 12 8 - 23 mg/dL    Creatinine 0.7 0.5 - 1.4 mg/dL    Calcium 8.5 (L) 8.6 -  10.0 mg/dL    Total Protein 6.4 6.0 - 8.4 g/dL    Albumin 2.4 (L) 3.5 - 5.2 g/dL    Total Bilirubin 0.5 0.3 - 1.2 mg/dL    Alkaline Phosphatase 138 (H) 38 - 126 U/L    AST 32 15 - 41 U/L    ALT 18 14 - 54 U/L    Anion Gap 8 8 - 16 mmol/L    eGFR if African American >60.0 >60 mL/min/1.73 m^2    eGFR if non African American >60.0 >60 mL/min/1.73 m^2   CBC auto differential    Collection Time: 09/10/19 10:33 AM   Result Value Ref Range    WBC 7.90 3.90 - 12.70 K/uL    RBC 3.11 (L) 4.00 - 5.40 M/uL    Hemoglobin 8.8 (L) 12.0 - 16.0 g/dL    Hematocrit 27.0 (L) 37.0 - 48.5 %    Mean Corpuscular Volume 87 82 - 98 fL    Mean Corpuscular Hemoglobin 28.4 27.0 - 31.0 pg    Mean Corpuscular Hemoglobin Conc 32.7 32.0 - 36.0 g/dL    RDW 16.2 (H) 11.5 - 14.5 %    Platelets 241 150 - 350 K/uL    MPV 7.5 (L) 9.2 - 12.9 fL    Gran # (ANC) 5.5 1.8 - 7.7 K/uL    Lymph # 1.3 1.0 - 4.8 K/uL    Mono # 1.0 0.3 - 1.0 K/uL    Eos # 0.0 0.0 - 0.5 K/uL    Baso # 0.00 0.00 - 0.20 K/uL    Gran% 69.8 38.0 - 73.0 %    Lymph% 16.8 (L) 18.0 - 48.0 %    Mono% 12.7 4.0 - 15.0 %    Eosinophil% 0.3 0.0 - 8.0 %    Basophil% 0.4 0.0 - 1.9 %    Platelet Estimate Appears normal     Differential Method Automated    TSH    Collection Time: 09/10/19 10:33 AM   Result Value Ref Range    TSH 2.36 0.45 - 5.33 uIU/mL     Imagin19 xray  FINDINGS:  Two shift of the heart mediastinum to the left with left-sided pleuroparenchymal abnormality has slightly improved with some residual basal opacity seen in the retrocardiac area however likely lobar atelectasis.  Right lung is normally aerated.    The cardiac silhouette is normal in size. Port-A-Cath remains.    No acute bony change seen.      Impression       1. 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  Further evaluation  with CT should be considered.               Assessment:       1. Small cell lung cancer, left    2. Dehydration    3. Constipation, unspecified constipation type    4. Antineoplastic chemotherapy induced anemia    5. Anorexia           Plan:           1. Extensive stage small cell lung cancer also with features of large cell neuroendocrine cells.  While treating small cell lung cancer is the priority, typical approach to purely large cell neuroendocrine is similar to that of NSCLC.  Small cell lung cancer treatment overlaps with that of NSCLC with platinum and immunotherapy components.  Metastases to brain and left adrenal gland.  Was started on carboplatin, etoposide and atezolizumab.  S/p 3 cycles, CT imaging 8/20/2019 showed interval improvement.  Has completed 4 cycles of carboplatin, etoposide, and atezolizumab.  -Patient had discontinued HH/PT due to scheduling conflict with chemo.  Advised patient to reach out to HH/PT to restart PT at home  -s/p port placement 6/4/19  -proceed with atezolizumab cycle 5 today  -Return to clinic 10/2/19 for maintenance atezolizumab  --Additional IV NS bolus for hyponatremia and hypochloremia consistent with dehydration.  -Plan to start WBRT with Dr. Quintana.  Will ask if he will consider adding thoracic radiation.  --Monitor TSH and HbA1c q4 cycles    2. Dehydration - secondary to chemotherapy and loss of appetite. Will add IV fluids to treatment plan.    3. Constipation - prescribed senna and colace to take daily and advised her to increase mobility.    4. Chemotherapy related anemia -Prior to chemotherapy hemoglobin was 11. Hemoglobin declined to as low as 5 likely related to chemotherapy. s/p 2 units at OSH last week and CBC today shows stable hemoglobin of 8.  Thrombocytopenia resolved and she is not neutropenic.  With all counts improving, anticipate improvement in performance status when anemia recovers to baseline.    5. Anorexia - on cyproheptadine and just started last  week.  Advised patient if appetite does not improve by next week, ok to stop as it is likely contributing to fatigue.      Advance Care Planning I initiated the process of advance care planning at prior visit and explained the importance of this process to the patient.  Then the patient received detailed information about the importance of designating a Health Care Power of  (HCPOA). she was instructed to communicate with this person about their wishes for future healthcare, should she become sick and lose decision-making capacity. The patient has previously appointed a HCPOA. After our discussion, the patient has decided to complete a HCPOA and has appointed her significant other and NAME:Edenilson Almodovar   I spent a total time of 16 minutes discussing this issue with the patient.     No orders of the defined types were placed in this encounter.        Stephan Atkinson MD  Hematology Oncology Fellow PGY6       ATTENDING NOTE, ONCOLOGY CLINIC    As above.  Patient seen and examined, chart reviewed.  Appears comfortable, in NAD.  Lungs are clear to auscultation.  Abdomen is soft, nontender.  Labs reviewed.    PLAN  She will receive her atezolizumab infusion today.  She may start PCI.  RTC 3 weeks.  Her multiple questions were answered to her satisfaction.      Bjorn Rodriguez MD

## 2025-02-17 ENCOUNTER — ANESTHESIA (OUTPATIENT)
Dept: SURGERY | Facility: CLINIC | Age: 40
End: 2025-02-17
Payer: COMMERCIAL

## 2025-02-17 ENCOUNTER — HOSPITAL ENCOUNTER (OUTPATIENT)
Dept: NUCLEAR MEDICINE | Facility: CLINIC | Age: 40
Setting detail: NUCLEAR MEDICINE
Discharge: HOME | End: 2025-02-17
Attending: SURGERY | Admitting: SURGERY
Payer: COMMERCIAL

## 2025-02-17 ENCOUNTER — HOSPITAL ENCOUNTER (OUTPATIENT)
Facility: CLINIC | Age: 40
Discharge: HOME | End: 2025-02-17
Attending: SURGERY | Admitting: SURGERY
Payer: COMMERCIAL

## 2025-02-17 VITALS
OXYGEN SATURATION: 100 % | BODY MASS INDEX: 25.74 KG/M2 | HEIGHT: 64 IN | DIASTOLIC BLOOD PRESSURE: 67 MMHG | SYSTOLIC BLOOD PRESSURE: 108 MMHG | HEART RATE: 59 BPM | RESPIRATION RATE: 16 BRPM | WEIGHT: 150.79 LBS | TEMPERATURE: 97.9 F

## 2025-02-17 DIAGNOSIS — C50.912 INVASIVE DUCTAL CARCINOMA OF BREAST, FEMALE, LEFT (H): ICD-10-CM

## 2025-02-17 DIAGNOSIS — Z17.0 MALIGNANT NEOPLASM OF OVERLAPPING SITES OF LEFT BREAST IN FEMALE, ESTROGEN RECEPTOR POSITIVE (H): Primary | ICD-10-CM

## 2025-02-17 DIAGNOSIS — C50.812 MALIGNANT NEOPLASM OF OVERLAPPING SITES OF LEFT BREAST IN FEMALE, ESTROGEN RECEPTOR POSITIVE (H): Primary | ICD-10-CM

## 2025-02-17 LAB
ABO + RH BLD: NORMAL
BLD GP AB SCN SERPL QL: NEGATIVE
GLUCOSE BLDC GLUCOMTR-MCNC: 90 MG/DL (ref 70–99)
HOLD SPECIMEN: NORMAL
SPECIMEN EXP DATE BLD: NORMAL

## 2025-02-17 PROCEDURE — 88341 IMHCHEM/IMCYTCHM EA ADD ANTB: CPT | Mod: TC | Performed by: SURGERY

## 2025-02-17 PROCEDURE — 710N000012 HC RECOVERY PHASE 2, PER MINUTE: Performed by: SURGERY

## 2025-02-17 PROCEDURE — 82962 GLUCOSE BLOOD TEST: CPT

## 2025-02-17 PROCEDURE — 360N000076 HC SURGERY LEVEL 3, PER MIN: Performed by: SURGERY

## 2025-02-17 PROCEDURE — 86900 BLOOD TYPING SEROLOGIC ABO: CPT | Performed by: SURGERY

## 2025-02-17 PROCEDURE — 370N000017 HC ANESTHESIA TECHNICAL FEE, PER MIN: Performed by: SURGERY

## 2025-02-17 PROCEDURE — 250N000013 HC RX MED GY IP 250 OP 250 PS 637

## 2025-02-17 PROCEDURE — 99207 NM LYMPHOSCINTIGRAPHY INJECTION ONLY: CPT | Performed by: RADIOLOGY

## 2025-02-17 PROCEDURE — 250N000011 HC RX IP 250 OP 636: Performed by: NURSE ANESTHETIST, CERTIFIED REGISTERED

## 2025-02-17 PROCEDURE — 38792 RA TRACER ID OF SENTINL NODE: CPT

## 2025-02-17 PROCEDURE — 250N000009 HC RX 250: Performed by: NURSE ANESTHETIST, CERTIFIED REGISTERED

## 2025-02-17 PROCEDURE — 250N000011 HC RX IP 250 OP 636

## 2025-02-17 PROCEDURE — 343N000001 HC RX 343 MED OP 636: Performed by: SURGERY

## 2025-02-17 PROCEDURE — 250N000011 HC RX IP 250 OP 636: Performed by: SURGERY

## 2025-02-17 PROCEDURE — 250N000009 HC RX 250: Performed by: SURGERY

## 2025-02-17 PROCEDURE — 19357 TISS XPNDR PLMT BRST RCNSTJ: CPT | Mod: 50 | Performed by: PLASTIC SURGERY

## 2025-02-17 PROCEDURE — 250N000011 HC RX IP 250 OP 636: Performed by: PLASTIC SURGERY

## 2025-02-17 PROCEDURE — 88342 IMHCHEM/IMCYTCHM 1ST ANTB: CPT | Mod: 26 | Performed by: PATHOLOGY

## 2025-02-17 PROCEDURE — A9520 TC99 TILMANOCEPT DIAG 0.5MCI: HCPCS | Performed by: SURGERY

## 2025-02-17 PROCEDURE — C1789 PROSTHESIS, BREAST, IMP: HCPCS | Performed by: SURGERY

## 2025-02-17 PROCEDURE — 710N000010 HC RECOVERY PHASE 1, LEVEL 2, PER MIN: Performed by: SURGERY

## 2025-02-17 PROCEDURE — 38900 IO MAP OF SENT LYMPH NODE: CPT | Mod: LT | Performed by: SURGERY

## 2025-02-17 PROCEDURE — 258N000003 HC RX IP 258 OP 636: Performed by: NURSE ANESTHETIST, CERTIFIED REGISTERED

## 2025-02-17 PROCEDURE — 250N000025 HC SEVOFLURANE, PER MIN: Performed by: SURGERY

## 2025-02-17 PROCEDURE — 88341 IMHCHEM/IMCYTCHM EA ADD ANTB: CPT | Mod: 26 | Performed by: PATHOLOGY

## 2025-02-17 PROCEDURE — 19303 MAST SIMPLE COMPLETE: CPT | Mod: 50 | Performed by: SURGERY

## 2025-02-17 PROCEDURE — 88307 TISSUE EXAM BY PATHOLOGIST: CPT | Mod: 26 | Performed by: PATHOLOGY

## 2025-02-17 PROCEDURE — 258N000001 HC RX 258: Performed by: PLASTIC SURGERY

## 2025-02-17 PROCEDURE — 37799 UNLISTED PX VASCULAR SURGERY: CPT | Performed by: PLASTIC SURGERY

## 2025-02-17 PROCEDURE — 272N000001 HC OR GENERAL SUPPLY STERILE: Performed by: SURGERY

## 2025-02-17 PROCEDURE — 38525 BIOPSY/REMOVAL LYMPH NODES: CPT | Mod: LT | Performed by: SURGERY

## 2025-02-17 PROCEDURE — 999N000141 HC STATISTIC PRE-PROCEDURE NURSING ASSESSMENT: Performed by: SURGERY

## 2025-02-17 DEVICE — NATRELLE TE SMOOTH 133S-MV-15-T (US)
Type: IMPLANTABLE DEVICE | Site: BREAST | Status: FUNCTIONAL
Brand: NATRELLE 133S TISSUE EXPANDERS

## 2025-02-17 RX ORDER — OXYCODONE HYDROCHLORIDE 10 MG/1
10 TABLET ORAL
Status: DISCONTINUED | OUTPATIENT
Start: 2025-02-17 | End: 2025-02-18 | Stop reason: HOSPADM

## 2025-02-17 RX ORDER — APREPITANT 40 MG/1
40 CAPSULE ORAL ONCE
Status: DISCONTINUED | OUTPATIENT
Start: 2025-02-17 | End: 2025-02-17 | Stop reason: HOSPADM

## 2025-02-17 RX ORDER — SODIUM CHLORIDE, SODIUM LACTATE, POTASSIUM CHLORIDE, CALCIUM CHLORIDE 600; 310; 30; 20 MG/100ML; MG/100ML; MG/100ML; MG/100ML
INJECTION, SOLUTION INTRAVENOUS CONTINUOUS
Status: DISCONTINUED | OUTPATIENT
Start: 2025-02-17 | End: 2025-02-17 | Stop reason: HOSPADM

## 2025-02-17 RX ORDER — PROPOFOL 10 MG/ML
INJECTION, EMULSION INTRAVENOUS CONTINUOUS PRN
Status: DISCONTINUED | OUTPATIENT
Start: 2025-02-17 | End: 2025-02-17

## 2025-02-17 RX ORDER — ONDANSETRON 2 MG/ML
4 INJECTION INTRAMUSCULAR; INTRAVENOUS EVERY 30 MIN PRN
Status: DISCONTINUED | OUTPATIENT
Start: 2025-02-17 | End: 2025-02-18 | Stop reason: HOSPADM

## 2025-02-17 RX ORDER — ONDANSETRON 4 MG/1
4 TABLET, ORALLY DISINTEGRATING ORAL EVERY 30 MIN PRN
Status: DISCONTINUED | OUTPATIENT
Start: 2025-02-17 | End: 2025-02-17 | Stop reason: HOSPADM

## 2025-02-17 RX ORDER — HYDROMORPHONE HCL IN WATER/PF 6 MG/30 ML
0.2 PATIENT CONTROLLED ANALGESIA SYRINGE INTRAVENOUS EVERY 5 MIN PRN
Status: DISCONTINUED | OUTPATIENT
Start: 2025-02-17 | End: 2025-02-17 | Stop reason: HOSPADM

## 2025-02-17 RX ORDER — DEXAMETHASONE SODIUM PHOSPHATE 4 MG/ML
4 INJECTION, SOLUTION INTRA-ARTICULAR; INTRALESIONAL; INTRAMUSCULAR; INTRAVENOUS; SOFT TISSUE
Status: DISCONTINUED | OUTPATIENT
Start: 2025-02-17 | End: 2025-02-17 | Stop reason: HOSPADM

## 2025-02-17 RX ORDER — CEFAZOLIN SODIUM/WATER 2 G/20 ML
2 SYRINGE (ML) INTRAVENOUS SEE ADMIN INSTRUCTIONS
Status: DISCONTINUED | OUTPATIENT
Start: 2025-02-17 | End: 2025-02-17 | Stop reason: HOSPADM

## 2025-02-17 RX ORDER — APREPITANT 40 MG/1
40 CAPSULE ORAL ONCE
Status: COMPLETED | OUTPATIENT
Start: 2025-02-17 | End: 2025-02-17

## 2025-02-17 RX ORDER — NALOXONE HYDROCHLORIDE 0.4 MG/ML
0.4 INJECTION, SOLUTION INTRAMUSCULAR; INTRAVENOUS; SUBCUTANEOUS
Status: DISCONTINUED | OUTPATIENT
Start: 2025-02-17 | End: 2025-02-17 | Stop reason: HOSPADM

## 2025-02-17 RX ORDER — FENTANYL CITRATE 50 UG/ML
INJECTION, SOLUTION INTRAMUSCULAR; INTRAVENOUS PRN
Status: DISCONTINUED | OUTPATIENT
Start: 2025-02-17 | End: 2025-02-17

## 2025-02-17 RX ORDER — NALOXONE HYDROCHLORIDE 0.4 MG/ML
0.1 INJECTION, SOLUTION INTRAMUSCULAR; INTRAVENOUS; SUBCUTANEOUS
Status: DISCONTINUED | OUTPATIENT
Start: 2025-02-17 | End: 2025-02-17 | Stop reason: HOSPADM

## 2025-02-17 RX ORDER — FENTANYL CITRATE 50 UG/ML
25-50 INJECTION, SOLUTION INTRAMUSCULAR; INTRAVENOUS
Status: DISCONTINUED | OUTPATIENT
Start: 2025-02-17 | End: 2025-02-17 | Stop reason: HOSPADM

## 2025-02-17 RX ORDER — FENTANYL CITRATE 50 UG/ML
50 INJECTION, SOLUTION INTRAMUSCULAR; INTRAVENOUS EVERY 5 MIN PRN
Status: DISCONTINUED | OUTPATIENT
Start: 2025-02-17 | End: 2025-02-17 | Stop reason: HOSPADM

## 2025-02-17 RX ORDER — DEXAMETHASONE SODIUM PHOSPHATE 4 MG/ML
4 INJECTION, SOLUTION INTRA-ARTICULAR; INTRALESIONAL; INTRAMUSCULAR; INTRAVENOUS; SOFT TISSUE
Status: DISCONTINUED | OUTPATIENT
Start: 2025-02-17 | End: 2025-02-18 | Stop reason: HOSPADM

## 2025-02-17 RX ORDER — OXYCODONE HYDROCHLORIDE 5 MG/1
5-10 TABLET ORAL EVERY 4 HOURS PRN
Qty: 15 TABLET | Refills: 0 | Status: SHIPPED | OUTPATIENT
Start: 2025-02-17

## 2025-02-17 RX ORDER — INDOCYANINE GREEN AND WATER 25 MG
KIT INJECTION PRN
Status: DISCONTINUED | OUTPATIENT
Start: 2025-02-17 | End: 2025-02-17 | Stop reason: HOSPADM

## 2025-02-17 RX ORDER — OXYCODONE HYDROCHLORIDE 5 MG/1
5 TABLET ORAL
Status: COMPLETED | OUTPATIENT
Start: 2025-02-17 | End: 2025-02-17

## 2025-02-17 RX ORDER — FLUMAZENIL 0.1 MG/ML
0.2 INJECTION, SOLUTION INTRAVENOUS
Status: DISCONTINUED | OUTPATIENT
Start: 2025-02-17 | End: 2025-02-17 | Stop reason: HOSPADM

## 2025-02-17 RX ORDER — SODIUM CHLORIDE, SODIUM LACTATE, POTASSIUM CHLORIDE, CALCIUM CHLORIDE 600; 310; 30; 20 MG/100ML; MG/100ML; MG/100ML; MG/100ML
INJECTION, SOLUTION INTRAVENOUS CONTINUOUS PRN
Status: DISCONTINUED | OUTPATIENT
Start: 2025-02-17 | End: 2025-02-17

## 2025-02-17 RX ORDER — ONDANSETRON 2 MG/ML
4 INJECTION INTRAMUSCULAR; INTRAVENOUS EVERY 30 MIN PRN
Status: DISCONTINUED | OUTPATIENT
Start: 2025-02-17 | End: 2025-02-17 | Stop reason: HOSPADM

## 2025-02-17 RX ORDER — NALOXONE HYDROCHLORIDE 0.4 MG/ML
0.2 INJECTION, SOLUTION INTRAMUSCULAR; INTRAVENOUS; SUBCUTANEOUS
Status: DISCONTINUED | OUTPATIENT
Start: 2025-02-17 | End: 2025-02-17 | Stop reason: HOSPADM

## 2025-02-17 RX ORDER — DOXYCYCLINE 100 MG/1
100 CAPSULE ORAL 2 TIMES DAILY
Qty: 28 CAPSULE | Refills: 0 | Status: SHIPPED | OUTPATIENT
Start: 2025-02-17 | End: 2025-02-17

## 2025-02-17 RX ORDER — FENTANYL CITRATE 50 UG/ML
25 INJECTION, SOLUTION INTRAMUSCULAR; INTRAVENOUS EVERY 5 MIN PRN
Status: DISCONTINUED | OUTPATIENT
Start: 2025-02-17 | End: 2025-02-17 | Stop reason: HOSPADM

## 2025-02-17 RX ORDER — ACETAMINOPHEN 325 MG/1
975 TABLET ORAL ONCE
Status: COMPLETED | OUTPATIENT
Start: 2025-02-17 | End: 2025-02-17

## 2025-02-17 RX ORDER — LIDOCAINE HYDROCHLORIDE 20 MG/ML
INJECTION, SOLUTION INFILTRATION; PERINEURAL PRN
Status: DISCONTINUED | OUTPATIENT
Start: 2025-02-17 | End: 2025-02-17

## 2025-02-17 RX ORDER — NALOXONE HYDROCHLORIDE 0.4 MG/ML
0.1 INJECTION, SOLUTION INTRAMUSCULAR; INTRAVENOUS; SUBCUTANEOUS
Status: DISCONTINUED | OUTPATIENT
Start: 2025-02-17 | End: 2025-02-18 | Stop reason: HOSPADM

## 2025-02-17 RX ORDER — ONDANSETRON 4 MG/1
4 TABLET, FILM COATED ORAL EVERY 8 HOURS PRN
Qty: 12 TABLET | Refills: 0 | Status: SHIPPED | OUTPATIENT
Start: 2025-02-17

## 2025-02-17 RX ORDER — LIDOCAINE 40 MG/G
CREAM TOPICAL
Status: DISCONTINUED | OUTPATIENT
Start: 2025-02-17 | End: 2025-02-17 | Stop reason: HOSPADM

## 2025-02-17 RX ORDER — OXYCODONE HYDROCHLORIDE 5 MG/1
5-10 TABLET ORAL EVERY 4 HOURS PRN
Qty: 15 TABLET | Refills: 0 | Status: SHIPPED | OUTPATIENT
Start: 2025-02-17 | End: 2025-02-17

## 2025-02-17 RX ORDER — HYDROMORPHONE HCL IN WATER/PF 6 MG/30 ML
0.4 PATIENT CONTROLLED ANALGESIA SYRINGE INTRAVENOUS EVERY 5 MIN PRN
Status: DISCONTINUED | OUTPATIENT
Start: 2025-02-17 | End: 2025-02-17 | Stop reason: HOSPADM

## 2025-02-17 RX ORDER — PROPOFOL 10 MG/ML
INJECTION, EMULSION INTRAVENOUS PRN
Status: DISCONTINUED | OUTPATIENT
Start: 2025-02-17 | End: 2025-02-17

## 2025-02-17 RX ORDER — SODIUM CHLORIDE, SODIUM LACTATE, POTASSIUM CHLORIDE, CALCIUM CHLORIDE 600; 310; 30; 20 MG/100ML; MG/100ML; MG/100ML; MG/100ML
INJECTION, SOLUTION INTRAVENOUS CONTINUOUS
Status: DISCONTINUED | OUTPATIENT
Start: 2025-02-17 | End: 2025-02-17

## 2025-02-17 RX ORDER — ENOXAPARIN SODIUM 100 MG/ML
40 INJECTION SUBCUTANEOUS
Status: COMPLETED | OUTPATIENT
Start: 2025-02-17 | End: 2025-02-17

## 2025-02-17 RX ORDER — OXYCODONE HYDROCHLORIDE 5 MG/1
5 TABLET ORAL
Status: DISCONTINUED | OUTPATIENT
Start: 2025-02-17 | End: 2025-02-18 | Stop reason: HOSPADM

## 2025-02-17 RX ORDER — ACETAMINOPHEN 325 MG/1
650 TABLET ORAL
Status: DISCONTINUED | OUTPATIENT
Start: 2025-02-17 | End: 2025-02-18 | Stop reason: HOSPADM

## 2025-02-17 RX ORDER — ONDANSETRON 2 MG/ML
INJECTION INTRAMUSCULAR; INTRAVENOUS PRN
Status: DISCONTINUED | OUTPATIENT
Start: 2025-02-17 | End: 2025-02-17

## 2025-02-17 RX ORDER — CEFAZOLIN SODIUM/WATER 2 G/20 ML
2 SYRINGE (ML) INTRAVENOUS
Status: COMPLETED | OUTPATIENT
Start: 2025-02-17 | End: 2025-02-17

## 2025-02-17 RX ORDER — POLYETHYLENE GLYCOL 3350 17 G/17G
17 POWDER, FOR SOLUTION ORAL DAILY
Qty: 510 G | Refills: 0 | Status: SHIPPED | OUTPATIENT
Start: 2025-02-17

## 2025-02-17 RX ORDER — BUPIVACAINE HYDROCHLORIDE 2.5 MG/ML
INJECTION, SOLUTION INFILTRATION; PERINEURAL PRN
Status: DISCONTINUED | OUTPATIENT
Start: 2025-02-17 | End: 2025-02-17 | Stop reason: HOSPADM

## 2025-02-17 RX ORDER — DOXYCYCLINE 100 MG/1
100 CAPSULE ORAL 2 TIMES DAILY
Qty: 28 CAPSULE | Refills: 0 | Status: SHIPPED | OUTPATIENT
Start: 2025-02-17

## 2025-02-17 RX ORDER — ONDANSETRON 4 MG/1
4 TABLET, ORALLY DISINTEGRATING ORAL EVERY 30 MIN PRN
Status: DISCONTINUED | OUTPATIENT
Start: 2025-02-17 | End: 2025-02-18 | Stop reason: HOSPADM

## 2025-02-17 RX ADMIN — INDOCYANINE GREEN AND WATER 12.5 MG: KIT at 18:19

## 2025-02-17 RX ADMIN — APREPITANT 40 MG: 40 CAPSULE ORAL at 12:56

## 2025-02-17 RX ADMIN — MIDAZOLAM 2 MG: 1 INJECTION INTRAMUSCULAR; INTRAVENOUS at 15:09

## 2025-02-17 RX ADMIN — PROPOFOL 150 MG: 10 INJECTION, EMULSION INTRAVENOUS at 15:15

## 2025-02-17 RX ADMIN — FENTANYL CITRATE 25 MCG: 50 INJECTION, SOLUTION INTRAMUSCULAR; INTRAVENOUS at 21:18

## 2025-02-17 RX ADMIN — OXYCODONE HYDROCHLORIDE 5 MG: 5 TABLET ORAL at 20:42

## 2025-02-17 RX ADMIN — PROPOFOL 50 MG: 10 INJECTION, EMULSION INTRAVENOUS at 15:19

## 2025-02-17 RX ADMIN — Medication 20 MG: at 18:05

## 2025-02-17 RX ADMIN — Medication 20 MG: at 17:31

## 2025-02-17 RX ADMIN — ACETAMINOPHEN 975 MG: 325 TABLET, FILM COATED ORAL at 11:29

## 2025-02-17 RX ADMIN — PROPOFOL 200 MCG/KG/MIN: 10 INJECTION, EMULSION INTRAVENOUS at 15:15

## 2025-02-17 RX ADMIN — FENTANYL CITRATE 100 MCG: 50 INJECTION INTRAMUSCULAR; INTRAVENOUS at 16:20

## 2025-02-17 RX ADMIN — HYDROMORPHONE HYDROCHLORIDE 0.5 MG: 1 INJECTION, SOLUTION INTRAMUSCULAR; INTRAVENOUS; SUBCUTANEOUS at 15:58

## 2025-02-17 RX ADMIN — ENOXAPARIN SODIUM 40 MG: 40 INJECTION SUBCUTANEOUS at 11:30

## 2025-02-17 RX ADMIN — ONDANSETRON 4 MG: 2 INJECTION INTRAMUSCULAR; INTRAVENOUS at 19:08

## 2025-02-17 RX ADMIN — SODIUM CHLORIDE, POTASSIUM CHLORIDE, SODIUM LACTATE AND CALCIUM CHLORIDE: 600; 310; 30; 20 INJECTION, SOLUTION INTRAVENOUS at 15:09

## 2025-02-17 RX ADMIN — ONDANSETRON 4 MG: 2 INJECTION, SOLUTION INTRAMUSCULAR; INTRAVENOUS at 22:23

## 2025-02-17 RX ADMIN — FENTANYL CITRATE 25 MCG: 50 INJECTION, SOLUTION INTRAMUSCULAR; INTRAVENOUS at 21:12

## 2025-02-17 RX ADMIN — Medication 50 MG: at 15:15

## 2025-02-17 RX ADMIN — Medication 20 MG: at 15:52

## 2025-02-17 RX ADMIN — Medication 10 MG: at 16:41

## 2025-02-17 RX ADMIN — Medication 20 MG: at 16:19

## 2025-02-17 RX ADMIN — LIDOCAINE HYDROCHLORIDE 100 MG: 20 INJECTION, SOLUTION INFILTRATION; PERINEURAL at 15:15

## 2025-02-17 RX ADMIN — Medication 2 G: at 15:22

## 2025-02-17 RX ADMIN — Medication 10 MG: at 18:31

## 2025-02-17 RX ADMIN — FENTANYL CITRATE 100 MCG: 50 INJECTION INTRAMUSCULAR; INTRAVENOUS at 15:15

## 2025-02-17 RX ADMIN — TILMANOCEPT 0.5 MILLICURIE: KIT at 11:45

## 2025-02-17 RX ADMIN — SODIUM CHLORIDE, POTASSIUM CHLORIDE, SODIUM LACTATE AND CALCIUM CHLORIDE: 600; 310; 30; 20 INJECTION, SOLUTION INTRAVENOUS at 16:40

## 2025-02-17 RX ADMIN — Medication 100 MG: at 19:14

## 2025-02-17 ASSESSMENT — ACTIVITIES OF DAILY LIVING (ADL)
ADLS_ACUITY_SCORE: 36

## 2025-02-17 NOTE — OR NURSING
No need to go down to nuclear med pre op per nurse in nuc med.  To be sent up to OR.  Dr Perea stated that he would do  block in OR.  Ok per Dr Perea to give lovenox pre op.

## 2025-02-17 NOTE — ANESTHESIA PROCEDURE NOTES
Airway       Patient location during procedure: OR       Procedure Start/Stop Times: 2/17/2025 3:21 PM  Staff -        CRNA: Elaine Fraga APRN CRNA       Performed By: CRNAIndications and Patient Condition       Indications for airway management: juan-procedural         Mask difficulty assessment: 1 - vent by mask    Final Airway Details       Final airway type: endotracheal airway       Successful airway: ETT - single  Endotracheal Airway Details        ETT size (mm): 7.0       Cuffed: yes       Successful intubation technique: direct laryngoscopy       DL Blade Type: MAC 3       Grade View of Cords: 1       Adjucts: stylet       Position: Right       Measured from: lips       Secured at (cm): 22       Bite block used: None    Post intubation assessment        Placement verified by: capnometry, equal breath sounds and chest rise        Number of attempts at approach: 1       Secured with: tape       Ease of procedure: easy       Dentition: Intact and Unchanged    Medication(s) Administered   Medication Administration Time: 2/17/2025 3:21 PM

## 2025-02-18 NOTE — OP NOTE
Preoperative Diagnosis: Left breast cancer    Postoperative Diagnosis: Same    Procedure: Bilateral skin sparing mastectomy, lymphatic mapping, left axillary sentinel lymph node biopsy x 3    Surgeons: Dr. Jose Perea and Dr. Rylie Merrill    Anesthesia: General    Indications for Surgery: The patient is a 39-year-old woman who was diagnosed with a left-sided breast cancer.  She has extensive microcalcifications throughout her breast.  She now presents for surgical treatment    Procedure in Detail: After informed consent the patient was brought the operative room and given a general anesthetic.  I injected technetium sulfur colloid and ICG into her left breast.  She was prepped and draped in the usual fashion.  I started on the right side and keaton out a small elliptical skin incision around the nipple-areolar complex.  The Bovie cautery was used to incise the subcutaneous tissues.  A superior skin flap was raised to the clavicle.  A medial skin flap was raised to the lateral border the sternum.  An inferior skin flap was raised inframammary fold.  Next the breast and pectoralis fashion were removed from the pectoralis major muscle from superior to inferior and from medial to lateral.  After the specimen was dissected off the lateral border the pectoralis major muscle it was divided and sent to pathology.  Strict hemostasis was obtained with the Bovie cautery and with surgical clips.  We made a mirror image incision on the patient's left side skin flaps were raised sharply with the Bovie cautery.  A superior skin flap was raised to the clavicle.  A medial skin flap was raised to the lateral border the sternum.  An inferior skin flap was raised inframammary fold.  Next the breast and pectoralis fashion removed the pectoralis major muscle from superior to inferior and from medial to lateral.  After the specimen was dissected off the lateral border the pectoralis major muscle it was divided oriented and sent to  pathology.  Next the clavipectoral fascia was incised.  I identified a radioactive fluorescent lymph node.  [place] node #1 was removed and had ex vivo counts of 25 counts per second.  I identified another lymph node that was not fluorescent nonradioactive but was a little firm and this was removed and sent as sentinel node #2.  I identified a radioactive and fluorescent lymph node that was removed as sentinel lymph node #3.  He had had ex vivo counts of 200 counts per second and was highly fluorescent.  After removal of the third sentinel lymph node there were no additional palpable radioactive or fluorescent lymph nodes.  Strict hemostasis was obtained with the Bovie cautery and with surgical clips.  On both sides I performed a pec block with quarter percent Marcaine.  Laparotomy pad was placed into each chest and the skin was temporarily closed with skin staples.  The reconstruction would be performed by the plastic surgery team and dictated in a separate note.      [place] Node Biopsy for Breast Cancer - Left  Operation performed with curative intent Yes   Tracer(s) used to identify sentinel nodes in the upfront surgery (non-neoadjuvant) setting Dye   Tracer(s) used to identify sentinel nodes in the neoadjuvant setting Radioactive tracer   All nodes (colored or non-colored) present at the end of a dye-filled lymphatic channel were removed Yes   All significantly radioactive nodes were removed Yes   All palpably suspicious nodes were removed N/A   Biopsy-proven positive nodes marked with clips prior to chemotherapy were identified and removed N/A     Jose Perea MD, MS  Surgical Oncology

## 2025-02-18 NOTE — DISCHARGE INSTRUCTIONS
Tissue Expander Placement Post Op Instructions     General:  Have someone at home with you for the first 24 hours. You may need additional support for the first week.   Get plenty of rest and eat a balanced diet.   Drink plenty of fluids to help with healing and to help prevent constipation.    Avoid alcohol for a minimum of 3 weeks after surgery as it can cause fluid retention.   No smoking or any nicotine products.      Activity:  Start walking around your house as soon as possible, this will help reduce swelling and decrease risk of blood clots.  You may use your arms for activities of daily living but avoid raising over your head until 3 weeks post-op.   Avoid strenuous activities, no working out.  In general, you can drive around 3-4 weeks post op. You must be off all narcotic pain medications.   No heavy lifting, nothing greater than 10lbs until 6 weeks post op.     Medications:  You can take Ibuprofen 400-800 mg and Tylenol 650 mg for pain relief. Please take each every 6 hours, and for optimal pain relief - please stagger the medications so that you are taking one or the other every 3 hours. It is best to make a chart/schedule of this to stay organized. If you are taking additional pain medications, please do not exceed 4000 mg of Tylenol daily from all sources.   You have been prescribed additional pain meds such as narcotics and muscle relaxants, please take as instructed and as needed.   If you are taking narcotic medications, please do not operate heavy machinery or drive.    Please take any antibiotics as prescribed, generally this is for 2 weeks or until the drains are removed.     Incision Care:  Incisions have a tape and glue in place. Please leave in place for 2-3 weeks. If it starts to peel off on its own you can trim back the tape.   Ok to get incisions wet in the shower. No soaking in tubs or baths for 6 weeks or until all incisions/wounds have healed.   It is ok to shower with drains in place  but please cover the drain site with tegaderm dressing or saran wrap.    Please wear a light wrap or soft bra as directed. OK to remove wrap daily to observe the condition of the skin then re-wrap. Ok to re-wrap as needed throughout the day.    Take down ace wrap and monitor incisions daily for signs of infection such as spreading redness to skin.   Small amounts of drainage from incisions can be expected.     Drain Care:  Strip, empty and record GONZALES drain output twice a day. Bring your drain log to your follow up appointment. This is how we will know if drains can be removed.     Follow up:  Follow up with your Surgeon or Physician Assistant will be 7-10 days after discharge   Initial follow up visit will be to assess drains and incisions. The first fill for the tissue expander is generally 3 weeks from surgery. This will be discussed at your follow up appointments.      When to call:  Please call the office and/or consider return to the ER if you experience worsening pain not relieved by medications, increased swelling, redness to skin or high fevers >101F or if there are unexpected problems like shortness of breath.   Contact us on [address] [facility] Monday - Friday, 8 a.m. - 4:30 p.m. or call [phone] to speak with our nursing team   After hours and on weekends, call Hospital Paging at [phone], and ask for the Plastic Surgery Resident on call     Tissue Expansion General Instruction   Your first tissue expansion will likely be about 3 weeks post op but will depend on how things are healing.   You may experience minor discomfort for the following 12 to 24 hours after each tissue expansion. This discomfort usually subsides 2 to 3 days after each tissue expansion. You may take tylenol and ibuprofen as needed for this discomfort   You may experience discomfort more on one side or the other if you have bilateral tissue expanders   You may feel tightness or heaviness across the chest from tissue expansion, this usually  improves with time   Tissue expanders feel firm to the touch, this is temporary and after the expanders are removed your reconstruction with implants or your own tissue will feel softer     Please note:   You cannot have an MRI if you have tissue expanders in place.   The tissue expanders may be detected via the security scanners at the airport. You will need a note from your Doctor if you plan to travel indicating you have tissue expanders in place with metal. We can provide a letter if any travel planned with tissue expanders in place.        Contacting your Doctor -   To contact a doctor, call Dr Perea's office at [phone] ([facility])  or:  [phone] and ask for the resident on call for general (answered 24 hours a day)   Emergency Department:  [facility] [place]: [phone]  [facility]: [phone] 911 if you are in need of immediate or emergent help

## 2025-02-18 NOTE — ANESTHESIA CARE TRANSFER NOTE
Patient: Lesley Mahmood    Procedure: Procedure(s):  SKIN-SPARING MASTECTOMY  SENTINEL LYMPH NODE BIOPSY  Bilateral breast reconstruction with expanders and SPY       Diagnosis: Invasive ductal carcinoma of breast, female, left (H) [C50.912]  Diagnosis Additional Information: No value filed.    Anesthesia Type:   General     Note:    Oropharynx: oropharynx clear of all foreign objects and spontaneously breathing  Level of Consciousness: drowsy  Oxygen Supplementation: nasal cannula  Level of Supplemental Oxygen (L/min / FiO2): 4  Independent Airway: airway patency satisfactory and stable  Dentition: dentition unchanged  Vital Signs Stable: post-procedure vital signs reviewed and stable  Report to RN Given: handoff report given  Patient transferred to: PACU    Handoff Report: Identifed the Patient, Identified the Reponsible Provider, Reviewed the pertinent medical history, Discussed the surgical course, Reviewed Intra-OP anesthesia mangement and issues during anesthesia, Set expectations for post-procedure period and Allowed opportunity for questions and acknowledgement of understanding      Vitals:  Vitals Value Taken Time   BP     Temp     Pulse 78 02/17/25 1943   Resp 14 02/17/25 1943   SpO2 100 % 02/17/25 1943   Vitals shown include unfiled device data.    Electronically Signed By: LEONARD Albarran CRNA  February 17, 2025  7:43 PM

## 2025-02-18 NOTE — OP NOTE
PREOPERATIVE DIAGNOSIS: Status post left breast cancer requiring bilateral nipple-non-sparing mastectomy and immediate reconstruction.     POSTOPERATIVE DIAGNOSIS: Status post left breast cancer requiring bilateral nipple-non-sparing mastectomy and immediate reconstruction.     PROCEDURES:   1. Bilateral immediate breast reconstruction using pre-pectoral expander (Allergan  cc smooth expander, filled with air without tension for about a 600 g mastectomy specimen bilaterally.  Medical Indication for Use in the Pre-pectoral plane: Prevent animation defect, decrease juan-operative pain, decrease anatomical destruction/distortion of the pectoral muscle).   2. Intraoperative chemical angiography with injection of ICG dye and interpretation of the angiogram using the SPY phi system (indication of use was the fact the patient had undergone a nipple-non-sparing mastectomy and we needed to evaluate the blood flow to the skin flaps to decide appropriate reconstruction as well as debridement).    IMPLANTS:     Implant Name Type Inv. Item Serial No.  Lot No. LRB No. Used Action   TISSUE EXPANDER NATRELLE MAGNA 600CC [id number] - [id number] Breast Implant/Tissue Expander TISSUE EXPANDER NATRELLE MAGNA 600CC [id number] [employer] NA Left 1 Implanted   TISSUE EXPANDER NATRELLE MAGNA 600CC [id number] - [id number] Breast Implant/Tissue Expander TISSUE EXPANDER NATRELLE MAGNA 600CC [id number] [employer] NA Right 1 Implanted         SURGEON: Holly Taveras MD     RESIDENT: None    ANESTHESIA: General anesthesia with LMA.    COMPLICATIONS: Nil.     DRAINS: One 15-Sami channel GONZALES drain on each side.    SPECIMENS: Nil.     BLOOD LOSS: 5 cc    DESCRIPTION OF PROCEDURE: After informed consent was taken from the patient, the proper site and procedure was ascertained with her and she was appropriately marked and taken to the operating room. She was placed in a  supine position with her knees comfortably flexed, pillows underneath them and pneumoboots placed and running prior to induction of anesthesia. Preoperative antibiotics were given in the OR and appropriately redosed during the case. General anesthesia was administered without any complications. A Lantigua catheter was inserted. Her arms were abducted to about 50 degrees and appropriately padded. Surgical Oncology took over and began the procedure. The bilateral nipple-non sparing mastectomy was completed and I was called to the operating room. She was reprepped and draped and I began the procedure by first analyzing the skin flaps. Clinically everything looked good. I went ahead and carried out the preoperative chemical angiogram using the SPY Elite System and injection of ICG dye. A total of 5 mL was injected at this time and at 30 seconds we evaluated the blood flow to the surrounding skin flaps. Overall both skin flaps had some dark juan-incisional areas but overall the flow was good. These were marked out for later excision. Given the clinical findings, we decided to proceed with the immediate pre-pectoral reconstruction. Identical steps were performed bilaterally.The dead-space in the lateral areas were closed off and the LMF and IMF were recreated with 0-vicryl sutures.  No Alloderm/ADM was used.  We measured the base width at about 14 cm. The surgical pocket was then irrigated with triple antibiotic solution and betadine. We changed our gloves. The expander was then removed from the package and were placed in the pockets.  All the tabs were sewn down to the chest wall with 0 Prolene sutures.  A 15-[place] channeled GONZALES drain was then placed on each side and brought out through a separate stab incision and sewn into position. The edges of the wounds were refreshened per the SPY and there was bleeding seen at the edges of the wounds and then the skin was closed using 2-0 Monocryl suture in a deep dermal layer.  Air  was filled in both expanders to fill the skin flaps without tension.  We went ahead and then placed a 3-0 Strattafix suture in a running intracuticular manner followed by Surgical Glue and Tape. Appropriate dressings were placed over the drain sites. The patient was wrapped not tightly. The patient tolerated the procedure well. All counts were correct at the end of the case. The patient was extubated and sent to recovery room in stable condition.

## 2025-02-18 NOTE — ANESTHESIA POSTPROCEDURE EVALUATION
Patient: Lesley Mahmood    Procedure: Procedure(s):  SKIN-SPARING MASTECTOMY  SENTINEL LYMPH NODE BIOPSY  Bilateral breast reconstruction with expanders and SPY       Anesthesia Type:  General    Note:  Disposition: Outpatient   Postop Pain Control: Uneventful            Sign Out: Well controlled pain   PONV: No   Neuro/Psych: Uneventful            Sign Out: Acceptable/Baseline neuro status   Airway/Respiratory: Uneventful            Sign Out: Acceptable/Baseline resp. status   CV/Hemodynamics: Uneventful            Sign Out: Acceptable CV status; No obvious hypovolemia; No obvious fluid overload   Other NRE: NONE   DID A NON-ROUTINE EVENT OCCUR? No           Last vitals:  Vitals Value Taken Time   /64 02/17/25 2130   Temp 36.7  C (98.1  F) 02/17/25 1945   Pulse 64 02/17/25 2130   Resp 9 02/17/25 2130   SpO2 97 % 02/17/25 2130   Vitals shown include unfiled device data.    Electronically Signed By: Sal Avelar MD  February 17, 2025  10:18 PM

## 2025-02-19 ENCOUNTER — PATIENT OUTREACH (OUTPATIENT)
Dept: ONCOLOGY | Facility: CLINIC | Age: 40
End: 2025-02-19
Payer: COMMERCIAL

## 2025-02-19 NOTE — PROGRESS NOTES
[facility]: Surgical Oncology Post-op Call Note                                     Discussion with Patient                                                           Surgery:      Bilateral skin sparing mastectomy, lymphatic mapping, left axillary sentinel lymph node biopsy x 3      Surgery date:  2/17/2025     Discharge Date:  2/17/2025    Date of Post-op Call:  2/19/2025       Immediate Concerns:     None. Answered questions regarding dressings, drain and medication refills. Lesley will call if/when she needs an Oxycodone refill.     Fever:   Denies fever/chills.      Pain:  No concerns with pain management. Taking scheduled Tylenol and Oxycodone PRN.   Using pain medications as recommended with appropriate relief.      Incision:   No concerns, healing well, no redness, drainage or edema reported. Has removed wrap and has no concerns with chest wall incisions or drain sites.      Drains:   Drains x2 to suction. Managing stripping and emptying drains with no concerns.         Activity:   No difficulty with ADLs reported.   Patient is up independently at home.   Encouraged patient to continue to stay active.        Post op/follow up plans:      Post op appointment scheduled,confirmed date and time with patient. Direct contact number provided for any additional questions or concerns.     Future Appointments   Date Time Provider Department Center   3/3/2025 11:00 AM Geovanna Moser MD [facility]   3/4/2025 10:30 AM IAN Taveras MD [facility]   3/5/2025 12:45 PM Hansa Beasley MD [facility]   3/7/2025 10:20 AM Jose Perea MD [facility]   3/24/2025 10:20 AM Victorina Arshad, OD [facility]TA[facility]   10/28/2025 10:20 AM [facility]   10/28/2025 11:00 AM IAN Taveras MD [facility]   10/28/2025 12:30 PM Iza Rivera PA-C [facility]   11/25/2025 10:30 AM IAN Taveras MD [facility]         Felipa Johnson, RNCC  [facility]  Surgical Oncology       Approximately 10 min was spent in conversation with the patient.

## 2025-02-28 NOTE — PROGRESS NOTES
Oncology Visit:  Date on this visit: 3/3/2025    Diagnosis:  Clinical prognostic stage Ia, L2cG3A0, grade 2, ER positive, TX positive, HER-2 positive left breast cancer.    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  Routine bilateral screening mammograms on 1/6/2025 showed fine pleomorphic calcifications throughout the left breast and possible medial breast focal asymmetry.  Ultrasound showed a mass at 10 o'clock, 9 cm from the nipple measuring 1 cm and multiple areas with conglomerate of ducts and dilated ducts with associated calcifications in all 4 quadrants of the breast. Ultrasound of the left axilla was without lymphadenopathy.  Pathology of biopsy of the mass at 10 o'clock was c/w grade 3 invasive ductal carcinoma with associated high grade DCIS.  Invasive carcinoma is ER positive (71-80%), TX positive (51-60%), HER2 equivocal by IHC and positive by FISH (HER2 signals/cell of 11.1 and HER2/CEP17 ratio of 4.0).  Pathology of biopsy of calcifications at 3 o'clock is c/w grade 2 invasive lobular carcinoma with associated high grade DCIS and LCIS.  Invasive carcinoma is ER positive (%), TX positive (%), and HER2 negative (IHC 1+).  Breast MRI on 1/23/2025 was with marked background parenchymal enhancement with extensive nonfocal enhancement greater than the right breast.  There were some areas of progressive enhancement greater than background enhanced but no focal mass.  MRI was without lymphadenopathy.  No focal abnormality in the right breast was seen.    She underwent bilateral skin sparing mastectomies and left axillary sentinel lymph node biopsy with Dr. Perea on 2/17/2025.  She had immediate tissue expander placement.  Pathology showed at least 12 foci of high grade invasive ductal carcinoma in the left breast.  The largest focus measured 1.2 cm, examples of additional foci measuring 1 cm, 2.1 mm, 1.1  mm.  In addition, there were at least 7 microscopic foci of grade 2 invasive lobular carcinoma.  This was in a background of extensive high grade DCIS, spanning 12 cm.  LCIS was also present.  The anterior, superior margin was involved by DCIS.  Pathology of the right breast was negative for malignancy.  3 left axillary sentinel lymph nodes were negative for malignancy.    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 Since last visit, on 2/17, she underwent bilateral mastectomies, immediate tissue expander placement and left axillary sentinel lymph node biopsy. She has been recovering well from surgery, but continues to have some pain and discomfort. She will use tylenol, ibuprofen, and infrequently oxycodone for pain. She has bilateral GONZALES drains in place, which hopefully will be removed later this week. She notes little output from the right drain and approximately 20-30 ml daily from the left.  She continues to have some swelling with her axilla, but this is slightly improving.  She is gradually gaining increased range of motion with her arms. She is eating and drinking well, energy level is good. 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 Her energy level is good and she feels well-rested recently. Denies fever, chills, SOB, N/V/D, or rashes.     Past Medical/Surgical History:  Past Medical History:   Diagnosis Date    Invasive ductal carcinoma of breast, female, left (H)      Past Surgical History:   Procedure Laterality Date    BIOPSY NODE SENTINEL Left 2/17/2025    Procedure: SENTINEL LYMPH NODE BIOPSY;  Surgeon: Jose Perea MD;  Location: [facility] OR    birthmark removal  2001    EXTRACTION(S) DENTAL  1995    INSERT TISSUE EXPANDER BREAST Bilateral 2/17/2025    Procedure: Bilateral breast reconstruction with expanders and SPY;  Surgeon: IAN Taveras MD;  Location: [facility] OR    MASTECTOMY SIMPLE  Bilateral 2/17/2025    Procedure: SKIN-SPARING MASTECTOMY;  Surgeon: Jose Perea MD;  Location:  OR     Allergies:  Allergies as of 03/03/2025    (No Known Allergies)     Current Medications:  Current Outpatient Medications   Medication Sig Dispense Refill    doxycycline hyclate (VIBRAMYCIN) 100 MG capsule Take 1 capsule (100 mg) by mouth 2 times daily for 3 days. 6 capsule 0    Nutritional Supplements (VITAMIN D BOOSTER PO) Take 1,000 Units by mouth every morning.      ondansetron (ZOFRAN) 4 MG tablet Take 1 tablet (4 mg) by mouth every 8 hours as needed for nausea. 12 tablet 0    oxyCODONE (ROXICODONE) 5 MG tablet Take 1-2 tablets (5-10 mg) by mouth every 4 hours as needed for pain. 15 tablet 0    polyethylene glycol (MIRALAX) 17 GM/Dose powder Take 17 g by mouth daily. 510 g 0      Family and Social History:  Please see initial consult dated 1/23/2025 for details.    Physical Exam:  /84 (BP Location: Right arm, Patient Position: Sitting, Cuff Size: Adult Regular)   Pulse 90   Temp 98.2  F (36.8  C) (Oral)   Resp 14   Wt 70 kg (154 lb 4.8 oz)   LMP 02/17/2025   SpO2 100%   BMI 26.49 kg/m    General:  Well appearing adult female in NAD.  Alert and oriented x 3.  HEENT:  Normocephalic.  Sclera anicteric.  MMM.   Chest:  Respirating and conversing comfortably on room air.  Breast:  S/p bilateral skin sparing mastectomies with bilateral tissue expanders in place. Incisions are C/D/I, some palpable fluid on L chest>right, b/l drains in place - left drain with more output than right   Abd:  Soft/ND,   Ext:  No edema of the bilateral lower extremities    Musculo:  Equal ROM of the bilateral upper extremities  Neuro:  Cranial nerves grossly intact, ambulates independently   Psych:  Mood and affect appear normal.  Skin:  No rash on exposed skin     Laboratory/Imaging Studies  I personally reviewed the below images and pathology studies while in clinic today:    1/23/2025 Breast MRI:  FINDINGS:  Right  Breast:  Breast composition: Heterogeneous fibroglandular tissue  Background parenchymal enhancement: Marked  There is extensive enhancement seen diffusely through predominantly the lateral aspect of the right breast. No focal masslike enhancement. Presumably much of this is physiologic, but it is indeterminate. There is a slight predominance of progressive   enhancement, but extensive plateau enhancement is noted as well through this area.     Right Axilla: No lymphadenopathy.   Right Internal Mammary Chain: No lymphadenopathy.      Left Breast:   Breast composition: Heterogeneous fibroglandular tissue  Background parenchymal enhancement: Marked  There is extensive nonfocal enhancement throughout the left breast that is significantly greater than what is seen in the right breast. This appears to fit with the extent of calcifications seen on mammography. The enhancement curves are predominantly   plateau, but there are some areas of progressive enhancement as well. Small hematoma is seen at the biopsy site in the 3:00 position. Enhancement about this area is slightly greater than the remainder background enhancement, but it is difficult to   discern a focal mass. There is no masslike enhancement extending posteriorly to the chest wall. No significant focal findings.     Left Axilla: No lymphadenopathy.   Left Internal Mammary Chain: No lymphadenopathy.                                                                       IMPRESSION:   1. Extensive, predominantly plateau enhancement seen diffusely through the left breast that is slightly greater in density about the area of the stereotactic biopsy in the 3:00 position. No good focal masses are seen. At least some of the diffuse   enhancement is likely physiologic, but these findings would be concerning for widespread disease through the left breast. No evidence for chest wall involvement.  2. Extensive enhancement through the right breast predominantly laterally.  This enhancement shows slight predominance of persistent curves. No focal findings to target for biopsy. If bilateral mastectomy is not pursued, six-month follow-up MRI would be   recommended.  3. No evidence for axillary or internal mammary adenopathy on either side.    2/5/2025 Germline NGS Hereditary Cancer: Hematologic Malignancy + Comprehensive Cancer Panels:  RESULTS:  Pathogenic/Likely Pathogenic Variant(s): One Detected  Variant(s) of Uncertain Significance: One Detected  Interpretation  An intronic variant, c.67+32G>C, was identified in the RBM8A gene.    VARIANTS OF UNCERTAIN SIGNIFICANCE:  BRCA1: [id number]; c.1879G>A (p.Jmh100Aig), Het, Uncertain Significance  The c.1879G>A missense variant in BRCA1 results in a p.Pyn885Wsl substitution.    2/17/2025 Pathology bilateral nipple sparing mastectomies and left axillary sentinel lymph node procedure:  Final Diagnosis   A. RIGHT breast, risk-reducing skin-sparing mastectomy:  -Fibrocystic change (including microcysts with apocrine metaplasia) and pseudolactational change  -Benign nipple and skin  -Negative for atypia and malignancy      B. LEFT breast, skin-sparing mastectomy:  -INVASIVE BREAST CARCINOMA, multiple (12) foci  -INVASIVE BREAST CARCINOMA OF NO SPECIAL TYPE (INVASIVE DUCTAL CARCINOMA), [place] grade 3  -Largest focus (not previously biopsied) at 7:00, 12 mm  -Next largest focus (previously biopsied, globe biopsy marker site), at 11:00, 10 mm  -Next largest focus (not previously biopsied), at 3:00, 2.1 mm  -Smallest focus (near top hat biopsy marker site), at 3:00, 1.1 mm  -INVASIVE LOBULAR CARCINOMA, [place] grade 2  -Multiple (7) microscopic foci of invasive and microinvasive carcinoma at 3:00, including prior top hat biopsy marker site (0.6 to 2.0 mm in greatest dimension))  -Focus at 7:00 (near focus of invasive ductal carcinoma), 1.1 mm   -Foci of invasive carcinoma are in a background of extensive ductal carcinoma in situ (DCIS),  nuclear grade 3, cribriform, micropapillary and papillary types, with comedonecrosis and lobular involvement  -DCIS spans an estimated 120 mm, involving the upper outer quadrant, the upper inner quadrant, the lower inner quadrant and nipple  -Nipple is uninvolved by invasive carcinoma  -Skin is uninvolved by carcinoma  -No lymphovascular invasion is identified   -Margins are uninvolved by invasive carcinoma  -Invasive carcinoma is 1.9 mm from the nearest (anterior superior) margin, and >10 mm from the anterior inferior and posterior margins  -Anterior superior margin is involved by DCIS (at approximately 9-10:00)  -DCIS is > 5 mm from the anterior inferior and posterior margins  -Lobular carcinoma in situ (LCIS), classic type  -Other findings: fibrocystic change (including microcysts with apocrine metaplasia) and usual ductal hyperplasia  -Calcifications associated with DCIS  -Prior core biopsy sites identified (two)  -Invasive ductal carcinoma is estrogen receptor positive (71-80%, moderate intensity), progesterone receptor positive (51-60%, moderate intensity) and HER2 equivocal (score 2+) by immunohistochemistry and is HER2 amplified (group 1) by FISH (performed on prior core biopsy, see report [id number] specimen A)  -Invasive lobular carcinoma is estrogen receptor positive (%, strong intensity), progesterone receptor positive (%, strong intensity) and HER2 negative (score 1+) by immunohistochemistry (performed on prior core biopsy, see report [id number] specimen B)  -See comment and microscopic description  -See tumor synoptic below      C. Lymph node, LEFT axillary, sentinel, #1, excision:  -One benign lymph node (0/1)     D. Lymph node, LEFT axillary, sentinel,  #2, excision:  -One benign lymph node (0/1)     E. Lymph node, LEFT axillary, sentinel, #3, excision:  -One benign lymph node (0/1)   Electronically signed by Soo Fatima MD on 3/3/2025 at  2:15 AM   Comment  [id number]   Specimen  "B (LEFT breast, skin-sparing mastectomy):  The invasive carcinoma foci described (see details in the microscopic description below) are in a background of extensive DCIS (and a minor component of classic LCIS). The foci of invasive ductal carcinoma are morphologically similar to each other. The foci of invasive lobular carcinoma are morphologically similar to each other. The described foci of invasive carcinoma appear to be > 5 mm apart.    Synoptic Checklist   INVASIVE CARCINOMA OF THE BREAST: Resection   8th Edition - Protocol posted: 6/19/2024INVASIVE CARCINOMA OF THE BREAST: RESECTION - B, C, D, E  SPECIMEN   Procedure  Total mastectomy   Specimen Laterality  Left   TUMOR   Tumor Site  Clock position     3 o'clock     7 o'clock     11 o'clock   Histologic Type  Invasive carcinoma of no special type (ductal)   Histologic Type Comment  foci of invasive lobular carcinoma also present (see \"additional findings\" below for details of invasive lobular carcinoma grade and size present (see   Histologic Grade ([place]ers Histologic Score)     Glandular (Acinar) / Tubular Differentiation  Score 2   Nuclear Pleomorphism  Score 3   Mitotic Rate  Score 3   Overall Grade  Grade 3 (scores of 8 or 9)   Tumor Size  Greatest dimension of largest invasive focus (Millimeters): 12 mm   Tumor Focality  Multiple foci of invasive carcinoma   Number of Foci  12   Sizes of Individual Foci in Millimeters (mm)  invasive ductal carcinoma 4 foci (12 mm at 7:00, 10 mm at 11:00, 2.1 mm at 3:00, 1.1 mm at 3:00); invasive lobular carcinoma 8 foci (7 foci at 3:00, size 0.6-2.0 mm; one focus at 7:00, size 1.1 mm)   Ductal Carcinoma In Situ (DCIS)  Present     Positive for extensive intraductal component (EIC)   Size (Extent) of DCIS  Estimated size (extent) of DCIS is at least (Millimeters): 120 mm   Architectural Patterns  [place]     Cribriform     Micropapillary     Papillary   Nuclear Grade  Grade III (high)   Necrosis  Present, central " "(expansive \"comedo\" necrosis)   Lobular Carcinoma In Situ (LCIS)  Present   Lymphatic and / or Vascular Invasion  Not identified   Dermal Lymphatic and / or Vascular Invasion  Not identified   Microcalcifications  Present in DCIS   Treatment Effect in the Breast  No known presurgical therapy   MARGINS   Margin Status for Invasive Carcinoma  All margins negative for invasive carcinoma   Distance from Invasive Carcinoma to Closest Margin  1.9 mm   Closest Margin(s) to Invasive Carcinoma  Anterior     Superior   Distance from Invasive Carcinoma to Posterior Margin  Greater than: 10 mm   Margin Status for DCIS  DCIS present at margin   Margin(s) Involved by DCIS  Anterior: focal involvement of anterior superior margin at approximately 9-10:00 (at ink, 1-2 mm, block B33)   Distance from DCIS to Posterior Margin  Greater than: 5 mm   REGIONAL LYMPH NODES   Regional Lymph Node Status  All regional lymph nodes negative for tumor   Total Number of Lymph Nodes Examined (sentinel and non-sentinel)  3   Number of [place] Nodes Examined  3   pTNM CLASSIFICATION (AJCC 8th Edition)   Reporting of pT, pN, and (when applicable) pM categories is based on information available to the pathologist at the time the report is issued. As per the AJCC (Chapter 1, 8th Ed.) it is the managing physician's responsibility to establish the final pathologic stage based upon all pertinent information, including but potentially not limited to this pathology report.   pT Category  pT1c   T Suffix  (m)   pN Category  pN0   N Suffix  (sn)   ADDITIONAL FINDINGS   Additional Findings  Invasive lobular carcinoma tubule formation 3 + nuclear pleomorphism 2 + mitotic activity 1 = [place] score 6 = [place] grade 2   SPECIAL STUDIES        Estrogen Receptor (ER) Status  Positive (greater than 10% of cells demonstrate nuclear positivity)   Percentage of Cells with Nuclear Positivity  71-80%        Progesterone Receptor (PgR) Status  Positive   Percentage " of Cells with Nuclear Positivity  51-60%        HER2 (by immunohistochemistry)  Equivocal (Score 2+)   Percentage of Cells with Uniform Intense Complete Membrane Staining  0 %        HER2 (by in situ hybridization)  Positive (amplified)          ASSESSMENT/PLAN:  Patient is a 40 yo female with multifocal left breast cancer.  She has a [id number], grade 3, ER positive, MS positive, HER2 positive invasive ductal carcinoma and a B1dA3P1, grade 2, ER positive, MS positive, HER2 negative invasive lobular carcinoma of the left breast.       Left breast invasive ductal and lobular breast cancers:  - We reviewed the pathology from the bilateral mastectomies and left axillary sentinel lymph node procedure performed on 2/17/2025.  Pathology of the left breast showed at least 4 foci of invasive ductal carcinoma, the largest measuring 1.2 cm.  Additional foci of IDC measuring 1 cm, 2.1 mm and 1.1 mm.  In addition there were at least 7 microscopic foci of invasive and microinvasive lobular carcinoma.  Three sentinel lymph nodes were negative for malignancy.  - Anterior superior margin was positive for DCIS.  There was extensive DCIS spanning 12 cm.  Will review her case at [facility] tumor board to discuss whether adjuvant radiation therapy is recommended.  - I recommend initiating systemic treatment at this time.  We reviewed the role of chemotherapy + HER2 targeted therapy in reducing the future risk of distant recurrence of HER2 positive breast cancer.  My recommendation is for treatment with Taxotere, carboplatin, Herceptin, and pertuzumab administered IV once every 3 weeks x 6 cycles.  We reviewed the potential side effects of this regimen including fatigue, hair loss, nausea, diarrhea, constipation, low blood counts, increased risk of infections, peripheral neuropathy, elevation of liver tests and cardiac toxicity.  We reviewed the use of cold capping to prevent hair loss.  I recommend icing of the hands and feet to  prevent peripheral neuropathy.  We plan to obtain an echocardiogram prior to start of treatment and once every 3 months while on HER2 targeted therapy.  I recommend the chemotherapy be administered via a port-a-catheter which can be placed in a day procedure by interventional radiology.  - Following completion of initial chemotherapy + HER2 targeted therapy, we will continue Herceptin IV once every 3 weeks x 13 additional cycles to complete one year total of HER2 targeted therapy.  - We plan to treat with an adjuvant course of endocrine therapy.  My recommendation will be for ovarian suppression plus an aromatase inhibitor, total treatment duration of 10 years.  -She will consider extending her short-term disability. We recommended extending her leave through cycle 1 of chemotherapy if possible, so that she can get a sense of how she will feel with later cycles and can plan her work schedule accordingly.     2.  RBM8A pathogenic mutation; BRCA1 varian of undetermined significance:  She has a personal history of two different types of breast cancer.  I reviewed Caleb Estevez's 1/30/2025 genetic counseling note.  Dr. Mahmood elected to proceed with BRCA 1/g genetic analysis with reflext to Breast Actionable, Common Hereditary Cancer and Hereditary Hematologic Malignancy panels.  This testing showed a pathogenic gene mutation in RBM8A.  This is a common recessive variant and in the absence of a second RBM8A mutation, represents a carrier status.  She was also found to have a variant of undetermined significance in BRCA1.  We discussed variants of undetermined significance do not change cancer screening recommendations.    Plan:   - Continue to follow-up with surgery, potential b/l drain removal later this week   - IR port placement and echo within 2 weeks.  labs, [facility] visit, and TCHP 3/17.  Will need labs, provider visit, and TCHP every 3 weeks x 6 total.  - Will review her case at breast cancer tumor board on 3/7 to  determine if any indication for adjuvant radiation in the setting of positive anterior superior DCIS margin and 12 cm area of DCIS.    Patient was seen and discussed with medical oncology fellow, Dr. Ha Barnes.  The oncology fellow was personally supervised by me during the patient examination. I personally verified the medical history, performed a physical exam and the medical decision-making. I made appropriate changes to the documentation and the assessment and plan based on my verification, exam, and medical decision making.    The longitudinal plan of care for the diagnosis(es)/condition(s) as documented were addressed during this visit. Due to the added complexity in care, I will continue to support Lesley in the subsequent management and with ongoing continuity of care.    I spent 75 minutes on the date of the encounter doing chart review, review of test results, interpretation of tests, patient visit, documentation, and discussion with other provider(s)

## 2025-03-03 ENCOUNTER — ONCOLOGY VISIT (OUTPATIENT)
Dept: ONCOLOGY | Facility: CLINIC | Age: 40
End: 2025-03-03
Attending: INTERNAL MEDICINE
Payer: COMMERCIAL

## 2025-03-03 VITALS
TEMPERATURE: 98.2 F | BODY MASS INDEX: 26.49 KG/M2 | HEART RATE: 90 BPM | OXYGEN SATURATION: 100 % | SYSTOLIC BLOOD PRESSURE: 123 MMHG | DIASTOLIC BLOOD PRESSURE: 84 MMHG | WEIGHT: 154.3 LBS | RESPIRATION RATE: 14 BRPM

## 2025-03-03 DIAGNOSIS — C50.812 MALIGNANT NEOPLASM OF OVERLAPPING SITES OF LEFT BREAST IN FEMALE, ESTROGEN RECEPTOR POSITIVE (H): Primary | ICD-10-CM

## 2025-03-03 DIAGNOSIS — Z90.13 S/P MASTECTOMY, BILATERAL: ICD-10-CM

## 2025-03-03 DIAGNOSIS — Z15.89: ICD-10-CM

## 2025-03-03 DIAGNOSIS — Z17.0 MALIGNANT NEOPLASM OF OVERLAPPING SITES OF LEFT BREAST IN FEMALE, ESTROGEN RECEPTOR POSITIVE (H): Primary | ICD-10-CM

## 2025-03-03 DIAGNOSIS — Z51.11 ENCOUNTER FOR ANTINEOPLASTIC CHEMOTHERAPY: ICD-10-CM

## 2025-03-03 LAB
PATH REPORT.COMMENTS IMP SPEC: ABNORMAL
PATH REPORT.COMMENTS IMP SPEC: YES
PATH REPORT.FINAL DX SPEC: ABNORMAL
PATH REPORT.GROSS SPEC: ABNORMAL
PATH REPORT.MICROSCOPIC SPEC OTHER STN: ABNORMAL
PATH REPORT.RELEVANT HX SPEC: ABNORMAL
PATHOLOGY SYNOPTIC REPORT: ABNORMAL
PHOTO IMAGE: ABNORMAL

## 2025-03-03 PROCEDURE — 99213 OFFICE O/P EST LOW 20 MIN: CPT | Performed by: INTERNAL MEDICINE

## 2025-03-03 PROCEDURE — 99215 OFFICE O/P EST HI 40 MIN: CPT | Performed by: INTERNAL MEDICINE

## 2025-03-03 PROCEDURE — 99417 PROLNG OP E/M EACH 15 MIN: CPT | Performed by: INTERNAL MEDICINE

## 2025-03-03 RX ORDER — ACETAMINOPHEN 500 MG
500-1000 TABLET ORAL EVERY 6 HOURS PRN
COMMUNITY

## 2025-03-03 ASSESSMENT — PAIN SCALES - GENERAL: PAINLEVEL_OUTOF10: MILD PAIN (3)

## 2025-03-03 NOTE — LETTER
3/3/2025      Lesley Mahmood  [address] [facility] [id number]      Dear Colleague,    Thank you for referring your patient, Lesley Mahmood, to the [facility]. Please see a copy of my visit note below.    Oncology Visit:  Date on this visit: 3/3/2025    Diagnosis:  Clinical prognostic stage Ia, [id number], grade 2, ER positive, KS positive, HER-2 positive left breast cancer.    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  Routine bilateral screening mammograms on 1/6/2025 showed fine pleomorphic calcifications throughout the left breast and possible medial breast focal asymmetry.  Ultrasound showed a mass at 10 o'clock, 9 cm from the nipple measuring 1 cm and multiple areas with conglomerate of ducts and dilated ducts with associated calcifications in all 4 quadrants of the breast. Ultrasound of the left axilla was without lymphadenopathy.  Pathology of biopsy of the mass at 10 o'clock was c/w grade 3 invasive ductal carcinoma with associated high grade DCIS.  Invasive carcinoma is ER positive (71-80%), KS positive (51-60%), HER2 equivocal by IHC and positive by FISH (HER2 signals/cell of 11.1 and HER2/CEP17 ratio of 4.0).  Pathology of biopsy of calcifications at 3 o'clock is c/w grade 2 invasive lobular carcinoma with associated high grade DCIS and LCIS.  Invasive carcinoma is ER positive (%), KS positive (%), and HER2 negative (IHC 1+).  Breast MRI on 1/23/2025 was with marked background parenchymal enhancement with extensive nonfocal enhancement greater than the right breast.  There were some areas of progressive enhancement greater than background enhanced but no focal mass.  MRI was without lymphadenopathy.  No focal abnormality in the right breast was seen.    She underwent bilateral skin sparing mastectomies and left axillary sentinel lymph node biopsy with Dr. Perea on  2/17/2025.  She had immediate tissue expander placement.  Pathology showed at least 12 foci of high grade invasive ductal carcinoma in the left breast.  The largest focus measured 1.2 cm, examples of additional foci measuring 1 cm, 2.1 mm, 1.1 mm.  In addition, there were at least 7 microscopic foci of grade 2 invasive lobular carcinoma.  This was in a background of extensive high grade DCIS, spanning 12 cm.  LCIS was also present.  The anterior, superior margin was involved by DCIS.  Pathology of the right breast was negative for malignancy.  3 left axillary sentinel lymph nodes were negative for malignancy.    Interval History:  Clara Timoteo presents to clinic with her  Mauro. Since last visit, on 2/17, she underwent bilateral mastectomies, immediate tissue expander placement and left axillary sentinel lymph node biopsy. She has been recovering well from surgery, but continues to have some pain and discomfort. She will use tylenol, ibuprofen, and infrequently oxycodone for pain. She has bilateral GONZALES drains in place, which hopefully will be removed later this week. She notes little output from the right drain and approximately 20-30 ml daily from the left.  She continues to have some swelling with her axilla, but this is slightly improving.  She is gradually gaining increased range of motion with her arms. She is eating and drinking well, energy level is good. 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 Her energy level is good and she feels well-rested recently. Denies fever, chills, SOB, N/V/D, or rashes.     Past Medical/Surgical History:  Past Medical History:   Diagnosis Date     Invasive ductal carcinoma of breast, female, left (H)      Past Surgical History:   Procedure Laterality Date     BIOPSY NODE SENTINEL Left 2/17/2025    Procedure: SENTINEL LYMPH NODE BIOPSY;  Surgeon: Jose Perea MD;  Location:  OR     [facility]  removal  2001     EXTRACTION(S) DENTAL  1995     INSERT TISSUE EXPANDER BREAST Bilateral 2/17/2025    Procedure: Bilateral breast reconstruction with expanders and SPY;  Surgeon: IAN Taveras MD;  Location: [facility] OR     MASTECTOMY SIMPLE Bilateral 2/17/2025    Procedure: SKIN-SPARING MASTECTOMY;  Surgeon: Jose Perea MD;  Location: [facility] OR     Allergies:  Allergies as of 03/03/2025     (No Known Allergies)     Current Medications:  Current Outpatient Medications   Medication Sig Dispense Refill     doxycycline hyclate (VIBRAMYCIN) 100 MG capsule Take 1 capsule (100 mg) by mouth 2 times daily for 3 days. 6 capsule 0     Nutritional Supplements (VITAMIN D BOOSTER PO) Take 1,000 Units by mouth every morning.       ondansetron (ZOFRAN) 4 MG tablet Take 1 tablet (4 mg) by mouth every 8 hours as needed for nausea. 12 tablet 0     oxyCODONE (ROXICODONE) 5 MG tablet Take 1-2 tablets (5-10 mg) by mouth every 4 hours as needed for pain. 15 tablet 0     polyethylene glycol (MIRALAX) 17 GM/Dose powder Take 17 g by mouth daily. 510 g 0      Family and Social History:  Please see initial consult dated 1/23/2025 for details.    Physical Exam:  /84 (BP Location: Right arm, Patient Position: Sitting, Cuff Size: Adult Regular)   Pulse 90   Temp 98.2  F (36.8  C) (Oral)   Resp 14   Wt 70 kg (154 lb 4.8 oz)   LMP 02/17/2025   SpO2 100%   BMI 26.49 kg/m    General:  Well appearing adult female in NAD.  Alert and oriented x 3.  HEENT:  Normocephalic.  Sclera anicteric.  MMM.   Chest:  Respirating and conversing comfortably on room air.  Breast:  S/p bilateral skin sparing mastectomies with bilateral tissue expanders in place. Incisions are C/D/I, some palpable fluid on L chest>right, b/l drains in place - left drain with more output than right   Abd:  Soft/ND,   Ext:  No edema of the bilateral lower extremities    Musculo:  Equal ROM of the bilateral upper extremities  Neuro:  Cranial nerves grossly intact,  ambulates independently   Psych:  Mood and affect appear normal.  Skin:  No rash on exposed skin     Laboratory/Imaging Studies  I personally reviewed the below images and pathology studies while in clinic today:    1/23/2025 Breast MRI:  FINDINGS:  Right Breast:  Breast composition: Heterogeneous fibroglandular tissue  Background parenchymal enhancement: Marked  There is extensive enhancement seen diffusely through predominantly the lateral aspect of the right breast. No focal masslike enhancement. Presumably much of this is physiologic, but it is indeterminate. There is a slight predominance of progressive   enhancement, but extensive plateau enhancement is noted as well through this area.     Right Axilla: No lymphadenopathy.   Right Internal Mammary Chain: No lymphadenopathy.      Left Breast:   Breast composition: Heterogeneous fibroglandular tissue  Background parenchymal enhancement: Marked  There is extensive nonfocal enhancement throughout the left breast that is significantly greater than what is seen in the right breast. This appears to fit with the extent of calcifications seen on mammography. The enhancement curves are predominantly   plateau, but there are some areas of progressive enhancement as well. Small hematoma is seen at the biopsy site in the 3:00 position. Enhancement about this area is slightly greater than the remainder background enhancement, but it is difficult to   discern a focal mass. There is no masslike enhancement extending posteriorly to the chest wall. No significant focal findings.     Left Axilla: No lymphadenopathy.   Left Internal Mammary Chain: No lymphadenopathy.                                                                       IMPRESSION:   1. Extensive, predominantly plateau enhancement seen diffusely through the left breast that is slightly greater in density about the area of the stereotactic biopsy in the 3:00 position. No good focal masses are seen. At least some of  the diffuse   enhancement is likely physiologic, but these findings would be concerning for widespread disease through the left breast. No evidence for chest wall involvement.  2. Extensive enhancement through the right breast predominantly laterally. This enhancement shows slight predominance of persistent curves. No focal findings to target for biopsy. If bilateral mastectomy is not pursued, six-month follow-up MRI would be   recommended.  3. No evidence for axillary or internal mammary adenopathy on either side.    2/5/2025 Germline NGS Hereditary Cancer: Hematologic Malignancy + Comprehensive Cancer Panels:  RESULTS:  Pathogenic/Likely Pathogenic Variant(s): One Detected  Variant(s) of Uncertain Significance: One Detected  Interpretation  An intronic variant, c.67+32G>C, was identified in the RBM8A gene.    VARIANTS OF UNCERTAIN SIGNIFICANCE:  BRCA1: [id number]; c.1879G>A (p.[id number]), Het, Uncertain Significance  The c.1879G>A missense variant in BRCA1 results in a p.[id number] substitution.    2/17/2025 Pathology bilateral nipple sparing mastectomies and left axillary sentinel lymph node procedure:  Final Diagnosis   A. RIGHT breast, risk-reducing skin-sparing mastectomy:  -Fibrocystic change (including microcysts with apocrine metaplasia) and pseudolactational change  -Benign nipple and skin  -Negative for atypia and malignancy      B. LEFT breast, skin-sparing mastectomy:  -INVASIVE BREAST CARCINOMA, multiple (12) foci  -INVASIVE BREAST CARCINOMA OF NO SPECIAL TYPE (INVASIVE DUCTAL CARCINOMA), [place] grade 3  -Largest focus (not previously biopsied) at 7:00, 12 mm  -Next largest focus (previously biopsied, globe biopsy marker site), at 11:00, 10 mm  -Next largest focus (not previously biopsied), at 3:00, 2.1 mm  -Smallest focus (near top hat biopsy marker site), at 3:00, 1.1 mm  -INVASIVE LOBULAR CARCINOMA, Georgiana grade 2  -Multiple (7) microscopic foci of invasive and microinvasive carcinoma at  3:00, including prior top hat biopsy marker site (0.6 to 2.0 mm in greatest dimension))  -Focus at 7:00 (near focus of invasive ductal carcinoma), 1.1 mm   -Foci of invasive carcinoma are in a background of extensive ductal carcinoma in situ (DCIS), nuclear grade 3, cribriform, micropapillary and papillary types, with comedonecrosis and lobular involvement  -DCIS spans an estimated 120 mm, involving the upper outer quadrant, the upper inner quadrant, the lower inner quadrant and nipple  -Nipple is uninvolved by invasive carcinoma  -Skin is uninvolved by carcinoma  -No lymphovascular invasion is identified   -Margins are uninvolved by invasive carcinoma  -Invasive carcinoma is 1.9 mm from the nearest (anterior superior) margin, and >10 mm from the anterior inferior and posterior margins  -Anterior superior margin is involved by DCIS (at approximately 9-10:00)  -DCIS is > 5 mm from the anterior inferior and posterior margins  -Lobular carcinoma in situ (LCIS), classic type  -Other findings: fibrocystic change (including microcysts with apocrine metaplasia) and usual ductal hyperplasia  -Calcifications associated with DCIS  -Prior core biopsy sites identified (two)  -Invasive ductal carcinoma is estrogen receptor positive (71-80%, moderate intensity), progesterone receptor positive (51-60%, moderate intensity) and HER2 equivocal (score 2+) by immunohistochemistry and is HER2 amplified (group 1) by FISH (performed on prior core biopsy, see report [id number] specimen A)  -Invasive lobular carcinoma is estrogen receptor positive (%, strong intensity), progesterone receptor positive (%, strong intensity) and HER2 negative (score 1+) by immunohistochemistry (performed on prior core biopsy, see report [id number] specimen B)  -See comment and microscopic description  -See tumor synoptic below      C. Lymph node, LEFT axillary, sentinel, #1, excision:  -One benign lymph node (0/1)     D. Lymph node, LEFT axillary,  "sentinel,  #2, excision:  -One benign lymph node (0/1)     E. Lymph node, LEFT axillary, sentinel, #3, excision:  -One benign lymph node (0/1)   Electronically signed by Soo Fatima MD on 3/3/2025 at  2:15 AM   Comment  [id number]   Specimen B (LEFT breast, skin-sparing mastectomy):  The invasive carcinoma foci described (see details in the microscopic description below) are in a background of extensive DCIS (and a minor component of classic LCIS). The foci of invasive ductal carcinoma are morphologically similar to each other. The foci of invasive lobular carcinoma are morphologically similar to each other. The described foci of invasive carcinoma appear to be > 5 mm apart.    Synoptic Checklist   INVASIVE CARCINOMA OF THE BREAST: Resection   8th Edition - Protocol posted: 6/19/2024INVASIVE CARCINOMA OF THE BREAST: RESECTION - B, C, D, E  SPECIMEN   Procedure  Total mastectomy   Specimen Laterality  Left   TUMOR   Tumor Site  Clock position     3 o'clock     7 o'clock     11 o'clock   Histologic Type  Invasive carcinoma of no special type (ductal)   Histologic Type Comment  foci of invasive lobular carcinoma also present (see \"additional findings\" below for details of invasive lobular carcinoma grade and size present (see   Histologic Grade (Remington Histologic Score)     Glandular (Acinar) / Tubular Differentiation  Score 2   Nuclear Pleomorphism  Score 3   Mitotic Rate  Score 3   Overall Grade  Grade 3 (scores of 8 or 9)   Tumor Size  Greatest dimension of largest invasive focus (Millimeters): 12 mm   Tumor Focality  Multiple foci of invasive carcinoma   Number of Foci  12   Sizes of Individual Foci in Millimeters (mm)  invasive ductal carcinoma 4 foci (12 mm at 7:00, 10 mm at 11:00, 2.1 mm at 3:00, 1.1 mm at 3:00); invasive lobular carcinoma 8 foci (7 foci at 3:00, size 0.6-2.0 mm; one focus at 7:00, size 1.1 mm)   Ductal Carcinoma In Situ (DCIS)  Present     Positive for extensive intraductal " "component (EIC)   Size (Extent) of DCIS  Estimated size (extent) of DCIS is at least (Millimeters): 120 mm   Architectural Patterns  [place]     Cribriform     Micropapillary     Papillary   Nuclear Grade  Grade III (high)   Necrosis  Present, central (expansive \"comedo\" necrosis)   Lobular Carcinoma In Situ (LCIS)  Present   Lymphatic and / or Vascular Invasion  Not identified   Dermal Lymphatic and / or Vascular Invasion  Not identified   Microcalcifications  Present in DCIS   Treatment Effect in the Breast  No known presurgical therapy   MARGINS   Margin Status for Invasive Carcinoma  All margins negative for invasive carcinoma   Distance from Invasive Carcinoma to Closest Margin  1.9 mm   Closest Margin(s) to Invasive Carcinoma  Anterior     Superior   Distance from Invasive Carcinoma to Posterior Margin  Greater than: 10 mm   Margin Status for DCIS  DCIS present at margin   Margin(s) Involved by DCIS  Anterior: focal involvement of anterior superior margin at approximately 9-10:00 (at ink, 1-2 mm, block B33)   Distance from DCIS to Posterior Margin  Greater than: 5 mm   REGIONAL LYMPH NODES   Regional Lymph Node Status  All regional lymph nodes negative for tumor   Total Number of Lymph Nodes Examined (sentinel and non-sentinel)  3   Number of Kirksey Nodes Examined  3   pTNM CLASSIFICATION (AJCC 8th Edition)   Reporting of pT, pN, and (when applicable) pM categories is based on information available to the pathologist at the time the report is issued. As per the AJCC (Chapter 1, 8th Ed.) it is the managing physician's responsibility to establish the final pathologic stage based upon all pertinent information, including but potentially not limited to this pathology report.   pT Category  pT1c   T Suffix  (m)   pN Category  pN0   N Suffix  (sn)   ADDITIONAL FINDINGS   Additional Findings  Invasive lobular carcinoma tubule formation 3 + nuclear pleomorphism 2 + mitotic activity 1 = Ponce score 6 = Georgiana " grade 2   SPECIAL STUDIES        Estrogen Receptor (ER) Status  Positive (greater than 10% of cells demonstrate nuclear positivity)   Percentage of Cells with Nuclear Positivity  71-80%        Progesterone Receptor (PgR) Status  Positive   Percentage of Cells with Nuclear Positivity  51-60%        HER2 (by immunohistochemistry)  Equivocal (Score 2+)   Percentage of Cells with Uniform Intense Complete Membrane Staining  0 %        HER2 (by in situ hybridization)  Positive (amplified)          ASSESSMENT/PLAN:  Patient is a 40 yo female with multifocal left breast cancer.  She has a [id number], grade 3, ER positive, MI positive, HER2 positive invasive ductal carcinoma and a X2rC5P2, grade 2, ER positive, MI positive, HER2 negative invasive lobular carcinoma of the left breast.       Left breast invasive ductal and lobular breast cancers:  - We reviewed the pathology from the bilateral mastectomies and left axillary sentinel lymph node procedure performed on 2/17/2025.  Pathology of the left breast showed at least 4 foci of invasive ductal carcinoma, the largest measuring 1.2 cm.  Additional foci of IDC measuring 1 cm, 2.1 mm and 1.1 mm.  In addition there were at least 7 microscopic foci of invasive and microinvasive lobular carcinoma.  Three sentinel lymph nodes were negative for malignancy.  - Anterior superior margin was positive for DCIS.  There was extensive DCIS spanning 12 cm.  [facility] to discuss whether adjuvant radiation therapy is recommended.  - I recommend initiating systemic treatment at this time.  We reviewed the role of chemotherapy + HER2 targeted therapy in reducing the future risk of distant recurrence of HER2 positive breast cancer.  My recommendation is for treatment with Taxotere, carboplatin, Herceptin, and pertuzumab administered IV once every 3 weeks x 6 cycles.  We reviewed the potential side effects of this regimen including fatigue, hair loss,  nausea, diarrhea, constipation, low blood counts, increased risk of infections, peripheral neuropathy, elevation of liver tests and cardiac toxicity.  We reviewed the use of cold capping to prevent hair loss.  I recommend icing of the hands and feet to prevent peripheral neuropathy.  We plan to obtain an echocardiogram prior to start of treatment and once every 3 months while on HER2 targeted therapy.  I recommend the chemotherapy be administered via a port-a-catheter which can be placed in a day procedure by interventional radiology.  - Following completion of initial chemotherapy + HER2 targeted therapy, we will continue Herceptin IV once every 3 weeks x 13 additional cycles to complete one year total of HER2 targeted therapy.  - We plan to treat with an adjuvant course of endocrine therapy.  My recommendation will be for ovarian suppression plus an aromatase inhibitor, total treatment duration of 10 years.  -She will consider extending her short-term disability. We recommended extending her leave through cycle 1 of chemotherapy if possible, so that she can get a sense of how she will feel with later cycles and can plan her work schedule accordingly.     2.  RBM8A pathogenic mutation; BRCA1 varian of undetermined significance:  She has a personal history of two different types of breast cancer.  I reviewed Caleb Estevez's 1/30/2025 genetic counseling note.  Dr. Mahmood elected to proceed with BRCA 1/g genetic analysis with reflext to Breast Actionable, Common Hereditary Cancer and Hereditary Hematologic Malignancy panels.  This testing showed a pathogenic gene mutation in RBM8A.  This is a common recessive variant and in the absence of a second RBM8A mutation, represents a carrier status.  She was also found to have a variant of undetermined significance in BRCA1.  We discussed variants of undetermined significance do not change cancer screening recommendations.    Plan:   - Continue to follow-up with surgery,  potential b/l drain removal later this week   - IR port placement and echo within 2 weeks.  labs, [facility]ANNA[facility] visit, and TCHP 3/17.  Will need labs, provider visit, and TCHP every 3 weeks x 6 total.  - Will review her case at breast cancer tumor board on 3/7 to determine if any indication for adjuvant radiation in the setting of positive anterior superior DCIS margin and 12 cm area of DCIS.    Patient was seen and discussed with medical oncology fellow, Dr. Ha Barnes.  The oncology fellow was personally supervised by me during the patient examination. I personally verified the medical history, performed a physical exam and the medical decision-making. I made appropriate changes to the documentation and the assessment and plan based on my verification, exam, and medical decision making.    The longitudinal plan of care for the diagnosis(es)/condition(s) as documented were addressed during this visit. Due to the added complexity in care, I will continue to support Lesley in the subsequent management and with ongoing continuity of care.    I spent 75 minutes on the date of the encounter doing chart review, review of test results, interpretation of tests, patient visit, documentation, and discussion with other provider(s)                  Again, thank you for allowing me to participate in the care of your patient.        Sincerely,        Geovanna Moser MD    Electronically signed

## 2025-03-03 NOTE — NURSING NOTE
"Oncology Rooming Note    March 3, 2025 10:54 AM   Lesley Mahmood is a 39 year old female who presents for:    Chief Complaint   Patient presents with    Oncology Clinic Visit     Invasive ductal carcinoma of breast, female, left      Initial Vitals: /84 (BP Location: Right arm, Patient Position: Sitting, Cuff Size: Adult Regular)   Pulse 90   Temp 98.2  F (36.8  C) (Oral)   Resp 14   Wt 70 kg (154 lb 4.8 oz)   LMP 02/17/2025   SpO2 100%   BMI 26.49 kg/m   Estimated body mass index is 26.49 kg/m  as calculated from the following:    Height as of 2/17/25: 1.626 m (5' 4\").    Weight as of this encounter: 70 kg (154 lb 4.8 oz). Body surface area is 1.78 meters squared.  Mild Pain (3) Comment: Data Unavailable   Patient's last menstrual period was 02/17/2025.  Allergies reviewed: Yes  Medications reviewed: Yes    Medications: Medication refills not needed today.  Pharmacy name entered into [facility]: [facility] - [place], [place] - [address]    Frailty Screening:   Is the patient here for a new oncology consult visit in cancer care? 2. No    PHQ9:  Did this patient require a PHQ9?: No      Clinical concerns: Patient states no new concerns to discuss with provider.        Dominik Perez, EMT            "

## 2025-03-04 DIAGNOSIS — Z51.11 ENCOUNTER FOR ANTINEOPLASTIC CHEMOTHERAPY: Primary | ICD-10-CM

## 2025-03-05 ENCOUNTER — OFFICE VISIT (OUTPATIENT)
Dept: PLASTIC SURGERY | Facility: CLINIC | Age: 40
End: 2025-03-05
Payer: COMMERCIAL

## 2025-03-05 VITALS — SYSTOLIC BLOOD PRESSURE: 133 MMHG | HEART RATE: 98 BPM | OXYGEN SATURATION: 100 % | DIASTOLIC BLOOD PRESSURE: 81 MMHG

## 2025-03-05 DIAGNOSIS — C50.812 MALIGNANT NEOPLASM OF OVERLAPPING SITES OF LEFT BREAST IN FEMALE, ESTROGEN RECEPTOR POSITIVE (H): Primary | ICD-10-CM

## 2025-03-05 DIAGNOSIS — Z98.890 S/P BREAST RECONSTRUCTION, BILATERAL: ICD-10-CM

## 2025-03-05 DIAGNOSIS — Z17.0 MALIGNANT NEOPLASM OF OVERLAPPING SITES OF LEFT BREAST IN FEMALE, ESTROGEN RECEPTOR POSITIVE (H): Primary | ICD-10-CM

## 2025-03-05 ASSESSMENT — PAIN SCALES - GENERAL: PAINLEVEL_OUTOF10: MILD PAIN (2)

## 2025-03-05 NOTE — NURSING NOTE
Chief Complaint   Patient presents with    Post-op Visit       Vitals:    03/05/25 0941   BP: 133/81   BP Location: Right arm   Patient Position: Sitting   Cuff Size: Adult Regular   Pulse: 98   SpO2: 100%       There is no height or weight on file to calculate BMI.    Rich Gates EMT-P

## 2025-03-05 NOTE — LETTER
3/5/2025       RE: Lesley Mahmood  [address]  [facility] [address]     Dear Colleague,    Thank you for referring your patient, Lesley Mahmood, to the [facility]IN[facility] [place] at [facility]. Please see a copy of my visit note below.    PRESENTING COMPLAINT:  Post-operative visit status post left breast cancer underwent bilateral nipple nonsparing mastectomy and immediate expander based reconstruction on 2/17/2025    HISTORY OF PRESENTING COMPLAINT: The patient is here for post-operative visit.  The patient is being seen in the presence of my nurse.     Doing very well.  Minimal pain.  GONAZLES drainage is less than 30 cc a day.      Will be requiring adjuvant chemotherapy and possibly radiation therapy.  Final decisions of radiation therapy are being discussed this Friday.    On exam: Vital signs stable afebrile.  Both chest wounds healing and well.  No evidence of infection seroma or hematoma.     ASSESSMENT AND PLAN:  Based upon the above findings, the patient is here for post-operative visit.     Plan is to remove the GONZALES drains today and start aggressive moisturization.  Refrain from any heavy activities for 4 more weeks.  We will start expansion this Friday.  Plan is a Abbi flap in the future.  Currently on the books for November 2025.  That may change if she needs radiation therapy.  I will see her back after she has finished adjuvant treatments.    All questions were answered.  The patient was happy with the visit.      Again, thank you for allowing me to participate in the care of your patient.      Sincerely,    IAN Taveras MD

## 2025-03-05 NOTE — PROGRESS NOTES
PRESENTING COMPLAINT:  Post-operative visit status post left breast cancer underwent bilateral nipple nonsparing mastectomy and immediate expander based reconstruction on 2/17/2025    HISTORY OF PRESENTING COMPLAINT: The patient is here for post-operative visit.  The patient is being seen in the presence of my nurse.     Doing very well.  Minimal pain.  GONZALES drainage is less than 30 cc a day.      Will be requiring adjuvant chemotherapy and possibly radiation therapy.  Final decisions of radiation therapy are being discussed this Friday.    On exam: Vital signs stable afebrile.  Both chest wounds healing and well.  No evidence of infection seroma or hematoma.     ASSESSMENT AND PLAN:  Based upon the above findings, the patient is here for post-operative visit.     Plan is to remove the GONZALES drains today and start aggressive moisturization.  Refrain from any heavy activities for 4 more weeks.  We will start expansion this Friday.  Plan is a Abbi flap in the future.  Currently on the books for November 2025.  That may change if she needs radiation therapy.  I will see her back after she has finished adjuvant treatments.    All questions were answered.  The patient was happy with the visit.

## 2025-03-07 ENCOUNTER — TUMOR CONFERENCE (OUTPATIENT)
Dept: ONCOLOGY | Facility: CLINIC | Age: 40
End: 2025-03-07
Payer: COMMERCIAL

## 2025-03-07 ENCOUNTER — ONCOLOGY VISIT (OUTPATIENT)
Dept: ONCOLOGY | Facility: CLINIC | Age: 40
End: 2025-03-07
Payer: COMMERCIAL

## 2025-03-07 VITALS
TEMPERATURE: 97.4 F | BODY MASS INDEX: 26.78 KG/M2 | WEIGHT: 156 LBS | SYSTOLIC BLOOD PRESSURE: 121 MMHG | HEART RATE: 86 BPM | DIASTOLIC BLOOD PRESSURE: 79 MMHG | OXYGEN SATURATION: 100 % | RESPIRATION RATE: 16 BRPM

## 2025-03-07 DIAGNOSIS — Z17.0 MALIGNANT NEOPLASM OF OVERLAPPING SITES OF LEFT BREAST IN FEMALE, ESTROGEN RECEPTOR POSITIVE (H): Primary | ICD-10-CM

## 2025-03-07 DIAGNOSIS — C50.812 MALIGNANT NEOPLASM OF OVERLAPPING SITES OF LEFT BREAST IN FEMALE, ESTROGEN RECEPTOR POSITIVE (H): Primary | ICD-10-CM

## 2025-03-07 ASSESSMENT — PAIN SCALES - GENERAL: PAINLEVEL_OUTOF10: MILD PAIN (3)

## 2025-03-07 NOTE — PROGRESS NOTES
And is here for postoperative visit after undergoing bilateral mastectomies and immediate reconstruction with a sentinel node biopsy on February 17.  She has done very well since her surgery.  Her drains have been removed by plastic surgery.  On physical examination her skin is healing well with no evidence of infection or skin necrosis.  Her pathology report demonstrated 12 cm of DCIS with a focally positive anterior margin.  She had 4 small invasive cancers.  Adjuvant chemotherapy is recommended.  At our multidisciplinary conference this morning radiation oncologist did not think there was a strong indication for postmastectomy radiation therapy.  I will see her in the future if any problems arise.

## 2025-03-07 NOTE — NURSING NOTE
"Oncology Rooming Note    March 7, 2025 10:08 AM   Lesley Mahmood is a 39 year old female who presents for:    Chief Complaint   Patient presents with    Oncology Clinic Visit     Malignant neoplasm of overlapping sites of left breast in female, estrogen receptor positive     Initial Vitals: /79 (BP Location: Right arm, Patient Position: Sitting, Cuff Size: Adult Regular)   Pulse 86   Temp 97.4  F (36.3  C) (Oral)   Resp 16   Wt 70.8 kg (156 lb)   LMP 02/17/2025   SpO2 100%   BMI 26.78 kg/m   Estimated body mass index is 26.78 kg/m  as calculated from the following:    Height as of 2/17/25: 1.626 m (5' 4\").    Weight as of this encounter: 70.8 kg (156 lb). Body surface area is 1.79 meters squared.  Mild Pain (3) Comment: Data Unavailable   Patient's last menstrual period was 02/17/2025.  Allergies reviewed: Yes  Medications reviewed: Yes    Medications: Medication refills not needed today.  Pharmacy name entered into [facility]: [facility] - [place], [place] - [address]    Frailty Screening:   Is the patient here for a new oncology consult visit in cancer care? 2. No    PHQ9:  Did this patient require a PHQ9?: No      Clinical concerns: Patient states no new concerns to discuss with provider.        Dominik Perez, EMT            "

## 2025-03-09 ENCOUNTER — PATIENT OUTREACH (OUTPATIENT)
Dept: ONCOLOGY | Facility: CLINIC | Age: 40
End: 2025-03-09
Payer: COMMERCIAL

## 2025-03-09 NOTE — PROGRESS NOTES
New Patient Oncology Nurse Navigator Note     Referring provider: Geovanna Moser MD      Referring Clinic/Organization: [facility]     Referred to (specialty:) Radiation Oncology      Date Referral Received: March 7, 2025     Evaluation for:  C50.812, Z17.0 (ICD-10-CM) - Malignant neoplasm of overlapping sites of left breast in female, estrogen receptor positive (H)      Clinical History (per Nurse review of records provided):      Lesley Mahmood is a 39 year old female who presented for screening mammogram on 1/6/25 revealing possible calcifications left breast calcifications, upper outer, lower outer and medial quadrants mid to posterior depth.  Possible medial breast focal asymmetry CC view mid depth. Diagnostic imaging followed on 1/10 and on ultrasound in left breast at 10:00, 9 cm from nipple, there is an irregular hypoechoic mass with spiculated margins measuring 1 x 0.5 x 0.7 cm. In the left breast at 11:00, 7 cm the nipple, there is conglomerate of ducts with calcifications measuring 0.9 x 0.4 x 1.3 cm. In the left breast at 2:00, 7 cm from nipple, there is a conglomerate of ducts with calcifications measuring 0.6 x 0.6 x 1 cm. In the left breast at 8:00, 4 cm and the nipple, there is a conglomerate of ducts with calcifications measuring 1 x 0.6 x 1 cm. There are dilated ducts containing calcifications that correspond to mammogram in all 4 quadrants. No suspicious left axillary lymph nodes.    1/13/25 - Case: [id number]    A. LEFT breast, 10:00, 9 cm from nipple, ultrasound guided core biopsy:  -INVASIVE BREAST CARCINOMA OF NO SPECIAL TYPE (INVASIVE DUCTAL CARCINOMA), [place] grade 3  -Ductal carcinoma in situ (DCIS), nuclear grade 3, cribriform and solid types, with comedonecrosis  -Invasive carcinoma is estrogen receptor positive, progesterone receptor positive and HER2 equivocal (score 2+) by immunohistochemistry (see biomarker reporting template  below)  -HER2 FISH results will be reported separately by cytogenetics  -See comment     B. LEFT breast, 3:00,  calcifications, stereotactic core biopsy:  -INVASIVE LOBULAR CARCINOMA, [place] grade 2  -Ductal carcinoma in situ (DCIS), nuclear grade 3, cribriform and solid types, with comedonecrosis  -Lobular carcinoma in situ (LCIS), classic type  -Calcifications associated with DCIS  -Invasive carcinoma is estrogen receptor positive, progesterone receptor positive and HER2 negative (score 1+) by immunohistochemistry (see biomarker reporting template below)  -See comment  Comment    Specimen A: Invasive carcinoma involves multiple tissue cores, and is 7 mm in greatest dimension in a single core.  The [place] grade is 3 (tubule formation 3 + nuclear pleomorphism 3 + mitotic activity 2 = [place] score 8).      Specimen B: Invasive carcinoma involves multiple tissue cores, and is 2 mm in greatest dimension in a single core.  The [place] grade is 2 (tubule formation 3 + nuclear pleomorphism 2 + mitotic activity 1 = [place] score 6).  HER2 by FISH subsequently reported as POSITIVE                                 She met with Dr. Perea and Dr. Moser and Dr. Taveras.    1/23/25 - bilateral breast MRI  IMPRESSION:   1. Extensive, predominantly plateau enhancement seen diffusely through the left breast that is slightly greater in density about the area of the stereotactic biopsy in the 3:00 position. No good focal masses are seen. At least some of the diffuse   enhancement is likely physiologic, but these findings would be concerning for widespread disease through the left breast. No evidence for chest wall involvement.  2. Extensive enhancement through the right breast predominantly laterally. This enhancement shows slight predominance of persistent curves. No focal findings to target for biopsy. If bilateral mastectomy is not pursued, six-month follow-up MRI would be   recommended.  3. No evidence for  axillary or internal mammary adenopathy on either side.    2/17/25 - Bilateral mastectomies with immediate tissue expander placement.  [id number]  A. RIGHT breast, risk-reducing skin-sparing mastectomy:  -Fibrocystic change (including microcysts with apocrine metaplasia) and pseudolactational change  -Benign nipple and skin  -Negative for atypia and malignancy  B. LEFT breast, skin-sparing mastectomy:  -INVASIVE BREAST CARCINOMA, multiple (12) foci  -INVASIVE BREAST CARCINOMA OF NO SPECIAL TYPE (INVASIVE DUCTAL CARCINOMA), [place] grade 3  -Largest focus (not previously biopsied) at 7:00, 12 mm  -Next largest focus (previously biopsied, globe biopsy marker site), at 11:00, 10 mm  -Next largest focus (not previously biopsied), at 3:00, 2.1 mm  -Smallest focus (near top hat biopsy marker site), at 3:00, 1.1 mm  -INVASIVE LOBULAR CARCINOMA, [place] grade 2  -Multiple (7) microscopic foci of invasive and microinvasive carcinoma at 3:00, including prior top hat biopsy marker site (0.6 to 2.0 mm in greatest dimension))  -Focus at 7:00 (near focus of invasive ductal carcinoma), 1.1 mm  -Foci of invasive carcinoma are in a background of extensive ductal carcinoma in situ (DCIS), nuclear grade 3, cribriform, micropapillary and papillary types, with comedonecrosis and lobular involvement  -DCIS spans an estimated 120 mm, involving the upper outer quadrant, the upper inner quadrant, the lower inner quadrant and nipple  -Nipple is uninvolved by invasive carcinoma  -Skin is uninvolved by carcinoma  -No lymphovascular invasion is identified  -Margins are uninvolved by invasive carcinoma  -Invasive carcinoma is 1.9 mm from the nearest (anterior superior) margin, and >10 mm from the anterior inferior and posterior margins  -Anterior superior margin is involved by DCIS (at approximately 9-10:00)  -DCIS is > 5 mm from the anterior inferior and posterior margins  -Lobular carcinoma in situ (LCIS), classic type  -Other findings:  fibrocystic change (including microcysts with apocrine metaplasia) and usual ductal  hyperplasia  -Calcifications associated with DCIS  -Prior core biopsy sites identified (two)  -Invasive ductal carcinoma is estrogen receptor positive (71-80%, moderate intensity), progesterone  receptor positive (51-60%, moderate intensity) and HER2 equivocal (score 2+) by immunohistochemistry and is HER2 amplified (group 1) by FISH (performed on prior core biopsy, see report [id number] specimen A)  -Invasive lobular carcinoma is estrogen receptor positive (%, strong intensity), progesterone receptor positive (%, strong intensity) and HER2 negative (score 1+) by immunohistochemistry (performed on prior core biopsy, see report [id number] specimen B)  -See comment and microscopic description  -See tumor synoptic below  C. Lymph node, LEFT axillary, sentinel, #1, excision:  -One benign lymph node (0/1)  D. Lymph node, LEFT axillary, sentinel, #2, excision:  -One benign lymph node (0/1)  E. Lymph node, LEFT axillary, sentinel, #3, excision:  -One benign lymph node (0/1)  Comment  Specimen B (LEFT breast, skin-sparing mastectomy):  The invasive carcinoma foci described (see details in the microscopic description below) are in a background of extensive DCIS (and a minor component of classic LCIS). The foci of invasive ductal carcinoma are morphologically similar to each other. The foci of invasive lobular carcinoma are morphologically similar to each other. The described foci of invasive carcinoma appear to  be > 5 mm apart.    Dr. Moser has recommended for chemotherapy THP rather than TCHP. THP consists of paclitaxel and Herceptin administered IV weekly, along with pertuzumab administered IV once every 3 weeks, the combination for a total of 12 weeks. This will then be followed by Herceptin once every 3 weeks to complete a year.      Lesley's case was discussed at breast tumor conference and the pros and cons of  postmastectomy radiation given extensive DCIS with positive anterior margin was reviewed.  Radiation oncology stated risk of local recurrence is likely low and will be even lower given planned course of endocrine therapy. They did recommend a radiation oncology referral be placed so that the pros and cons of radiation could be discussed with the patient.     She is scheduled for port placement on 3/18.   Chemo start planned for 3/19.    3/10 1026  - Telephoned and spoke with Lesley. Explained my role and purpose for the call Writer received referral, reviewed for appropriate plan, and call warm transferred to New Patient Scheduling for completion.

## 2025-03-11 ENCOUNTER — PATIENT OUTREACH (OUTPATIENT)
Dept: ONCOLOGY | Facility: CLINIC | Age: 40
End: 2025-03-11
Payer: COMMERCIAL

## 2025-03-11 NOTE — PROGRESS NOTES
[facility]: Cancer Care Plan of Care Education Note                                    Discussion with Patient:                                                      Called pt and talked with her and her  for chemo teaching on THP.      Antiemetics: Zofran, decadron, pepcid      Assessment:                                                      Assessment completed with:: Patient, Spouse or significant other    Plan of Care Education   Yearly learning assessment completed?: Yes (see Education tab)  Diagnosis:: breast ca  Does patient understand diagnosis?: Yes  Tx plan/regimen:: THP  Does patient understand treatment plan/regimen?: Yes  Preparing for treatment:: Reviewed treatment preparation information with patient (vascular access, day of chemo, visitor policy, what to bring, etc.)  Vascular access education provided for:: Port  Side effect education:: Diarrhea/Constipation, Sexual health, Skin changes, Urinary, DVT/PE, Fatigue, Hair loss, Immune-mediated effects, Infection, Neuropathy, Nausea/Vomiting, Mylosuppression, Mouth sores, Lab value monitoring (anemia, neutropenia, thrombocytopenia)  Safety/self care at home reviewed with patient:: Yes  Coping - concerns/fears reviewed with patient:: Yes  Plan of Care:: HANNAH follow-up appointment, Lab appointment, Imaging, MD follow-up appointment, Treatment schedule  When to call provider:: Bleeding, Uncontrolled diarrhea/constipation, Increased shortness of breath, Uncontrolled nausea/vomiting, New/worsening pain, Shaking chills, Temperature >100.4F  Reasons for deferring treatment reviewed with patient:: Yes  Additional education provided for: : Neutropenic precautions    Evaluation of Learning  Patient Education Provided: Yes  Readiness:: Acceptance  Method:: Explanation, Literature  Response:: Verbalizes understanding      Intervention/Education provided during outreach:                                                       Discussed the side effects of  the Chemotherapy medications, how to menage the side effects and when to call the Clinic. Pt and her  had several questions- we discussed Cold capping, using ice for hand/feet, wig prescription and answered questions. Discussed Premeds and take home meds. Pt verbalized understanding and reported all her questions were answered.     Patient to follow up as scheduled at next appt  Patient to call/[employer] message with updates  Confirmed patient has clinic and triage numbers    Signature:  Berenice Beverly RNCC

## 2025-03-17 ENCOUNTER — PATIENT OUTREACH (OUTPATIENT)
Dept: ONCOLOGY | Facility: CLINIC | Age: 40
End: 2025-03-17

## 2025-03-17 ENCOUNTER — TELEPHONE (OUTPATIENT)
Dept: ONCOLOGY | Facility: CLINIC | Age: 40
End: 2025-03-17

## 2025-03-17 DIAGNOSIS — C50.812 MALIGNANT NEOPLASM OF OVERLAPPING SITES OF LEFT BREAST IN FEMALE, ESTROGEN RECEPTOR POSITIVE (H): ICD-10-CM

## 2025-03-17 DIAGNOSIS — Z17.0 MALIGNANT NEOPLASM OF OVERLAPPING SITES OF LEFT BREAST IN FEMALE, ESTROGEN RECEPTOR POSITIVE (H): ICD-10-CM

## 2025-03-17 DIAGNOSIS — Z51.11 ENCOUNTER FOR ANTINEOPLASTIC CHEMOTHERAPY: Primary | ICD-10-CM

## 2025-03-17 NOTE — TELEPHONE ENCOUNTER
STD forms received via FAX from [facility].      Forms to be completed and put in folder for provider to approve.    Fax #:  [phone]  Claim: [id number]    Brooke Lugo

## 2025-03-18 ENCOUNTER — LAB (OUTPATIENT)
Dept: LAB | Facility: CLINIC | Age: 40
End: 2025-03-18
Attending: INTERNAL MEDICINE
Payer: COMMERCIAL

## 2025-03-18 ENCOUNTER — ANCILLARY PROCEDURE (OUTPATIENT)
Dept: CARDIOLOGY | Facility: CLINIC | Age: 40
End: 2025-03-18
Attending: INTERNAL MEDICINE
Payer: COMMERCIAL

## 2025-03-18 ENCOUNTER — ANCILLARY PROCEDURE (OUTPATIENT)
Dept: RADIOLOGY | Facility: AMBULATORY SURGERY CENTER | Age: 40
End: 2025-03-18
Attending: INTERNAL MEDICINE
Payer: COMMERCIAL

## 2025-03-18 ENCOUNTER — HOSPITAL ENCOUNTER (OUTPATIENT)
Facility: AMBULATORY SURGERY CENTER | Age: 40
Discharge: HOME | End: 2025-03-18
Attending: RADIOLOGY | Admitting: RADIOLOGY
Payer: COMMERCIAL

## 2025-03-18 VITALS
RESPIRATION RATE: 16 BRPM | TEMPERATURE: 98.4 F | HEIGHT: 64 IN | BODY MASS INDEX: 25.61 KG/M2 | HEART RATE: 76 BPM | DIASTOLIC BLOOD PRESSURE: 76 MMHG | SYSTOLIC BLOOD PRESSURE: 112 MMHG | OXYGEN SATURATION: 99 % | WEIGHT: 150 LBS

## 2025-03-18 DIAGNOSIS — Z17.0 MALIGNANT NEOPLASM OF OVERLAPPING SITES OF LEFT BREAST IN FEMALE, ESTROGEN RECEPTOR POSITIVE (H): ICD-10-CM

## 2025-03-18 DIAGNOSIS — Z17.0 MALIGNANT NEOPLASM OF OVERLAPPING SITES OF LEFT BREAST IN FEMALE, ESTROGEN RECEPTOR POSITIVE (H): Primary | ICD-10-CM

## 2025-03-18 DIAGNOSIS — C50.812 MALIGNANT NEOPLASM OF OVERLAPPING SITES OF LEFT BREAST IN FEMALE, ESTROGEN RECEPTOR POSITIVE (H): ICD-10-CM

## 2025-03-18 DIAGNOSIS — Z80.42 FAMILY HISTORY OF PROSTATE CANCER: ICD-10-CM

## 2025-03-18 DIAGNOSIS — C50.812 MALIGNANT NEOPLASM OF OVERLAPPING SITES OF LEFT BREAST IN FEMALE, ESTROGEN RECEPTOR POSITIVE (H): Primary | ICD-10-CM

## 2025-03-18 DIAGNOSIS — Z51.11 ENCOUNTER FOR ANTINEOPLASTIC CHEMOTHERAPY: ICD-10-CM

## 2025-03-18 DIAGNOSIS — Z80.6 FAMILY HISTORY OF CLL (CHRONIC LYMPHOID LEUKEMIA): ICD-10-CM

## 2025-03-18 LAB
ALBUMIN SERPL BCG-MCNC: 4.4 G/DL (ref 3.5–5.2)
ALP SERPL-CCNC: 47 U/L (ref 40–150)
ALT SERPL W P-5'-P-CCNC: 8 U/L (ref 0–50)
ANION GAP SERPL CALCULATED.3IONS-SCNC: 10 MMOL/L (ref 7–15)
AST SERPL W P-5'-P-CCNC: 16 U/L (ref 0–45)
BASOPHILS # BLD AUTO: 0.1 10E3/UL (ref 0–0.2)
BASOPHILS NFR BLD AUTO: 1 %
BILIRUB SERPL-MCNC: 0.4 MG/DL
BUN SERPL-MCNC: 9.3 MG/DL (ref 6–20)
CALCIUM SERPL-MCNC: 9.3 MG/DL (ref 8.8–10.4)
CHLORIDE SERPL-SCNC: 102 MMOL/L (ref 98–107)
CREAT SERPL-MCNC: 0.72 MG/DL (ref 0.51–0.95)
EGFRCR SERPLBLD CKD-EPI 2021: >90 ML/MIN/1.73M2
EOSINOPHIL # BLD AUTO: 0.3 10E3/UL (ref 0–0.7)
EOSINOPHIL NFR BLD AUTO: 6 %
ERYTHROCYTE [DISTWIDTH] IN BLOOD BY AUTOMATED COUNT: 12.6 % (ref 10–15)
GLUCOSE SERPL-MCNC: 100 MG/DL (ref 70–99)
HCG UR QL: NEGATIVE
HCO3 SERPL-SCNC: 24 MMOL/L (ref 22–29)
HCT VFR BLD AUTO: 38.5 % (ref 35–47)
HGB BLD-MCNC: 13 G/DL (ref 11.7–15.7)
IMM GRANULOCYTES # BLD: 0 10E3/UL
IMM GRANULOCYTES NFR BLD: 0 %
INTERNAL QC OK POCT: NORMAL
LVEF ECHO: NORMAL
LYMPHOCYTES # BLD AUTO: 2.3 10E3/UL (ref 0.8–5.3)
LYMPHOCYTES NFR BLD AUTO: 38 %
MCH RBC QN AUTO: 29.5 PG (ref 26.5–33)
MCHC RBC AUTO-ENTMCNC: 33.8 G/DL (ref 31.5–36.5)
MCV RBC AUTO: 88 FL (ref 78–100)
MONOCYTES # BLD AUTO: 0.5 10E3/UL (ref 0–1.3)
MONOCYTES NFR BLD AUTO: 8 %
NEUTROPHILS # BLD AUTO: 3 10E3/UL (ref 1.6–8.3)
NEUTROPHILS NFR BLD AUTO: 48 %
NRBC # BLD AUTO: 0 10E3/UL
NRBC BLD AUTO-RTO: 0 /100
PLATELET # BLD AUTO: 311 10E3/UL (ref 150–450)
POCT KIT EXPIRATION DATE: NORMAL
POCT KIT LOT NUMBER: NORMAL
POTASSIUM SERPL-SCNC: 4.4 MMOL/L (ref 3.4–5.3)
PROT SERPL-MCNC: 7 G/DL (ref 6.4–8.3)
RBC # BLD AUTO: 4.4 10E6/UL (ref 3.8–5.2)
SODIUM SERPL-SCNC: 136 MMOL/L (ref 135–145)
WBC # BLD AUTO: 6.2 10E3/UL (ref 4–11)

## 2025-03-18 PROCEDURE — 36561 INSERT TUNNELED CV CATH: CPT | Mod: RT | Performed by: RADIOLOGY

## 2025-03-18 PROCEDURE — 85004 AUTOMATED DIFF WBC COUNT: CPT

## 2025-03-18 PROCEDURE — 82374 ASSAY BLOOD CARBON DIOXIDE: CPT

## 2025-03-18 PROCEDURE — 76937 US GUIDE VASCULAR ACCESS: CPT | Mod: 26 | Performed by: RADIOLOGY

## 2025-03-18 PROCEDURE — 93306 TTE W/DOPPLER COMPLETE: CPT | Performed by: INTERNAL MEDICINE

## 2025-03-18 PROCEDURE — 36415 COLL VENOUS BLD VENIPUNCTURE: CPT

## 2025-03-18 PROCEDURE — 81025 URINE PREGNANCY TEST: CPT | Performed by: PATHOLOGY

## 2025-03-18 PROCEDURE — 82310 ASSAY OF CALCIUM: CPT

## 2025-03-18 PROCEDURE — 36561 INSERT TUNNELED CV CATH: CPT | Mod: RT

## 2025-03-18 PROCEDURE — 77001 FLUOROGUIDE FOR VEIN DEVICE: CPT | Mod: 26 | Performed by: RADIOLOGY

## 2025-03-18 PROCEDURE — 93356 MYOCRD STRAIN IMG SPCKL TRCK: CPT | Performed by: INTERNAL MEDICINE

## 2025-03-18 PROCEDURE — 99152 MOD SED SAME PHYS/QHP 5/>YRS: CPT | Performed by: RADIOLOGY

## 2025-03-18 DEVICE — SMARTPORT PLASTIC LOW PROFILE PORT WITH VORTEX TECHNOLOGY
Type: IMPLANTABLE DEVICE | Site: CHEST | Status: FUNCTIONAL
Brand: BIOFLO VORTEX

## 2025-03-18 RX ORDER — NALOXONE HYDROCHLORIDE 0.4 MG/ML
0.4 INJECTION, SOLUTION INTRAMUSCULAR; INTRAVENOUS; SUBCUTANEOUS
Status: DISCONTINUED | OUTPATIENT
Start: 2025-03-18 | End: 2025-03-19 | Stop reason: HOSPADM

## 2025-03-18 RX ORDER — CEFAZOLIN SODIUM 2 G/50ML
2 SOLUTION INTRAVENOUS
Status: DISCONTINUED | OUTPATIENT
Start: 2025-03-18 | End: 2025-03-19 | Stop reason: HOSPADM

## 2025-03-18 RX ORDER — NALOXONE HYDROCHLORIDE 0.4 MG/ML
0.2 INJECTION, SOLUTION INTRAMUSCULAR; INTRAVENOUS; SUBCUTANEOUS
Status: DISCONTINUED | OUTPATIENT
Start: 2025-03-18 | End: 2025-03-19 | Stop reason: HOSPADM

## 2025-03-18 RX ORDER — HEPARIN SODIUM (PORCINE) LOCK FLUSH IV SOLN 100 UNIT/ML 100 UNIT/ML
SOLUTION INTRAVENOUS DAILY PRN
Status: DISCONTINUED | OUTPATIENT
Start: 2025-03-18 | End: 2025-03-18 | Stop reason: HOSPADM

## 2025-03-18 RX ORDER — FENTANYL CITRATE 50 UG/ML
INJECTION, SOLUTION INTRAMUSCULAR; INTRAVENOUS DAILY PRN
Status: DISCONTINUED | OUTPATIENT
Start: 2025-03-18 | End: 2025-03-18 | Stop reason: HOSPADM

## 2025-03-18 RX ORDER — FENTANYL CITRATE 50 UG/ML
25-50 INJECTION, SOLUTION INTRAMUSCULAR; INTRAVENOUS EVERY 5 MIN PRN
Status: DISCONTINUED | OUTPATIENT
Start: 2025-03-18 | End: 2025-03-19 | Stop reason: HOSPADM

## 2025-03-18 RX ORDER — HEPARIN SODIUM,PORCINE 10 UNIT/ML
5-10 VIAL (ML) INTRAVENOUS
Status: DISCONTINUED | OUTPATIENT
Start: 2025-03-18 | End: 2025-03-19 | Stop reason: HOSPADM

## 2025-03-18 RX ORDER — CEFAZOLIN SODIUM 2 G/50ML
2 SOLUTION INTRAVENOUS SEE ADMIN INSTRUCTIONS
Status: DISCONTINUED | OUTPATIENT
Start: 2025-03-18 | End: 2025-03-19 | Stop reason: HOSPADM

## 2025-03-18 RX ORDER — HEPARIN SODIUM,PORCINE 10 UNIT/ML
5-10 VIAL (ML) INTRAVENOUS EVERY 24 HOURS
Status: DISCONTINUED | OUTPATIENT
Start: 2025-03-18 | End: 2025-03-19 | Stop reason: HOSPADM

## 2025-03-18 RX ORDER — FLUMAZENIL 0.1 MG/ML
0.2 INJECTION, SOLUTION INTRAVENOUS
Status: DISCONTINUED | OUTPATIENT
Start: 2025-03-18 | End: 2025-03-19 | Stop reason: HOSPADM

## 2025-03-18 RX ORDER — ACETAMINOPHEN 325 MG/1
975 TABLET ORAL ONCE
Status: COMPLETED | OUTPATIENT
Start: 2025-03-18 | End: 2025-03-18

## 2025-03-18 RX ORDER — HEPARIN SODIUM (PORCINE) LOCK FLUSH IV SOLN 100 UNIT/ML 100 UNIT/ML
5-10 SOLUTION INTRAVENOUS
Status: DISCONTINUED | OUTPATIENT
Start: 2025-03-18 | End: 2025-03-19 | Stop reason: HOSPADM

## 2025-03-18 RX ADMIN — ACETAMINOPHEN 975 MG: 325 TABLET ORAL at 11:45

## 2025-03-18 RX ADMIN — CEFAZOLIN SODIUM 2 G: 2 SOLUTION INTRAVENOUS at 10:40

## 2025-03-18 NOTE — BRIEF OP NOTE
[facility] [place]    Brief Operative Note    Pre-operative diagnosis: Malignant neoplasm of overlapping sites of left breast in female, estrogen receptor positive (H) [C50.812, Z17.0]  Post-operative diagnosis Same as pre-operative diagnosis    Procedure: Insert port vascular access, Right - Chest    Surgeon: Surgeons and Role:     * Julio César Bird MD - Primary  Anesthesia: Moderate Sedation   Estimated Blood Loss: Minimal    Drains: None  Specimens: * No specimens in log *  Findings:   None.  Complications: None.  Implants:   Implant Name Type Inv. Item Serial No.  Lot No. LRB No. Used Action   IMP SMART PORT LOW PROFILE 300 PSI [id number] - [id number] Port IMP SMART PORT LOW PROFILE 300 PSI [id number]  [employer] [id number] Right 1 Implanted           6 [place] 17 cm single lumen Ang[employer]ynamics Plastic CT Smart Port placed via right internal jugular vein. Tip in the high right atrium. Heparin locked and ready for use.    1 mg Versed and 50 mcg Fentanyl IV    Sedation time: 27 minutes.

## 2025-03-18 NOTE — NURSING NOTE
Chief Complaint   Patient presents with    Labs Only     Labs drawn with PIV start by RN. Pt tolerated well.      Meg Watson RN

## 2025-03-18 NOTE — PROGRESS NOTES
Virtual Visit Details    Type of service:  Video Visit     Originating Location (pt. Location): Home    Distant Location (provider location):  Off-site  Platform used for Video Visit: [facility]    Oncology Visit:  Date on this visit: Mar 19, 2025    Diagnosis:  Clinical prognostic stage Ia, [id number], grade 2, ER positive, GA positive, HER-2 positive left breast cancer.    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  Routine bilateral screening mammograms on 1/6/2025 showed fine pleomorphic calcifications throughout the left breast and possible medial breast focal asymmetry.  Ultrasound showed a mass at 10 o'clock, 9 cm from the nipple measuring 1 cm and multiple areas with conglomerate of ducts and dilated ducts with associated calcifications in all 4 quadrants of the breast. Ultrasound of the left axilla was without lymphadenopathy.  Pathology of biopsy of the mass at 10 o'clock was c/w grade 3 invasive ductal carcinoma with associated high grade DCIS.  Invasive carcinoma is ER positive (71-80%), GA positive (51-60%), HER2 equivocal by IHC and positive by FISH (HER2 signals/cell of 11.1 and HER2/CEP17 ratio of 4.0).  Pathology of biopsy of calcifications at 3 o'clock is c/w grade 2 invasive lobular carcinoma with associated high grade DCIS and LCIS.  Invasive carcinoma is ER positive (%), GA positive (%), and HER2 negative (IHC 1+).  Breast MRI on 1/23/2025 was with marked background parenchymal enhancement with extensive nonfocal enhancement greater than the right breast.  There were some areas of progressive enhancement greater than background enhanced but no focal mass.  MRI was without lymphadenopathy.  No focal abnormality in the right breast was seen.    She underwent bilateral skin sparing mastectomies and left axillary sentinel lymph node biopsy with Dr. Perea on 2/17/2025.  She had immediate tissue expander  placement.  Pathology showed a total of at least 12 foci invasive carcinoma.  At least 4 foci of high grade invasive ductal carcinoma in the left breast.  The largest focus measured 1.2 cm, examples of additional foci measuring 1 cm, 2.1 mm, 1.1 mm.  In addition, there were at least 7 microscopic foci of grade 2 invasive lobular carcinoma.  This was in a background of extensive high grade DCIS, spanning 12 cm.  LCIS was also present.  The anterior, superior margin was involved by DCIS.  Pathology of the right breast was negative for malignancy.  3 left axillary sentinel lymph nodes were negative for malignancy.    She met with Dr. Moser who ultimately recommended adjuvant therapy with THP x 12 weeks followed by Herceptin x 1 year. Consideration of adjuvant radiation given extensive DCIS and positive margin.     Interval History: Lesley is seen prior to starting cycle 1 day 1 of THP. She had port placement, labs, and echo done yesterday. Was feeling really well and recovering well from surgery but now has some chest wall and shoulder pain after port placement yesterday. She has been icing.     No recent fevers/chills or infectious concerns. Mastectomy incisions are healing well. No concerning skin changes. Bowel and bladder are at baseline.     She is planning to cold cap and ice hands and feet during taxol.     Past Medical/Surgical History:  Past Medical History:   Diagnosis Date    Invasive ductal carcinoma of breast, female, left (H)      Past Surgical History:   Procedure Laterality Date    BIOPSY NODE SENTINEL Left 2/17/2025    Procedure: SENTINEL LYMPH NODE BIOPSY;  Surgeon: Jose Perea MD;  Location:  OR    birthmark removal  2001    EXTRACTION(S) DENTAL  1995    INSERT PORT VASCULAR ACCESS Right 3/18/2025    Procedure: Insert port vascular access;  Surgeon: Julio César Bird MD;  Location: [facility] OR    INSERT TISSUE EXPANDER BREAST Bilateral 2/17/2025    Procedure: Bilateral breast reconstruction with  "expanders and SPY;  Surgeon: IAN Taveras MD;  Location: [facility] OR    IR CHEST PORT PLACEMENT > 5 YRS OF AGE  3/18/2025    MASTECTOMY SIMPLE Bilateral 2/17/2025    Procedure: SKIN-SPARING MASTECTOMY;  Surgeon: Jose Perea MD;  Location: [facility] OR     Allergies:  Allergies as of 03/19/2025    (No Known Allergies)     Current Medications:  Current Outpatient Medications   Medication Sig Dispense Refill    acetaminophen (TYLENOL) 500 MG tablet Take 500-1,000 mg by mouth every 6 hours as needed for mild pain.      lidocaine-prilocaine (EMLA) 2.5-2.5 % external cream Apply to skin overlying port site at least 30 minutes prior to port access for labs or infusions. 30 g 3    Nutritional Supplements (VITAMIN D BOOSTER PO) Take 1,000 Units by mouth every morning.      ondansetron (ZOFRAN) 4 MG tablet Take 1 tablet (4 mg) by mouth every 8 hours as needed for nausea. 12 tablet 0    oxyCODONE (ROXICODONE) 5 MG tablet Take 1-2 tablets (5-10 mg) by mouth every 4 hours as needed for pain. 15 tablet 0    polyethylene glycol (MIRALAX) 17 GM/Dose powder Take 17 g by mouth daily. (Patient not taking: Reported on 3/7/2025) 510 g 0      Family and Social History:  Please see initial consult dated 1/23/2025 for details.    Physical Exam:  Ht 1.626 m (5' 4\")   Wt 68 kg (150 lb)   LMP 02/17/2025   BMI 25.75 kg/m    Video physical exam  General: Patient appears well in no acute distress.   Skin: No visualized rash or lesions on visualized skin  Eyes: EOMI, no erythema, sclera icterus or discharge noted  Resp: Appears to be breathing comfortably without accessory muscle usage, speaking in full sentences, no cough  MSK: Appears to have normal range of motion based on visualized movements  Neurologic: No apparent tremors, facial movements symmetric  Psych: affect and mood congruent, alert and oriented      Laboratory/Imaging Studies  I personally reviewed the below images and pathology studies while in clinic today:     03/18/25 07:32 "   Sodium 136   Potassium 4.4   Chloride 102   Carbon Dioxide (CO2) 24   Urea Nitrogen 9.3   Creatinine 0.72   GFR Estimate >90   Calcium 9.3   Anion Gap 10   Albumin 4.4   Protein Total 7.0   Alkaline Phosphatase 47   ALT 8   AST 16   Bilirubin Total 0.4   Glucose 100 (H)   WBC 6.2   Hemoglobin 13.0   Hematocrit 38.5   Platelet Count 311   RBC Count 4.40   MCV 88   MCH 29.5   MCHC 33.8   RDW 12.6   % Neutrophils 48   % Lymphocytes 38   % Monocytes 8   % Eosinophils 6   % Basophils 1   Absolute Basophils 0.1   Absolute Eosinophils 0.3   Absolute Immature Granulocytes 0.0   Absolute Lymphocytes 2.3   Absolute Monocytes 0.5   % Immature Granulocytes 0   Absolute Neutrophils 3.0   Absolute NRBCs 0.0   NRBCs per 100 WBC 0       ECHO 3/18/25  Interpretation Summary  Left ventricular size, wall motion and function are normal. The ejection  fraction is 55-60%. Global peak LV longitudinal strain is averaged at -19.4%.  This is within reported normal limits (normal <-18%).  Global right ventricular function is normal.  No significant valvular abnormalities were noted.  No pericardial effusion is present.  Previous study not available for comparison.    ASSESSMENT/PLAN:  Patient is a 40 yo female with multifocal left breast cancer.  She has a [id number], grade 3, ER positive, IN positive, HER2 positive invasive ductal carcinoma and a O6rU2H0, grade 2, ER positive, IN positive, HER2 negative invasive lobular carcinoma of the left breast.       Left breast invasive ductal and lobular breast cancers:  -Pathology from the bilateral mastectomies and left axillary sentinel lymph node procedure performed on 2/17/2025.  Pathology of the left breast showed at least 4 foci of invasive ductal carcinoma, the largest measuring 1.2 cm.  Additional foci of IDC measuring 1 cm, 2.1 mm and 1.1 mm.  In addition there were at least 7 microscopic foci of invasive and microinvasive lobular carcinoma.  Three sentinel lymph nodes were negative for  malignancy. Anterior superior margin was positive for DCIS.  There was extensive DCIS spanning 12 cm.    - Dr. Moser recommended systemic therapy with THP followed by Herceptin to complete a year of treatment. I reviewed the potential common side effects again including fatigue, hair loss, nausea, diarrhea, low blood counts, increased risk of infections, peripheral neuropathy, elevation of liver tests and cardiac toxicity. Reviewed premeds and take home meds.   - Baseline echo WNL. We will monitor once every 3 months while on HER2 therapy.    - We plan to treat with an adjuvant course of endocrine therapy.  Dr. Moser recommended ovarian suppression plus an aromatase inhibitor, total treatment duration of 10 years. This will be started after the first 12 weeks of treatment.   -She will have a consult with radiation oncology to discuss adjuvant radiation given extensive DCIS with positive anterior margin.     2.  RBM8A pathogenic mutation; BRCA1 varian of undetermined significance:  She has a personal history of two different types of breast cancer.  I reviewed Caleb Estevez's 1/30/2025 genetic counseling note.  Dr. Mahmood elected to proceed with BRCA 1/g genetic analysis with reflext to Breast Actionable, Common Hereditary Cancer and Hereditary Hematologic Malignancy panels.  This testing showed a pathogenic gene mutation in RBM8A.  This is a common recessive variant and in the absence of a second RBM8A mutation, represents a carrier status.  She was also found to have a variant of undetermined significance in BRCA1.  We discussed variants of undetermined significance do not change cancer screening recommendations.    3. Port discomfort: May last 1-2 weeks. Icing is helping. Okay to take tylenol or ibuprofen PRN.     30 minutes spent on the date of the encounter doing chart review, review of test results, interpretation of tests, patient visit, and documentation     The longitudinal plan of care for the  diagnosis(es)/condition(s) as documented were addressed during this visit. Due to the added complexity in care, I will continue to support Lesley in the subsequent management and with ongoing continuity of care.    Carole Salas PA-C

## 2025-03-18 NOTE — TELEPHONE ENCOUNTER
STD paperwork completed, checked for accuracy, signed and faxed to [employer] @ [phone]. A copy was made, sent to scanning and original mailed to patient at home address.    Successful transmission verified in Right Fax.      Alyssa Castro

## 2025-03-18 NOTE — DISCHARGE INSTRUCTIONS
A collaboration between [facility] and [facility]  Experts in minimally invasive, targeted treatments performed using imaging guidance    Venous Access Device,  Port Catheter or Tunneled or Non-Tunneled Central Line Placement    Today you had a procedure today to install a venous access device; either a tunneled central vein catheter or a subcutaneous port catheter.    After you go home:  Drink plenty of fluids.  Generally 6-8 (8 ounce) glasses a day is recommended.  Resume your regular diet unless otherwise ordered by a medical provider.  Keep any applied tape/gauze dressings clean and dry.  Change tape/gauze dressings if they get wet or soiled.  You may shower the following day after procedure, however cover and protect from moisture any tape/gauze dressings.  You may let water hit and run over dried skin glue, but do not scrub.  Pat the area dry after showering.  Port placement incisions are closed with absorbable suture, meaning they do not need to be removed at a later date, and a topical skin adhesive (skin glue).  This glue will wear off in 7-14 days.  Do not remove before this time.  If 14 days have passed and residual glue is present, you may gently remove it.  Do not apply gels, lotions, or ointments to the glue site for the first 10 days as this may cause the glue to prematurely soften and fail.  Do not perform strenuous activities or lift greater than 10 pounds for the next three days.  If there is bleeding or oozing from the procedure site, apply firm pressure to the area for 5-10 minutes.  If the bleeding continues seek medical advice at the numbers below.  Mild procedure site discomfort can be treated with an ice pack and over-the-counter pain relievers.        For 24 hours after any sedation used:  Relax and take it easy.  No strenuous activities.  Do not drive or operate machines at home or at work.  No alcohol consumption.  Do not make any  important or legal decisions.    Call our Interventional Radiology (IR) service if:  If you start bleeding from the procedure site.  If you do start to bleed from the site, lie down and hold some pressure on the site.  Our radiology provider can help you decide if you need to return to the hospital.  If you have new or worsening pain related to the procedure.  If you have concerning swelling at the procedure site.  If you develop persistent nausea or vomiting.  If you develop hives or a rash or any unexplained itching.  If you have a fever of greater than 100.5  F and chills in the first 5 days after procedure.  Any other concerns related to your procedure.      [facility]  Interventional Radiology (IR)  [address]  [address] [facility] [place] [address]    Contact Number:  [phone]  (IR Nurse Triage)  Monday - Friday 7am - 4pm    After hours for urgent concerns:  [phone]  After 4pm Monday - Friday, Weekends and Holidays.   Ask for Interventional Radiology on-call.  Someone is available 24 hours a day.  [facility] toll free number:  [phone]

## 2025-03-18 NOTE — H&P
Interventional Radiology Pre-Procedure Sedation Assessment   Time of Assessment: 9:10 AM    Expected Level: Moderate Sedation    Indication: Sedation is required for the following type of Procedure:  port placement    Sedation and procedural consent: Risks, benefits and alternatives were discussed with Patient    PO Intake: Appropriately NPO for procedure    ASA Class: Class 2 - MILD SYSTEMIC DISEASE, NO ACUTE PROBLEMS, NO FUNCTIONAL LIMITATIONS.    Mallampati: Grade 2:  Soft palate, base of uvula, tonsillar pillars, and portion of posterior pharyngeal wall visible    Lungs: Lungs Clear with good breath sounds bilaterally    Heart: Normal heart sounds and rate    History and physical reviewed and no updates needed. I have reviewed the lab findings, diagnostic data, medications, and the plan for sedation. I have determined this patient to be an appropriate candidate for the planned sedation and procedure and have reassessed the patient IMMEDIATELY PRIOR to sedation and procedure.    Julio César Bird MD

## 2025-03-19 ENCOUNTER — VIRTUAL VISIT (OUTPATIENT)
Dept: ONCOLOGY | Facility: CLINIC | Age: 40
End: 2025-03-19
Attending: INTERNAL MEDICINE
Payer: COMMERCIAL

## 2025-03-19 VITALS
OXYGEN SATURATION: 100 % | RESPIRATION RATE: 12 BRPM | DIASTOLIC BLOOD PRESSURE: 75 MMHG | TEMPERATURE: 98.4 F | HEART RATE: 78 BPM | SYSTOLIC BLOOD PRESSURE: 116 MMHG

## 2025-03-19 VITALS — HEIGHT: 64 IN | BODY MASS INDEX: 25.61 KG/M2 | WEIGHT: 150 LBS

## 2025-03-19 DIAGNOSIS — Z51.11 ENCOUNTER FOR ANTINEOPLASTIC CHEMOTHERAPY: ICD-10-CM

## 2025-03-19 DIAGNOSIS — Z17.0 MALIGNANT NEOPLASM OF OVERLAPPING SITES OF LEFT BREAST IN FEMALE, ESTROGEN RECEPTOR POSITIVE (H): Primary | ICD-10-CM

## 2025-03-19 DIAGNOSIS — C50.812 MALIGNANT NEOPLASM OF OVERLAPPING SITES OF LEFT BREAST IN FEMALE, ESTROGEN RECEPTOR POSITIVE (H): Primary | ICD-10-CM

## 2025-03-19 PROCEDURE — 258N000003 HC RX IP 258 OP 636: Performed by: INTERNAL MEDICINE

## 2025-03-19 PROCEDURE — 250N000013 HC RX MED GY IP 250 OP 250 PS 637: Performed by: INTERNAL MEDICINE

## 2025-03-19 PROCEDURE — 250N000011 HC RX IP 250 OP 636: Performed by: INTERNAL MEDICINE

## 2025-03-19 RX ORDER — DIPHENHYDRAMINE HCL 25 MG
50 CAPSULE ORAL ONCE
Status: COMPLETED | OUTPATIENT
Start: 2025-03-19 | End: 2025-03-19

## 2025-03-19 RX ORDER — ONDANSETRON 8 MG/1
8 TABLET, FILM COATED ORAL EVERY 8 HOURS PRN
Qty: 30 TABLET | Refills: 3 | Status: SHIPPED | OUTPATIENT
Start: 2025-03-19

## 2025-03-19 RX ORDER — DIPHENHYDRAMINE HCL 25 MG
25 CAPSULE ORAL ONCE
Start: 2025-03-26

## 2025-03-19 RX ORDER — PROCHLORPERAZINE MALEATE 10 MG
10 TABLET ORAL EVERY 6 HOURS PRN
Qty: 30 TABLET | Refills: 2 | Status: SHIPPED | OUTPATIENT
Start: 2025-03-19

## 2025-03-19 RX ORDER — HEPARIN SODIUM (PORCINE) LOCK FLUSH IV SOLN 100 UNIT/ML 100 UNIT/ML
5 SOLUTION INTRAVENOUS
Status: DISCONTINUED | OUTPATIENT
Start: 2025-03-19 | End: 2025-03-20 | Stop reason: HOSPADM

## 2025-03-19 RX ORDER — ACETAMINOPHEN 325 MG/1
650 TABLET ORAL ONCE
Status: COMPLETED | OUTPATIENT
Start: 2025-03-19 | End: 2025-03-19

## 2025-03-19 RX ORDER — HEPARIN SODIUM,PORCINE 10 UNIT/ML
5-20 VIAL (ML) INTRAVENOUS DAILY PRN
Status: DISCONTINUED | OUTPATIENT
Start: 2025-03-19 | End: 2025-03-20 | Stop reason: HOSPADM

## 2025-03-19 RX ADMIN — PERTUZUMAB 840 MG: 30 INJECTION, SOLUTION, CONCENTRATE INTRAVENOUS at 10:41

## 2025-03-19 RX ADMIN — ACETAMINOPHEN 650 MG: 325 TABLET ORAL at 10:09

## 2025-03-19 RX ADMIN — SODIUM CHLORIDE 250 ML: 0.9 INJECTION, SOLUTION INTRAVENOUS at 10:07

## 2025-03-19 RX ADMIN — SODIUM CHLORIDE 280 MG: 0.9 INJECTION, SOLUTION INTRAVENOUS at 11:53

## 2025-03-19 RX ADMIN — FAMOTIDINE 20 MG: 10 INJECTION, SOLUTION INTRAVENOUS at 10:11

## 2025-03-19 RX ADMIN — DIPHENHYDRAMINE HYDROCHLORIDE 50 MG: 25 CAPSULE ORAL at 10:08

## 2025-03-19 RX ADMIN — DEXAMETHASONE SODIUM PHOSPHATE: 10 INJECTION, SOLUTION INTRAMUSCULAR; INTRAVENOUS at 11:42

## 2025-03-19 RX ADMIN — HEPARIN 3 ML: 100 SYRINGE at 14:56

## 2025-03-19 RX ADMIN — PACLITAXEL 143 MG: 6 INJECTION, SOLUTION INTRAVENOUS at 13:37

## 2025-03-19 ASSESSMENT — PAIN SCALES - GENERAL
PAINLEVEL_OUTOF10: NO PAIN (0)
PAINLEVEL_OUTOF10: MODERATE PAIN (5)

## 2025-03-19 NOTE — NURSING NOTE
Current patient location: [address] [place] [id number]    Is the patient currently in the state of [place]? YES    Visit mode: VIDEO    If the visit is dropped, the patient can be reconnected by:VIDEO VISIT: Text to cell phone:   Telephone Information:   Mobile [phone]       Will anyone else be joining the visit? NO  (If patient encounters technical issues they should call [phone]    Are changes needed to the allergy or medication list? Pt stated no changes to allergies and Pt stated no med changes    Are refills needed on medications prescribed by this physician? NO    Rooming Documentation:  Questionnaire(s) completed    Reason for visit: TERRIE JAIMES

## 2025-03-19 NOTE — LETTER
3/19/2025      Lesley Mahmood  [address] [id number]      Dear Colleague,    Thank you for referring your patient, Lesley Mahmood, to the [facility]. Please see a copy of my visit note below.    Virtual Visit Details    Type of service:  Video Visit     Originating Location (pt. Location): Home    Distant Location (provider location):  Off-site  Platform used for Video Visit: [facility]    Oncology Visit:  Date on this visit: Mar 19, 2025    Diagnosis:  Clinical prognostic stage Ia, [id number], grade 2, ER positive, OH positive, HER-2 positive left breast cancer.    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  Routine bilateral screening mammograms on 1/6/2025 showed fine pleomorphic calcifications throughout the left breast and possible medial breast focal asymmetry.  Ultrasound showed a mass at 10 o'clock, 9 cm from the nipple measuring 1 cm and multiple areas with conglomerate of ducts and dilated ducts with associated calcifications in all 4 quadrants of the breast. Ultrasound of the left axilla was without lymphadenopathy.  Pathology of biopsy of the mass at 10 o'clock was c/w grade 3 invasive ductal carcinoma with associated high grade DCIS.  Invasive carcinoma is ER positive (71-80%), OH positive (51-60%), HER2 equivocal by IHC and positive by FISH (HER2 signals/cell of 11.1 and HER2/CEP17 ratio of 4.0).  Pathology of biopsy of calcifications at 3 o'clock is c/w grade 2 invasive lobular carcinoma with associated high grade DCIS and LCIS.  Invasive carcinoma is ER positive (%), OH positive (%), and HER2 negative (IHC 1+).  Breast MRI on 1/23/2025 was with marked background parenchymal enhancement with extensive nonfocal enhancement greater than the right breast.  There were some areas of progressive enhancement greater than background enhanced but no focal mass.  MRI  was without lymphadenopathy.  No focal abnormality in the right breast was seen.    She underwent bilateral skin sparing mastectomies and left axillary sentinel lymph node biopsy with Dr. Perea on 2/17/2025.  She had immediate tissue expander placement.  Pathology showed a total of at least 12 foci invasive carcinoma.  At least 4 foci of high grade invasive ductal carcinoma in the left breast.  The largest focus measured 1.2 cm, examples of additional foci measuring 1 cm, 2.1 mm, 1.1 mm.  In addition, there were at least 7 microscopic foci of grade 2 invasive lobular carcinoma.  This was in a background of extensive high grade DCIS, spanning 12 cm.  LCIS was also present.  The anterior, superior margin was involved by DCIS.  Pathology of the right breast was negative for malignancy.  3 left axillary sentinel lymph nodes were negative for malignancy.    She met with Dr. Moser who ultimately recommended adjuvant therapy with THP x 12 weeks followed by Herceptin x 1 year. Consideration of adjuvant radiation given extensive DCIS and positive margin.     Interval History: Lesley is seen prior to starting cycle 1 day 1 of THP. She had port placement, labs, and echo done yesterday. Was feeling really well and recovering well from surgery but now has some chest wall and shoulder pain after port placement yesterday. She has been icing.     No recent fevers/chills or infectious concerns. Mastectomy incisions are healing well. No concerning skin changes. Bowel and bladder are at baseline.     She is planning to cold cap and ice hands and feet during taxol.     Past Medical/Surgical History:  Past Medical History:   Diagnosis Date     Invasive ductal carcinoma of breast, female, left (H)      Past Surgical History:   Procedure Laterality Date     BIOPSY NODE SENTINEL Left 2/17/2025    Procedure: SENTINEL LYMPH NODE BIOPSY;  Surgeon: Jose Perea MD;  Location:  OR     birthmark removal  2001     EXTRACTION(S) DENTAL   "1995     INSERT PORT VASCULAR ACCESS Right 3/18/2025    Procedure: Insert port vascular access;  Surgeon: Julio César Bird MD;  Location: [facility] OR     INSERT TISSUE EXPANDER BREAST Bilateral 2/17/2025    Procedure: Bilateral breast reconstruction with expanders and SPY;  Surgeon: IAN Taveras MD;  Location: [facility] OR     IR CHEST PORT PLACEMENT > 5 YRS OF AGE  3/18/2025     MASTECTOMY SIMPLE Bilateral 2/17/2025    Procedure: SKIN-SPARING MASTECTOMY;  Surgeon: Jose Perea MD;  Location: [facility] OR     Allergies:  Allergies as of 03/19/2025     (No Known Allergies)     Current Medications:  Current Outpatient Medications   Medication Sig Dispense Refill     acetaminophen (TYLENOL) 500 MG tablet Take 500-1,000 mg by mouth every 6 hours as needed for mild pain.       lidocaine-prilocaine (EMLA) 2.5-2.5 % external cream Apply to skin overlying port site at least 30 minutes prior to port access for labs or infusions. 30 g 3     Nutritional Supplements (VITAMIN D BOOSTER PO) Take 1,000 Units by mouth every morning.       ondansetron (ZOFRAN) 4 MG tablet Take 1 tablet (4 mg) by mouth every 8 hours as needed for nausea. 12 tablet 0     oxyCODONE (ROXICODONE) 5 MG tablet Take 1-2 tablets (5-10 mg) by mouth every 4 hours as needed for pain. 15 tablet 0     polyethylene glycol (MIRALAX) 17 GM/Dose powder Take 17 g by mouth daily. (Patient not taking: Reported on 3/7/2025) 510 g 0      Family and Social History:  Please see initial consult dated 1/23/2025 for details.    Physical Exam:  Ht 1.626 m (5' 4\")   Wt 68 kg (150 lb)   LMP 02/17/2025   BMI 25.75 kg/m    Video physical exam  General: Patient appears well in no acute distress.   Skin: No visualized rash or lesions on visualized skin  Eyes: EOMI, no erythema, sclera icterus or discharge noted  Resp: Appears to be breathing comfortably without accessory muscle usage, speaking in full sentences, no cough  MSK: Appears to have normal range of motion based on " visualized movements  Neurologic: No apparent tremors, facial movements symmetric  Psych: affect and mood congruent, alert and oriented      Laboratory/Imaging Studies  I personally reviewed the below images and pathology studies while in clinic today:     03/18/25 07:32   Sodium 136   Potassium 4.4   Chloride 102   Carbon Dioxide (CO2) 24   Urea Nitrogen 9.3   Creatinine 0.72   GFR Estimate >90   Calcium 9.3   Anion Gap 10   Albumin 4.4   Protein Total 7.0   Alkaline Phosphatase 47   ALT 8   AST 16   Bilirubin Total 0.4   Glucose 100 (H)   WBC 6.2   Hemoglobin 13.0   Hematocrit 38.5   Platelet Count 311   RBC Count 4.40   MCV 88   MCH 29.5   MCHC 33.8   RDW 12.6   % Neutrophils 48   % Lymphocytes 38   % Monocytes 8   % Eosinophils 6   % Basophils 1   Absolute Basophils 0.1   Absolute Eosinophils 0.3   Absolute Immature Granulocytes 0.0   Absolute Lymphocytes 2.3   Absolute Monocytes 0.5   % Immature Granulocytes 0   Absolute Neutrophils 3.0   Absolute NRBCs 0.0   NRBCs per 100 WBC 0       ECHO 3/18/25  Interpretation Summary  Left ventricular size, wall motion and function are normal. The ejection  fraction is 55-60%. Global peak LV longitudinal strain is averaged at -19.4%.  This is within reported normal limits (normal <-18%).  Global right ventricular function is normal.  No significant valvular abnormalities were noted.  No pericardial effusion is present.  Previous study not available for comparison.    ASSESSMENT/PLAN:  Patient is a 40 yo female with multifocal left breast cancer.  She has a [id number], grade 3, ER positive, AR positive, HER2 positive invasive ductal carcinoma and a [id number], grade 2, ER positive, AR positive, HER2 negative invasive lobular carcinoma of the left breast.       Left breast invasive ductal and lobular breast cancers:  -Pathology from the bilateral mastectomies and left axillary sentinel lymph node procedure performed on 2/17/2025.  Pathology of the left breast showed at least 4 foci  of invasive ductal carcinoma, the largest measuring 1.2 cm.  Additional foci of IDC measuring 1 cm, 2.1 mm and 1.1 mm.  In addition there were at least 7 microscopic foci of invasive and microinvasive lobular carcinoma.  Three sentinel lymph nodes were negative for malignancy. Anterior superior margin was positive for DCIS.  There was extensive DCIS spanning 12 cm.    - Dr. Moser recommended systemic therapy with THP followed by Herceptin to complete a year of treatment. I reviewed the potential common side effects again including fatigue, hair loss, nausea, diarrhea, low blood counts, increased risk of infections, peripheral neuropathy, elevation of liver tests and cardiac toxicity. Reviewed premeds and take home meds.   - Baseline echo WNL. We will monitor once every 3 months while on HER2 therapy.    - We plan to treat with an adjuvant course of endocrine therapy.  Dr. Moser recommended ovarian suppression plus an aromatase inhibitor, total treatment duration of 10 years. This will be started after the first 12 weeks of treatment.   -She will have a consult with radiation oncology to discuss adjuvant radiation given extensive DCIS with positive anterior margin.     2.  RBM8A pathogenic mutation; BRCA1 varian of undetermined significance:  She has a personal history of two different types of breast cancer.  I reviewed Caleb Estevez's 1/30/2025 genetic counseling note.  Dr. Mahmood elected to proceed with BRCA 1/g genetic analysis with reflext to Breast Actionable, Common Hereditary Cancer and Hereditary Hematologic Malignancy panels.  This testing showed a pathogenic gene mutation in RBM8A.  This is a common recessive variant and in the absence of a second RBM8A mutation, represents a carrier status.  She was also found to have a variant of undetermined significance in BRCA1.  We discussed variants of undetermined significance do not change cancer screening recommendations.    3. Port discomfort: May last  1-2 weeks. Icing is helping. Okay to take tylenol or ibuprofen PRN.     30 minutes spent on the date of the encounter doing chart review, review of test results, interpretation of tests, patient visit, and documentation     The longitudinal plan of care for the diagnosis(es)/condition(s) as documented were addressed during this visit. Due to the added complexity in care, I will continue to support Lesley in the subsequent management and with ongoing continuity of care.    Carole Salas PA-C                   Again, thank you for allowing me to participate in the care of your patient.        Sincerely,        Carole Salas PA-C    Electronically signed

## 2025-03-20 NOTE — PROGRESS NOTES
Infusion Nursing Note:  Lesley Mahmood presents today for C1D1 - Pertuzumab / weekly Trastuzumab / Weekly PACLitaxel.    Patient seen by provider today: Yes: Carole Salas PA-C   present during visit today: Not Applicable.    Note: Pt saw provider prior to infusion, ok for treatment.      First time receiving T/H/P today.  Immunotherapy and/or Chemotherapy teaching, side effects, and schedule reviewed with patient. Pt instructed to call triage (or MD on call if after hours/weekends) with chills/temp >=100.5. Pt stated understanding of plan. Pt has a thermometer at home    Pt was fit for [employer] Cold Capping yesterday but supplies had not arrived, used infusions.  PRE Cool: 30 minutes  During Taxol  POST Cool: 60 minutes    Request for ice bags to ice hands/feet during Taxol.  Pt requesting to have future appts moved to Mondays and Tuesdays so the schedule is lined up when she returns to work, IB sent to [facility] for follow up.  -D8 and D15 doses of Taxol are ordered prior to Trastuzumab, IB to Pharmacy Pool for follow up    Intravenous Access:  Implanted Port.    Treatment Conditions:  ECHO/MUGA completed 3/18  EF 55-60%.   Latest Reference Range & Units 03/18/25 07:32   Sodium 135 - 145 mmol/L 136   Potassium 3.4 - 5.3 mmol/L 4.4   Chloride 98 - 107 mmol/L 102   Carbon Dioxide (CO2) 22 - 29 mmol/L 24   Urea Nitrogen 6.0 - 20.0 mg/dL 9.3   Creatinine 0.51 - 0.95 mg/dL 0.72   GFR Estimate >60 mL/min/1.73m2 >90   Calcium 8.8 - 10.4 mg/dL 9.3   Anion Gap 7 - 15 mmol/L 10   Albumin 3.5 - 5.2 g/dL 4.4   Protein Total 6.4 - 8.3 g/dL 7.0   Alkaline Phosphatase 40 - 150 U/L 47   ALT 0 - 50 U/L 8   AST 0 - 45 U/L 16   Bilirubin Total <=1.2 mg/dL 0.4   Glucose 70 - 99 mg/dL 100 (H)   WBC 4.0 - 11.0 10e3/uL 6.2   Hemoglobin 11.7 - 15.7 g/dL 13.0   Hematocrit 35.0 - 47.0 % 38.5   Platelet Count 150 - 450 10e3/uL 311   RBC Count 3.80 - 5.20 10e6/uL 4.40   MCV 78 - 100 fL 88   MCH 26.5 - 33.0 pg 29.5   MCHC 31.5 -  36.5 g/dL 33.8   RDW 10.0 - 15.0 % 12.6   % Neutrophils % 48   % Lymphocytes % 38   % Monocytes % 8   % Eosinophils % 6   % Basophils % 1   Absolute Basophils 0.0 - 0.2 10e3/uL 0.1   Absolute Eosinophils 0.0 - 0.7 10e3/uL 0.3   Absolute Immature Granulocytes <=0.4 10e3/uL 0.0   Absolute Lymphocytes 0.8 - 5.3 10e3/uL 2.3   Absolute Monocytes 0.0 - 1.3 10e3/uL 0.5   % Immature Granulocytes % 0   Absolute Neutrophils 1.6 - 8.3 10e3/uL 3.0   Absolute NRBCs 10e3/uL 0.0   NRBCs per 100 WBC <1 /100 0       Post Infusion Assessment:  Patient tolerated infusion without incident.  Blood return noted pre and post infusion.  Site patent and intact, free from redness, edema or discomfort.  No evidence of extravasations.  Access discontinued per protocol.       Discharge Plan:   Prescriptions Compazine and Zofran  sent to local pharmacy.  Discharge instructions reviewed with: Patient and Family.  Patient and/or family verbalized understanding of discharge instructions and all questions answered.  AVS to patient via [employer].  Patient will return 3/26 for next appointment.   Patient discharged in stable condition accompanied by: .  Departure Mode: Ambulatory.    Rhea Cates RN

## 2025-03-24 ENCOUNTER — OFFICE VISIT (OUTPATIENT)
Dept: OPTOMETRY | Facility: CLINIC | Age: 40
End: 2025-03-24
Payer: COMMERCIAL

## 2025-03-24 DIAGNOSIS — H52.10 MYOPIA WITH REGULAR ASTIGMATISM: Primary | ICD-10-CM

## 2025-03-24 DIAGNOSIS — H52.229 MYOPIA WITH REGULAR ASTIGMATISM: Primary | ICD-10-CM

## 2025-03-24 ASSESSMENT — VISUAL ACUITY
CORRECTION_TYPE: GLASSES
OD_CC: 20/20
OS_CC: 20/20
OS_CC+: -1
METHOD: SNELLEN - LINEAR

## 2025-03-24 ASSESSMENT — EXTERNAL EXAM - LEFT EYE: OS_EXAM: NORMAL

## 2025-03-24 ASSESSMENT — CUP TO DISC RATIO
OS_RATIO: 0.2
OD_RATIO: 0.2

## 2025-03-24 ASSESSMENT — CONF VISUAL FIELD
OD_NORMAL: 1
OD_SUPERIOR_TEMPORAL_RESTRICTION: 0
OS_SUPERIOR_NASAL_RESTRICTION: 0
OS_NORMAL: 1
OD_INFERIOR_TEMPORAL_RESTRICTION: 0
METHOD: COUNTING FINGERS
OD_INFERIOR_NASAL_RESTRICTION: 0
OS_INFERIOR_NASAL_RESTRICTION: 0
OS_SUPERIOR_TEMPORAL_RESTRICTION: 0
OD_SUPERIOR_NASAL_RESTRICTION: 0
OS_INFERIOR_TEMPORAL_RESTRICTION: 0

## 2025-03-24 ASSESSMENT — TONOMETRY
OD_IOP_MMHG: 13
OS_IOP_MMHG: 13
IOP_METHOD: ICARE

## 2025-03-24 ASSESSMENT — REFRACTION_MANIFEST
OD_AXIS: 020
OS_AXIS: 080
OS_SPHERE: -0.75
OD_SPHERE: -2.50
OD_CYLINDER: +0.50
OS_CYLINDER: +0.75

## 2025-03-24 ASSESSMENT — SLIT LAMP EXAM - LIDS
COMMENTS: NORMAL
COMMENTS: NORMAL

## 2025-03-24 ASSESSMENT — REFRACTION_WEARINGRX
OD_AXIS: 010
OS_SPHERE: -1.00
OS_CYLINDER: +0.75
OS_AXIS: 077
OD_CYLINDER: +0.75
SPECS_TYPE: SVL
OD_SPHERE: -2.50

## 2025-03-24 ASSESSMENT — EXTERNAL EXAM - RIGHT EYE: OD_EXAM: NORMAL

## 2025-03-24 NOTE — PROGRESS NOTES
A/P  1.) Myopia/Astigmatism OU  -Stable Rx, updated today. Asymptomatic for presbyopia, continue in single vision lens but reviewed options for when symptoms arise  -Some increasing dryness, especially with chemotherapy lately. Restart AT 2-4x/day prn  -Dilated ocular health unremarkable OU    Monitor 1-2 years comprehensive, sooner prn    I have confirmed the patient's CC, HPI and reviewed Past Medical History, Past Surgical History, Social History, Family History, Problem List, Medication List and agree with Tech note.     Victorina Arshad, OD FAAO FSLS

## 2025-03-24 NOTE — NURSING NOTE
Chief Complaints and History of Present Illnesses   Patient presents with    Annual Eye Exam     Pt here for annual eye exam      Chief Complaint(s) and History of Present Illness(es)       Annual Eye Exam              Laterality: both eyes    Comments: Pt here for annual eye exam               Comments    Pt has largely unchanged vision since last exam. No new concerns. Pt is on chemotherapy for breast cancer- she started last week. Pt also notes fb left eye > right eye.     CAROL Ramey on 3/24/2025 at 10:16 AM

## 2025-03-24 NOTE — PATIENT INSTRUCTIONS
Artificial Tears  -iVizia  -Blink Tears  -Refresh Relieva  -Biotrue Hydration Boost  -Offerman  -Systane Hydration  -Blink Triple Care  -Refresh Relieva PF Xtra  -Soothe XP  -Systane Balance  -Systane Complete  -Refresh Digital  -Refresh Optive Armin-3

## 2025-03-25 ENCOUNTER — VIRTUAL VISIT (OUTPATIENT)
Dept: ONCOLOGY | Facility: CLINIC | Age: 40
End: 2025-03-25
Attending: GENETIC COUNSELOR, MS
Payer: COMMERCIAL

## 2025-03-25 DIAGNOSIS — Z80.6 FAMILY HISTORY OF CLL (CHRONIC LYMPHOID LEUKEMIA): ICD-10-CM

## 2025-03-25 DIAGNOSIS — Z80.42 FAMILY HISTORY OF PROSTATE CANCER: ICD-10-CM

## 2025-03-25 DIAGNOSIS — C50.812 MALIGNANT NEOPLASM OF OVERLAPPING SITES OF LEFT BREAST IN FEMALE, ESTROGEN RECEPTOR POSITIVE (H): ICD-10-CM

## 2025-03-25 DIAGNOSIS — Z17.0 MALIGNANT NEOPLASM OF OVERLAPPING SITES OF LEFT BREAST IN FEMALE, ESTROGEN RECEPTOR POSITIVE (H): ICD-10-CM

## 2025-03-25 NOTE — NURSING NOTE
Current patient location: [address] [place] [id number]    Is the patient currently in the state of [place]? YES    Visit mode: VIDEO    If the visit is dropped, the patient can be reconnected by:VIDEO VISIT: Text to cell phone:   Telephone Information:   Mobile [phone]       Will anyone else be joining the visit? NO  (If patient encounters technical issues they should call [phone]    Are changes needed to the allergy or medication list? N/A    Are refills needed on medications prescribed by this physician? NO    Rooming Documentation:  Questionnaire(s) not pre-assigned    Reason for visit: RECHECK    Rosalia BLOCKF

## 2025-03-25 NOTE — PROGRESS NOTES
"Virtual Visit Details    Type of service:  Video Visit     Originating Location (pt. Location): {video visit patient location:122552::\"Home\"}  {PROVIDER LOCATION On-site should be selected for visits conducted from your clinic location or adjoining [facility] hospital, academic office, or other nearby [facility] building. Off-site should be selected for all other provider locations, including home:[id number]}  Distant Location (provider location):  {virtual location provider:852639}  Platform used for Video Visit: {Virtual Visit Platforms:044982::\"[employer]\"}  "

## 2025-03-26 ENCOUNTER — APPOINTMENT (OUTPATIENT)
Dept: LAB | Facility: CLINIC | Age: 40
End: 2025-03-26
Attending: INTERNAL MEDICINE
Payer: COMMERCIAL

## 2025-03-26 ENCOUNTER — INFUSION THERAPY VISIT (OUTPATIENT)
Dept: ONCOLOGY | Facility: CLINIC | Age: 40
End: 2025-03-26
Attending: INTERNAL MEDICINE
Payer: COMMERCIAL

## 2025-03-26 VITALS
OXYGEN SATURATION: 98 % | SYSTOLIC BLOOD PRESSURE: 109 MMHG | TEMPERATURE: 98 F | DIASTOLIC BLOOD PRESSURE: 74 MMHG | BODY MASS INDEX: 26.74 KG/M2 | WEIGHT: 155.8 LBS | HEART RATE: 76 BPM | RESPIRATION RATE: 18 BRPM

## 2025-03-26 DIAGNOSIS — C50.812 MALIGNANT NEOPLASM OF OVERLAPPING SITES OF LEFT BREAST IN FEMALE, ESTROGEN RECEPTOR POSITIVE (H): ICD-10-CM

## 2025-03-26 DIAGNOSIS — Z17.0 MALIGNANT NEOPLASM OF OVERLAPPING SITES OF LEFT BREAST IN FEMALE, ESTROGEN RECEPTOR POSITIVE (H): ICD-10-CM

## 2025-03-26 DIAGNOSIS — Z51.11 ENCOUNTER FOR ANTINEOPLASTIC CHEMOTHERAPY: Primary | ICD-10-CM

## 2025-03-26 LAB
BASOPHILS # BLD AUTO: 0.1 10E3/UL (ref 0–0.2)
BASOPHILS NFR BLD AUTO: 1 %
EOSINOPHIL # BLD AUTO: 0.2 10E3/UL (ref 0–0.7)
EOSINOPHIL NFR BLD AUTO: 4 %
ERYTHROCYTE [DISTWIDTH] IN BLOOD BY AUTOMATED COUNT: 12.1 % (ref 10–15)
HCT VFR BLD AUTO: 36.7 % (ref 35–47)
HGB BLD-MCNC: 12.3 G/DL (ref 11.7–15.7)
IMM GRANULOCYTES # BLD: 0.1 10E3/UL
IMM GRANULOCYTES NFR BLD: 1 %
LYMPHOCYTES # BLD AUTO: 1.6 10E3/UL (ref 0.8–5.3)
LYMPHOCYTES NFR BLD AUTO: 29 %
MCH RBC QN AUTO: 29.1 PG (ref 26.5–33)
MCHC RBC AUTO-ENTMCNC: 33.5 G/DL (ref 31.5–36.5)
MCV RBC AUTO: 87 FL (ref 78–100)
MONOCYTES # BLD AUTO: 0.2 10E3/UL (ref 0–1.3)
MONOCYTES NFR BLD AUTO: 4 %
NEUTROPHILS # BLD AUTO: 3.4 10E3/UL (ref 1.6–8.3)
NEUTROPHILS NFR BLD AUTO: 61 %
NRBC # BLD AUTO: 0 10E3/UL
NRBC BLD AUTO-RTO: 0 /100
PLATELET # BLD AUTO: 334 10E3/UL (ref 150–450)
RBC # BLD AUTO: 4.23 10E6/UL (ref 3.8–5.2)
WBC # BLD AUTO: 5.6 10E3/UL (ref 4–11)

## 2025-03-26 PROCEDURE — 85025 COMPLETE CBC W/AUTO DIFF WBC: CPT | Performed by: INTERNAL MEDICINE

## 2025-03-26 PROCEDURE — 258N000003 HC RX IP 258 OP 636: Performed by: PHYSICIAN ASSISTANT

## 2025-03-26 PROCEDURE — 250N000011 HC RX IP 250 OP 636: Performed by: PHYSICIAN ASSISTANT

## 2025-03-26 PROCEDURE — 250N000013 HC RX MED GY IP 250 OP 250 PS 637: Performed by: PHYSICIAN ASSISTANT

## 2025-03-26 PROCEDURE — 250N000011 HC RX IP 250 OP 636: Performed by: INTERNAL MEDICINE

## 2025-03-26 PROCEDURE — 258N000003 HC RX IP 258 OP 636: Performed by: INTERNAL MEDICINE

## 2025-03-26 PROCEDURE — 36591 DRAW BLOOD OFF VENOUS DEVICE: CPT | Performed by: INTERNAL MEDICINE

## 2025-03-26 RX ORDER — DIPHENHYDRAMINE HCL 25 MG
50 CAPSULE ORAL
Start: 2025-04-02

## 2025-03-26 RX ORDER — DIPHENHYDRAMINE HCL 25 MG
25 CAPSULE ORAL ONCE
Status: COMPLETED | OUTPATIENT
Start: 2025-03-26 | End: 2025-03-26

## 2025-03-26 RX ORDER — HEPARIN SODIUM (PORCINE) LOCK FLUSH IV SOLN 100 UNIT/ML 100 UNIT/ML
5 SOLUTION INTRAVENOUS
Status: DISCONTINUED | OUTPATIENT
Start: 2025-03-26 | End: 2025-03-26 | Stop reason: HOSPADM

## 2025-03-26 RX ORDER — ACETAMINOPHEN 325 MG/1
650 TABLET ORAL
OUTPATIENT
Start: 2025-04-02

## 2025-03-26 RX ORDER — HEPARIN SODIUM (PORCINE) LOCK FLUSH IV SOLN 100 UNIT/ML 100 UNIT/ML
5 SOLUTION INTRAVENOUS ONCE
Status: COMPLETED | OUTPATIENT
Start: 2025-03-26 | End: 2025-03-26

## 2025-03-26 RX ORDER — ACETAMINOPHEN 325 MG/1
650 TABLET ORAL
Status: CANCELLED | OUTPATIENT
Start: 2025-03-26

## 2025-03-26 RX ADMIN — SODIUM CHLORIDE 250 ML: 0.9 INJECTION, SOLUTION INTRAVENOUS at 10:07

## 2025-03-26 RX ADMIN — DIPHENHYDRAMINE HYDROCHLORIDE 25 MG: 25 CAPSULE ORAL at 10:03

## 2025-03-26 RX ADMIN — DEXAMETHASONE SODIUM PHOSPHATE: 10 INJECTION, SOLUTION INTRAMUSCULAR; INTRAVENOUS at 10:03

## 2025-03-26 RX ADMIN — HEPARIN 5 ML: 100 SYRINGE at 12:15

## 2025-03-26 RX ADMIN — FAMOTIDINE 20 MG: 10 INJECTION, SOLUTION INTRAVENOUS at 10:03

## 2025-03-26 RX ADMIN — HEPARIN 3 ML: 100 SYRINGE at 09:04

## 2025-03-26 RX ADMIN — PACLITAXEL 143 MG: 6 INJECTION, SOLUTION INTRAVENOUS at 11:09

## 2025-03-26 RX ADMIN — SODIUM CHLORIDE 140 MG: 0.9 INJECTION, SOLUTION INTRAVENOUS at 10:29

## 2025-03-26 ASSESSMENT — PAIN SCALES - GENERAL: PAINLEVEL_OUTOF10: MILD PAIN (3)

## 2025-03-26 NOTE — PROGRESS NOTES
Infusion Nursing Note:  Lesley Mahmood presents today for Day 8 Cycle 1 Trastuzmab and Paclitaxel .    Patient seen by provider today: No   present during visit today: Not Applicable.    Note: Lesley comes into the clinic feeling well for her treatment today. She is noting pain in her shoulders, that she is managing at home, declines interventions here. She denies any SOB/fever/chills, N/V/D. She has no signs of bleeding including bloody nose.     She cold capped (30m before Taxol- during Taxol- 60 m post Taxol) today and well as had her own ice mitts and socks.       Intravenous Access:  Implanted Port.    Treatment Conditions:  Lab Results   Component Value Date    HGB 12.3 03/26/2025    WBC 5.6 03/26/2025    ANEU 7.1 03/01/2020    ANEUTAUTO 3.4 03/26/2025     03/26/2025        Lab Results   Component Value Date     03/18/2025    POTASSIUM 4.4 03/18/2025    CR 0.72 03/18/2025    GRECIA 9.3 03/18/2025    BILITOTAL 0.4 03/18/2025    ALBUMIN 4.4 03/18/2025    ALT 8 03/18/2025    AST 16 03/18/2025       Results reviewed, labs MET treatment parameters, ok to proceed with treatment.      Post Infusion Assessment:  Patient tolerated infusion without incident.  Blood return noted pre and post infusion.  Site patent and intact, free from redness, edema or discomfort.  No evidence of extravasations.  Access discontinued per protocol.          Discharge Plan:   Patient declined prescription refills.  Patient and/or family verbalized understanding of discharge instructions and all questions answered.  AVS to patient via [employer].  Patient will return 4/2/25 for provider appt, and 4/3/25 for infusion.  Patient discharged in stable condition accompanied by: .  Departure Mode: Ambulatory.      Sue Cárdenas RN

## 2025-03-26 NOTE — PATIENT INSTRUCTIONS
March 2025 Sunday Monday Tuesday Wednesday Thursday Friday Saturday                                 1       2     3    RETURN CCSL  10:45 AM   (60 min.)   Geovanna Moser MD   [facility] 4     5    POST-OP   9:30 AM   (30 min.)   IAN Taveras MD   [facility]astic [facility] Reconstructive [facility] 6     7    BREAST TUMOR CONFERENCE   7:20 AM   (10 min.)   BREAST TUMOR CONFERENCE   [facility] Tumor Conference Virtual Scheduling    RETURN CCSL  10:05 AM   (20 min.)   Jose Perea MD   [facility] [place]    UM NURSE VISIT  11:00 AM   (30 min.)   Nurse, [facility] General Surgery [facility] 8       9     10     11     12     13     14    [facility] NURSE VISIT  11:00 AM   (30 min.)   Nurse, [facility] 15       16     17     18    LAB PERIPHERAL   7:00 AM   (15 min.)   [facility] LAB DRAW   [facility]    ECHO COMPLETE   7:15 AM   (60 min.)   [facility] [facility]    Outpatient Visit   8:07 AM   Julio César Bird MD   [facility]    IR CHEST PORT PLACEMENT >5 YRS   8:30 AM   (60 min.)   [email or web address]   [facility] [place]    INSERTION, VASCULAR ACCESS PORT  10:00 AM   Julio César Bird MD   [facility] 19    RETURN CCSL   7:45 AM   (45 min.)   Carole Salas PA-C   [facility]    ONC INFUSION 5 HR (300 MIN)   9:00 AM   (300 min.)    ONC INFUSION NURSE   [facility] 20     21    P NURSE VISIT  11:00 AM   (30 min.)   Nurse, [facility] General Surgery [facility] 22       23     24    RETURN ADULT EYE  10:05 AM   (20 min.)   Victorina Arshad OD    [facility] 25    RETURN CCSL  10:00 AM   (60 min.)   Ed  Jessica MANDEL GC   [facility] Cancer [facility] 26  Happy Birthday!    LAB PERIPHERAL   8:45 AM   (15 min.)    [facility] DRAW   [facility]    ONC INFUSION 5 HR (300 MIN)   9:00 AM   (300 min.)   UC ONC INFUSION NURSE   [facility] 27     28     29       30     31 April 2025 Sunday Monday Tuesday Wednesday Thursday Friday Saturday             1     2    RETURN CCSL  10:45 AM   (45 min.)   Carole Salas PA-C   [facility] 3    LAB PERIPHERAL   6:30 AM   (15 min.)   [facility] DRAW   [facility]    ONC INFUSION 5 HR (300 MIN)   7:00 AM   (300 min.)   UC ONC INFUSION NURSE   [facility] 4     5       6     7     8     9    LAB PERIPHERAL   7:30 AM   (15 min.)   [facility] DRAW   [facility]    ONC INFUSION 5 HR (300 MIN)   8:00 AM   (300 min.)   UC ONC INFUSION NURSE   [facility] 10     11     12       13     14     15    LAB PERIPHERAL   6:45 AM   (15 min.)   [facility] DRAW   [facility]    ONC INFUSION 5 HR (300 MIN)   7:00 AM   (300 min.)   UC ONC INFUSION NURSE   [facility] 16     17     18     19       20     21    LAB PERIPHERAL   8:00 AM   (15 min.)   [facility] DRAW   [facility]    ONC INFUSION 5 HR (300 MIN)   8:30 AM   (300 min.)   UC ONC INFUSION NURSE   [facility] 22    RETURN ANNUAL   9:15 AM   (30 min.)   Hansa Beasley MD   [facility] 23    RETURN CCSL  10:45 AM   (45 min.)   Carole Salas PA-C   [facility] 24     25     26       27     28     29    LAB PERIPHERAL   6:30 AM   (15 min.)   [facility] DRAW   [facility]  Cancer Clinic    ONC INFUSION 5 HR (300 MIN)   7:00 AM   (300 min.)    ONC INFUSION NURSE   [facility] Cancer [facility] 30                                    Lab Results:  Recent Results (from the past 12 hours)   CBC with platelets and differential    Collection Time: 03/26/25  9:13 AM   Result Value Ref Range    WBC Count 5.6 4.0 - 11.0 10e3/uL    RBC Count 4.23 3.80 - 5.20 10e6/uL    Hemoglobin 12.3 11.7 - 15.7 g/dL    Hematocrit 36.7 35.0 - 47.0 %    MCV 87 78 - 100 fL    MCH 29.1 26.5 - 33.0 pg    MCHC 33.5 31.5 - 36.5 g/dL    RDW 12.1 10.0 - 15.0 %    Platelet Count 334 150 - 450 10e3/uL    % Neutrophils 61 %    % Lymphocytes 29 %    % Monocytes 4 %    % Eosinophils 4 %    % Basophils 1 %    % Immature Granulocytes 1 %    NRBCs per 100 WBC 0 <1 /100    Absolute Neutrophils 3.4 1.6 - 8.3 10e3/uL    Absolute Lymphocytes 1.6 0.8 - 5.3 10e3/uL    Absolute Monocytes 0.2 0.0 - 1.3 10e3/uL    Absolute Eosinophils 0.2 0.0 - 0.7 10e3/uL    Absolute Basophils 0.1 0.0 - 0.2 10e3/uL    Absolute Immature Granulocytes 0.1 <=0.4 10e3/uL    Absolute NRBCs 0.0 10e3/uL      [facility] Triage and after hours / weekends / holidays:  [phone]    Please call the triage or after hours line if you experience a temperature greater than or equal to 100.4, shaking chills, have uncontrolled nausea, vomiting and/or diarrhea, dizziness, shortness of breath, chest pain, bleeding, unexplained bruising, or if you have any other new/concerning symptoms, questions or concerns.      If you are having any concerning symptoms or wish to speak to a provider before your next infusion visit, please call triage to notify them so we can adequately serve you.     If you need a refill on a narcotic prescription or other medication, please call before your infusion appointment.

## 2025-03-26 NOTE — NURSING NOTE
Chief Complaint   Patient presents with    Port Draw     Labs drawn via port by RN in lab. VS taken.      Port accessed and labs drawn by RN. Vital signs taken.  Pt checked in for next appt.    Natacha Snowden RN

## 2025-04-01 NOTE — PROGRESS NOTES
Virtual Visit Details    Type of service:  Video Visit     Originating Location (pt. Location): Home    Distant Location (provider location):  Off-site  Platform used for Video Visit: [facility]          Oncology Visit:  Date on this visit: Apr 2, 2025    Diagnosis:  Clinical prognostic stage Ia, [id number], grade 2, ER positive, MO positive, HER-2 positive left breast cancer.    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  Routine bilateral screening mammograms on 1/6/2025 showed fine pleomorphic calcifications throughout the left breast and possible medial breast focal asymmetry.  Ultrasound showed a mass at 10 o'clock, 9 cm from the nipple measuring 1 cm and multiple areas with conglomerate of ducts and dilated ducts with associated calcifications in all 4 quadrants of the breast. Ultrasound of the left axilla was without lymphadenopathy.  Pathology of biopsy of the mass at 10 o'clock was c/w grade 3 invasive ductal carcinoma with associated high grade DCIS.  Invasive carcinoma is ER positive (71-80%), MO positive (51-60%), HER2 equivocal by IHC and positive by FISH (HER2 signals/cell of 11.1 and HER2/CEP17 ratio of 4.0).  Pathology of biopsy of calcifications at 3 o'clock is c/w grade 2 invasive lobular carcinoma with associated high grade DCIS and LCIS.  Invasive carcinoma is ER positive (%), MO positive (%), and HER2 negative (IHC 1+).  Breast MRI on 1/23/2025 was with marked background parenchymal enhancement with extensive nonfocal enhancement greater than the right breast.  There were some areas of progressive enhancement greater than background enhanced but no focal mass.  MRI was without lymphadenopathy.  No focal abnormality in the right breast was seen.    She underwent bilateral skin sparing mastectomies and left axillary sentinel lymph node biopsy with Dr. Perea on 2/17/2025.  She had immediate tissue  expander placement.  Pathology showed a total of at least 12 foci invasive carcinoma.  At least 4 foci of high grade invasive ductal carcinoma in the left breast.  The largest focus measured 1.2 cm, examples of additional foci measuring 1 cm, 2.1 mm, 1.1 mm.  In addition, there were at least 7 microscopic foci of grade 2 invasive lobular carcinoma.  This was in a background of extensive high grade DCIS, spanning 12 cm.  LCIS was also present.  The anterior, superior margin was involved by DCIS.  Pathology of the right breast was negative for malignancy.  3 left axillary sentinel lymph nodes were negative for malignancy.    She met with Dr. Moser who ultimately recommended adjuvant therapy with THP x 12 weeks followed by Herceptin x 1 year. Consideration of adjuvant radiation given extensive DCIS and positive margin.     She started treatment on 3/19/25.     Interval History: Lesley is feeling well today. She has had two weeks of THP so far. Second week was harder than the first with more heartburn (managing with TUMs and smaller meals) and skin irritation of scalp, pustules below nose, neck, back. Her noses is starting to drip. No nausea. Bowels have been looser 1-2 episodes per day. No overt diarrhea. No mucositis. No neuropathy. She is tolerating cold capping and icing hands and feet so far. No hair loss but she does have some scalp tingling.     No fevers/chills. Has a mild sore throat irritation after chemo but no true URI symptoms. Bladder function is fine.     She has been wired after steroids and then crashes. Has fatigue for a few days and then energy improves.    Had bilateral shoulder pain almost like frozen shoulder but this has improved with ROM exercises.     No other concerns today.     Past Medical/Surgical History:  Past Medical History:   Diagnosis Date    Invasive ductal carcinoma of breast, female, left (H)      Past Surgical History:   Procedure Laterality Date    BIOPSY NODE SENTINEL Left  2/17/2025    Procedure: SENTINEL LYMPH NODE BIOPSY;  Surgeon: Jose Perea MD;  Location: [facility] OR    birthmark removal  2001    EXTRACTION(S) DENTAL  1995    INSERT PORT VASCULAR ACCESS Right 3/18/2025    Procedure: Insert port vascular access;  Surgeon: Julio César Bird MD;  Location: [facility] OR    INSERT TISSUE EXPANDER BREAST Bilateral 2/17/2025    Procedure: Bilateral breast reconstruction with expanders and SPY;  Surgeon: IAN Taveras MD;  Location: [facility] OR    IR CHEST PORT PLACEMENT > 5 YRS OF AGE  3/18/2025    MASTECTOMY SIMPLE Bilateral 2/17/2025    Procedure: SKIN-SPARING MASTECTOMY;  Surgeon: Jose Perea MD;  Location: [facility] OR     Allergies:  Allergies as of 04/02/2025    (No Known Allergies)     Current Medications:  Current Outpatient Medications   Medication Sig Dispense Refill    acetaminophen (TYLENOL) 500 MG tablet Take 500-1,000 mg by mouth every 6 hours as needed for mild pain.      lidocaine-prilocaine (EMLA) 2.5-2.5 % external cream Apply to skin overlying port site at least 30 minutes prior to port access for labs or infusions. 30 g 3    Nutritional Supplements (VITAMIN D BOOSTER PO) Take 1,000 Units by mouth every morning. (Patient not taking: Reported on 3/26/2025)      ondansetron (ZOFRAN) 8 MG tablet Take 1 tablet (8 mg) by mouth every 8 hours as needed for nausea. 30 tablet 3    oxyCODONE (ROXICODONE) 5 MG tablet Take 1-2 tablets (5-10 mg) by mouth every 4 hours as needed for pain. (Patient not taking: Reported on 3/26/2025) 15 tablet 0    prochlorperazine (COMPAZINE) 10 MG tablet Take 1 tablet (10 mg) by mouth every 6 hours as needed for nausea or vomiting. (Patient not taking: Reported on 3/26/2025) 30 tablet 2        Physical Exam:  LMP 02/17/2025   Video physical exam  General: Patient appears well in no acute distress.   Skin: No visualized rash or lesions on visualized skin  Eyes: EOMI, no erythema, sclera icterus or discharge noted  Resp: Appears to be breathing  comfortably without accessory muscle usage, speaking in full sentences, no cough  MSK: Appears to have normal range of motion based on visualized movements  Neurologic: No apparent tremors, facial movements symmetric  Psych: affect and mood congruent, alert and oriented      Laboratory/Imaging Studies     03/18/25 07:32 03/26/25 09:13   WBC 6.2 5.6   Hemoglobin 13.0 12.3   Hematocrit 38.5 36.7   Platelet Count 311 334   RBC Count 4.40 4.23   MCV 88 87   MCH 29.5 29.1   MCHC 33.8 33.5   RDW 12.6 12.1   % Neutrophils 48 61   % Lymphocytes 38 29   % Monocytes 8 4   % Eosinophils 6 4   % Basophils 1 1   Absolute Basophils 0.1 0.1   Absolute Eosinophils 0.3 0.2   Absolute Immature Granulocytes 0.0 0.1   Absolute Lymphocytes 2.3 1.6   Absolute Monocytes 0.5 0.2   % Immature Granulocytes 0 1   Absolute Neutrophils 3.0 3.4   Absolute NRBCs 0.0 0.0   NRBCs per 100 WBC 0 0         ECHO 3/18/25  Interpretation Summary  Left ventricular size, wall motion and function are normal. The ejection  fraction is 55-60%. Global peak LV longitudinal strain is averaged at -19.4%.  This is within reported normal limits (normal <-18%).  Global right ventricular function is normal.  No significant valvular abnormalities were noted.  No pericardial effusion is present.  Previous study not available for comparison.    ASSESSMENT/PLAN:  Patient is a 38 yo female with multifocal left breast cancer.  She has a [id number], grade 3, ER positive, UT positive, HER2 positive invasive ductal carcinoma and a [id number], grade 2, ER positive, UT positive, HER2 negative invasive lobular carcinoma of the left breast.       Left breast invasive ductal and lobular breast cancers:  -Pathology from the bilateral mastectomies and left axillary sentinel lymph node procedure performed on 2/17/2025.  Pathology of the left breast showed at least 4 foci of invasive ductal carcinoma, the largest measuring 1.2 cm.  Additional foci of IDC measuring 1 cm, 2.1 mm and 1.1 mm.   In addition there were at least 7 microscopic foci of invasive and microinvasive lobular carcinoma.  Three sentinel lymph nodes were negative for malignancy. Anterior superior margin was positive for DCIS.  There was extensive DCIS spanning 12 cm.    - Dr. Moser recommended systemic therapy with THP followed by Herceptin to complete a year of treatment.   -She is s/p 2 weeks of this and tolerating well overall with expected side effects.  -We will discontinue hypersensitivity premedications this week in the absence of infusion reactions week 1 or 2.   - Baseline echo WNL. We will monitor once every 3 months while on HER2 therapy.    - We plan to treat with an adjuvant course of endocrine therapy.  Dr. Moser recommended ovarian suppression plus an aromatase inhibitor, total treatment duration of 10 years. This will be started after the first 12 weeks of treatment.   -She will have a consult with radiation oncology to discuss adjuvant radiation given extensive DCIS with positive anterior margin.     2.  RBM8A pathogenic mutation; BRCA1 varian of undetermined significance:  She has a personal history of two different types of breast cancer.  I reviewed Caleb Estevez's 1/30/2025 genetic counseling note.  Dr. Mahmood elected to proceed with BRCA 1/g genetic analysis with reflext to Breast Actionable, Common Hereditary Cancer and Hereditary Hematologic Malignancy panels.  This testing showed a pathogenic gene mutation in RBM8A.  This is a common recessive variant and in the absence of a second RBM8A mutation, represents a carrier status.  She was also found to have a variant of undetermined significance in BRCA1.  We discussed variants of undetermined significance do not change cancer screening recommendations.    3. Acneform rash: May be from dex versus perjeta. Stopping dex as above. Rx for clindamycin gel to place BID on affected areas.     4. Dry nose/drippy nose: Humidifier in bedroom at night, saline nasal  spray BID, vaseline to nares before bed.     5. GERD: Secondary to dex. Should improve now. TUMs PRN     6. Shoulder pain: Now resolved. May have been from post mastectomy pain but if it recurs could be taxol induced arthralgias. Will monitor.     30 minutes spent on the date of the encounter doing chart review, review of test results, interpretation of tests, patient visit, and documentation     The longitudinal plan of care for the diagnosis(es)/condition(s) as documented were addressed during this visit. Due to the added complexity in care, I will continue to support Lesley in the subsequent management and with ongoing continuity of care.    Carole Salas PA-C

## 2025-04-02 ENCOUNTER — VIRTUAL VISIT (OUTPATIENT)
Dept: ONCOLOGY | Facility: CLINIC | Age: 40
End: 2025-04-02
Attending: INTERNAL MEDICINE
Payer: COMMERCIAL

## 2025-04-02 VITALS — WEIGHT: 155 LBS | HEIGHT: 64 IN | BODY MASS INDEX: 26.46 KG/M2

## 2025-04-02 DIAGNOSIS — C50.812 MALIGNANT NEOPLASM OF OVERLAPPING SITES OF LEFT BREAST IN FEMALE, ESTROGEN RECEPTOR POSITIVE (H): Primary | ICD-10-CM

## 2025-04-02 DIAGNOSIS — L70.8 ACNEIFORM RASH: ICD-10-CM

## 2025-04-02 DIAGNOSIS — Z17.0 MALIGNANT NEOPLASM OF OVERLAPPING SITES OF LEFT BREAST IN FEMALE, ESTROGEN RECEPTOR POSITIVE (H): Primary | ICD-10-CM

## 2025-04-02 PROCEDURE — 1126F AMNT PAIN NOTED NONE PRSNT: CPT | Mod: 95 | Performed by: PHYSICIAN ASSISTANT

## 2025-04-02 PROCEDURE — 98006 SYNCH AUDIO-VIDEO EST MOD 30: CPT | Performed by: PHYSICIAN ASSISTANT

## 2025-04-02 PROCEDURE — G2211 COMPLEX E/M VISIT ADD ON: HCPCS | Mod: 95 | Performed by: PHYSICIAN ASSISTANT

## 2025-04-02 RX ORDER — HEPARIN SODIUM (PORCINE) LOCK FLUSH IV SOLN 100 UNIT/ML 100 UNIT/ML
5 SOLUTION INTRAVENOUS
OUTPATIENT
Start: 2025-04-16

## 2025-04-02 RX ORDER — DIPHENHYDRAMINE HCL 25 MG
50 CAPSULE ORAL
Start: 2025-04-16

## 2025-04-02 RX ORDER — ALBUTEROL SULFATE 90 UG/1
1-2 INHALANT RESPIRATORY (INHALATION)
Start: 2025-04-16

## 2025-04-02 RX ORDER — HEPARIN SODIUM,PORCINE 10 UNIT/ML
5-20 VIAL (ML) INTRAVENOUS DAILY PRN
OUTPATIENT
Start: 2025-04-23

## 2025-04-02 RX ORDER — DIPHENHYDRAMINE HYDROCHLORIDE 50 MG/ML
50 INJECTION, SOLUTION INTRAMUSCULAR; INTRAVENOUS
Start: 2025-04-23

## 2025-04-02 RX ORDER — DIPHENHYDRAMINE HYDROCHLORIDE 50 MG/ML
50 INJECTION, SOLUTION INTRAMUSCULAR; INTRAVENOUS
Start: 2025-04-16

## 2025-04-02 RX ORDER — METHYLPREDNISOLONE SODIUM SUCCINATE 40 MG/ML
40 INJECTION INTRAMUSCULAR; INTRAVENOUS
Start: 2025-04-09

## 2025-04-02 RX ORDER — LORAZEPAM 2 MG/ML
0.5 INJECTION INTRAMUSCULAR EVERY 4 HOURS PRN
OUTPATIENT
Start: 2025-04-09

## 2025-04-02 RX ORDER — ALBUTEROL SULFATE 0.83 MG/ML
2.5 SOLUTION RESPIRATORY (INHALATION)
OUTPATIENT
Start: 2025-04-23

## 2025-04-02 RX ORDER — HEPARIN SODIUM (PORCINE) LOCK FLUSH IV SOLN 100 UNIT/ML 100 UNIT/ML
5 SOLUTION INTRAVENOUS
OUTPATIENT
Start: 2025-04-09

## 2025-04-02 RX ORDER — ALBUTEROL SULFATE 0.83 MG/ML
2.5 SOLUTION RESPIRATORY (INHALATION)
OUTPATIENT
Start: 2025-04-09

## 2025-04-02 RX ORDER — DIPHENHYDRAMINE HYDROCHLORIDE 50 MG/ML
50 INJECTION, SOLUTION INTRAMUSCULAR; INTRAVENOUS
Start: 2025-04-09

## 2025-04-02 RX ORDER — LORAZEPAM 2 MG/ML
0.5 INJECTION INTRAMUSCULAR EVERY 4 HOURS PRN
OUTPATIENT
Start: 2025-04-16

## 2025-04-02 RX ORDER — MEPERIDINE HYDROCHLORIDE 25 MG/ML
25 INJECTION INTRAMUSCULAR; INTRAVENOUS; SUBCUTANEOUS
OUTPATIENT
Start: 2025-04-16

## 2025-04-02 RX ORDER — DIPHENHYDRAMINE HYDROCHLORIDE 50 MG/ML
25 INJECTION, SOLUTION INTRAMUSCULAR; INTRAVENOUS
Start: 2025-04-09

## 2025-04-02 RX ORDER — EPINEPHRINE 1 MG/ML
0.3 INJECTION, SOLUTION INTRAMUSCULAR; SUBCUTANEOUS EVERY 5 MIN PRN
OUTPATIENT
Start: 2025-04-09

## 2025-04-02 RX ORDER — ACETAMINOPHEN 325 MG/1
650 TABLET ORAL
OUTPATIENT
Start: 2025-04-09

## 2025-04-02 RX ORDER — HEPARIN SODIUM,PORCINE 10 UNIT/ML
5-20 VIAL (ML) INTRAVENOUS DAILY PRN
OUTPATIENT
Start: 2025-04-09

## 2025-04-02 RX ORDER — METHYLPREDNISOLONE SODIUM SUCCINATE 40 MG/ML
40 INJECTION INTRAMUSCULAR; INTRAVENOUS
Start: 2025-04-16

## 2025-04-02 RX ORDER — DIPHENHYDRAMINE HYDROCHLORIDE 50 MG/ML
25 INJECTION, SOLUTION INTRAMUSCULAR; INTRAVENOUS
Start: 2025-04-23

## 2025-04-02 RX ORDER — ONDANSETRON 2 MG/ML
8 INJECTION INTRAMUSCULAR; INTRAVENOUS ONCE
OUTPATIENT
Start: 2025-04-09

## 2025-04-02 RX ORDER — METHYLPREDNISOLONE SODIUM SUCCINATE 40 MG/ML
40 INJECTION INTRAMUSCULAR; INTRAVENOUS
Start: 2025-04-23

## 2025-04-02 RX ORDER — DIPHENHYDRAMINE HCL 25 MG
50 CAPSULE ORAL
Start: 2025-04-23

## 2025-04-02 RX ORDER — ALBUTEROL SULFATE 90 UG/1
1-2 INHALANT RESPIRATORY (INHALATION)
Start: 2025-04-23

## 2025-04-02 RX ORDER — ALBUTEROL SULFATE 0.83 MG/ML
2.5 SOLUTION RESPIRATORY (INHALATION)
OUTPATIENT
Start: 2025-04-16

## 2025-04-02 RX ORDER — DIPHENHYDRAMINE HCL 25 MG
50 CAPSULE ORAL
Start: 2025-04-09

## 2025-04-02 RX ORDER — ACETAMINOPHEN 325 MG/1
650 TABLET ORAL
OUTPATIENT
Start: 2025-04-16

## 2025-04-02 RX ORDER — MEPERIDINE HYDROCHLORIDE 25 MG/ML
25 INJECTION INTRAMUSCULAR; INTRAVENOUS; SUBCUTANEOUS
OUTPATIENT
Start: 2025-04-09

## 2025-04-02 RX ORDER — ONDANSETRON 2 MG/ML
8 INJECTION INTRAMUSCULAR; INTRAVENOUS ONCE
OUTPATIENT
Start: 2025-04-23

## 2025-04-02 RX ORDER — EPINEPHRINE 1 MG/ML
0.3 INJECTION, SOLUTION INTRAMUSCULAR; SUBCUTANEOUS EVERY 5 MIN PRN
OUTPATIENT
Start: 2025-04-16

## 2025-04-02 RX ORDER — ONDANSETRON 2 MG/ML
8 INJECTION INTRAMUSCULAR; INTRAVENOUS ONCE
OUTPATIENT
Start: 2025-04-16

## 2025-04-02 RX ORDER — HEPARIN SODIUM (PORCINE) LOCK FLUSH IV SOLN 100 UNIT/ML 100 UNIT/ML
5 SOLUTION INTRAVENOUS
OUTPATIENT
Start: 2025-04-23

## 2025-04-02 RX ORDER — ACETAMINOPHEN 325 MG/1
650 TABLET ORAL
OUTPATIENT
Start: 2025-04-23

## 2025-04-02 RX ORDER — LORAZEPAM 2 MG/ML
0.5 INJECTION INTRAMUSCULAR EVERY 4 HOURS PRN
OUTPATIENT
Start: 2025-04-23

## 2025-04-02 RX ORDER — CLINDAMYCIN PHOSPHATE 10 MG/G
GEL TOPICAL
Qty: 45 G | Refills: 1 | Status: SHIPPED | OUTPATIENT
Start: 2025-04-02

## 2025-04-02 RX ORDER — MEPERIDINE HYDROCHLORIDE 25 MG/ML
25 INJECTION INTRAMUSCULAR; INTRAVENOUS; SUBCUTANEOUS
OUTPATIENT
Start: 2025-04-23

## 2025-04-02 RX ORDER — DIPHENHYDRAMINE HYDROCHLORIDE 50 MG/ML
25 INJECTION, SOLUTION INTRAMUSCULAR; INTRAVENOUS
Start: 2025-04-16

## 2025-04-02 RX ORDER — HEPARIN SODIUM,PORCINE 10 UNIT/ML
5-20 VIAL (ML) INTRAVENOUS DAILY PRN
OUTPATIENT
Start: 2025-04-16

## 2025-04-02 RX ORDER — ALBUTEROL SULFATE 90 UG/1
1-2 INHALANT RESPIRATORY (INHALATION)
Start: 2025-04-09

## 2025-04-02 RX ORDER — EPINEPHRINE 1 MG/ML
0.3 INJECTION, SOLUTION INTRAMUSCULAR; SUBCUTANEOUS EVERY 5 MIN PRN
OUTPATIENT
Start: 2025-04-23

## 2025-04-02 ASSESSMENT — PAIN SCALES - GENERAL: PAINLEVEL_OUTOF10: NO PAIN (0)

## 2025-04-02 NOTE — NURSING NOTE
Is the patient currently in the state of [place]? YES    Visit mode: VIDEO    If the visit is dropped, the patient can be reconnected by:VIDEO VISIT: Send to e-mail at: [email or web address]    Will anyone else be joining the visit? NO  (If patient encounters technical issues they should call [phone]    Are changes needed to the allergy or medication list? No    Are refills needed on medications prescribed by this physician? NO    Rooming Documentation:  Questionnaire(s) completed    Reason for visit: Video Visit (Follow up )    Sandra JAIMES      [Time Spent: ___ minutes] : I have spent [unfilled] minutes of time on the encounter.

## 2025-04-02 NOTE — LETTER
4/2/2025      Lesley Mahmood  [address] [id number]      Dear Colleague,    Thank you for referring your patient, Lesley Mahmood, to the [facility]. Please see a copy of my visit note below.    Virtual Visit Details    Type of service:  Video Visit     Originating Location (pt. Location): Home    Distant Location (provider location):  Off-site  Platform used for Video Visit: [facility]          Oncology Visit:  Date on this visit: Apr 2, 2025    Diagnosis:  Clinical prognostic stage Ia, [id number], grade 2, ER positive, IL positive, HER-2 positive left breast cancer.    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  Routine bilateral screening mammograms on 1/6/2025 showed fine pleomorphic calcifications throughout the left breast and possible medial breast focal asymmetry.  Ultrasound showed a mass at 10 o'clock, 9 cm from the nipple measuring 1 cm and multiple areas with conglomerate of ducts and dilated ducts with associated calcifications in all 4 quadrants of the breast. Ultrasound of the left axilla was without lymphadenopathy.  Pathology of biopsy of the mass at 10 o'clock was c/w grade 3 invasive ductal carcinoma with associated high grade DCIS.  Invasive carcinoma is ER positive (71-80%), IL positive (51-60%), HER2 equivocal by IHC and positive by FISH (HER2 signals/cell of 11.1 and HER2/CEP17 ratio of 4.0).  Pathology of biopsy of calcifications at 3 o'clock is c/w grade 2 invasive lobular carcinoma with associated high grade DCIS and LCIS.  Invasive carcinoma is ER positive (%), IL positive (%), and HER2 negative (IHC 1+).  Breast MRI on 1/23/2025 was with marked background parenchymal enhancement with extensive nonfocal enhancement greater than the right breast.  There were some areas of progressive enhancement greater than background enhanced but no focal mass.   MRI was without lymphadenopathy.  No focal abnormality in the right breast was seen.    She underwent bilateral skin sparing mastectomies and left axillary sentinel lymph node biopsy with Dr. Perea on 2/17/2025.  She had immediate tissue expander placement.  Pathology showed a total of at least 12 foci invasive carcinoma.  At least 4 foci of high grade invasive ductal carcinoma in the left breast.  The largest focus measured 1.2 cm, examples of additional foci measuring 1 cm, 2.1 mm, 1.1 mm.  In addition, there were at least 7 microscopic foci of grade 2 invasive lobular carcinoma.  This was in a background of extensive high grade DCIS, spanning 12 cm.  LCIS was also present.  The anterior, superior margin was involved by DCIS.  Pathology of the right breast was negative for malignancy.  3 left axillary sentinel lymph nodes were negative for malignancy.    She met with Dr. Moser who ultimately recommended adjuvant therapy with THP x 12 weeks followed by Herceptin x 1 year. Consideration of adjuvant radiation given extensive DCIS and positive margin.     She started treatment on 3/19/25.     Interval History: Lesley is feeling well today. She has had two weeks of THP so far. Second week was harder than the first with more heartburn (managing with TUMs and smaller meals) and skin irritation of scalp, pustules below nose, neck, back. Her noses is starting to drip. No nausea. Bowels have been looser 1-2 episodes per day. No overt diarrhea. No mucositis. No neuropathy. She is tolerating cold capping and icing hands and feet so far. No hair loss but she does have some scalp tingling.     No fevers/chills. Has a mild sore throat irritation after chemo but no true URI symptoms. Bladder function is fine.     She has been wired after steroids and then crashes. Has fatigue for a few days and then energy improves.    Had bilateral shoulder pain almost like frozen shoulder but this has improved with ROM exercises.     No other  concerns today.     Past Medical/Surgical History:  Past Medical History:   Diagnosis Date     Invasive ductal carcinoma of breast, female, left (H)      Past Surgical History:   Procedure Laterality Date     BIOPSY NODE SENTINEL Left 2/17/2025    Procedure: SENTINEL LYMPH NODE BIOPSY;  Surgeon: Jose Perea MD;  Location: [facility] OR     birthmark removal  2001     EXTRACTION(S) DENTAL  1995     INSERT PORT VASCULAR ACCESS Right 3/18/2025    Procedure: Insert port vascular access;  Surgeon: Julio César Bird MD;  Location: [facility] OR     INSERT TISSUE EXPANDER BREAST Bilateral 2/17/2025    Procedure: Bilateral breast reconstruction with expanders and SPY;  Surgeon: IAN Taveras MD;  Location: [facility] OR     IR CHEST PORT PLACEMENT > 5 YRS OF AGE  3/18/2025     MASTECTOMY SIMPLE Bilateral 2/17/2025    Procedure: SKIN-SPARING MASTECTOMY;  Surgeon: Jose Perea MD;  Location: [facility] OR     Allergies:  Allergies as of 04/02/2025     (No Known Allergies)     Current Medications:  Current Outpatient Medications   Medication Sig Dispense Refill     acetaminophen (TYLENOL) 500 MG tablet Take 500-1,000 mg by mouth every 6 hours as needed for mild pain.       lidocaine-prilocaine (EMLA) 2.5-2.5 % external cream Apply to skin overlying port site at least 30 minutes prior to port access for labs or infusions. 30 g 3     Nutritional Supplements (VITAMIN D BOOSTER PO) Take 1,000 Units by mouth every morning. (Patient not taking: Reported on 3/26/2025)       ondansetron (ZOFRAN) 8 MG tablet Take 1 tablet (8 mg) by mouth every 8 hours as needed for nausea. 30 tablet 3     oxyCODONE (ROXICODONE) 5 MG tablet Take 1-2 tablets (5-10 mg) by mouth every 4 hours as needed for pain. (Patient not taking: Reported on 3/26/2025) 15 tablet 0     prochlorperazine (COMPAZINE) 10 MG tablet Take 1 tablet (10 mg) by mouth every 6 hours as needed for nausea or vomiting. (Patient not taking: Reported on 3/26/2025) 30 tablet 2        Physical  Exam:  LMP 02/17/2025   Video physical exam  General: Patient appears well in no acute distress.   Skin: No visualized rash or lesions on visualized skin  Eyes: EOMI, no erythema, sclera icterus or discharge noted  Resp: Appears to be breathing comfortably without accessory muscle usage, speaking in full sentences, no cough  MSK: Appears to have normal range of motion based on visualized movements  Neurologic: No apparent tremors, facial movements symmetric  Psych: affect and mood congruent, alert and oriented      Laboratory/Imaging Studies     03/18/25 07:32 03/26/25 09:13   WBC 6.2 5.6   Hemoglobin 13.0 12.3   Hematocrit 38.5 36.7   Platelet Count 311 334   RBC Count 4.40 4.23   MCV 88 87   MCH 29.5 29.1   MCHC 33.8 33.5   RDW 12.6 12.1   % Neutrophils 48 61   % Lymphocytes 38 29   % Monocytes 8 4   % Eosinophils 6 4   % Basophils 1 1   Absolute Basophils 0.1 0.1   Absolute Eosinophils 0.3 0.2   Absolute Immature Granulocytes 0.0 0.1   Absolute Lymphocytes 2.3 1.6   Absolute Monocytes 0.5 0.2   % Immature Granulocytes 0 1   Absolute Neutrophils 3.0 3.4   Absolute NRBCs 0.0 0.0   NRBCs per 100 WBC 0 0         ECHO 3/18/25  Interpretation Summary  Left ventricular size, wall motion and function are normal. The ejection  fraction is 55-60%. Global peak LV longitudinal strain is averaged at -19.4%.  This is within reported normal limits (normal <-18%).  Global right ventricular function is normal.  No significant valvular abnormalities were noted.  No pericardial effusion is present.  Previous study not available for comparison.    ASSESSMENT/PLAN:  Patient is a 40 yo female with multifocal left breast cancer.  She has a [id number], grade 3, ER positive, MD positive, HER2 positive invasive ductal carcinoma and a [id number], grade 2, ER positive, MD positive, HER2 negative invasive lobular carcinoma of the left breast.       Left breast invasive ductal and lobular breast cancers:  -Pathology from the bilateral mastectomies  and left axillary sentinel lymph node procedure performed on 2/17/2025.  Pathology of the left breast showed at least 4 foci of invasive ductal carcinoma, the largest measuring 1.2 cm.  Additional foci of IDC measuring 1 cm, 2.1 mm and 1.1 mm.  In addition there were at least 7 microscopic foci of invasive and microinvasive lobular carcinoma.  Three sentinel lymph nodes were negative for malignancy. Anterior superior margin was positive for DCIS.  There was extensive DCIS spanning 12 cm.    - Dr. Moser recommended systemic therapy with THP followed by Herceptin to complete a year of treatment.   -She is s/p 2 weeks of this and tolerating well overall with expected side effects.  -We will discontinue hypersensitivity premedications this week in the absence of infusion reactions week 1 or 2.   - Baseline echo WNL. We will monitor once every 3 months while on HER2 therapy.    - We plan to treat with an adjuvant course of endocrine therapy.  Dr. Moser recommended ovarian suppression plus an aromatase inhibitor, total treatment duration of 10 years. This will be started after the first 12 weeks of treatment.   -She will have a consult with radiation oncology to discuss adjuvant radiation given extensive DCIS with positive anterior margin.     2.  RBM8A pathogenic mutation; BRCA1 varian of undetermined significance:  She has a personal history of two different types of breast cancer.  I reviewed Caleb Estevez's 1/30/2025 genetic counseling note.  Dr. Mahmood elected to proceed with BRCA 1/g genetic analysis with reflext to Breast Actionable, Common Hereditary Cancer and Hereditary Hematologic Malignancy panels.  This testing showed a pathogenic gene mutation in RBM8A.  This is a common recessive variant and in the absence of a second RBM8A mutation, represents a carrier status.  She was also found to have a variant of undetermined significance in BRCA1.  We discussed variants of undetermined significance do not  change cancer screening recommendations.    3. Acneform rash: May be from dex versus perjeta. Stopping dex as above. Rx for clindamycin gel to place BID on affected areas.     4. Dry nose/drippy nose: Humidifier in bedroom at night, saline nasal spray BID, vaseline to nares before bed.     5. GERD: Secondary to dex. Should improve now. TUMs PRN     6. Shoulder pain: Now resolved. May have been from post mastectomy pain but if it recurs could be taxol induced arthralgias. Will monitor.     30 minutes spent on the date of the encounter doing chart review, review of test results, interpretation of tests, patient visit, and documentation     The longitudinal plan of care for the diagnosis(es)/condition(s) as documented were addressed during this visit. Due to the added complexity in care, I will continue to support Lesley in the subsequent management and with ongoing continuity of care.    Carole Salas PA-C                   Again, thank you for allowing me to participate in the care of your patient.        Sincerely,        Carole Salas PA-C    Electronically signed

## 2025-04-03 ENCOUNTER — INFUSION THERAPY VISIT (OUTPATIENT)
Dept: ONCOLOGY | Facility: CLINIC | Age: 40
End: 2025-04-03
Attending: INTERNAL MEDICINE
Payer: COMMERCIAL

## 2025-04-03 ENCOUNTER — APPOINTMENT (OUTPATIENT)
Dept: LAB | Facility: CLINIC | Age: 40
End: 2025-04-03
Attending: INTERNAL MEDICINE
Payer: COMMERCIAL

## 2025-04-03 VITALS
SYSTOLIC BLOOD PRESSURE: 122 MMHG | RESPIRATION RATE: 18 BRPM | TEMPERATURE: 98.1 F | BODY MASS INDEX: 27.17 KG/M2 | HEART RATE: 79 BPM | WEIGHT: 158.3 LBS | OXYGEN SATURATION: 100 % | DIASTOLIC BLOOD PRESSURE: 77 MMHG

## 2025-04-03 DIAGNOSIS — C50.812 MALIGNANT NEOPLASM OF OVERLAPPING SITES OF LEFT BREAST IN FEMALE, ESTROGEN RECEPTOR POSITIVE (H): ICD-10-CM

## 2025-04-03 DIAGNOSIS — Z51.11 ENCOUNTER FOR ANTINEOPLASTIC CHEMOTHERAPY: Primary | ICD-10-CM

## 2025-04-03 DIAGNOSIS — Z17.0 MALIGNANT NEOPLASM OF OVERLAPPING SITES OF LEFT BREAST IN FEMALE, ESTROGEN RECEPTOR POSITIVE (H): ICD-10-CM

## 2025-04-03 LAB
BASOPHILS # BLD AUTO: 0.1 10E3/UL (ref 0–0.2)
BASOPHILS NFR BLD AUTO: 1 %
EOSINOPHIL # BLD AUTO: 0.2 10E3/UL (ref 0–0.7)
EOSINOPHIL NFR BLD AUTO: 3 %
ERYTHROCYTE [DISTWIDTH] IN BLOOD BY AUTOMATED COUNT: 12.4 % (ref 10–15)
HCT VFR BLD AUTO: 35 % (ref 35–47)
HGB BLD-MCNC: 11.6 G/DL (ref 11.7–15.7)
IMM GRANULOCYTES # BLD: 0 10E3/UL
IMM GRANULOCYTES NFR BLD: 1 %
LYMPHOCYTES # BLD AUTO: 1.9 10E3/UL (ref 0.8–5.3)
LYMPHOCYTES NFR BLD AUTO: 39 %
MCH RBC QN AUTO: 29 PG (ref 26.5–33)
MCHC RBC AUTO-ENTMCNC: 33.1 G/DL (ref 31.5–36.5)
MCV RBC AUTO: 88 FL (ref 78–100)
MONOCYTES # BLD AUTO: 0.4 10E3/UL (ref 0–1.3)
MONOCYTES NFR BLD AUTO: 8 %
NEUTROPHILS # BLD AUTO: 2.4 10E3/UL (ref 1.6–8.3)
NEUTROPHILS NFR BLD AUTO: 49 %
NRBC # BLD AUTO: 0 10E3/UL
NRBC BLD AUTO-RTO: 0 /100
PLATELET # BLD AUTO: 322 10E3/UL (ref 150–450)
RBC # BLD AUTO: 4 10E6/UL (ref 3.8–5.2)
WBC # BLD AUTO: 5 10E3/UL (ref 4–11)

## 2025-04-03 PROCEDURE — 85025 COMPLETE CBC W/AUTO DIFF WBC: CPT | Performed by: INTERNAL MEDICINE

## 2025-04-03 PROCEDURE — 36591 DRAW BLOOD OFF VENOUS DEVICE: CPT | Performed by: INTERNAL MEDICINE

## 2025-04-03 PROCEDURE — 250N000011 HC RX IP 250 OP 636: Performed by: INTERNAL MEDICINE

## 2025-04-03 PROCEDURE — 258N000003 HC RX IP 258 OP 636: Performed by: INTERNAL MEDICINE

## 2025-04-03 RX ORDER — HEPARIN SODIUM (PORCINE) LOCK FLUSH IV SOLN 100 UNIT/ML 100 UNIT/ML
5 SOLUTION INTRAVENOUS
Status: DISCONTINUED | OUTPATIENT
Start: 2025-04-03 | End: 2025-04-03 | Stop reason: HOSPADM

## 2025-04-03 RX ORDER — HEPARIN SODIUM (PORCINE) LOCK FLUSH IV SOLN 100 UNIT/ML 100 UNIT/ML
5 SOLUTION INTRAVENOUS EVERY 8 HOURS
Status: DISCONTINUED | OUTPATIENT
Start: 2025-04-03 | End: 2025-04-03 | Stop reason: HOSPADM

## 2025-04-03 RX ORDER — ONDANSETRON 2 MG/ML
8 INJECTION INTRAMUSCULAR; INTRAVENOUS ONCE
Status: COMPLETED | OUTPATIENT
Start: 2025-04-03 | End: 2025-04-03

## 2025-04-03 RX ADMIN — ONDANSETRON 8 MG: 2 INJECTION INTRAMUSCULAR; INTRAVENOUS at 07:37

## 2025-04-03 RX ADMIN — PACLITAXEL 143 MG: 6 INJECTION, SOLUTION INTRAVENOUS at 08:40

## 2025-04-03 RX ADMIN — SODIUM CHLORIDE 140 MG: 0.9 INJECTION, SOLUTION INTRAVENOUS at 08:06

## 2025-04-03 RX ADMIN — HEPARIN 5 ML: 100 SYRINGE at 10:44

## 2025-04-03 RX ADMIN — HEPARIN 5 ML: 100 SYRINGE at 07:10

## 2025-04-03 ASSESSMENT — PAIN SCALES - GENERAL: PAINLEVEL_OUTOF10: NO PAIN (0)

## 2025-04-03 NOTE — NURSING NOTE
Chief Complaint   Patient presents with    Port Draw     Labs drawn via port by RN in lab, vitals taken.      Labs drawn via port by RN. Port accessed with 20g, 3/4in, power needle. Flushed with saline and heparin. Pt tolerated well. Vitals taken. Pt checked into next appt.     Serene Triana RN

## 2025-04-03 NOTE — PATIENT INSTRUCTIONS
Contact Numbers    [facility] [place] Main Line (for Scheduling/Triage/After Hours Nurse Line): [phone]    Please call the [facility] nurse triage or the after hours nurse line if you experience a temperature greater than or equal to 100.4, shaking chills, have uncontrolled nausea, vomiting and/or diarrhea, dizziness, lightheadedness, shortness of breath, chest pain, bleeding, unexplained bruising, or if you have any other new/concerning symptoms, questions or concerns.     If you are having any concerning symptoms or wish to speak to a provider before your next infusion visit, please call your care coordinator or triage to notify them so we can adequately serve you.     If you need any refills on medications (narcotics or other medications), please call before your infusion appointment.    Lab Results:  Recent Results (from the past 12 hours)   CBC with platelets and differential    Collection Time: 04/03/25  7:07 AM   Result Value Ref Range    WBC Count 5.0 4.0 - 11.0 10e3/uL    RBC Count 4.00 3.80 - 5.20 10e6/uL    Hemoglobin 11.6 (L) 11.7 - 15.7 g/dL    Hematocrit 35.0 35.0 - 47.0 %    MCV 88 78 - 100 fL    MCH 29.0 26.5 - 33.0 pg    MCHC 33.1 31.5 - 36.5 g/dL    RDW 12.4 10.0 - 15.0 %    Platelet Count 322 150 - 450 10e3/uL    % Neutrophils 49 %    % Lymphocytes 39 %    % Monocytes 8 %    % Eosinophils 3 %    % Basophils 1 %    % Immature Granulocytes 1 %    NRBCs per 100 WBC 0 <1 /100    Absolute Neutrophils 2.4 1.6 - 8.3 10e3/uL    Absolute Lymphocytes 1.9 0.8 - 5.3 10e3/uL    Absolute Monocytes 0.4 0.0 - 1.3 10e3/uL    Absolute Eosinophils 0.2 0.0 - 0.7 10e3/uL    Absolute Basophils 0.1 0.0 - 0.2 10e3/uL    Absolute Immature Granulocytes 0.0 <=0.4 10e3/uL    Absolute NRBCs 0.0 10e3/uL

## 2025-04-03 NOTE — PROGRESS NOTES
Infusion Nursing Note:  Lesley Mahmood presents today for C1D15 Trastuzumab-qyyp and Paclitaxel.    Patient seen by provider today: No   present during visit today: Not Applicable.    Note: Patient presents to Infusion Clinic feeling well today. Patient states her treatments have been going well without any significant side effects. Mild skin irritations of scalp, neck, face, and back. Patient will start Clindamycin for symptoms. Denies fever, chills, pain, or any other symptoms of infection/illness. Patient wishes to proceed with treatment today.    Patient cold caps 30 minutes prior to, during, and 60 minutes following Taxol infusion.   Patient chewed on ice chips, and iced hands/feet during Taxol infusion today.     Patient reported moderate nausea 60 minutes following Taxol infusion. Patient to take Compazine at home and alternate with Zofran as needed. Patient instructed to call care team if symptoms worsen.     Intravenous Access:  Implanted Port.    Treatment Conditions:  Lab Results   Component Value Date    HGB 11.6 (L) 04/03/2025    WBC 5.0 04/03/2025    ANEU 7.1 03/01/2020    ANEUTAUTO 2.4 04/03/2025     04/03/2025   Results reviewed, labs MET treatment parameters, ok to proceed with treatment.  ECHO last done 03/03/2025 with EF of 55-60%.    Post Infusion Assessment:  Patient tolerated infusion without incident.  Blood return noted pre and post infusion.  Site patent and intact, free from redness, edema or discomfort.  Access discontinued per protocol.     Discharge Plan:   Patient declined prescription refills.  Discharge instructions reviewed with: Patient.  Patient and/or family verbalized understanding of discharge instructions and all questions answered.  AVS to patient via [employer].  Patient will return 4/9 for next appointment.   Patient discharged in stable condition accompanied by: self.  Departure Mode: Ambulatory.    Terri Santana RN

## 2025-04-07 NOTE — TELEPHONE ENCOUNTER
RECORDS STATUS - BREAST    RECORDS REQUESTED FROM: [facility] - Internal records   DATE REQUESTED: 4/7

## 2025-04-09 ENCOUNTER — INFUSION THERAPY VISIT (OUTPATIENT)
Dept: ONCOLOGY | Facility: CLINIC | Age: 40
End: 2025-04-09
Attending: INTERNAL MEDICINE
Payer: COMMERCIAL

## 2025-04-09 VITALS
TEMPERATURE: 97.6 F | RESPIRATION RATE: 16 BRPM | HEART RATE: 89 BPM | SYSTOLIC BLOOD PRESSURE: 122 MMHG | OXYGEN SATURATION: 100 % | BODY MASS INDEX: 27.24 KG/M2 | DIASTOLIC BLOOD PRESSURE: 76 MMHG | WEIGHT: 158.7 LBS

## 2025-04-09 DIAGNOSIS — Z51.11 ENCOUNTER FOR ANTINEOPLASTIC CHEMOTHERAPY: Primary | ICD-10-CM

## 2025-04-09 DIAGNOSIS — Z17.0 MALIGNANT NEOPLASM OF OVERLAPPING SITES OF LEFT BREAST IN FEMALE, ESTROGEN RECEPTOR POSITIVE (H): ICD-10-CM

## 2025-04-09 DIAGNOSIS — C50.812 MALIGNANT NEOPLASM OF OVERLAPPING SITES OF LEFT BREAST IN FEMALE, ESTROGEN RECEPTOR POSITIVE (H): ICD-10-CM

## 2025-04-09 LAB
ALBUMIN SERPL BCG-MCNC: 3.9 G/DL (ref 3.5–5.2)
ALP SERPL-CCNC: 57 U/L (ref 40–150)
ALT SERPL W P-5'-P-CCNC: 12 U/L (ref 0–50)
ANION GAP SERPL CALCULATED.3IONS-SCNC: 7 MMOL/L (ref 7–15)
AST SERPL W P-5'-P-CCNC: 14 U/L (ref 0–45)
BASOPHILS # BLD AUTO: 0 10E3/UL (ref 0–0.2)
BASOPHILS NFR BLD AUTO: 1 %
BILIRUB SERPL-MCNC: 0.3 MG/DL
BUN SERPL-MCNC: 9.9 MG/DL (ref 6–20)
CALCIUM SERPL-MCNC: 8.9 MG/DL (ref 8.8–10.4)
CHLORIDE SERPL-SCNC: 106 MMOL/L (ref 98–107)
CREAT SERPL-MCNC: 0.72 MG/DL (ref 0.51–0.95)
EGFRCR SERPLBLD CKD-EPI 2021: >90 ML/MIN/1.73M2
EOSINOPHIL # BLD AUTO: 0.1 10E3/UL (ref 0–0.7)
EOSINOPHIL NFR BLD AUTO: 2 %
ERYTHROCYTE [DISTWIDTH] IN BLOOD BY AUTOMATED COUNT: 12.1 % (ref 10–15)
GLUCOSE SERPL-MCNC: 93 MG/DL (ref 70–99)
HCO3 SERPL-SCNC: 24 MMOL/L (ref 22–29)
HCT VFR BLD AUTO: 33.7 % (ref 35–47)
HGB BLD-MCNC: 11.3 G/DL (ref 11.7–15.7)
IMM GRANULOCYTES # BLD: 0 10E3/UL
IMM GRANULOCYTES NFR BLD: 1 %
LYMPHOCYTES # BLD AUTO: 1.6 10E3/UL (ref 0.8–5.3)
LYMPHOCYTES NFR BLD AUTO: 35 %
MCH RBC QN AUTO: 29 PG (ref 26.5–33)
MCHC RBC AUTO-ENTMCNC: 33.5 G/DL (ref 31.5–36.5)
MCV RBC AUTO: 87 FL (ref 78–100)
MONOCYTES # BLD AUTO: 0.4 10E3/UL (ref 0–1.3)
MONOCYTES NFR BLD AUTO: 9 %
NEUTROPHILS # BLD AUTO: 2.4 10E3/UL (ref 1.6–8.3)
NEUTROPHILS NFR BLD AUTO: 52 %
NRBC # BLD AUTO: 0 10E3/UL
NRBC BLD AUTO-RTO: 0 /100
PLATELET # BLD AUTO: 342 10E3/UL (ref 150–450)
POTASSIUM SERPL-SCNC: 4.3 MMOL/L (ref 3.4–5.3)
PROT SERPL-MCNC: 6.4 G/DL (ref 6.4–8.3)
RBC # BLD AUTO: 3.89 10E6/UL (ref 3.8–5.2)
SODIUM SERPL-SCNC: 137 MMOL/L (ref 135–145)
WBC # BLD AUTO: 4.6 10E3/UL (ref 4–11)

## 2025-04-09 PROCEDURE — 250N000011 HC RX IP 250 OP 636: Performed by: PHYSICIAN ASSISTANT

## 2025-04-09 PROCEDURE — 36591 DRAW BLOOD OFF VENOUS DEVICE: CPT | Performed by: PHYSICIAN ASSISTANT

## 2025-04-09 PROCEDURE — 96413 CHEMO IV INFUSION 1 HR: CPT

## 2025-04-09 PROCEDURE — 85025 COMPLETE CBC W/AUTO DIFF WBC: CPT | Performed by: PHYSICIAN ASSISTANT

## 2025-04-09 PROCEDURE — 80053 COMPREHEN METABOLIC PANEL: CPT | Performed by: PHYSICIAN ASSISTANT

## 2025-04-09 PROCEDURE — 258N000003 HC RX IP 258 OP 636: Performed by: PHYSICIAN ASSISTANT

## 2025-04-09 PROCEDURE — 96417 CHEMO IV INFUS EACH ADDL SEQ: CPT

## 2025-04-09 PROCEDURE — 250N000011 HC RX IP 250 OP 636: Performed by: INTERNAL MEDICINE

## 2025-04-09 PROCEDURE — 96375 TX/PRO/DX INJ NEW DRUG ADDON: CPT

## 2025-04-09 RX ORDER — ONDANSETRON 2 MG/ML
8 INJECTION INTRAMUSCULAR; INTRAVENOUS ONCE
Status: COMPLETED | OUTPATIENT
Start: 2025-04-09 | End: 2025-04-09

## 2025-04-09 RX ORDER — HEPARIN SODIUM (PORCINE) LOCK FLUSH IV SOLN 100 UNIT/ML 100 UNIT/ML
5 SOLUTION INTRAVENOUS ONCE
Status: COMPLETED | OUTPATIENT
Start: 2025-04-09 | End: 2025-04-09

## 2025-04-09 RX ORDER — HEPARIN SODIUM (PORCINE) LOCK FLUSH IV SOLN 100 UNIT/ML 100 UNIT/ML
5 SOLUTION INTRAVENOUS
Status: DISCONTINUED | OUTPATIENT
Start: 2025-04-09 | End: 2025-04-09 | Stop reason: HOSPADM

## 2025-04-09 RX ADMIN — PERTUZUMAB 420 MG: 30 INJECTION, SOLUTION, CONCENTRATE INTRAVENOUS at 08:46

## 2025-04-09 RX ADMIN — SODIUM CHLORIDE 140 MG: 0.9 INJECTION, SOLUTION INTRAVENOUS at 09:27

## 2025-04-09 RX ADMIN — ONDANSETRON 8 MG: 2 INJECTION INTRAMUSCULAR; INTRAVENOUS at 08:30

## 2025-04-09 RX ADMIN — PACLITAXEL 143 MG: 6 INJECTION, SOLUTION INTRAVENOUS at 10:07

## 2025-04-09 RX ADMIN — HEPARIN 5 ML: 100 SYRINGE at 07:58

## 2025-04-09 RX ADMIN — HEPARIN 5 ML: 100 SYRINGE at 11:14

## 2025-04-09 ASSESSMENT — PAIN SCALES - GENERAL: PAINLEVEL_OUTOF10: NO PAIN (0)

## 2025-04-09 NOTE — PATIENT INSTRUCTIONS
[facility] Triage and after hours / weekends / holidays:  [phone]    Please call the triage or after hours line if you experience a temperature greater than or equal to 100.4, shaking chills, have uncontrolled nausea, vomiting and/or diarrhea, dizziness, shortness of breath, chest pain, bleeding, unexplained bruising, or if you have any other new/concerning symptoms, questions or concerns.      If you are having any concerning symptoms or wish to speak to a provider before your next infusion visit, please call triage to notify them so we can adequately serve you.     If you need a refill on a narcotic prescription or other medication, please call before your infusion appointment.                April 2025 Sunday Monday Tuesday Wednesday Thursday Friday Saturday             1     2    RETURN CCSL  10:45 AM   (45 min.)   Carole Salas PA-C   [facility] 3    LAB PERIPHERAL   6:30 AM   (15 min.)   [facility] LAB DRAW   [facility]    ONC INFUSION 5 HR (300 MIN)   7:00 AM   (300 min.)    ONC INFUSION NURSE   [facility] 4     5       6     7     8     9    LAB PERIPHERAL   7:30 AM   (15 min.)   [facility] LAB DRAW   [facility]    ONC INFUSION 5 HR (300 MIN)   8:00 AM   (300 min.)    ONC INFUSION NURSE   [facility] 10     11     12       13     14     15    LAB PERIPHERAL   6:45 AM   (15 min.)   [facility] LAB DRAW   [facility]    ONC INFUSION 5 HR (300 MIN)   7:00 AM   (300 min.)    ONC INFUSION NURSE   [facility] 16     17     18     19       20     21    LAB PERIPHERAL   8:00 AM   (15 min.)   [facility] LAB DRAW   [facility]    ONC INFUSION 5 HR (300 MIN)   8:30 AM   (300 min.)    ONC INFUSION NURSE   [facility] 22    RETURN ANNUAL   9:15  AM   (30 min.)   Hansa Beasley MD   [facility] 23    RETURN CCSL  10:45 AM   (45 min.)   Carole Salas PA-C   [facility] 24     25     26       27     28     29    LAB PERIPHERAL   6:30 AM   (15 min.)   [facility] LAB DRAW   [facility]    ONC INFUSION 5 HR (300 MIN)   7:00 AM   (300 min.)   [facility] ONC INFUSION NURSE   [facility] 30                                May 2025      [place] Monday Tuesday Wednesday Thursday Friday Saturday                       1     2     3       4     5     6    LAB PERIPHERAL   8:00 AM   (15 min.)   [facility] LAB DRAW   [facility]    ONC INFUSION 5 HR (300 MIN)   8:30 AM   (300 min.)   UC ONC INFUSION NURSE   [facility] 7     8     9     10       11     12     13    LAB PERIPHERAL   6:30 AM   (15 min.)   [facility] LAB DRAW   [facility]    ONC INFUSION 5 HR (300 MIN)   7:00 AM   (300 min.)   UC ONC INFUSION NURSE   [facility] 14    RETURN CCSL   9:45 AM   (45 min.)   Carole Salas PA-C   [facility]    NEW RADIATION ONCOLOGY   1:00 PM   (90 min.)   Telma Cazares MD   [facility] Radiation Oncology 15     16     17       18     19     20    LAB PERIPHERAL   6:30 AM   (15 min.)   [facility] LAB DRAW   [facility]    ONC INFUSION 5 HR (300 MIN)   7:00 AM   (300 min.)   UC ONC INFUSION NURSE   [facility] 21     22     23     24       25     26     27    LAB PERIPHERAL   6:30 AM   (15 min.)   [facility] LAB DRAW   [facility]    ONC INFUSION 5 HR (300 MIN)   7:00 AM   (300 min.)   UC ONC INFUSION NURSE   [facility] 28     29     30     31                     Lab Results:  Recent Results  (from the past 12 hours)   Comprehensive metabolic panel    Collection Time: 04/09/25  7:55 AM   Result Value Ref Range    Sodium 137 135 - 145 mmol/L    Potassium 4.3 3.4 - 5.3 mmol/L    Carbon Dioxide (CO2) 24 22 - 29 mmol/L    Anion Gap 7 7 - 15 mmol/L    Urea Nitrogen 9.9 6.0 - 20.0 mg/dL    Creatinine 0.72 0.51 - 0.95 mg/dL    GFR Estimate >90 >60 mL/min/1.73m2    Calcium 8.9 8.8 - 10.4 mg/dL    Chloride 106 98 - 107 mmol/L    Glucose 93 70 - 99 mg/dL    Alkaline Phosphatase 57 40 - 150 U/L    AST 14 0 - 45 U/L    ALT 12 0 - 50 U/L    Protein Total 6.4 6.4 - 8.3 g/dL    Albumin 3.9 3.5 - 5.2 g/dL    Bilirubin Total 0.3 <=1.2 mg/dL   CBC with platelets and differential    Collection Time: 04/09/25  7:55 AM   Result Value Ref Range    WBC Count 4.6 4.0 - 11.0 10e3/uL    RBC Count 3.89 3.80 - 5.20 10e6/uL    Hemoglobin 11.3 (L) 11.7 - 15.7 g/dL    Hematocrit 33.7 (L) 35.0 - 47.0 %    MCV 87 78 - 100 fL    MCH 29.0 26.5 - 33.0 pg    MCHC 33.5 31.5 - 36.5 g/dL    RDW 12.1 10.0 - 15.0 %    Platelet Count 342 150 - 450 10e3/uL    % Neutrophils 52 %    % Lymphocytes 35 %    % Monocytes 9 %    % Eosinophils 2 %    % Basophils 1 %    % Immature Granulocytes 1 %    NRBCs per 100 WBC 0 <1 /100    Absolute Neutrophils 2.4 1.6 - 8.3 10e3/uL    Absolute Lymphocytes 1.6 0.8 - 5.3 10e3/uL    Absolute Monocytes 0.4 0.0 - 1.3 10e3/uL    Absolute Eosinophils 0.1 0.0 - 0.7 10e3/uL    Absolute Basophils 0.0 0.0 - 0.2 10e3/uL    Absolute Immature Granulocytes 0.0 <=0.4 10e3/uL    Absolute NRBCs 0.0 10e3/uL

## 2025-04-09 NOTE — PROGRESS NOTES
Infusion Nursing Note:  Lesley Mahmood presents today for cycle 2 day 1 pertuzumab, trastuzumab-qyyp and paclitaxel.    Patient seen by provider today: No   present during visit today: Not Applicable.    Note: Pt comes to infusion today with no questions or concerns. Pt has no pain today and denies any need for intervention at this appointment. Pt has been afebrile and denies signs and symptoms of infection including: cough, SOB, sore throat, diarrhea, vomiting, or pain with urination. Pt wishes to proceed with today's planned treatment.     Patient notes rash is stable, unchanged since previous visit.    Patient cold capped - 30 minutes prior to taxol, during infusion and 1 hour post.     Intravenous Access:  Implanted Port.    Treatment Conditions:  Lab Results   Component Value Date    HGB 11.3 (L) 04/09/2025    WBC 4.6 04/09/2025    ANEU 7.1 03/01/2020    ANEUTAUTO 2.4 04/09/2025     04/09/2025      Lab Results   Component Value Date     04/09/2025    POTASSIUM 4.3 04/09/2025    CR 0.72 04/09/2025    GRECIA 8.9 04/09/2025    BILITOTAL 0.3 04/09/2025    ALBUMIN 3.9 04/09/2025    ALT 12 04/09/2025    AST 14 04/09/2025     Results reviewed, labs MET treatment parameters, ok to proceed with treatment.    Post Infusion Assessment:  Patient tolerated infusion without incident.  Blood return noted pre and post infusion.  Site patent and intact, free from redness, edema or discomfort.  No evidence of extravasations.  Access discontinued per protocol.     Discharge Plan:   Patient declined prescription refills.  Discharge instructions reviewed with: Patient.  Patient and/or family verbalized understanding of discharge instructions and all questions answered.  AVS to patient via [employer].  Patient will return 04/15/25 for next appointment.   Patient discharged in stable condition accompanied by: self.  Departure Mode: Ambulatory.      Emily Meese, RN  
all other ROS negative except as per HPI

## 2025-04-09 NOTE — NURSING NOTE
"Chief Complaint   Patient presents with    Port Draw     Labs drawn via port by RN. VS taken.     Port accessed with 20 gauge, 3/4\" power needle by RN, labs collected, line flushed with saline and heparin.  Vitals taken. Pt checked in for appointment(s).     Danae Casillas RN    "

## 2025-04-15 ENCOUNTER — INFUSION THERAPY VISIT (OUTPATIENT)
Dept: ONCOLOGY | Facility: CLINIC | Age: 40
End: 2025-04-15
Attending: INTERNAL MEDICINE
Payer: COMMERCIAL

## 2025-04-15 VITALS
HEART RATE: 88 BPM | WEIGHT: 158.4 LBS | TEMPERATURE: 97.7 F | OXYGEN SATURATION: 100 % | SYSTOLIC BLOOD PRESSURE: 138 MMHG | DIASTOLIC BLOOD PRESSURE: 83 MMHG | BODY MASS INDEX: 27.19 KG/M2 | RESPIRATION RATE: 16 BRPM

## 2025-04-15 DIAGNOSIS — C50.812 MALIGNANT NEOPLASM OF OVERLAPPING SITES OF LEFT BREAST IN FEMALE, ESTROGEN RECEPTOR POSITIVE (H): ICD-10-CM

## 2025-04-15 DIAGNOSIS — Z17.0 MALIGNANT NEOPLASM OF OVERLAPPING SITES OF LEFT BREAST IN FEMALE, ESTROGEN RECEPTOR POSITIVE (H): ICD-10-CM

## 2025-04-15 DIAGNOSIS — Z51.11 ENCOUNTER FOR ANTINEOPLASTIC CHEMOTHERAPY: Primary | ICD-10-CM

## 2025-04-15 LAB
BASOPHILS # BLD AUTO: 0.1 10E3/UL (ref 0–0.2)
BASOPHILS NFR BLD AUTO: 1 %
EOSINOPHIL # BLD AUTO: 0.1 10E3/UL (ref 0–0.7)
EOSINOPHIL NFR BLD AUTO: 3 %
ERYTHROCYTE [DISTWIDTH] IN BLOOD BY AUTOMATED COUNT: 12.3 % (ref 10–15)
HCT VFR BLD AUTO: 35.2 % (ref 35–47)
HGB BLD-MCNC: 11.7 G/DL (ref 11.7–15.7)
IMM GRANULOCYTES # BLD: 0.1 10E3/UL
IMM GRANULOCYTES NFR BLD: 1 %
LYMPHOCYTES # BLD AUTO: 1.9 10E3/UL (ref 0.8–5.3)
LYMPHOCYTES NFR BLD AUTO: 43 %
MCH RBC QN AUTO: 28.9 PG (ref 26.5–33)
MCHC RBC AUTO-ENTMCNC: 33.2 G/DL (ref 31.5–36.5)
MCV RBC AUTO: 87 FL (ref 78–100)
MONOCYTES # BLD AUTO: 0.3 10E3/UL (ref 0–1.3)
MONOCYTES NFR BLD AUTO: 8 %
NEUTROPHILS # BLD AUTO: 2 10E3/UL (ref 1.6–8.3)
NEUTROPHILS NFR BLD AUTO: 45 %
NRBC # BLD AUTO: 0 10E3/UL
NRBC BLD AUTO-RTO: 0 /100
PLATELET # BLD AUTO: 384 10E3/UL (ref 150–450)
RBC # BLD AUTO: 4.05 10E6/UL (ref 3.8–5.2)
WBC # BLD AUTO: 4.5 10E3/UL (ref 4–11)

## 2025-04-15 PROCEDURE — 258N000003 HC RX IP 258 OP 636: Performed by: PHYSICIAN ASSISTANT

## 2025-04-15 PROCEDURE — 85004 AUTOMATED DIFF WBC COUNT: CPT | Performed by: PHYSICIAN ASSISTANT

## 2025-04-15 PROCEDURE — 96375 TX/PRO/DX INJ NEW DRUG ADDON: CPT

## 2025-04-15 PROCEDURE — 96417 CHEMO IV INFUS EACH ADDL SEQ: CPT

## 2025-04-15 PROCEDURE — 36591 DRAW BLOOD OFF VENOUS DEVICE: CPT | Performed by: PHYSICIAN ASSISTANT

## 2025-04-15 PROCEDURE — 96413 CHEMO IV INFUSION 1 HR: CPT

## 2025-04-15 PROCEDURE — 250N000011 HC RX IP 250 OP 636: Performed by: PHYSICIAN ASSISTANT

## 2025-04-15 PROCEDURE — 250N000011 HC RX IP 250 OP 636: Performed by: INTERNAL MEDICINE

## 2025-04-15 RX ORDER — HEPARIN SODIUM (PORCINE) LOCK FLUSH IV SOLN 100 UNIT/ML 100 UNIT/ML
5 SOLUTION INTRAVENOUS DAILY PRN
Status: DISCONTINUED | OUTPATIENT
Start: 2025-04-15 | End: 2025-04-15 | Stop reason: HOSPADM

## 2025-04-15 RX ORDER — HEPARIN SODIUM (PORCINE) LOCK FLUSH IV SOLN 100 UNIT/ML 100 UNIT/ML
5 SOLUTION INTRAVENOUS
Status: DISCONTINUED | OUTPATIENT
Start: 2025-04-15 | End: 2025-04-15 | Stop reason: HOSPADM

## 2025-04-15 RX ORDER — ONDANSETRON 2 MG/ML
8 INJECTION INTRAMUSCULAR; INTRAVENOUS ONCE
Status: COMPLETED | OUTPATIENT
Start: 2025-04-15 | End: 2025-04-15

## 2025-04-15 RX ADMIN — PACLITAXEL 143 MG: 6 INJECTION, SOLUTION INTRAVENOUS at 08:43

## 2025-04-15 RX ADMIN — ONDANSETRON 8 MG: 2 INJECTION INTRAMUSCULAR; INTRAVENOUS at 07:55

## 2025-04-15 RX ADMIN — SODIUM CHLORIDE 140 MG: 9 INJECTION, SOLUTION INTRAVENOUS at 08:06

## 2025-04-15 RX ADMIN — HEPARIN 5 ML: 100 SYRINGE at 07:04

## 2025-04-15 RX ADMIN — HEPARIN 5 ML: 100 SYRINGE at 09:48

## 2025-04-15 ASSESSMENT — PAIN SCALES - GENERAL: PAINLEVEL_OUTOF10: NO PAIN (0)

## 2025-04-15 NOTE — NURSING NOTE
Chief Complaint   Patient presents with    Port Draw     Labs drawn via port by RN in lab. VS taken.    Labs drawn via Port accessed using 20g flat needle. Line flushed and Heparin locked. Vital signs taken. Checked into next appointment.     Stefani Rogers RN

## 2025-04-15 NOTE — PATIENT INSTRUCTIONS
[facility] Triage and after hours / weekends / holidays:  [phone]    Please call the triage or after hours line if you experience a temperature greater than or equal to 100.4, shaking chills, have uncontrolled nausea, vomiting and/or diarrhea, dizziness, shortness of breath, chest pain, bleeding, unexplained bruising, or if you have any other new/concerning symptoms, questions or concerns.      If you are having any concerning symptoms or wish to speak to a provider before your next infusion visit, please call triage to notify them so we can adequately serve you.     If you need a refill on a narcotic prescription or other medication, please call before your infusion appointment.

## 2025-04-15 NOTE — PROGRESS NOTES
Infusion Nursing Note:  Lesley Mahmood presents today for C2 D8 trastuzumab-qyyp + taxol.    Patient seen by provider today: No   present during visit today: Not Applicable.    Note: Patient presents to the infusion center feeling well today. She notes that her fatigue is a bit worse this past week. Denies fevers, chills, cough, SOB, or chest pain. Patient wishes to proceed with treatment today.     Patient ices hands and feet during taxol infusion. Patient cold caps 30 minutes prior to taxol, during taxol, and a hour post taxol infusion.     Intravenous Access:  Implanted Port.    Treatment Conditions:  Lab Results   Component Value Date    HGB 11.7 04/15/2025    WBC 4.5 04/15/2025    ANEU 7.1 03/01/2020    ANEUTAUTO 2.0 04/15/2025     04/15/2025     Echo 03/18/2025 EF 55-60%  Results reviewed, labs MET treatment parameters, ok to proceed with treatment.      Post Infusion Assessment:  Patient tolerated infusion without incident.  Blood return noted pre and post infusion.  Site patent and intact, free from redness, edema or discomfort.  No evidence of extravasations.  Access discontinued per protocol.       Discharge Plan:   Patient declined prescription refills.  Discharge instructions reviewed with: Patient.  Patient and/or family verbalized understanding of discharge instructions and all questions answered.  AVS to patient via [employer].  Patient will return 04/21/2025 for next appointment.   Patient discharged in stable condition accompanied by: self.  Departure Mode: Ambulatory.      Briseida Laws RN

## 2025-04-21 ENCOUNTER — INFUSION THERAPY VISIT (OUTPATIENT)
Dept: ONCOLOGY | Facility: CLINIC | Age: 40
End: 2025-04-21
Attending: INTERNAL MEDICINE
Payer: COMMERCIAL

## 2025-04-21 VITALS
RESPIRATION RATE: 16 BRPM | BODY MASS INDEX: 27.4 KG/M2 | WEIGHT: 159.6 LBS | DIASTOLIC BLOOD PRESSURE: 73 MMHG | OXYGEN SATURATION: 100 % | HEART RATE: 90 BPM | TEMPERATURE: 97.9 F | SYSTOLIC BLOOD PRESSURE: 122 MMHG

## 2025-04-21 DIAGNOSIS — Z51.11 ENCOUNTER FOR ANTINEOPLASTIC CHEMOTHERAPY: ICD-10-CM

## 2025-04-21 DIAGNOSIS — C50.812 MALIGNANT NEOPLASM OF OVERLAPPING SITES OF LEFT BREAST IN FEMALE, ESTROGEN RECEPTOR POSITIVE (H): ICD-10-CM

## 2025-04-21 DIAGNOSIS — Z17.0 MALIGNANT NEOPLASM OF OVERLAPPING SITES OF LEFT BREAST IN FEMALE, ESTROGEN RECEPTOR POSITIVE (H): Primary | ICD-10-CM

## 2025-04-21 DIAGNOSIS — C50.812 MALIGNANT NEOPLASM OF OVERLAPPING SITES OF LEFT BREAST IN FEMALE, ESTROGEN RECEPTOR POSITIVE (H): Primary | ICD-10-CM

## 2025-04-21 DIAGNOSIS — Z17.0 MALIGNANT NEOPLASM OF OVERLAPPING SITES OF LEFT BREAST IN FEMALE, ESTROGEN RECEPTOR POSITIVE (H): ICD-10-CM

## 2025-04-21 DIAGNOSIS — Z51.11 ENCOUNTER FOR ANTINEOPLASTIC CHEMOTHERAPY: Primary | ICD-10-CM

## 2025-04-21 LAB
BASOPHILS # BLD AUTO: 0.1 10E3/UL (ref 0–0.2)
BASOPHILS NFR BLD AUTO: 1 %
EOSINOPHIL # BLD AUTO: 0.1 10E3/UL (ref 0–0.7)
EOSINOPHIL NFR BLD AUTO: 2 %
ERYTHROCYTE [DISTWIDTH] IN BLOOD BY AUTOMATED COUNT: 12.2 % (ref 10–15)
HCT VFR BLD AUTO: 33.3 % (ref 35–47)
HGB BLD-MCNC: 11.2 G/DL (ref 11.7–15.7)
IMM GRANULOCYTES # BLD: 0.1 10E3/UL
IMM GRANULOCYTES NFR BLD: 1 %
LYMPHOCYTES # BLD AUTO: 1.7 10E3/UL (ref 0.8–5.3)
LYMPHOCYTES NFR BLD AUTO: 36 %
MCH RBC QN AUTO: 28.9 PG (ref 26.5–33)
MCHC RBC AUTO-ENTMCNC: 33.6 G/DL (ref 31.5–36.5)
MCV RBC AUTO: 86 FL (ref 78–100)
MONOCYTES # BLD AUTO: 0.3 10E3/UL (ref 0–1.3)
MONOCYTES NFR BLD AUTO: 7 %
NEUTROPHILS # BLD AUTO: 2.5 10E3/UL (ref 1.6–8.3)
NEUTROPHILS NFR BLD AUTO: 54 %
NRBC # BLD AUTO: 0 10E3/UL
NRBC BLD AUTO-RTO: 0 /100
PLATELET # BLD AUTO: 367 10E3/UL (ref 150–450)
RBC # BLD AUTO: 3.88 10E6/UL (ref 3.8–5.2)
WBC # BLD AUTO: 4.7 10E3/UL (ref 4–11)

## 2025-04-21 PROCEDURE — 36591 DRAW BLOOD OFF VENOUS DEVICE: CPT | Performed by: PHYSICIAN ASSISTANT

## 2025-04-21 PROCEDURE — 85025 COMPLETE CBC W/AUTO DIFF WBC: CPT | Performed by: PHYSICIAN ASSISTANT

## 2025-04-21 PROCEDURE — 96375 TX/PRO/DX INJ NEW DRUG ADDON: CPT

## 2025-04-21 PROCEDURE — 258N000003 HC RX IP 258 OP 636: Performed by: PHYSICIAN ASSISTANT

## 2025-04-21 PROCEDURE — 96417 CHEMO IV INFUS EACH ADDL SEQ: CPT

## 2025-04-21 PROCEDURE — 96413 CHEMO IV INFUSION 1 HR: CPT

## 2025-04-21 PROCEDURE — 250N000011 HC RX IP 250 OP 636: Performed by: INTERNAL MEDICINE

## 2025-04-21 PROCEDURE — 250N000011 HC RX IP 250 OP 636: Performed by: PHYSICIAN ASSISTANT

## 2025-04-21 RX ORDER — LOPERAMIDE HYDROCHLORIDE 2 MG/1
2 TABLET ORAL 4 TIMES DAILY PRN
Qty: 60 TABLET | Refills: 0 | Status: SHIPPED | OUTPATIENT
Start: 2025-04-21

## 2025-04-21 RX ORDER — ONDANSETRON 2 MG/ML
8 INJECTION INTRAMUSCULAR; INTRAVENOUS ONCE
Status: COMPLETED | OUTPATIENT
Start: 2025-04-21 | End: 2025-04-21

## 2025-04-21 RX ORDER — HEPARIN SODIUM (PORCINE) LOCK FLUSH IV SOLN 100 UNIT/ML 100 UNIT/ML
5 SOLUTION INTRAVENOUS
Status: DISCONTINUED | OUTPATIENT
Start: 2025-04-21 | End: 2025-04-21 | Stop reason: HOSPADM

## 2025-04-21 RX ORDER — HEPARIN SODIUM (PORCINE) LOCK FLUSH IV SOLN 100 UNIT/ML 100 UNIT/ML
5 SOLUTION INTRAVENOUS ONCE
Status: COMPLETED | OUTPATIENT
Start: 2025-04-21 | End: 2025-04-21

## 2025-04-21 RX ADMIN — SODIUM CHLORIDE 140 MG: 0.9 INJECTION, SOLUTION INTRAVENOUS at 09:46

## 2025-04-21 RX ADMIN — HEPARIN 5 ML: 100 SYRINGE at 11:30

## 2025-04-21 RX ADMIN — PACLITAXEL 143 MG: 6 INJECTION, SOLUTION INTRAVENOUS at 10:22

## 2025-04-21 RX ADMIN — HEPARIN 5 ML: 100 SYRINGE at 08:28

## 2025-04-21 RX ADMIN — ONDANSETRON 8 MG: 2 INJECTION INTRAMUSCULAR; INTRAVENOUS at 09:29

## 2025-04-21 ASSESSMENT — PAIN SCALES - GENERAL: PAINLEVEL_OUTOF10: NO PAIN (0)

## 2025-04-21 NOTE — PATIENT INSTRUCTIONS
[facility] Triage and after hours / weekends / holidays:  [phone]    Please call the triage or after hours line if you experience a temperature greater than or equal to 100.4, shaking chills, have uncontrolled nausea, vomiting and/or diarrhea, dizziness, shortness of breath, chest pain, bleeding, unexplained bruising, or if you have any other new/concerning symptoms, questions or concerns.      If you are having any concerning symptoms or wish to speak to a provider before your next infusion visit, please call triage to notify them so we can adequately serve you.     If you need a refill on a narcotic prescription or other medication, please call before your infusion appointment.

## 2025-04-21 NOTE — NURSING NOTE
Chief Complaint   Patient presents with    Blood Draw     Port blood draw by lab RN       Port accessed with blood draw and heparin flush by lab RN. Vitals taken and next appointment arrived.    Elaine Starkey RN

## 2025-04-21 NOTE — PROGRESS NOTES
Infusion Nursing Note:  Lesley Mahmood presents today for Cycle 2, Day 15- Trastuzumab-qyyp and Taxol Infusion + PAXMAN Cold Capping.    Patient seen by provider today: No   present during visit today: Not Applicable.    Note: Patient reports feeling at baseline on arrival to infusion suite today. Denies the following signs of infection: no fever, cough, chills, chest pain, shortness of breath, or urinary symptoms. Denies neuropathy. Reports intermittent epistaxis, states this is not new and that it resolves quickly. Reports mild nausea x 1 this last week as well as loose stools averaging once or twice a day, however she did have one day where she had several episodes of loose/watery stools. Messaged Dr. Moser for Imodium prescription. Has rash on abdomen, back , and a few spots on her legs. Reports it is stable and no changes. Pain in her left shoulder that she thinks is a surgical pain, that Taxol just set it back last week. Declined intervention today. No new changes or concerns. Consents to treatment today.     Patient cold caps for 30 minutes prior to and during Taxol infusion and then for 60 minutes following Taxol. Iced hands and feet.     *Patient only cold capped for 40 minutes post Taxol today.*       Intravenous Access:  Implanted Port.    Treatment Conditions:  Lab Results   Component Value Date    HGB 11.2 (L) 04/21/2025    WBC 4.7 04/21/2025    ANEU 7.1 03/01/2020    ANEUTAUTO 2.5 04/21/2025     04/21/2025        Results reviewed, labs MET treatment parameters, ok to proceed with treatment.  ECHO/MUGA completed 3/18/25  EF 55-60%.      Post Infusion Assessment:  Patient tolerated infusion without incident.  Blood return noted pre and post infusion.  Site patent and intact, free from redness, edema or discomfort.  No evidence of extravasations.  Access discontinued per protocol.       Discharge Plan:   Prescription refills given for Imodium.  Discharge instructions reviewed with:  Patient.  Patient and/or family verbalized understanding of discharge instructions and all questions answered.  AVS to patient via [employer].  Patient will return 4/29 for next appointment.   Patient discharged in stable condition accompanied by: self.  Departure Mode: Ambulatory.      Lisa Gaines RN

## 2025-04-22 ENCOUNTER — OFFICE VISIT (OUTPATIENT)
Dept: OBGYN | Facility: CLINIC | Age: 40
End: 2025-04-22
Attending: OBSTETRICS & GYNECOLOGY
Payer: COMMERCIAL

## 2025-04-22 VITALS
SYSTOLIC BLOOD PRESSURE: 138 MMHG | HEART RATE: 93 BPM | DIASTOLIC BLOOD PRESSURE: 73 MMHG | WEIGHT: 159 LBS | HEIGHT: 64 IN | BODY MASS INDEX: 27.14 KG/M2

## 2025-04-22 DIAGNOSIS — Z00.00 ROUTINE GENERAL MEDICAL EXAMINATION AT A HEALTH CARE FACILITY: Primary | ICD-10-CM

## 2025-04-22 PROCEDURE — 99213 OFFICE O/P EST LOW 20 MIN: CPT | Performed by: OBSTETRICS & GYNECOLOGY

## 2025-04-22 ASSESSMENT — ANXIETY QUESTIONNAIRES
7. FEELING AFRAID AS IF SOMETHING AWFUL MIGHT HAPPEN: NOT AT ALL
3. WORRYING TOO MUCH ABOUT DIFFERENT THINGS: NOT AT ALL
6. BECOMING EASILY ANNOYED OR IRRITABLE: NOT AT ALL
7. FEELING AFRAID AS IF SOMETHING AWFUL MIGHT HAPPEN: NOT AT ALL
4. TROUBLE RELAXING: NOT AT ALL
8. IF YOU CHECKED OFF ANY PROBLEMS, HOW DIFFICULT HAVE THESE MADE IT FOR YOU TO DO YOUR WORK, TAKE CARE OF THINGS AT HOME, OR GET ALONG WITH OTHER PEOPLE?: NOT DIFFICULT AT ALL
2. NOT BEING ABLE TO STOP OR CONTROL WORRYING: NOT AT ALL
GAD7 TOTAL SCORE: 0
1. FEELING NERVOUS, ANXIOUS, OR ON EDGE: NOT AT ALL
GAD7 TOTAL SCORE: 0
GAD7 TOTAL SCORE: 0
IF YOU CHECKED OFF ANY PROBLEMS ON THIS QUESTIONNAIRE, HOW DIFFICULT HAVE THESE PROBLEMS MADE IT FOR YOU TO DO YOUR WORK, TAKE CARE OF THINGS AT HOME, OR GET ALONG WITH OTHER PEOPLE: NOT DIFFICULT AT ALL
5. BEING SO RESTLESS THAT IT IS HARD TO SIT STILL: NOT AT ALL

## 2025-04-22 NOTE — LETTER
4/22/2025       RE: Lesley Mahmood  [address]  [facility] [address]     Dear Colleague,    Thank you for referring your patient, Lesley Mahmood, to the [facility] [place] [facility]. Please see a copy of my visit note below.    CC/HPI:   Lesley Mahmood is a 40 year old female  who presents today for her annual exam. She is currently using vasectomy--current partner  and would like to continue with this method of birth control. Her health care has been changed dramatically in the past 6 months d/t a breast cancer diagnosis. She is 6 cycles in on her chemotherapy and doing well. She is s/p bilateral mastectomy and has expanders in place currently for planned reconstruction after tx. She is managing well. She is well supported by family, friends and colleagues. She denies side effects of HF/night sweats or vaginal dryness.  Her IUD was taken out in January at the same time she had her pap smear done. She has not had a period this month and wonders if it's related to chemo or other. Tolerating chemo pretty well. Gene testing has been unremarkable per her report.     HISTORIES:  Patient Active Problem List   Diagnosis     Malignant neoplasm of overlapping sites of left breast in female, estrogen receptor positive (H)     Encounter for antineoplastic chemotherapy     Past Medical History:   Diagnosis Date     Invasive ductal carcinoma of breast, female, left (H)      Past Surgical History:   Procedure Laterality Date     BIOPSY NODE SENTINEL Left 2/17/2025    Procedure: SENTINEL LYMPH NODE BIOPSY;  Surgeon: Jose Perea MD;  Location:  OR     birthmark removal  2001     EXTRACTION(S) DENTAL  1995     INSERT PORT VASCULAR ACCESS Right 3/18/2025    Procedure: Insert port vascular access;  Surgeon: Julio César Bird MD;  Location: [facility] OR     INSERT TISSUE EXPANDER BREAST Bilateral 2/17/2025    Procedure: Bilateral breast reconstruction  with expanders and SPY;  Surgeon: IAN Taveras MD;  Location: [facility] OR     IR CHEST PORT PLACEMENT > 5 YRS OF AGE  3/18/2025     MASTECTOMY SIMPLE Bilateral 2/17/2025    Procedure: SKIN-SPARING MASTECTOMY;  Surgeon: Jose Perea MD;  Location: [facility] OR     Current Outpatient Medications   Medication Sig Dispense Refill     acetaminophen (TYLENOL) 500 MG tablet Take 500-1,000 mg by mouth every 6 hours as needed for mild pain.       clindamycin (CLEOCIN-T) 1 % external gel Apply a thin layer to affected areas twice daily. 45 g 1     lidocaine-prilocaine (EMLA) 2.5-2.5 % external cream Apply to skin overlying port site at least 30 minutes prior to port access for labs or infusions. 30 g 3     loperamide (IMODIUM A-D) 2 MG tablet Take 1 tablet (2 mg) by mouth 4 times daily as needed for diarrhea. 60 tablet 0     Nutritional Supplements (VITAMIN D BOOSTER PO) Take 1,000 Units by mouth every morning.       ondansetron (ZOFRAN) 8 MG tablet Take 1 tablet (8 mg) by mouth every 8 hours as needed for nausea. 30 tablet 3     prochlorperazine (COMPAZINE) 10 MG tablet Take 1 tablet (10 mg) by mouth every 6 hours as needed for nausea or vomiting. 30 tablet 2     oxyCODONE (ROXICODONE) 5 MG tablet Take 1-2 tablets (5-10 mg) by mouth every 4 hours as needed for pain. 15 tablet 0     No Known Allergies  Social History     Socioeconomic History     Marital status:      Spouse name: Mauro Pena     Number of children: 1     Years of education: Not on file     Highest education level: Not on file   Occupational History     Occupation: endocrine      Comment: FT   Tobacco Use     Smoking status: Never     Passive exposure: Never     Smokeless tobacco: Never   Vaping Use     Vaping status: Never Used   Substance and Sexual Activity     Alcohol use: Yes     Alcohol/week: 4.0 standard drinks of alcohol     Types: 4 Standard drinks or equivalent per week     Comment: occasional     Drug use: Not Currently     Comment: past  THC gummies     Sexual activity: Yes     Partners: Male     Birth control/protection: Vasectomy     Comment: IUD now removed   Other Topics Concern     Parent/sibling w/ CABG, MI or angioplasty before 65F 55M? Not Asked      Service No     Blood Transfusions No     Caffeine Concern Yes     Comment: 1 cup      Occupational Exposure No     Hobby Hazards No     Sleep Concern No     Stress Concern No     Weight Concern No     Special Diet No     Back Care No     Exercise Yes     Comment: yoga 2 times and walking     Bike Helmet Yes     Seat Belt Yes     Self-Exams Yes     Comment: occ   Social History Narrative    How much exercise per week? Yoga and walking    How much calcium per day? In foods and supplements       How much caffeine per day? 1    How much vitamin D per day? In foods    Do you/your family wear seatbelts?  Yes    Do you/your family use safety helmets? Yes    Do you/your family use sunscreen? Yes    Do you/your family keep firearms in the home? No    Do you/your family have a smoke detector(s)? Yes        Do you feel safe in your home? Yes    Has anyone ever touched you in an unwanted manner? No    [facility]  [facility]     Social Drivers of Health     Financial Resource Strain: Not on file   Food Insecurity: Not on file   Transportation Needs: Not on file   Physical Activity: Not on file   Stress: Not on file   Social Connections: Not on file   Interpersonal Safety: Low Risk  (3/18/2025)    Interpersonal Safety      Do you feel physically and emotionally safe where you currently live?: Yes      Within the past 12 months, have you been hit, slapped, kicked or otherwise physically hurt by someone?: No      Within the past 12 months, have you been humiliated or emotionally abused in other ways by your partner or ex-partner?: No   Housing Stability: Not on file     Family History   Problem Relation Age of Onset     Hypertension Mother      Cancer Father         CLL stable      Cardiovascular  "Maternal Grandmother         strokes     Hypertension Maternal Grandmother      Macular Degeneration Maternal Grandfather      Hypertension Maternal Grandfather      Cerebrovascular Disease Paternal Grandmother      Hypertension Paternal Grandmother      Other Cancer Paternal Grandfather         CLL     Hypertension Paternal Grandfather      Unknown/Adopted Brother      Unknown/Adopted Sister      Hemochromatosis Cousin      Glaucoma No family hx of         Gyn Hx:   Patient's last menstrual period was 04/14/2025.  Menses:   Irregular since dx    Review Of Systems:  CONSTITUTIONAL: NEGATIVE for fever, chills  EYES: NEGATIVE for vision changes   RESP: NEGATIVE for significant cough or SOB  CV: NEGATIVE for chest pain, palpitations   GI: NEGATIVE for nausea, abdominal pain, heartburn, or change in bowel habits  : NEGATIVE for frequency, dysuria, or hematuria  MUSCULOSKELETAL: NEGATIVE for significant arthralgias or myalgia  NEURO: NEGATIVE for weakness, dizziness or paresthesias or headache    EXAM:  /73   Pulse 93   Ht 1.626 m (5' 4\")   Wt 72.1 kg (159 lb)   LMP 04/14/2025   BMI 27.29 kg/m    Body mass index is 27.29 kg/m .    General - pleasant female in no acute distress.  Skin - no suspicious lesions or rashes  Neck - supple without lymphadenopathy.  Lungs - non labored breathing  Heart - well perfused  Breasts- not examined d/t expanders and current breast chemo ongoing.   Abdomen - soft, nontender, nondistended, no masses or organomegaly noted.  Musculoskeletal - no gross deformities.  Neurological - normal strength, sensation, and mental status.  Pelvic - EG: normal  female, vulva reveals no erythema or lesions.   BUS: within normal limits.  Vagina: well rugated, no lesions polyps or suspicious discharge.     Cervix: no lesions, polyps discharge or CMT.  Uterus: firm, small, normal size and nontender.  Adnexa: no masses or tenderness.  Anus- normal, no lesions.  Rectovaginal - " deferred.    ASSESSMENT/PLAN  39 yo  here for annual examination.  Current ongoing chemo tx for breast cancer.      Additional teaching done at this visit regarding exercise, perimenopause, and management of  symptoms as well as possible BSO if her cancer team recommends. I have discussed with patient the harms and benefits of medications, treatment options.     Recommend pap q 5 yrs w/ HPV cotesting  Recommend colonoscopy at age 43  Recommend f/up breast mgmt per cancer team.   Derm skin check annually.     The patient will be notified of any results via [employer].    Hansa Beasley MD, FACOG  (she/her/hers)    Department of Ob/Gyn/Women's Health  [employer] [facility] [employer]  [place] [employer] [address] [place] [address]  [email or web address]  p. [phone]  f. [phone]          Answers submitted by the patient for this visit:  Patient Health Questionnaire (G7) (Submitted on 4/22/2025)  DOLORES 7 TOTAL SCORE: 0      Again, thank you for allowing me to participate in the care of your patient.      Sincerely,    Hansa Beasley MD

## 2025-04-22 NOTE — PROGRESS NOTES
CC/HPI:   Lesley Mahmood is a 40 year old female  who presents today for her annual exam. She is currently using vasectomy--current partner  and would like to continue with this method of birth control. Her health care has been changed dramatically in the past 6 months d/t a breast cancer diagnosis. She is 6 cycles in on her chemotherapy and doing well. She is s/p bilateral mastectomy and has expanders in place currently for planned reconstruction after tx. She is managing well. She is well supported by family, friends and colleagues. She denies side effects of HF/night sweats or vaginal dryness.  Her IUD was taken out in January at the same time she had her pap smear done. She has not had a period this month and wonders if it's related to chemo or other. Tolerating chemo pretty well. Gene testing has been unremarkable per her report.     HISTORIES:  Patient Active Problem List   Diagnosis    Malignant neoplasm of overlapping sites of left breast in female, estrogen receptor positive (H)    Encounter for antineoplastic chemotherapy     Past Medical History:   Diagnosis Date    Invasive ductal carcinoma of breast, female, left (H)      Past Surgical History:   Procedure Laterality Date    BIOPSY NODE SENTINEL Left 2/17/2025    Procedure: SENTINEL LYMPH NODE BIOPSY;  Surgeon: Jose Perea MD;  [facility]    birthmark removal  2001    EXTRACTION(S) DENTAL  1995    INSERT PORT VASCULAR ACCESS Right 3/18/2025    Procedure: Insert port vascular access;  Surgeon: Julio César Bird MD;  Location: [facility] OR    INSERT TISSUE EXPANDER BREAST Bilateral 2/17/2025    Procedure: Bilateral breast reconstruction with expanders and SPY;  Surgeon: IAN Taveras MD;  Location: [facility] OR    IR CHEST PORT PLACEMENT > 5 YRS OF AGE  3/18/2025    MASTECTOMY SIMPLE Bilateral 2/17/2025    Procedure: SKIN-SPARING MASTECTOMY;  Surgeon: Jose Perea MD;  Location: [facility] OR     Current Outpatient Medications   Medication Sig Dispense  Refill    acetaminophen (TYLENOL) 500 MG tablet Take 500-1,000 mg by mouth every 6 hours as needed for mild pain.      clindamycin (CLEOCIN-T) 1 % external gel Apply a thin layer to affected areas twice daily. 45 g 1    lidocaine-prilocaine (EMLA) 2.5-2.5 % external cream Apply to skin overlying port site at least 30 minutes prior to port access for labs or infusions. 30 g 3    loperamide (IMODIUM A-D) 2 MG tablet Take 1 tablet (2 mg) by mouth 4 times daily as needed for diarrhea. 60 tablet 0    Nutritional Supplements (VITAMIN D BOOSTER PO) Take 1,000 Units by mouth every morning.      ondansetron (ZOFRAN) 8 MG tablet Take 1 tablet (8 mg) by mouth every 8 hours as needed for nausea. 30 tablet 3    prochlorperazine (COMPAZINE) 10 MG tablet Take 1 tablet (10 mg) by mouth every 6 hours as needed for nausea or vomiting. 30 tablet 2    oxyCODONE (ROXICODONE) 5 MG tablet Take 1-2 tablets (5-10 mg) by mouth every 4 hours as needed for pain. 15 tablet 0     No Known Allergies  Social History     Socioeconomic History    Marital status:      Spouse name: Mauro Pena    Number of children: 1    Years of education: Not on file    Highest education level: Not on file   Occupational History    Occupation: endocrine      Comment: FT   Tobacco Use    Smoking status: Never     Passive exposure: Never    Smokeless tobacco: Never   Vaping Use    Vaping status: Never Used   Substance and Sexual Activity    Alcohol use: Yes     Alcohol/week: 4.0 standard drinks of alcohol     Types: 4 Standard drinks or equivalent per week     Comment: occasional    Drug use: Not Currently     Comment: past THC gummies    Sexual activity: Yes     Partners: Male     Birth control/protection: Vasectomy     Comment: IUD now removed   Other Topics Concern    Parent/sibling w/ CABG, MI or angioplasty before 65F 55M? Not Asked     Service No    Blood Transfusions No    Caffeine Concern Yes     Comment: 1 cup     Occupational Exposure No     Hobby Hazards No    Sleep Concern No    Stress Concern No    Weight Concern No    Special Diet No    Back Care No    Exercise Yes     Comment: yoga 2 times and walking    Bike Helmet Yes    Seat Belt Yes    Self-Exams Yes     Comment: occ   Social History Narrative    How much exercise per week? Yoga and walking    How much calcium per day? In foods and supplements       How much caffeine per day? 1    How much vitamin D per day? In foods    Do you/your family wear seatbelts?  Yes    Do you/your family use safety helmets? Yes    Do you/your family use sunscreen? Yes    Do you/your family keep firearms in the home? No    Do you/your family have a smoke detector(s)? Yes        Do you feel safe in your home? Yes    Has anyone ever touched you in an unwanted manner? No    Reviewed [facility]  6-     Social Drivers of Health     Financial Resource Strain: Not on file   Food Insecurity: Not on file   Transportation Needs: Not on file   Physical Activity: Not on file   Stress: Not on file   Social Connections: Not on file   Interpersonal Safety: Low Risk  (3/18/2025)    Interpersonal Safety     Do you feel physically and emotionally safe where you currently live?: Yes     Within the past 12 months, have you been hit, slapped, kicked or otherwise physically hurt by someone?: No     Within the past 12 months, have you been humiliated or emotionally abused in other ways by your partner or ex-partner?: No   Housing Stability: Not on file     Family History   Problem Relation Age of Onset    Hypertension Mother     Cancer Father         CLL stable     Cardiovascular Maternal Grandmother         strokes    Hypertension Maternal Grandmother     Macular Degeneration Maternal Grandfather     Hypertension Maternal Grandfather     Cerebrovascular Disease Paternal Grandmother     Hypertension Paternal Grandmother     Other Cancer Paternal Grandfather         CLL    Hypertension Paternal Grandfather     Unknown/Adopted Brother  "    Unknown/Adopted Sister     Hemochromatosis Cousin     Glaucoma No family hx of         Gyn Hx:   Patient's last menstrual period was 04/14/2025.  Menses:   Irregular since dx    Review Of Systems:  CONSTITUTIONAL: NEGATIVE for fever, chills  EYES: NEGATIVE for vision changes   RESP: NEGATIVE for significant cough or SOB  CV: NEGATIVE for chest pain, palpitations   GI: NEGATIVE for nausea, abdominal pain, heartburn, or change in bowel habits  : NEGATIVE for frequency, dysuria, or hematuria  MUSCULOSKELETAL: NEGATIVE for significant arthralgias or myalgia  NEURO: NEGATIVE for weakness, dizziness or paresthesias or headache    EXAM:  /73   Pulse 93   Ht 1.626 m (5' 4\")   Wt 72.1 kg (159 lb)   LMP 04/14/2025   BMI 27.29 kg/m    Body mass index is 27.29 kg/m .    General - pleasant female in no acute distress.  Skin - no suspicious lesions or rashes  Neck - supple without lymphadenopathy.  Lungs - non labored breathing  Heart - well perfused  Breasts- not examined d/t expanders and current breast chemo ongoing.   Abdomen - soft, nontender, nondistended, no masses or organomegaly noted.  Musculoskeletal - no gross deformities.  Neurological - normal strength, sensation, and mental status.  Pelvic - EG: normal  female, vulva reveals no erythema or lesions.   BUS: within normal limits.  Vagina: well rugated, no lesions polyps or suspicious discharge.     Cervix: no lesions, polyps discharge or CMT.  Uterus: firm, small, normal size and nontender.  Adnexa: no masses or tenderness.  Anus- normal, no lesions.  Rectovaginal - deferred.    ASSESSMENT/PLAN  39 yo  here for annual examination.  Current ongoing chemo tx for breast cancer.      Additional teaching done at this visit regarding exercise, perimenopause, and management of  symptoms as well as possible BSO if her cancer team recommends. I have discussed with patient the harms and benefits of medications, treatment options.     Recommend pap q 5 " yrs w/ HPV cotesting  Recommend colonoscopy at age 43  Recommend f/up breast mgmt per cancer team.   Derm skin check annually.     The patient will be notified of any results via [employer].    Hansa Beasley MD, FACOG  (she/her/hers)    Department of Ob/Gyn/Women's Health  [employer] [facility] [employer]  [place] [employer] [facility]  [address] [place] [address]. [place] [address]  [email or web address]  p. [phone]  f. [phone]          Answers submitted by the patient for this visit:  Patient Health Questionnaire (G7) (Submitted on 4/22/2025)  DOLORES 7 TOTAL SCORE: 0

## 2025-04-22 NOTE — PATIENT INSTRUCTIONS
Thank you for trusting us with your care!   Please be aware, if you are on Mychart, you may see your results prior to your providers review. If labs are abnormal, we will call or message you on [employer] with a follow up plan.    If you need to contact us for questions about:  Symptoms, Scheduling & Medical Questions; Non-urgent (2-3 day response) [email or web address] message, Urgent (needing response today) [phone] (if after 3:30pm next day response)   Prescriptions: Please call your Pharmacy   Billing: [facility] [phone] or [facility]:[phone]

## 2025-04-23 ENCOUNTER — VIRTUAL VISIT (OUTPATIENT)
Dept: ONCOLOGY | Facility: CLINIC | Age: 40
End: 2025-04-23
Attending: INTERNAL MEDICINE
Payer: COMMERCIAL

## 2025-04-23 VITALS — BODY MASS INDEX: 27.14 KG/M2 | WEIGHT: 159 LBS | HEIGHT: 64 IN

## 2025-04-23 DIAGNOSIS — Z51.11 ENCOUNTER FOR ANTINEOPLASTIC CHEMOTHERAPY: ICD-10-CM

## 2025-04-23 DIAGNOSIS — Z17.0 MALIGNANT NEOPLASM OF OVERLAPPING SITES OF LEFT BREAST IN FEMALE, ESTROGEN RECEPTOR POSITIVE (H): Primary | ICD-10-CM

## 2025-04-23 DIAGNOSIS — C50.812 MALIGNANT NEOPLASM OF OVERLAPPING SITES OF LEFT BREAST IN FEMALE, ESTROGEN RECEPTOR POSITIVE (H): Primary | ICD-10-CM

## 2025-04-23 DIAGNOSIS — L27.0 DRUG RASH: ICD-10-CM

## 2025-04-23 PROCEDURE — G2211 COMPLEX E/M VISIT ADD ON: HCPCS | Mod: 95 | Performed by: PHYSICIAN ASSISTANT

## 2025-04-23 PROCEDURE — 1126F AMNT PAIN NOTED NONE PRSNT: CPT | Mod: 95 | Performed by: PHYSICIAN ASSISTANT

## 2025-04-23 PROCEDURE — 98006 SYNCH AUDIO-VIDEO EST MOD 30: CPT | Mod: 24 | Performed by: PHYSICIAN ASSISTANT

## 2025-04-23 RX ORDER — MEPERIDINE HYDROCHLORIDE 25 MG/ML
25 INJECTION INTRAMUSCULAR; INTRAVENOUS; SUBCUTANEOUS
OUTPATIENT
Start: 2025-05-07

## 2025-04-23 RX ORDER — HEPARIN SODIUM,PORCINE 10 UNIT/ML
5-20 VIAL (ML) INTRAVENOUS DAILY PRN
OUTPATIENT
Start: 2025-05-07

## 2025-04-23 RX ORDER — HEPARIN SODIUM (PORCINE) LOCK FLUSH IV SOLN 100 UNIT/ML 100 UNIT/ML
5 SOLUTION INTRAVENOUS
OUTPATIENT
Start: 2025-05-14

## 2025-04-23 RX ORDER — DIPHENHYDRAMINE HCL 25 MG
50 CAPSULE ORAL
Start: 2025-05-07

## 2025-04-23 RX ORDER — ACETAMINOPHEN 325 MG/1
650 TABLET ORAL
OUTPATIENT
Start: 2025-05-14

## 2025-04-23 RX ORDER — METHYLPREDNISOLONE SODIUM SUCCINATE 40 MG/ML
40 INJECTION INTRAMUSCULAR; INTRAVENOUS
Start: 2025-04-30

## 2025-04-23 RX ORDER — ALBUTEROL SULFATE 90 UG/1
1-2 INHALANT RESPIRATORY (INHALATION)
Start: 2025-05-07

## 2025-04-23 RX ORDER — ACETAMINOPHEN 325 MG/1
650 TABLET ORAL
OUTPATIENT
Start: 2025-05-07

## 2025-04-23 RX ORDER — EPINEPHRINE 1 MG/ML
0.3 INJECTION, SOLUTION INTRAMUSCULAR; SUBCUTANEOUS EVERY 5 MIN PRN
OUTPATIENT
Start: 2025-04-30

## 2025-04-23 RX ORDER — EPINEPHRINE 1 MG/ML
0.3 INJECTION, SOLUTION INTRAMUSCULAR; SUBCUTANEOUS EVERY 5 MIN PRN
OUTPATIENT
Start: 2025-05-14

## 2025-04-23 RX ORDER — DIPHENHYDRAMINE HCL 25 MG
50 CAPSULE ORAL
Start: 2025-05-14

## 2025-04-23 RX ORDER — ONDANSETRON 2 MG/ML
8 INJECTION INTRAMUSCULAR; INTRAVENOUS ONCE
OUTPATIENT
Start: 2025-04-30

## 2025-04-23 RX ORDER — DIPHENHYDRAMINE HYDROCHLORIDE 50 MG/ML
25 INJECTION, SOLUTION INTRAMUSCULAR; INTRAVENOUS
Start: 2025-05-14

## 2025-04-23 RX ORDER — DIPHENHYDRAMINE HCL 25 MG
50 CAPSULE ORAL
Start: 2025-04-30

## 2025-04-23 RX ORDER — TRIAMCINOLONE ACETONIDE 5 MG/G
OINTMENT TOPICAL
Qty: 45 G | Refills: 1 | Status: SHIPPED | OUTPATIENT
Start: 2025-04-23

## 2025-04-23 RX ORDER — METHYLPREDNISOLONE SODIUM SUCCINATE 40 MG/ML
40 INJECTION INTRAMUSCULAR; INTRAVENOUS
Start: 2025-05-14

## 2025-04-23 RX ORDER — DIPHENHYDRAMINE HYDROCHLORIDE 50 MG/ML
25 INJECTION, SOLUTION INTRAMUSCULAR; INTRAVENOUS
Start: 2025-05-07

## 2025-04-23 RX ORDER — LORAZEPAM 2 MG/ML
0.5 INJECTION INTRAMUSCULAR EVERY 4 HOURS PRN
OUTPATIENT
Start: 2025-05-07

## 2025-04-23 RX ORDER — ALBUTEROL SULFATE 90 UG/1
1-2 INHALANT RESPIRATORY (INHALATION)
Start: 2025-04-30

## 2025-04-23 RX ORDER — MEPERIDINE HYDROCHLORIDE 25 MG/ML
25 INJECTION INTRAMUSCULAR; INTRAVENOUS; SUBCUTANEOUS
OUTPATIENT
Start: 2025-05-14

## 2025-04-23 RX ORDER — ALBUTEROL SULFATE 0.83 MG/ML
2.5 SOLUTION RESPIRATORY (INHALATION)
OUTPATIENT
Start: 2025-05-07

## 2025-04-23 RX ORDER — ONDANSETRON 2 MG/ML
8 INJECTION INTRAMUSCULAR; INTRAVENOUS ONCE
OUTPATIENT
Start: 2025-05-14

## 2025-04-23 RX ORDER — EPINEPHRINE 1 MG/ML
0.3 INJECTION, SOLUTION INTRAMUSCULAR; SUBCUTANEOUS EVERY 5 MIN PRN
OUTPATIENT
Start: 2025-05-07

## 2025-04-23 RX ORDER — METHYLPREDNISOLONE SODIUM SUCCINATE 40 MG/ML
40 INJECTION INTRAMUSCULAR; INTRAVENOUS
Start: 2025-05-07

## 2025-04-23 RX ORDER — MEPERIDINE HYDROCHLORIDE 25 MG/ML
25 INJECTION INTRAMUSCULAR; INTRAVENOUS; SUBCUTANEOUS
OUTPATIENT
Start: 2025-04-30

## 2025-04-23 RX ORDER — ALBUTEROL SULFATE 0.83 MG/ML
2.5 SOLUTION RESPIRATORY (INHALATION)
OUTPATIENT
Start: 2025-05-14

## 2025-04-23 RX ORDER — DIPHENHYDRAMINE HYDROCHLORIDE 50 MG/ML
25 INJECTION, SOLUTION INTRAMUSCULAR; INTRAVENOUS
Start: 2025-04-30

## 2025-04-23 RX ORDER — ONDANSETRON 2 MG/ML
8 INJECTION INTRAMUSCULAR; INTRAVENOUS ONCE
OUTPATIENT
Start: 2025-05-07

## 2025-04-23 RX ORDER — LORAZEPAM 2 MG/ML
0.5 INJECTION INTRAMUSCULAR EVERY 4 HOURS PRN
OUTPATIENT
Start: 2025-05-14

## 2025-04-23 RX ORDER — LORAZEPAM 2 MG/ML
0.5 INJECTION INTRAMUSCULAR EVERY 4 HOURS PRN
OUTPATIENT
Start: 2025-04-30

## 2025-04-23 RX ORDER — HEPARIN SODIUM,PORCINE 10 UNIT/ML
5-20 VIAL (ML) INTRAVENOUS DAILY PRN
OUTPATIENT
Start: 2025-04-30

## 2025-04-23 RX ORDER — ACETAMINOPHEN 325 MG/1
650 TABLET ORAL
OUTPATIENT
Start: 2025-04-30

## 2025-04-23 RX ORDER — DIPHENHYDRAMINE HYDROCHLORIDE 50 MG/ML
50 INJECTION, SOLUTION INTRAMUSCULAR; INTRAVENOUS
Start: 2025-05-07

## 2025-04-23 RX ORDER — ALBUTEROL SULFATE 90 UG/1
1-2 INHALANT RESPIRATORY (INHALATION)
Start: 2025-05-14

## 2025-04-23 RX ORDER — HEPARIN SODIUM,PORCINE 10 UNIT/ML
5-20 VIAL (ML) INTRAVENOUS DAILY PRN
OUTPATIENT
Start: 2025-05-14

## 2025-04-23 RX ORDER — ALBUTEROL SULFATE 0.83 MG/ML
2.5 SOLUTION RESPIRATORY (INHALATION)
OUTPATIENT
Start: 2025-04-30

## 2025-04-23 RX ORDER — HEPARIN SODIUM (PORCINE) LOCK FLUSH IV SOLN 100 UNIT/ML 100 UNIT/ML
5 SOLUTION INTRAVENOUS
OUTPATIENT
Start: 2025-05-07

## 2025-04-23 RX ORDER — HEPARIN SODIUM (PORCINE) LOCK FLUSH IV SOLN 100 UNIT/ML 100 UNIT/ML
5 SOLUTION INTRAVENOUS
OUTPATIENT
Start: 2025-04-30

## 2025-04-23 RX ORDER — DIPHENHYDRAMINE HYDROCHLORIDE 50 MG/ML
50 INJECTION, SOLUTION INTRAMUSCULAR; INTRAVENOUS
Start: 2025-05-14

## 2025-04-23 RX ORDER — DIPHENHYDRAMINE HYDROCHLORIDE 50 MG/ML
50 INJECTION, SOLUTION INTRAMUSCULAR; INTRAVENOUS
Start: 2025-04-30

## 2025-04-23 ASSESSMENT — PAIN SCALES - GENERAL: PAINLEVEL_OUTOF10: NO PAIN (0)

## 2025-04-23 NOTE — LETTER
4/23/2025      Lesley Mahmood  [address] [address]      Dear Colleague,    Thank you for referring your patient, Lesley Mahmood, to the [facility]. Please see a copy of my visit note below.    Oncology Visit:  Date on this visit: Apr 23, 2025    Diagnosis:  Clinical prognostic stage Ia, [id number], grade 2, ER positive, MA positive, HER-2 positive left breast cancer.    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  Routine bilateral screening mammograms on 1/6/2025 showed fine pleomorphic calcifications throughout the left breast and possible medial breast focal asymmetry.  Ultrasound showed a mass at 10 o'clock, 9 cm from the nipple measuring 1 cm and multiple areas with conglomerate of ducts and dilated ducts with associated calcifications in all 4 quadrants of the breast. Ultrasound of the left axilla was without lymphadenopathy.  Pathology of biopsy of the mass at 10 o'clock was c/w grade 3 invasive ductal carcinoma with associated high grade DCIS.  Invasive carcinoma is ER positive (71-80%), MA positive (51-60%), HER2 equivocal by IHC and positive by FISH (HER2 signals/cell of 11.1 and HER2/CEP17 ratio of 4.0).  Pathology of biopsy of calcifications at 3 o'clock is c/w grade 2 invasive lobular carcinoma with associated high grade DCIS and LCIS.  Invasive carcinoma is ER positive (%), MA positive (%), and HER2 negative (IHC 1+).  Breast MRI on 1/23/2025 was with marked background parenchymal enhancement with extensive nonfocal enhancement greater than the right breast.  There were some areas of progressive enhancement greater than background enhanced but no focal mass.  MRI was without lymphadenopathy.  No focal abnormality in the right breast was seen.    She underwent bilateral skin sparing mastectomies and left axillary sentinel lymph node biopsy with Dr. Perea  on 2/17/2025.  She had immediate tissue expander placement.  Pathology showed a total of at least 12 foci invasive carcinoma.  At least 4 foci of high grade invasive ductal carcinoma in the left breast.  The largest focus measured 1.2 cm, examples of additional foci measuring 1 cm, 2.1 mm, 1.1 mm.  In addition, there were at least 7 microscopic foci of grade 2 invasive lobular carcinoma.  This was in a background of extensive high grade DCIS, spanning 12 cm.  LCIS was also present.  The anterior, superior margin was involved by DCIS.  Pathology of the right breast was negative for malignancy.  3 left axillary sentinel lymph nodes were negative for malignancy.    She met with Dr. Moser who ultimately recommended adjuvant therapy with THP x 12 weeks followed by Herceptin x 1 year. Consideration of adjuvant radiation given extensive DCIS and positive margin.     She started treatment on 3/19/25.     Interval History: Lesley is feeling well today. She has had six weeks of THP so far. She has noticed some progression of side effects but symptoms are manageable. She went back to work a week ago Monday. Doing well with this as long as she works about 6 hours. Has good energy at work and then rests when she gets home. This has been a positive change mentally.     She notes skin rash is stable. Clearing on chest and neck and now is more itchy and macular more on hands and elbows. Using vaseline, lotion, and benadryl at bedtime with relief. Clindamycin is helping too.     Shoulders are a lot better. Gets some L axillary tenderness after chemo that improves with stretching and exercise and is overall trending better.     Appetite is great. No nausea. Less GERD. Bowels are trending more loose now and she is using Imodium PRN.      No fevers, no URI symptoms. Sore throat yesterday (day 2) now resolved.     Tongue feels weird with some sensation change. No mucositis. She does feel vague sensation change in fingers and toes.        Past Medical/Surgical History:  Past Medical History:   Diagnosis Date     Invasive ductal carcinoma of breast, female, left (H)      Past Surgical History:   Procedure Laterality Date     BIOPSY NODE SENTINEL Left 2/17/2025    Procedure: SENTINEL LYMPH NODE BIOPSY;  Surgeon: Jose Perea MD;  Location: [facility] OR     birthmark removal  2001     EXTRACTION(S) DENTAL  1995     INSERT PORT VASCULAR ACCESS Right 3/18/2025    Procedure: Insert port vascular access;  Surgeon: Julio César Bird MD;  Location: [facility] OR     INSERT TISSUE EXPANDER BREAST Bilateral 2/17/2025    Procedure: Bilateral breast reconstruction with expanders and SPY;  Surgeon: IAN Taveras MD;  Location: [facility] OR     IR CHEST PORT PLACEMENT > 5 YRS OF AGE  3/18/2025     MASTECTOMY SIMPLE Bilateral 2/17/2025    Procedure: SKIN-SPARING MASTECTOMY;  Surgeon: Jose Perea MD;  Location: [facility] OR     Allergies:  Allergies as of 04/23/2025     (No Known Allergies)     Current Medications:  Current Outpatient Medications   Medication Sig Dispense Refill     acetaminophen (TYLENOL) 500 MG tablet Take 500-1,000 mg by mouth every 6 hours as needed for mild pain.       clindamycin (CLEOCIN-T) 1 % external gel Apply a thin layer to affected areas twice daily. 45 g 1     lidocaine-prilocaine (EMLA) 2.5-2.5 % external cream Apply to skin overlying port site at least 30 minutes prior to port access for labs or infusions. 30 g 3     loperamide (IMODIUM A-D) 2 MG tablet Take 1 tablet (2 mg) by mouth 4 times daily as needed for diarrhea. 60 tablet 0     Nutritional Supplements (VITAMIN D BOOSTER PO) Take 1,000 Units by mouth every morning.       ondansetron (ZOFRAN) 8 MG tablet Take 1 tablet (8 mg) by mouth every 8 hours as needed for nausea. 30 tablet 3     oxyCODONE (ROXICODONE) 5 MG tablet Take 1-2 tablets (5-10 mg) by mouth every 4 hours as needed for pain. 15 tablet 0     prochlorperazine (COMPAZINE) 10 MG tablet Take 1 tablet (10 mg) by mouth every  6 hours as needed for nausea or vomiting. 30 tablet 2        Physical Exam:  LMP 04/14/2025   Video physical exam  General: Patient appears well in no acute distress.   Skin: No visualized rash or lesions on visualized skin  Eyes: EOMI, no erythema, sclera icterus or discharge noted  Resp: Appears to be breathing comfortably without accessory muscle usage, speaking in full sentences, no cough  MSK: Appears to have normal range of motion based on visualized movements  Neurologic: No apparent tremors, facial movements symmetric  Psych: affect and mood congruent, alert and oriented      Laboratory/Imaging Studies   03/18/25 07:32 03/18/25 08:53 04/09/25 07:55   Sodium 136  137   Potassium 4.4  4.3   Chloride 102  106   Carbon Dioxide (CO2) 24  24   Urea Nitrogen 9.3  9.9   Creatinine 0.72  0.72   GFR Estimate >90  >90   Calcium 9.3  8.9   Anion Gap 10  7   Albumin 4.4  3.9   Protein Total 7.0  6.4   Alkaline Phosphatase 47  57   ALT 8  12   AST 16  14   Bilirubin Total 0.4  0.3   Glucose 100 (H)  93   HCG Qual Urine  Negative       04/03/25 07:07 04/09/25 07:55 04/15/25 07:10 04/21/25 08:27   WBC 5.0 4.6 4.5 4.7   Hemoglobin 11.6 (L) 11.3 (L) 11.7 11.2 (L)   Hematocrit 35.0 33.7 (L) 35.2 33.3 (L)   Platelet Count 322 342 384 367   RBC Count 4.00 3.89 4.05 3.88   MCV 88 87 87 86   MCH 29.0 29.0 28.9 28.9   MCHC 33.1 33.5 33.2 33.6   RDW 12.4 12.1 12.3 12.2   % Neutrophils 49 52 45 54   % Lymphocytes 39 35 43 36   % Monocytes 8 9 8 7   % Eosinophils 3 2 3 2   % Basophils 1 1 1 1   % Immature Granulocytes 1 1 1 1   NRBC/W 0 0 0 0   Absolute Neutrophil 2.4 2.4 2.0 2.5   Absolute Lymphocytes 1.9 1.6 1.9 1.7   Absolute Monocytes 0.4 0.4 0.3 0.3   Absolute Eosinophils 0.2 0.1 0.1 0.1   Absolute Basophils 0.1 0.0 0.1 0.1   Absolute Immature Granulocytes 0.0 0.0 0.1 0.1   Absolute NRBCs 0.0 0.0 0.0 0.0         ECHO 3/18/25  Interpretation Summary  Left ventricular size, wall motion and function are normal. The  ejection  fraction is 55-60%. Global peak LV longitudinal strain is averaged at -19.4%.  This is within reported normal limits (normal <-18%).  Global right ventricular function is normal.  No significant valvular abnormalities were noted.  No pericardial effusion is present.  Previous study not available for comparison.    ASSESSMENT/PLAN:  Patient is a 41 yo female with multifocal left breast cancer.  She has a [id number], grade 3, ER positive, CA positive, HER2 positive invasive ductal carcinoma and a [id number], grade 2, ER positive, CA positive, HER2 negative invasive lobular carcinoma of the left breast.       Left breast invasive ductal and lobular breast cancers:  -Pathology from the bilateral mastectomies and left axillary sentinel lymph node procedure performed on 2/17/2025.  Pathology of the left breast showed at least 4 foci of invasive ductal carcinoma, the largest measuring 1.2 cm.  Additional foci of IDC measuring 1 cm, 2.1 mm and 1.1 mm.  In addition there were at least 7 microscopic foci of invasive and microinvasive lobular carcinoma.  Three sentinel lymph nodes were negative for malignancy. Anterior superior margin was positive for DCIS.  There was extensive DCIS spanning 12 cm.    - Dr. Moser recommended systemic therapy with THP followed by Herceptin to complete a year of treatment.   -She is s/p 6 weeks of this and tolerating well with manageable side effects.   - Baseline echo WNL. We will monitor once every 3 months while on HER2 therapy.    - We plan to treat with an adjuvant course of endocrine therapy.  Dr. Moser recommended ovarian suppression plus an aromatase inhibitor, total treatment duration of 10 years. This will be started after the first 12 weeks of treatment.   -She will have a consult with radiation oncology to discuss adjuvant radiation given extensive DCIS with positive anterior margin.     2.  RBM8A pathogenic mutation; BRCA1 varian of undetermined significance:  She has a  personal history of two different types of breast cancer.  I reviewed Caleb Estevez's 1/30/2025 genetic counseling note.  Dr. Mahmood elected to proceed with BRCA 1/g genetic analysis with reflext to Breast Actionable, Common Hereditary Cancer and Hereditary Hematologic Malignancy panels.  This testing showed a pathogenic gene mutation in RBM8A.  This is a common recessive variant and in the absence of a second RBM8A mutation, represents a carrier status.  She was also found to have a variant of undetermined significance in BRCA1.  We discussed variants of undetermined significance do not change cancer screening recommendations.    3. Acneform rash-->now more macular: I think initially pustular rash was from dex versus perjeta and now macular rash is coming more from Taxol. Use clindamycin on any pustular areas. For macular, pruritic rash, use triamcinolone 0.5 % BID. Rx sent.     4. Dry nose/drippy nose: Humidifier in bedroom at night, saline nasal spray BID, vaseline to nares before bed. This is helping.     5. Loose stools: Imodium PRN working well.     6. Possible tongue mucositis: Trial of s/s rinses BID.     7. Neuropathy: Very minor. Grade 1. Monitor. She is icing hands and feet during Taxol.     30 minutes spent on the date of the encounter doing chart review, review of test results, interpretation of tests, patient visit, and documentation     The longitudinal plan of care for the diagnosis(es)/condition(s) as documented were addressed during this visit. Due to the added complexity in care, I will continue to support Lesley in the subsequent management and with ongoing continuity of care.    Carole Salas PA-C                   Again, thank you for allowing me to participate in the care of your patient.        Sincerely,        Carole Salas PA-C    Electronically signed

## 2025-04-23 NOTE — NURSING NOTE
Current patient location: [address] [place] [id number]    Is the patient currently in the state of [place]? YES    Visit mode: VIDEO    If the visit is dropped, the patient can be reconnected by:VIDEO VISIT: Text to cell phone:   Telephone Information:   Mobile [phone]    and VIDEO VISIT: Send to e-mail at: [email or web address]    Will anyone else be joining the visit? NO  (If patient encounters technical issues they should call [phone])    Are changes needed to the allergy or medication list? Pt stated no med changes    Are refills needed on medications prescribed by this physician? NO    Rooming Documentation:  Not applicable    Reason for visit: TERRIE BLOCKF

## 2025-04-23 NOTE — PROGRESS NOTES
Oncology Visit:  Date on this visit: Apr 23, 2025    Diagnosis:  Clinical prognostic stage Ia, [id number], grade 2, ER positive, UT positive, HER-2 positive left breast cancer.    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  Routine bilateral screening mammograms on 1/6/2025 showed fine pleomorphic calcifications throughout the left breast and possible medial breast focal asymmetry.  Ultrasound showed a mass at 10 o'clock, 9 cm from the nipple measuring 1 cm and multiple areas with conglomerate of ducts and dilated ducts with associated calcifications in all 4 quadrants of the breast. Ultrasound of the left axilla was without lymphadenopathy.  Pathology of biopsy of the mass at 10 o'clock was c/w grade 3 invasive ductal carcinoma with associated high grade DCIS.  Invasive carcinoma is ER positive (71-80%), UT positive (51-60%), HER2 equivocal by IHC and positive by FISH (HER2 signals/cell of 11.1 and HER2/CEP17 ratio of 4.0).  Pathology of biopsy of calcifications at 3 o'clock is c/w grade 2 invasive lobular carcinoma with associated high grade DCIS and LCIS.  Invasive carcinoma is ER positive (%), UT positive (%), and HER2 negative (IHC 1+).  Breast MRI on 1/23/2025 was with marked background parenchymal enhancement with extensive nonfocal enhancement greater than the right breast.  There were some areas of progressive enhancement greater than background enhanced but no focal mass.  MRI was without lymphadenopathy.  No focal abnormality in the right breast was seen.    She underwent bilateral skin sparing mastectomies and left axillary sentinel lymph node biopsy with Dr. Perea on 2/17/2025.  She had immediate tissue expander placement.  Pathology showed a total of at least 12 foci invasive carcinoma.  At least 4 foci of high grade invasive ductal carcinoma in the left breast.  The largest focus measured 1.2 cm,  examples of additional foci measuring 1 cm, 2.1 mm, 1.1 mm.  In addition, there were at least 7 microscopic foci of grade 2 invasive lobular carcinoma.  This was in a background of extensive high grade DCIS, spanning 12 cm.  LCIS was also present.  The anterior, superior margin was involved by DCIS.  Pathology of the right breast was negative for malignancy.  3 left axillary sentinel lymph nodes were negative for malignancy.    She met with Dr. Moser who ultimately recommended adjuvant therapy with THP x 12 weeks followed by Herceptin x 1 year. Consideration of adjuvant radiation given extensive DCIS and positive margin.     She started treatment on 3/19/25.     Interval History: Lesley is feeling well today. She has had six weeks of THP so far. She has noticed some progression of side effects but symptoms are manageable. She went back to work a week ago Monday. Doing well with this as long as she works about 6 hours. Has good energy at work and then rests when she gets home. This has been a positive change mentally.     She notes skin rash is stable. Clearing on chest and neck and now is more itchy and macular more on hands and elbows. Using vaseline, lotion, and benadryl at bedtime with relief. Clindamycin is helping too.     Shoulders are a lot better. Gets some L axillary tenderness after chemo that improves with stretching and exercise and is overall trending better.     Appetite is great. No nausea. Less GERD. Bowels are trending more loose now and she is using Imodium PRN.      No fevers, no URI symptoms. Sore throat yesterday (day 2) now resolved.     Tongue feels weird with some sensation change. No mucositis. She does feel vague sensation change in fingers and toes.       Past Medical/Surgical History:  Past Medical History:   Diagnosis Date    Invasive ductal carcinoma of breast, female, left (H)      Past Surgical History:   Procedure Laterality Date    BIOPSY NODE SENTINEL Left 2/17/2025    Procedure:  SENTINEL LYMPH NODE BIOPSY;  Surgeon: Jose Perea MD;  Location: [facility] OR    birthmark removal  2001    EXTRACTION(S) DENTAL  1995    INSERT PORT VASCULAR ACCESS Right 3/18/2025    Procedure: Insert port vascular access;  Surgeon: Julio César Bird MD;  Location: [facility] OR    INSERT TISSUE EXPANDER BREAST Bilateral 2/17/2025    Procedure: Bilateral breast reconstruction with expanders and SPY;  Surgeon: IAN Taveras MD;  Location: [facility] OR    IR CHEST PORT PLACEMENT > 5 YRS OF AGE  3/18/2025    MASTECTOMY SIMPLE Bilateral 2/17/2025    Procedure: SKIN-SPARING MASTECTOMY;  Surgeon: Jose Perea MD;  Location: [facility] OR     Allergies:  Allergies as of 04/23/2025    (No Known Allergies)     Current Medications:  Current Outpatient Medications   Medication Sig Dispense Refill    acetaminophen (TYLENOL) 500 MG tablet Take 500-1,000 mg by mouth every 6 hours as needed for mild pain.      clindamycin (CLEOCIN-T) 1 % external gel Apply a thin layer to affected areas twice daily. 45 g 1    lidocaine-prilocaine (EMLA) 2.5-2.5 % external cream Apply to skin overlying port site at least 30 minutes prior to port access for labs or infusions. 30 g 3    loperamide (IMODIUM A-D) 2 MG tablet Take 1 tablet (2 mg) by mouth 4 times daily as needed for diarrhea. 60 tablet 0    Nutritional Supplements (VITAMIN D BOOSTER PO) Take 1,000 Units by mouth every morning.      ondansetron (ZOFRAN) 8 MG tablet Take 1 tablet (8 mg) by mouth every 8 hours as needed for nausea. 30 tablet 3    oxyCODONE (ROXICODONE) 5 MG tablet Take 1-2 tablets (5-10 mg) by mouth every 4 hours as needed for pain. 15 tablet 0    prochlorperazine (COMPAZINE) 10 MG tablet Take 1 tablet (10 mg) by mouth every 6 hours as needed for nausea or vomiting. 30 tablet 2        Physical Exam:  LMP 04/14/2025   Video physical exam  General: Patient appears well in no acute distress.   Skin: No visualized rash or lesions on visualized skin  Eyes: EOMI, no erythema, sclera  icterus or discharge noted  Resp: Appears to be breathing comfortably without accessory muscle usage, speaking in full sentences, no cough  MSK: Appears to have normal range of motion based on visualized movements  Neurologic: No apparent tremors, facial movements symmetric  Psych: affect and mood congruent, alert and oriented      Laboratory/Imaging Studies   03/18/25 07:32 03/18/25 08:53 04/09/25 07:55   Sodium 136  137   Potassium 4.4  4.3   Chloride 102  106   Carbon Dioxide (CO2) 24  24   Urea Nitrogen 9.3  9.9   Creatinine 0.72  0.72   GFR Estimate >90  >90   Calcium 9.3  8.9   Anion Gap 10  7   Albumin 4.4  3.9   Protein Total 7.0  6.4   Alkaline Phosphatase 47  57   ALT 8  12   AST 16  14   Bilirubin Total 0.4  0.3   Glucose 100 (H)  93   HCG Qual Urine  Negative       04/03/25 07:07 04/09/25 07:55 04/15/25 07:10 04/21/25 08:27   WBC 5.0 4.6 4.5 4.7   Hemoglobin 11.6 (L) 11.3 (L) 11.7 11.2 (L)   Hematocrit 35.0 33.7 (L) 35.2 33.3 (L)   Platelet Count 322 342 384 367   RBC Count 4.00 3.89 4.05 3.88   MCV 88 87 87 86   MCH 29.0 29.0 28.9 28.9   MCHC 33.1 33.5 33.2 33.6   RDW 12.4 12.1 12.3 12.2   % Neutrophils 49 52 45 54   % Lymphocytes 39 35 43 36   % Monocytes 8 9 8 7   % Eosinophils 3 2 3 2   % Basophils 1 1 1 1   % Immature Granulocytes 1 1 1 1   NRBC/W 0 0 0 0   Absolute Neutrophil 2.4 2.4 2.0 2.5   Absolute Lymphocytes 1.9 1.6 1.9 1.7   Absolute Monocytes 0.4 0.4 0.3 0.3   Absolute Eosinophils 0.2 0.1 0.1 0.1   Absolute Basophils 0.1 0.0 0.1 0.1   Absolute Immature Granulocytes 0.0 0.0 0.1 0.1   Absolute NRBCs 0.0 0.0 0.0 0.0         ECHO 3/18/25  Interpretation Summary  Left ventricular size, wall motion and function are normal. The ejection  fraction is 55-60%. Global peak LV longitudinal strain is averaged at -19.4%.  This is within reported normal limits (normal <-18%).  Global right ventricular function is normal.  No significant valvular abnormalities were noted.  No pericardial effusion is  present.  Previous study not available for comparison.    ASSESSMENT/PLAN:  Patient is a 41 yo female with multifocal left breast cancer.  She has a [id number], grade 3, ER positive, NC positive, HER2 positive invasive ductal carcinoma and a [id number], grade 2, ER positive, NC positive, HER2 negative invasive lobular carcinoma of the left breast.       Left breast invasive ductal and lobular breast cancers:  -Pathology from the bilateral mastectomies and left axillary sentinel lymph node procedure performed on 2/17/2025.  Pathology of the left breast showed at least 4 foci of invasive ductal carcinoma, the largest measuring 1.2 cm.  Additional foci of IDC measuring 1 cm, 2.1 mm and 1.1 mm.  In addition there were at least 7 microscopic foci of invasive and microinvasive lobular carcinoma.  Three sentinel lymph nodes were negative for malignancy. Anterior superior margin was positive for DCIS.  There was extensive DCIS spanning 12 cm.    - Dr. Moser recommended systemic therapy with THP followed by Herceptin to complete a year of treatment.   -She is s/p 6 weeks of this and tolerating well with manageable side effects.   - Baseline echo WNL. We will monitor once every 3 months while on HER2 therapy.    - We plan to treat with an adjuvant course of endocrine therapy.  Dr. Moser recommended ovarian suppression plus an aromatase inhibitor, total treatment duration of 10 years. This will be started after the first 12 weeks of treatment.   -She will have a consult with radiation oncology to discuss adjuvant radiation given extensive DCIS with positive anterior margin.     2.  RBM8A pathogenic mutation; BRCA1 varian of undetermined significance:  She has a personal history of two different types of breast cancer.  I reviewed Caleb Estevez's 1/30/2025 genetic counseling note.  Dr. Mahmood elected to proceed with BRCA 1/g genetic analysis with reflext to Breast Actionable, Common Hereditary Cancer and Hereditary  Hematologic Malignancy panels.  This testing showed a pathogenic gene mutation in RBM8A.  This is a common recessive variant and in the absence of a second RBM8A mutation, represents a carrier status.  She was also found to have a variant of undetermined significance in BRCA1.  We discussed variants of undetermined significance do not change cancer screening recommendations.    3. Acneform rash-->now more macular: I think initially pustular rash was from dex versus perjeta and now macular rash is coming more from Taxol. Use clindamycin on any pustular areas. For macular, pruritic rash, use triamcinolone 0.5 % BID. Rx sent.     4. Dry nose/drippy nose: Humidifier in bedroom at night, saline nasal spray BID, vaseline to nares before bed. This is helping.     5. Loose stools: Imodium PRN working well.     6. Possible tongue mucositis: Trial of s/s rinses BID.     7. Neuropathy: Very minor. Grade 1. Monitor. She is icing hands and feet during Taxol.     30 minutes spent on the date of the encounter doing chart review, review of test results, interpretation of tests, patient visit, and documentation     The longitudinal plan of care for the diagnosis(es)/condition(s) as documented were addressed during this visit. Due to the added complexity in care, I will continue to support Lesley in the subsequent management and with ongoing continuity of care.    Carole Salas PA-C

## 2025-04-29 ENCOUNTER — INFUSION THERAPY VISIT (OUTPATIENT)
Dept: ONCOLOGY | Facility: CLINIC | Age: 40
End: 2025-04-29
Attending: INTERNAL MEDICINE
Payer: COMMERCIAL

## 2025-04-29 VITALS
TEMPERATURE: 97.5 F | WEIGHT: 158.8 LBS | SYSTOLIC BLOOD PRESSURE: 116 MMHG | DIASTOLIC BLOOD PRESSURE: 75 MMHG | BODY MASS INDEX: 27.26 KG/M2 | OXYGEN SATURATION: 98 % | HEART RATE: 79 BPM | RESPIRATION RATE: 16 BRPM

## 2025-04-29 DIAGNOSIS — Z17.0 MALIGNANT NEOPLASM OF OVERLAPPING SITES OF LEFT BREAST IN FEMALE, ESTROGEN RECEPTOR POSITIVE (H): ICD-10-CM

## 2025-04-29 DIAGNOSIS — Z51.11 ENCOUNTER FOR ANTINEOPLASTIC CHEMOTHERAPY: Primary | ICD-10-CM

## 2025-04-29 DIAGNOSIS — C50.812 MALIGNANT NEOPLASM OF OVERLAPPING SITES OF LEFT BREAST IN FEMALE, ESTROGEN RECEPTOR POSITIVE (H): ICD-10-CM

## 2025-04-29 LAB
ALBUMIN SERPL BCG-MCNC: 3.9 G/DL (ref 3.5–5.2)
ALP SERPL-CCNC: 61 U/L (ref 40–150)
ALT SERPL W P-5'-P-CCNC: 11 U/L (ref 0–50)
ANION GAP SERPL CALCULATED.3IONS-SCNC: 7 MMOL/L (ref 7–15)
AST SERPL W P-5'-P-CCNC: 16 U/L (ref 0–45)
BASOPHILS # BLD AUTO: 0.1 10E3/UL (ref 0–0.2)
BASOPHILS NFR BLD AUTO: 1 %
BILIRUB SERPL-MCNC: <0.2 MG/DL
BUN SERPL-MCNC: 13.3 MG/DL (ref 6–20)
CALCIUM SERPL-MCNC: 8.8 MG/DL (ref 8.8–10.4)
CHLORIDE SERPL-SCNC: 105 MMOL/L (ref 98–107)
CREAT SERPL-MCNC: 0.78 MG/DL (ref 0.51–0.95)
EGFRCR SERPLBLD CKD-EPI 2021: >90 ML/MIN/1.73M2
EOSINOPHIL # BLD AUTO: 0.1 10E3/UL (ref 0–0.7)
EOSINOPHIL NFR BLD AUTO: 2 %
ERYTHROCYTE [DISTWIDTH] IN BLOOD BY AUTOMATED COUNT: 12.5 % (ref 10–15)
GLUCOSE SERPL-MCNC: 92 MG/DL (ref 70–99)
HCO3 SERPL-SCNC: 25 MMOL/L (ref 22–29)
HCT VFR BLD AUTO: 33.3 % (ref 35–47)
HGB BLD-MCNC: 11.2 G/DL (ref 11.7–15.7)
IMM GRANULOCYTES # BLD: 0 10E3/UL
IMM GRANULOCYTES NFR BLD: 1 %
LYMPHOCYTES # BLD AUTO: 2 10E3/UL (ref 0.8–5.3)
LYMPHOCYTES NFR BLD AUTO: 45 %
MCH RBC QN AUTO: 28.9 PG (ref 26.5–33)
MCHC RBC AUTO-ENTMCNC: 33.6 G/DL (ref 31.5–36.5)
MCV RBC AUTO: 86 FL (ref 78–100)
MONOCYTES # BLD AUTO: 0.4 10E3/UL (ref 0–1.3)
MONOCYTES NFR BLD AUTO: 9 %
NEUTROPHILS # BLD AUTO: 1.8 10E3/UL (ref 1.6–8.3)
NEUTROPHILS NFR BLD AUTO: 42 %
NRBC # BLD AUTO: 0 10E3/UL
NRBC BLD AUTO-RTO: 0 /100
PLATELET # BLD AUTO: 352 10E3/UL (ref 150–450)
POTASSIUM SERPL-SCNC: 4.2 MMOL/L (ref 3.4–5.3)
PROT SERPL-MCNC: 6.4 G/DL (ref 6.4–8.3)
RBC # BLD AUTO: 3.87 10E6/UL (ref 3.8–5.2)
SODIUM SERPL-SCNC: 137 MMOL/L (ref 135–145)
WBC # BLD AUTO: 4.4 10E3/UL (ref 4–11)

## 2025-04-29 PROCEDURE — 250N000011 HC RX IP 250 OP 636: Mod: JZ | Performed by: PHYSICIAN ASSISTANT

## 2025-04-29 PROCEDURE — 85004 AUTOMATED DIFF WBC COUNT: CPT | Performed by: PHYSICIAN ASSISTANT

## 2025-04-29 PROCEDURE — 250N000011 HC RX IP 250 OP 636: Performed by: INTERNAL MEDICINE

## 2025-04-29 PROCEDURE — 96413 CHEMO IV INFUSION 1 HR: CPT

## 2025-04-29 PROCEDURE — 258N000003 HC RX IP 258 OP 636: Performed by: PHYSICIAN ASSISTANT

## 2025-04-29 PROCEDURE — 36591 DRAW BLOOD OFF VENOUS DEVICE: CPT | Performed by: PHYSICIAN ASSISTANT

## 2025-04-29 PROCEDURE — 84155 ASSAY OF PROTEIN SERUM: CPT | Performed by: PHYSICIAN ASSISTANT

## 2025-04-29 PROCEDURE — 96375 TX/PRO/DX INJ NEW DRUG ADDON: CPT

## 2025-04-29 PROCEDURE — 96417 CHEMO IV INFUS EACH ADDL SEQ: CPT

## 2025-04-29 RX ORDER — ONDANSETRON 2 MG/ML
8 INJECTION INTRAMUSCULAR; INTRAVENOUS ONCE
Status: COMPLETED | OUTPATIENT
Start: 2025-04-29 | End: 2025-04-29

## 2025-04-29 RX ORDER — HEPARIN SODIUM (PORCINE) LOCK FLUSH IV SOLN 100 UNIT/ML 100 UNIT/ML
5 SOLUTION INTRAVENOUS
Status: DISCONTINUED | OUTPATIENT
Start: 2025-04-29 | End: 2025-04-29 | Stop reason: HOSPADM

## 2025-04-29 RX ORDER — HEPARIN SODIUM (PORCINE) LOCK FLUSH IV SOLN 100 UNIT/ML 100 UNIT/ML
5 SOLUTION INTRAVENOUS ONCE
Status: COMPLETED | OUTPATIENT
Start: 2025-04-29 | End: 2025-04-29

## 2025-04-29 RX ADMIN — PERTUZUMAB 420 MG: 30 INJECTION, SOLUTION, CONCENTRATE INTRAVENOUS at 08:03

## 2025-04-29 RX ADMIN — Medication 5 ML: at 06:58

## 2025-04-29 RX ADMIN — SODIUM CHLORIDE 140 MG: 0.9 INJECTION, SOLUTION INTRAVENOUS at 08:38

## 2025-04-29 RX ADMIN — HEPARIN 5 ML: 100 SYRINGE at 10:12

## 2025-04-29 RX ADMIN — SODIUM CHLORIDE 250 ML: 0.9 INJECTION, SOLUTION INTRAVENOUS at 07:59

## 2025-04-29 RX ADMIN — PACLITAXEL 143 MG: 6 INJECTION, SOLUTION INTRAVENOUS at 09:10

## 2025-04-29 RX ADMIN — ONDANSETRON 8 MG: 2 INJECTION INTRAMUSCULAR; INTRAVENOUS at 08:00

## 2025-04-29 ASSESSMENT — PAIN SCALES - GENERAL: PAINLEVEL_OUTOF10: NO PAIN (0)

## 2025-04-29 NOTE — PATIENT INSTRUCTIONS
Contact Numbers  [facility]: [phone]    After Hours:  [phone]  Triage: [phone]    Please call the [facility] Triage line if you experience a temperature greater than or equal to 100.5, shaking chills, have uncontrolled nausea, vomiting and/or diarrhea, dizziness, shortness of breath, chest pain, bleeding, unexplained bruising, or if you have any other new/concerning symptoms, questions or concerns.     If it is after hours, weekends, or holidays, please call the main hospital  at  [phone] and ask to speak to the Oncology doctor on call.     If you are having any concerning symptoms or wish to speak to a provider before your next infusion visit, please call your care coordinator or triage to notify them so we can adequately serve you.     If you need a refill on a narcotic prescription or other medication, please call triage before your infusion appointment.         April 2025 Sunday Monday Tuesday Wednesday Thursday Friday Saturday             1     2    RETURN CCSL  10:45 AM   (45 min.)   Carole Salas PA-C   [facility] 3    LAB PERIPHERAL   6:30 AM   (15 min.)   [facility] LAB DRAW   [facility]    ONC INFUSION 5 HR (300 MIN)   7:00 AM   (300 min.)    ONC INFUSION NURSE   [facility] 4     5       6     7     8     9    LAB PERIPHERAL   7:30 AM   (15 min.)   [facility] LAB DRAW   [facility]    ONC INFUSION 5 HR (300 MIN)   8:00 AM   (300 min.)    ONC INFUSION NURSE   [facility] 10     11     12       13     14     15    LAB PERIPHERAL   6:45 AM   (15 min.)   [facility] LAB DRAW   [facility]    ONC INFUSION 5 HR (300 MIN)   7:00 AM   (300 min.)    ONC INFUSION NURSE   [facility] 16     17     18     19       20     21    LAB PERIPHERAL   8:00 AM   (15 min.)     [facility] DRAW   [facility]    ONC INFUSION 5 HR (300 MIN)   8:30 AM   (300 min.)   [facility] ONC INFUSION NURSE   [facility] 22    RETURN ANNUAL   9:15 AM   (30 min.)   Hansa Beasley MD   [facility] 23    RETURN CCSL  10:45 AM   (45 min.)   Carole Salas PA-C   [facility] 24     25     26       27     28     29    LAB PERIPHERAL   6:30 AM   (15 min.)   [facility] LAB DRAW   [facility]    ONC INFUSION 5 HR (300 MIN)   7:00 AM   (300 min.)   [facility] ONC INFUSION NURSE   [facility] 30                                May 2025      Ike Monday Tuesday Wednesday Thursday Friday Saturday                       1     2     3       4     5     6    LAB PERIPHERAL   8:00 AM   (15 min.)   [facility] LAB DRAW   [facility]    ONC INFUSION 5 HR (300 MIN)   8:30 AM   (300 min.)   [facility] ONC INFUSION NURSE   [facility] 7     8     9     10       11     12     13    LAB PERIPHERAL   6:30 AM   (15 min.)   [facility] LAB DRAW   [facility]    ONC INFUSION 5 HR (300 MIN)   7:00 AM   (300 min.)   [facility] ONC INFUSION NURSE   [facility] 14    RETURN CCSL   9:45 AM   (45 min.)   Carole Salas PA-C   [facility]    NEW RADIATION ONCOLOGY   1:00 PM   (90 min.)   Telma Cazares MD   [facility] Radiation Oncology 15     16     17       18     19     20    LAB PERIPHERAL   6:30 AM   (15 min.)   [facility] LAB DRAW   [facility]    ONC INFUSION 5 HR (300 MIN)   7:00 AM   (300 min.)   [facility] ONC INFUSION NURSE   [facility] 21     22     23     24       25     26     27    LAB PERIPHERAL   6:30 AM   (15 min.)   [facility] LAB DRAW   [facility]  Cancer Clinic    ONC INFUSION 5 HR (300 MIN)   7:00 AM   (300 min.)    ONC INFUSION NURSE   [facility] 28    RETURN CCSL  10:45 AM   (45 min.)   Carole Salas PA-C   [facility] 29     30     31                     Lab Results:  Recent Results (from the past 12 hours)   CBC with platelets and differential    Collection Time: 04/29/25  6:54 AM   Result Value Ref Range    WBC Count 4.4 4.0 - 11.0 10e3/uL    RBC Count 3.87 3.80 - 5.20 10e6/uL    Hemoglobin 11.2 (L) 11.7 - 15.7 g/dL    Hematocrit 33.3 (L) 35.0 - 47.0 %    MCV 86 78 - 100 fL    MCH 28.9 26.5 - 33.0 pg    MCHC 33.6 31.5 - 36.5 g/dL    RDW 12.5 10.0 - 15.0 %    Platelet Count 352 150 - 450 10e3/uL    % Neutrophils 42 %    % Lymphocytes 45 %    % Monocytes 9 %    % Eosinophils 2 %    % Basophils 1 %    % Immature Granulocytes 1 %    NRBCs per 100 WBC 0 <1 /100    Absolute Neutrophils 1.8 1.6 - 8.3 10e3/uL    Absolute Lymphocytes 2.0 0.8 - 5.3 10e3/uL    Absolute Monocytes 0.4 0.0 - 1.3 10e3/uL    Absolute Eosinophils 0.1 0.0 - 0.7 10e3/uL    Absolute Basophils 0.1 0.0 - 0.2 10e3/uL    Absolute Immature Granulocytes 0.0 <=0.4 10e3/uL    Absolute NRBCs 0.0 10e3/uL

## 2025-04-29 NOTE — PROGRESS NOTES
Infusion Nursing Note:  Lesley Mahmood presents today for C3D1 Perjeta/Herceptin/Taxol.    Patient seen by provider today: No   present during visit today: Not Applicable.    Note: Patient reports  feeling at her baseline. Still with ongoing fatigue, intermittent diarrhea and rash. Not worsening and manageable. Denies fever/chills. No new concern.    Patient cold caps for 30 minutes prior to and during Taxol infusion and then for 60 minutes following Taxol. Iced hands and feet.        Intravenous Access:  Implanted Port.    Treatment Conditions:  Lab Results   Component Value Date    HGB 11.2 (L) 04/29/2025    WBC 4.4 04/29/2025    ANEU 1.8 04/29/2025     04/29/2025        Lab Results   Component Value Date     04/29/2025    POTASSIUM 4.2 04/29/2025    CR 0.78 04/29/2025    GRECIA 8.8 04/29/2025    BILITOTAL <0.2 04/29/2025    ALBUMIN 3.9 04/29/2025    ALT 11 04/29/2025    AST 16 04/29/2025       Results reviewed, labs MET treatment parameters, ok to proceed with treatment.  ECHO/MUGA completed 3/18/25  EF 55-60%.       Post Infusion Assessment:  Patient tolerated infusion without incident.  Blood return noted pre and post infusion.  Site patent and intact, free from redness, edema or discomfort.  No evidence of extravasations.  Access discontinued per protocol.       Discharge Plan:   Patient declined prescription refills.  Discharge instructions reviewed with: Patient.  Patient and/or family verbalized understanding of discharge instructions and all questions answered.  AVS to patient via Teacher[employer].  Patient will return 5/6 for next appointment.   Patient discharged in stable condition accompanied by: self.  Departure Mode: Ambulatory.      LEEANNE BOJORQUEZ RN

## 2025-04-30 DIAGNOSIS — Z17.0 MALIGNANT NEOPLASM OF OVERLAPPING SITES OF LEFT BREAST IN FEMALE, ESTROGEN RECEPTOR POSITIVE (H): ICD-10-CM

## 2025-04-30 DIAGNOSIS — Z51.11 ENCOUNTER FOR ANTINEOPLASTIC CHEMOTHERAPY: Primary | ICD-10-CM

## 2025-04-30 DIAGNOSIS — C50.812 MALIGNANT NEOPLASM OF OVERLAPPING SITES OF LEFT BREAST IN FEMALE, ESTROGEN RECEPTOR POSITIVE (H): ICD-10-CM

## 2025-05-06 ENCOUNTER — INFUSION THERAPY VISIT (OUTPATIENT)
Dept: ONCOLOGY | Facility: CLINIC | Age: 40
End: 2025-05-06
Attending: INTERNAL MEDICINE
Payer: COMMERCIAL

## 2025-05-06 VITALS
HEART RATE: 87 BPM | WEIGHT: 157 LBS | TEMPERATURE: 98.5 F | RESPIRATION RATE: 16 BRPM | OXYGEN SATURATION: 98 % | DIASTOLIC BLOOD PRESSURE: 69 MMHG | SYSTOLIC BLOOD PRESSURE: 116 MMHG | BODY MASS INDEX: 26.95 KG/M2

## 2025-05-06 DIAGNOSIS — Z51.11 ENCOUNTER FOR ANTINEOPLASTIC CHEMOTHERAPY: Primary | ICD-10-CM

## 2025-05-06 DIAGNOSIS — C50.812 MALIGNANT NEOPLASM OF OVERLAPPING SITES OF LEFT BREAST IN FEMALE, ESTROGEN RECEPTOR POSITIVE (H): ICD-10-CM

## 2025-05-06 DIAGNOSIS — Z17.0 MALIGNANT NEOPLASM OF OVERLAPPING SITES OF LEFT BREAST IN FEMALE, ESTROGEN RECEPTOR POSITIVE (H): ICD-10-CM

## 2025-05-06 LAB
BASOPHILS # BLD AUTO: 0.1 10E3/UL (ref 0–0.2)
BASOPHILS NFR BLD AUTO: 1 %
EOSINOPHIL # BLD AUTO: 0.1 10E3/UL (ref 0–0.7)
EOSINOPHIL NFR BLD AUTO: 1 %
ERYTHROCYTE [DISTWIDTH] IN BLOOD BY AUTOMATED COUNT: 12.5 % (ref 10–15)
HCT VFR BLD AUTO: 34.8 % (ref 35–47)
HGB BLD-MCNC: 11.7 G/DL (ref 11.7–15.7)
IMM GRANULOCYTES # BLD: 0.1 10E3/UL
IMM GRANULOCYTES NFR BLD: 1 %
LYMPHOCYTES # BLD AUTO: 1.7 10E3/UL (ref 0.8–5.3)
LYMPHOCYTES NFR BLD AUTO: 30 %
MCH RBC QN AUTO: 28.8 PG (ref 26.5–33)
MCHC RBC AUTO-ENTMCNC: 33.6 G/DL (ref 31.5–36.5)
MCV RBC AUTO: 86 FL (ref 78–100)
MONOCYTES # BLD AUTO: 0.4 10E3/UL (ref 0–1.3)
MONOCYTES NFR BLD AUTO: 7 %
NEUTROPHILS # BLD AUTO: 3.3 10E3/UL (ref 1.6–8.3)
NEUTROPHILS NFR BLD AUTO: 59 %
NRBC # BLD AUTO: 0 10E3/UL
NRBC BLD AUTO-RTO: 0 /100
PLATELET # BLD AUTO: 340 10E3/UL (ref 150–450)
RBC # BLD AUTO: 4.06 10E6/UL (ref 3.8–5.2)
WBC # BLD AUTO: 5.6 10E3/UL (ref 4–11)

## 2025-05-06 PROCEDURE — 250N000011 HC RX IP 250 OP 636: Performed by: PHYSICIAN ASSISTANT

## 2025-05-06 PROCEDURE — 96413 CHEMO IV INFUSION 1 HR: CPT

## 2025-05-06 PROCEDURE — 258N000003 HC RX IP 258 OP 636: Performed by: PHYSICIAN ASSISTANT

## 2025-05-06 PROCEDURE — 36591 DRAW BLOOD OFF VENOUS DEVICE: CPT | Performed by: PHYSICIAN ASSISTANT

## 2025-05-06 PROCEDURE — 85004 AUTOMATED DIFF WBC COUNT: CPT | Performed by: PHYSICIAN ASSISTANT

## 2025-05-06 PROCEDURE — 96417 CHEMO IV INFUS EACH ADDL SEQ: CPT

## 2025-05-06 PROCEDURE — 250N000011 HC RX IP 250 OP 636: Performed by: INTERNAL MEDICINE

## 2025-05-06 RX ORDER — ONDANSETRON 2 MG/ML
8 INJECTION INTRAMUSCULAR; INTRAVENOUS ONCE
Status: COMPLETED | OUTPATIENT
Start: 2025-05-06 | End: 2025-05-06

## 2025-05-06 RX ORDER — HEPARIN SODIUM (PORCINE) LOCK FLUSH IV SOLN 100 UNIT/ML 100 UNIT/ML
5 SOLUTION INTRAVENOUS
Status: DISCONTINUED | OUTPATIENT
Start: 2025-05-06 | End: 2025-05-06 | Stop reason: HOSPADM

## 2025-05-06 RX ORDER — HEPARIN SODIUM (PORCINE) LOCK FLUSH IV SOLN 100 UNIT/ML 100 UNIT/ML
5 SOLUTION INTRAVENOUS ONCE
Status: COMPLETED | OUTPATIENT
Start: 2025-05-06 | End: 2025-05-06

## 2025-05-06 RX ADMIN — Medication 5 ML: at 08:46

## 2025-05-06 RX ADMIN — Medication 5 ML: at 11:25

## 2025-05-06 RX ADMIN — ONDANSETRON 8 MG: 2 INJECTION INTRAMUSCULAR; INTRAVENOUS at 09:27

## 2025-05-06 RX ADMIN — PACLITAXEL 143 MG: 6 INJECTION, SOLUTION INTRAVENOUS at 10:21

## 2025-05-06 RX ADMIN — SODIUM CHLORIDE 140 MG: 0.9 INJECTION, SOLUTION INTRAVENOUS at 09:44

## 2025-05-06 RX ADMIN — SODIUM CHLORIDE 250 ML: 0.9 INJECTION, SOLUTION INTRAVENOUS at 09:44

## 2025-05-06 ASSESSMENT — PAIN SCALES - GENERAL: PAINLEVEL_OUTOF10: NO PAIN (0)

## 2025-05-06 NOTE — NURSING NOTE
Chief Complaint   Patient presents with    Port Draw     Labs collected from port by RN. Vitals taken. Checked in for appointment(s).      Port accessed with 20 gauge 3/4 inch flat needle by RN, labs collected, line flushed with saline and heparin.  Vitals taken. Pt checked in for appointment(s).     Angela Giles RN

## 2025-05-06 NOTE — PROGRESS NOTES
Infusion Nursing Note:  Lesley Mahmood presents today for C3D8 Trazimera and Taxol with cold capping.    Patient seen by provider today: No   present during visit today: Not Applicable.    Note: Lesley presents to infusion today feeling well. She denies any pain, infectious symptoms, or other concerns and wishes to proceed today. Her neuropathy in her fingertips is unchanged. She ices her hands and feet independently during Taxol. She cold-capped before, during, and after Taxol.    Intravenous Access:  Implanted Port.    Treatment Conditions:  Lab Results   Component Value Date    HGB 11.7 05/06/2025    WBC 5.6 05/06/2025    ANEU 3.3 05/06/2025     05/06/2025     Results reviewed, labs MET treatment parameters, ok to proceed with treatment.  ECHO/MUGA completed 3/18/25  EF 55-60%.    Post Infusion Assessment:  Patient tolerated infusion without incident.  Blood return noted pre and post infusion.  Site patent and intact, free from redness, edema or discomfort.  No evidence of extravasations.  Access discontinued per protocol.     Discharge Plan:   Patient declined prescription refills.  Discharge instructions reviewed with: Patient.  Patient and/or family verbalized understanding of discharge instructions and all questions answered.  AVS to patient via ViewdleHART.  Patient will return 5/13 for next appointment.   Patient discharged in stable condition accompanied by: self.  Departure Mode: Ambulatory.      Mable Wharton RN

## 2025-05-10 NOTE — PROGRESS NOTES
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      was seen for initial consultation in the Dept of Radiation Oncology on 2025 at the request of Dr. Moser.     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 Her oncologic history is the followin2025: Screening mammogram showed possible calcifications in the left breast, upper outer, lower outer and medial quadrants mid to posterior depth. Possible medial breast focal asymmetry in the CC view mid depth.     1/10/2025: Diagnostic mammogram showed heterogeneous dense breasts with calcifications in all 4 quadrants.  On the US, an irregular hypoechoic mass with spiculated margins measuring 1 cm was noted at 10:00, 9 cm from the nipple. Three areas of conglomerate of ducts with calcifications were seen at 11:00, 7 cm from the nipple, 2:00, 7 cm from the nipple, 8:00, 4 cm from the nipple, all around 1 cm. These dilated ducts correspond to mammogram in all 4 quadrants.     In retrospect, she has noticed her left breast to be slightly larger than the right. She also developed an itching sensation adjacent to the left nipple with milky discharge since fall.     2025: US guided bx & stereotactic bx  Left breast, 10:00, 9 cm from nipple: invasive ductal carcinoma, [place] garde 3, ER+/OK+/HER2 2+ by IHC, Group 1 by FISH (HER2 copy number 11.1, ratio 4.0; associated DCIS, nuclear grade 3 (globe clip)  Left breast, 3:00: invasive lobular  "carcinoma, [place] grade 2, ER+/TX+/HER2 1+, associated LCIS, classic type and DCIS (top hat clip)    1/20/2025: She saw Dr. Perea. She was not felt to be a candidate for breast conservation.     Hereditary panel with Breast Actionable Panel showed a VUS in BRCA1 gene     1/23/2025: She established care with Dr. Moser. Because the extent of the invasive disease was not entirely clear, additional imaging and upfront surgery was recommended to guide the choice of HER2-based systemic therapy.     MRI of the breast showed extensive nonfocal enhancement through the left breast significantly greater than the right, consistent with the extent of calcifications seen on mammography. No lymphadenopathy.     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     2/11/2025: Pre-op visit with Dr. Taveras. Plan is stage I expander base reconstruction and stage II EYAD flap reconstruction.     2/17/2025: Bilateral skin sparing mastectomies with left SLN Bx by Dr. Perea with immediate reconstruction with prepectoral tissue expanders by Dr. Taveras.   Pathology demonstrated:  Left breast:  Invasive components:  4 foci of invasive ductal carcinoma: Largest 1.2 cm at 7:00, grade 3 (previously biopsied focus; 1.0 cm at 11:00, 1.2 mm at 3:00, 1.1 mm at 3:00  8 foci of invasive lobular carcinoma: 7 foci at 3:00, ranging from 0.6 to 2.0 mm, one at 7:00, measuring 1.1 mm. [place] grade 2.   DCIS component:  Spanning at least 12 cm, involving upper outer, upper inner and lower inner quadrant and nipple  [place], cribriform, micropapillary, and papillary subtypes  Nuclear grade 3  Central expansive \"comedo\" necrosis present  LCIS: present   LVSI: not identified  Dermal lymphatic invasion: not identified.   Margins: closest invasive 1.9 mm anterior and superior; DCIS " present at margin (anterior superior at 9:00-10:00 over 1-2 mm). Posterior margins were at least 5 mm.   SLN: 0/3  Stage: pT1c(m)N0(sn)    Right breast:  Fibrocystic changes, no atypia or malignancy    3/3/2025: Follow up with Dr. Moser. She was recommended THP followed by Herceptin x 13 cycles for a total of 1 year HER-2 based therapy followed by 10 year of endocrine therapy.     3/26/2025: Lesley began chemo  infusion with cold capping. Last infusion is scheduled on 6/9/2025.     Lesley is here today to discuss pros and cons of adjuvant radiation therapy. She is with her  Mauro. On interview, Lesley states that she is tolerating chemotherapy quite well. She is cutting back from work a bit, but still has her regular clinic. She would prefer not to have to have radiation, but remains quite open minded.       PAST MEDICAL HISTORY:   Past Medical History:   Diagnosis Date    Invasive ductal carcinoma of breast, female, left (H)       Past Surgical History:   Procedure Laterality Date    BIOPSY NODE SENTINEL Left 2/17/2025    Procedure: SENTINEL LYMPH NODE BIOPSY;  Surgeon: Jose Perea MD;  Location: [facility] OR    birthmark removal  2001    EXTRACTION(S) DENTAL  1995    INSERT PORT VASCULAR ACCESS Right 3/18/2025    Procedure: Insert port vascular access;  Surgeon: Julio César Bird MD;  Location: [facility] OR    INSERT TISSUE EXPANDER BREAST Bilateral 2/17/2025    Procedure: Bilateral breast reconstruction with expanders and SPY;  Surgeon: IAN Taveras MD;  Location: [facility] OR    IR CHEST PORT PLACEMENT > 5 YRS OF AGE  3/18/2025    MASTECTOMY SIMPLE Bilateral 2/17/2025    Procedure: SKIN-SPARING MASTECTOMY;  Surgeon: Jose Perea MD;  Location: [facility] OR        CHEMOTHERAPY HISTORY: THP     PAST RADIATION THERAPY HISTORY: None     MEDICATIONS:    Current Outpatient Medications   Medication Sig Dispense Refill    acetaminophen (TYLENOL) 500 MG tablet Take 500-1,000 mg by mouth every 6 hours as needed for mild  pain.      clindamycin (CLEOCIN-T) 1 % external gel Apply a thin layer to affected areas twice daily. 45 g 1    lidocaine-prilocaine (EMLA) 2.5-2.5 % external cream Apply to skin overlying port site at least 30 minutes prior to port access for labs or infusions. 30 g 3    loperamide (IMODIUM A-D) 2 MG tablet Take 1 tablet (2 mg) by mouth 4 times daily as needed for diarrhea. 60 tablet 0    Nutritional Supplements (VITAMIN D BOOSTER PO) Take 1,000 Units by mouth every morning.      ondansetron (ZOFRAN) 8 MG tablet Take 1 tablet (8 mg) by mouth every 8 hours as needed for nausea. 30 tablet 3    oxyCODONE (ROXICODONE) 5 MG tablet Take 1-2 tablets (5-10 mg) by mouth every 4 hours as needed for pain. 15 tablet 0    prochlorperazine (COMPAZINE) 10 MG tablet Take 1 tablet (10 mg) by mouth every 6 hours as needed for nausea or vomiting. 30 tablet 2    triamcinolone (KENALOG) 0.5 % external ointment Apply a thin layer over affected areas twice daily. 45 g 1       ALLERGIES:  no known allergies.    SOCIAL HISTORY:    Social History     Socioeconomic History    Marital status:      Spouse name: Mauro Pena    Number of children: 1    Years of education: Not on file    Highest education level: Not on file   Occupational History    Occupation: endocrine      Comment: FT   Tobacco Use    Smoking status: Never     Passive exposure: Never    Smokeless tobacco: Never   Vaping Use    Vaping status: Never Used   Substance and Sexual Activity    Alcohol use: Yes     Alcohol/week: 4.0 standard drinks of alcohol     Types: 4 Standard drinks or equivalent per week     Comment: occasional    Drug use: Not Currently     Comment: past THC gummies    Sexual activity: Yes     Partners: Male     Birth control/protection: I.U.D.     Comment: IUD now removed   Other Topics Concern    Parent/sibling w/ CABG, MI or angioplasty before 65F 55M? Not Asked     Service No    Blood Transfusions No    Caffeine Concern Yes      Comment: 1 cup     Occupational Exposure No    Hobby Hazards No    Sleep Concern No    Stress Concern No    Weight Concern No    Special Diet No    Back Care No    Exercise Yes     Comment: yoga 2 times and walking    Bike Helmet Yes    Seat Belt Yes    Self-Exams Yes     Comment: occ   Social History Narrative    How much exercise per week? Yoga and walking    How much calcium per day? In foods and supplements       How much caffeine per day? 1    How much vitamin D per day? In foods    Do you/your family wear seatbelts?  Yes    Do you/your family use safety helmets? Yes    Do you/your family use sunscreen? Yes    Do you/your family keep firearms in the home? No    Do you/your family have a smoke detector(s)? Yes        Do you feel safe in your home? Yes    Has anyone ever touched you in an unwanted manner? No    Reviewed [facility]  6-     Social Drivers of Health     Financial Resource Strain: Not on file   Food Insecurity: Not on file   Transportation Needs: Not on file   Physical Activity: Not on file   Stress: Not on file   Social Connections: Not on file   Interpersonal Safety: Low Risk  (3/18/2025)    Interpersonal Safety     Do you feel physically and emotionally safe where you currently live?: Yes     Within the past 12 months, have you been hit, slapped, kicked or otherwise physically hurt by someone?: No     Within the past 12 months, have you been humiliated or emotionally abused in other ways by your partner or ex-partner?: No   Housing Stability: Not on file   2 children, age 5 and 9   to  Mauro  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   Recently obtained an R tereso to fund research.     FAMILY HISTORY:    family history includes Cancer in her father; Cardiovascular in her maternal grandmother; Cerebrovascular Disease in her paternal grandmother; Hemochromatosis in her cousin; Hypertension in her maternal grandfather, maternal grandmother, mother, paternal grandfather, and paternal  "grandmother; Macular Degeneration in her maternal grandfather; Other Cancer in her paternal grandfather; Unknown/Adopted in her brother and sister.  Father: CLL and prostate cancer  Paternal grandfather: CLL and prostate cancer    REVIEW OF SYMPTOMS:  A full 14-point review of systems was performed. See HPI for details.     IUD prior to diagnosis  2 children  Breast fed each for 3 years  No fertility treatment      PHYSICAL EXAMINATION:     BP (!) 140/89   Pulse 81   Wt 72.1 kg (159 lb)   LMP 04/14/2025   SpO2 100%   BMI 27.29 kg/m     Gen: alert and oriented, no acute distress  HEENT: unremarkable. No significant hair loss with cold cap.   CV: well perfused  Resp: breathing comfortably on room air  Neuro: grossly intact  Pelvic: deferred  Breasts: Absence of breasts consistent with bilateral mastectomies. Well healed surgical scars. Good symmetry.     FUNCTIONAL STATUS:     SHOULDER RANGE OF MOTION:    Adequate for radiation positioning       LYMPHEDEMA RISK:    She has mild lymphedema and will undergo lymphedema therapy.        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 She is status post bilateral skin sparing mastectomies with immediate reconstruction with tissue expanders. She is currently receiving adjuvant THP.     I reviewed the imaging and pathology in great details. The indications for post-mastectomy radiation therapy fall into a \"gray zone\".     If we are to consider the invasive components only, she has a T1c(m) tumor. We discussed that in the mastectomy setting, T1-2N0 tumors generally have a low locoregional recurrence. However, a subset of women with risk factors may have a higher LRR. The established risk factors include close margins (<= 2 mm), T2 or larger tumors, premenopausal status (or age <= 50), LVSI and no systemic therapy.  The LRR for women with 3 or more risk " "factors has been reported to be at least 20% (Jagsi 2025; IJROBP 62: 4190-1889; [place] 2011 IJROBP 81: l722-452). Lesley's risk factors are primarily her young age and close margin, technically not meeting the criteria for predicting a 20% local recurrence. However, her pathology showed features not included in the aforementioned studies that are worth noting. The number of invasive foci are at least 12 with 4 being ductal and 8 lobular, occupying near all quadrants of her left breast as well as the nipple. The largest ductal component was that of a high grade. On the other hand, HER-2 based therapies were not routinely used in these old studies. Her receiving THP as well as the plan for endocrine therapy may help reduce the LRR.    If we are to consider the DCIS components, she has 12 cm of DCIS that is nuclear grade 3 with comedonecrosis. There was focal involvement of the anterior superior margin over 1-2 mm. A few retrospective studies showed that among women with pure DCIS, presence of a focal margin may not necessarily lead to a local recurrence. Even with positive or close mastectomy margins, the rates of chest wall recurrences were quite low (generally < 5 %). ([place] 2011 IJROBP 80: 25-30; Maksim 2012 IJROBP).     Adding the invasive and DCIS components together, Lesley's case is not that clear cut with respect to the indication of postmastectomy radiation therapy. The upside of treatment is improved local control and the downside certainly is the side effects especially in the setting of reconstruction.     I discussed her case with my colleague, Dr. Martha Gilmore, who is leaning towards radiation therapy given the young age and focally positive DCIS margin. I additionally reached out to Dr. Dean Pastor, an international expert in breast radiation oncology. Here is his reply: \"I would fvaor with 12 cm grade 3 DCIS, positive margin and significant area of IDC and ILC.   It's not absolute but favor RT\".      If " Lesley is agreeable to proceed with the treatment, I would include the left chest wall only as she has no  imaging evidence of abnormal lymph nodes and 3 negative SLN. The axilla will receive some incidental radiation by virtue of being close to the chest wall. If the dose to the lung is acceptable, I may be inclined to include at least some internal mammary nodes coverage as her dominant ductal carcinoma is in the inner quadrant.     We discussed the option of treating with a conventional frationation of 5000 cGy in 25 fractions to the above field vs. a more moderate fractionated regimen of 4250 cGy in 16 fractions. Recent evidence shows that the latter is efficacious in treating regional lymph nodes and does not seem to increase the complication rate or compromise cosmesis for reconstruction, though data are emerging with relatively short follow up.     With regard to the boost, the 9:00-10:00 area of involved margin will be boosted with an additional 10 Gy in 4-5 fractions.     We discussed the logistics and the side effects of the treatment in great details.  Deep inspiration breath hold will be used to decrease the dose to the heart and lung. The adverse effect of radiation on reconstruction was discussed. With EYAD flap, the complication rate will be lower compared to an implant based reconstruction.     Following a long discussion, Lesley has decided to move forward with radiation therapy to optimize local control. Her last infusion is scheduled on 6/9/2025.     PLAN:  She will complete THP on 6/9/2025.   Simulation for radiation the week of 6/23. She will require a breast angle board due to her anatomy. DIBH will be employed for heart and lung sparing. Treatment will include the left chest wall +/- IM depending on the lung dose.   Safe to continue HER2-based therapy during radiation therapy.  She will require deflation of the contralateral expander during the radiation therapy.        Thank you for allowing us  to participate in this patient's care.  Please feel free to call with any questions or concerns.       Telma Cazares M.D./Ph.D.  Radiation Oncologist   Department of Radiation Oncology  [facility]  Phone: [phone]          I reviewed patient's chart, internal/external medical records, imaging studies (including actual images), labs and pathology reports.  I interviewed and counseled the patient face to face.  I additionally discussed the case with patient's referring physicians and care team.            Telma Cazares MD

## 2025-05-13 ENCOUNTER — INFUSION THERAPY VISIT (OUTPATIENT)
Dept: ONCOLOGY | Facility: CLINIC | Age: 40
End: 2025-05-13
Attending: INTERNAL MEDICINE
Payer: COMMERCIAL

## 2025-05-13 VITALS
SYSTOLIC BLOOD PRESSURE: 126 MMHG | BODY MASS INDEX: 27.12 KG/M2 | DIASTOLIC BLOOD PRESSURE: 84 MMHG | OXYGEN SATURATION: 100 % | RESPIRATION RATE: 16 BRPM | WEIGHT: 158 LBS | TEMPERATURE: 97.6 F | HEART RATE: 68 BPM

## 2025-05-13 DIAGNOSIS — Z17.0 MALIGNANT NEOPLASM OF OVERLAPPING SITES OF LEFT BREAST IN FEMALE, ESTROGEN RECEPTOR POSITIVE (H): ICD-10-CM

## 2025-05-13 DIAGNOSIS — C50.812 MALIGNANT NEOPLASM OF OVERLAPPING SITES OF LEFT BREAST IN FEMALE, ESTROGEN RECEPTOR POSITIVE (H): ICD-10-CM

## 2025-05-13 DIAGNOSIS — Z51.11 ENCOUNTER FOR ANTINEOPLASTIC CHEMOTHERAPY: Primary | ICD-10-CM

## 2025-05-13 LAB
BASOPHILS # BLD AUTO: 0.1 10E3/UL (ref 0–0.2)
BASOPHILS NFR BLD AUTO: 2 %
EOSINOPHIL # BLD AUTO: 0.1 10E3/UL (ref 0–0.7)
EOSINOPHIL NFR BLD AUTO: 3 %
ERYTHROCYTE [DISTWIDTH] IN BLOOD BY AUTOMATED COUNT: 12.5 % (ref 10–15)
HCT VFR BLD AUTO: 34.2 % (ref 35–47)
HGB BLD-MCNC: 11.2 G/DL (ref 11.7–15.7)
IMM GRANULOCYTES # BLD: 0.1 10E3/UL
IMM GRANULOCYTES NFR BLD: 2 %
LYMPHOCYTES # BLD AUTO: 1.8 10E3/UL (ref 0.8–5.3)
LYMPHOCYTES NFR BLD AUTO: 40 %
MCH RBC QN AUTO: 28.1 PG (ref 26.5–33)
MCHC RBC AUTO-ENTMCNC: 32.7 G/DL (ref 31.5–36.5)
MCV RBC AUTO: 86 FL (ref 78–100)
MONOCYTES # BLD AUTO: 0.3 10E3/UL (ref 0–1.3)
MONOCYTES NFR BLD AUTO: 7 %
NEUTROPHILS # BLD AUTO: 2.1 10E3/UL (ref 1.6–8.3)
NEUTROPHILS NFR BLD AUTO: 47 %
NRBC # BLD AUTO: 0 10E3/UL
NRBC BLD AUTO-RTO: 0 /100
PLATELET # BLD AUTO: 335 10E3/UL (ref 150–450)
RBC # BLD AUTO: 3.98 10E6/UL (ref 3.8–5.2)
WBC # BLD AUTO: 4.6 10E3/UL (ref 4–11)

## 2025-05-13 PROCEDURE — 85025 COMPLETE CBC W/AUTO DIFF WBC: CPT | Performed by: PHYSICIAN ASSISTANT

## 2025-05-13 PROCEDURE — 96375 TX/PRO/DX INJ NEW DRUG ADDON: CPT

## 2025-05-13 PROCEDURE — 250N000011 HC RX IP 250 OP 636: Performed by: INTERNAL MEDICINE

## 2025-05-13 PROCEDURE — 250N000011 HC RX IP 250 OP 636: Performed by: PHYSICIAN ASSISTANT

## 2025-05-13 PROCEDURE — 36591 DRAW BLOOD OFF VENOUS DEVICE: CPT | Performed by: PHYSICIAN ASSISTANT

## 2025-05-13 PROCEDURE — 96417 CHEMO IV INFUS EACH ADDL SEQ: CPT

## 2025-05-13 PROCEDURE — 258N000003 HC RX IP 258 OP 636: Performed by: PHYSICIAN ASSISTANT

## 2025-05-13 PROCEDURE — 96413 CHEMO IV INFUSION 1 HR: CPT

## 2025-05-13 RX ORDER — HEPARIN SODIUM (PORCINE) LOCK FLUSH IV SOLN 100 UNIT/ML 100 UNIT/ML
5 SOLUTION INTRAVENOUS
Status: COMPLETED | OUTPATIENT
Start: 2025-05-13 | End: 2025-05-13

## 2025-05-13 RX ORDER — HEPARIN SODIUM (PORCINE) LOCK FLUSH IV SOLN 100 UNIT/ML 100 UNIT/ML
5 SOLUTION INTRAVENOUS
Status: DISCONTINUED | OUTPATIENT
Start: 2025-05-13 | End: 2025-05-13 | Stop reason: HOSPADM

## 2025-05-13 RX ORDER — ONDANSETRON 2 MG/ML
8 INJECTION INTRAMUSCULAR; INTRAVENOUS ONCE
Status: COMPLETED | OUTPATIENT
Start: 2025-05-13 | End: 2025-05-13

## 2025-05-13 RX ADMIN — Medication 5 ML: at 09:56

## 2025-05-13 RX ADMIN — SODIUM CHLORIDE 140 MG: 0.9 INJECTION, SOLUTION INTRAVENOUS at 08:12

## 2025-05-13 RX ADMIN — ONDANSETRON 8 MG: 2 INJECTION INTRAMUSCULAR; INTRAVENOUS at 07:52

## 2025-05-13 RX ADMIN — PACLITAXEL 143 MG: 6 INJECTION, SOLUTION INTRAVENOUS at 08:52

## 2025-05-13 RX ADMIN — Medication 5 ML: at 06:58

## 2025-05-13 ASSESSMENT — PAIN SCALES - GENERAL: PAINLEVEL_OUTOF10: NO PAIN (0)

## 2025-05-13 NOTE — PROGRESS NOTES
Infusion Nursing Note:  Lesley Mahmood presents today for C3 D15 trastuzumab-qyyp + taxol.    Patient seen by provider today: No   present during visit today: Not Applicable.    Note: Patient presents to the infusion center feeling well today. She notes some fatigue that is manageable with rest. Denies fevers, chills, cough, SOB, or chest pain. Patient wishes to proceed with treatment today.        Patient ices hands and feet and chews on ice chips during taxol infusion. Patient cold caps 30 minutes prior to taxol, during taxol, and a hour post taxol infusion.     Intravenous Access:  Implanted Port.    Treatment Conditions:  Lab Results   Component Value Date    HGB 11.2 (L) 05/13/2025    WBC 4.6 05/13/2025    ANEU 2.1 05/13/2025     05/13/2025        Results reviewed, labs MET treatment parameters, ok to proceed with treatment.  ECHO/MUGA completed 03/18/2025  EF 55-60%.      Post Infusion Assessment:  Patient tolerated infusion without incident.  Blood return noted pre and post infusion.  Site patent and intact, free from redness, edema or discomfort.  No evidence of extravasations.  Access discontinued per protocol.       Discharge Plan:   Patient declined prescription refills.  Discharge instructions reviewed with: Patient.  Patient and/or family verbalized understanding of discharge instructions and all questions answered.  AVS to patient via [employer].  Patient will return 05/20/2025 for next appointment.   Patient discharged in stable condition accompanied by: self.  Departure Mode: Ambulatory.      Briseida Laws RN

## 2025-05-13 NOTE — NURSING NOTE
"Chief Complaint   Patient presents with    Port Draw     Labs drawn from port by rn.  VS taken.      Port accessed with 20 gauge 3/4\" Power needle and labs drawn by rn.  Port flushed with NS and heparin.  Pt tolerated well.  VS taken.  Pt checked in for next appt.    Bella Raphael RN      "

## 2025-05-13 NOTE — PROGRESS NOTES
Virtual Visit Details    Type of service:  Video Visit     Originating Location (pt. Location): Home    Distant Location (provider location):  Off-site  Platform used for Video Visit: [facility]      Oncology Visit:  Date on this visit: May 14, 2025    Diagnosis:  Clinical prognostic stage Ia, [id number], grade 2, ER positive, OK positive, HER-2 positive left breast cancer.    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  Routine bilateral screening mammograms on 1/6/2025 showed fine pleomorphic calcifications throughout the left breast and possible medial breast focal asymmetry.  Ultrasound showed a mass at 10 o'clock, 9 cm from the nipple measuring 1 cm and multiple areas with conglomerate of ducts and dilated ducts with associated calcifications in all 4 quadrants of the breast. Ultrasound of the left axilla was without lymphadenopathy.  Pathology of biopsy of the mass at 10 o'clock was c/w grade 3 invasive ductal carcinoma with associated high grade DCIS.  Invasive carcinoma is ER positive (71-80%), OK positive (51-60%), HER2 equivocal by IHC and positive by FISH (HER2 signals/cell of 11.1 and HER2/CEP17 ratio of 4.0).  Pathology of biopsy of calcifications at 3 o'clock is c/w grade 2 invasive lobular carcinoma with associated high grade DCIS and LCIS.  Invasive carcinoma is ER positive (%), OK positive (%), and HER2 negative (IHC 1+).  Breast MRI on 1/23/2025 was with marked background parenchymal enhancement with extensive nonfocal enhancement greater than the right breast.  There were some areas of progressive enhancement greater than background enhanced but no focal mass.  MRI was without lymphadenopathy.  No focal abnormality in the right breast was seen.    She underwent bilateral skin sparing mastectomies and left axillary sentinel lymph node biopsy with Dr. Perea on 2/17/2025.  She had immediate tissue  expander placement.  Pathology showed a total of at least 12 foci invasive carcinoma.  At least 4 foci of high grade invasive ductal carcinoma in the left breast.  The largest focus measured 1.2 cm, examples of additional foci measuring 1 cm, 2.1 mm, 1.1 mm.  In addition, there were at least 7 microscopic foci of grade 2 invasive lobular carcinoma.  This was in a background of extensive high grade DCIS, spanning 12 cm.  LCIS was also present.  The anterior, superior margin was involved by DCIS.  Pathology of the right breast was negative for malignancy.  3 left axillary sentinel lymph nodes were negative for malignancy.    She met with Dr. Moser who ultimately recommended adjuvant therapy with THP x 12 weeks followed by Herceptin x 1 year. Consideration of adjuvant radiation given extensive DCIS and positive margin.     She started treatment on 3/19/25.     Interval History: Lesley is feeling well today. She has had nine  weeks of THP so far. She is feeling more tired every week. She is having to pace herself more and prioritize sleep, exercise, and eating well. She is managing work okay but is a little behind.     Her skin rash is a little worse. She is having itchy papules on thighs, ankles, elbows. Hands are better. Could be using triamcinolone more regularly. Skin is overall dry. She is moisturizing.     Bloody noses have stopped now but she does have a drippy nose.     Neuropathy is stable. She has a minor sensation change in fingertips without numbness or tingling.     Has some mild dysgeusia.     Has some chronic numbness tip of nose that is a little worse.     She has started to have swelling along L arm with some cording. She has been doing some lymphedema exercises online and this helped initially.     No other concerns today.     Past Medical/Surgical History:  Past Medical History:   Diagnosis Date    Invasive ductal carcinoma of breast, female, left (H)      Past Surgical History:   Procedure  Laterality Date    BIOPSY NODE SENTINEL Left 2/17/2025    Procedure: SENTINEL LYMPH NODE BIOPSY;  Surgeon: Jose Perea MD;  Location: [facility] OR    birthmark removal  2001    EXTRACTION(S) DENTAL  1995    INSERT PORT VASCULAR ACCESS Right 3/18/2025    Procedure: Insert port vascular access;  Surgeon: Julio César Bird MD;  Location: [facility] OR    INSERT TISSUE EXPANDER BREAST Bilateral 2/17/2025    Procedure: Bilateral breast reconstruction with expanders and SPY;  Surgeon: IAN Taveras MD;  Location: [facility] OR    IR CHEST PORT PLACEMENT > 5 YRS OF AGE  3/18/2025    MASTECTOMY SIMPLE Bilateral 2/17/2025    Procedure: SKIN-SPARING MASTECTOMY;  Surgeon: Jose Perea MD;  Location: [facility] OR     Allergies:  Allergies as of 05/14/2025    (No Known Allergies)     Current Medications:  Current Outpatient Medications   Medication Sig Dispense Refill    acetaminophen (TYLENOL) 500 MG tablet Take 500-1,000 mg by mouth every 6 hours as needed for mild pain.      clindamycin (CLEOCIN-T) 1 % external gel Apply a thin layer to affected areas twice daily. 45 g 1    lidocaine-prilocaine (EMLA) 2.5-2.5 % external cream Apply to skin overlying port site at least 30 minutes prior to port access for labs or infusions. 30 g 3    loperamide (IMODIUM A-D) 2 MG tablet Take 1 tablet (2 mg) by mouth 4 times daily as needed for diarrhea. 60 tablet 0    Nutritional Supplements (VITAMIN D BOOSTER PO) Take 1,000 Units by mouth every morning.      ondansetron (ZOFRAN) 8 MG tablet Take 1 tablet (8 mg) by mouth every 8 hours as needed for nausea. 30 tablet 3    oxyCODONE (ROXICODONE) 5 MG tablet Take 1-2 tablets (5-10 mg) by mouth every 4 hours as needed for pain. 15 tablet 0    prochlorperazine (COMPAZINE) 10 MG tablet Take 1 tablet (10 mg) by mouth every 6 hours as needed for nausea or vomiting. 30 tablet 2    triamcinolone (KENALOG) 0.5 % external ointment Apply a thin layer over affected areas twice daily. 45 g 1        Physical Exam:  LMP  04/14/2025   Video physical exam  General: Patient appears well in no acute distress.   Skin: No visualized rash or lesions on visualized skin  Eyes: EOMI, no erythema, sclera icterus or discharge noted  Resp: Appears to be breathing comfortably without accessory muscle usage, speaking in full sentences, no cough  MSK: Appears to have normal range of motion based on visualized movements  Neurologic: No apparent tremors, facial movements symmetric  Psych: affect and mood congruent, alert and oriented      Laboratory/Imaging Studies   04/21/25 08:27 04/29/25 06:54 05/06/25 08:45 05/13/25 07:10   Sodium  137     Potassium  4.2     Chloride  105     Carbon Dioxide (CO2)  25     Urea Nitrogen  13.3     Creatinine  0.78     GFR Estimate  >90     Calcium  8.8     Anion Gap  7     Albumin  3.9     Protein Total  6.4     Alkaline Phosphatase  61     ALT  11     AST  16     Bilirubin Total  <0.2     Glucose  92     WBC 4.7 4.4 5.6 4.6   Hemoglobin 11.2 (L) 11.2 (L) 11.7 11.2 (L)   Hematocrit 33.3 (L) 33.3 (L) 34.8 (L) 34.2 (L)   Platelet Count 367 352 340 335   RBC Count 3.88 3.87 4.06 3.98   MCV 86 86 86 86   MCH 28.9 28.9 28.8 28.1   MCHC 33.6 33.6 33.6 32.7   RDW 12.2 12.5 12.5 12.5   % Neutrophils 54 42 59 47   % Lymphocytes 36 45 30 40   % Monocytes 7 9 7 7   % Eosinophils 2 2 1 3   % Basophils 1 1 1 2   % Immature Granulocytes 1 1 1 2   NRBC/W 0 0 0 0   Absolute Neutrophil 2.5 1.8 3.3 2.1   Absolute Lymphocytes 1.7 2.0 1.7 1.8   Absolute Monocytes 0.3 0.4 0.4 0.3   Absolute Eosinophils 0.1 0.1 0.1 0.1   Absolute Basophils 0.1 0.1 0.1 0.1   Absolute Immature Granulocytes 0.1 0.0 0.1 0.1   Absolute NRBCs 0.0 0.0 0.0 0.0       ECHO 3/18/25  Interpretation Summary  Left ventricular size, wall motion and function are normal. The ejection  fraction is 55-60%. Global peak LV longitudinal strain is averaged at -19.4%.  This is within reported normal limits (normal <-18%).  Global right ventricular function is normal.  No  significant valvular abnormalities were noted.  No pericardial effusion is present.  Previous study not available for comparison.    ASSESSMENT/PLAN:  Patient is a 39 yo female with multifocal left breast cancer.  She has a [id number], grade 3, ER positive, FL positive, HER2 positive invasive ductal carcinoma and a [id number], grade 2, ER positive, FL positive, HER2 negative invasive lobular carcinoma of the left breast.       Left breast invasive ductal and lobular breast cancers:  -Pathology from the bilateral mastectomies and left axillary sentinel lymph node procedure performed on 2/17/2025.  Pathology of the left breast showed at least 4 foci of invasive ductal carcinoma, the largest measuring 1.2 cm.  Additional foci of IDC measuring 1 cm, 2.1 mm and 1.1 mm.  In addition there were at least 7 microscopic foci of invasive and microinvasive lobular carcinoma.  Three sentinel lymph nodes were negative for malignancy. Anterior superior margin was positive for DCIS.  There was extensive DCIS spanning 12 cm.    - Dr. Moser recommended systemic therapy with THP followed by Herceptin to complete a year of treatment.   -She is s/p 9 weeks of this and tolerating well with manageable side effects. Fatigue is progressive and as expected.   - Baseline echo WNL. We will monitor once every 3 months while on HER2 therapy.    - We plan to treat with an adjuvant course of endocrine therapy.  Dr. Moser recommended ovarian suppression plus an aromatase inhibitor, total treatment duration of 10 years. This will be started after the first 12 weeks of treatment.   -She will have a consult with radiation oncology to discuss adjuvant radiation given extensive DCIS with positive anterior margin. This is scheduled for later today.     2.  RBM8A pathogenic mutation; BRCA1 varian of undetermined significance:  She has a personal history of two different types of breast cancer.  I reviewed Caleb Estevez's 1/30/2025 genetic counseling  note.  Dr. Mahmood elected to proceed with BRCA 1/g genetic analysis with reflext to Breast Actionable, Common Hereditary Cancer and Hereditary Hematologic Malignancy panels.  This testing showed a pathogenic gene mutation in RBM8A.  This is a common recessive variant and in the absence of a second RBM8A mutation, represents a carrier status.  She was also found to have a variant of undetermined significance in BRCA1.  We discussed variants of undetermined significance do not change cancer screening recommendations.    3. Acneform rash-->now more macular: I think initially pustular rash was from dex versus perjeta and now macular rash is coming more from Taxol. Use clindamycin on any pustular areas. For macular, pruritic rash, use triamcinolone 0.5 % BID. Has more BSA involvement now but still manageable to apply topical steroid. Refilled.     4. Dry nose/drippy nose: Humidifier in bedroom at night, saline nasal spray BID, vaseline to nares before bed. This is helping.     5. Loose stools: Imodium PRN working well.     6. Possible tongue mucositis: Trial of s/s rinses BID.     7. Neuropathy: Very minor. Grade 1. Monitor. She is icing hands and feet during Taxol.     8. Lymphedema/cording: Referral to lymphedema PT.     30 minutes spent on the date of the encounter doing chart review, review of test results, interpretation of tests, patient visit, and documentation     The longitudinal plan of care for the diagnosis(es)/condition(s) as documented were addressed during this visit. Due to the added complexity in care, I will continue to support Lesley in the subsequent management and with ongoing continuity of care.    Carole Salas PA-C

## 2025-05-13 NOTE — PATIENT INSTRUCTIONS
[facility] Triage and after hours / weekends / holidays:  [phone]    Please call the triage or after hours line if you experience a temperature greater than or equal to 100.4, shaking chills, have uncontrolled nausea, vomiting and/or diarrhea, dizziness, shortness of breath, chest pain, bleeding, unexplained bruising, or if you have any other new/concerning symptoms, questions or concerns.      If you are having any concerning symptoms or wish to speak to a provider before your next infusion visit, please call triage to notify them so we can adequately serve you.     If you need a refill on a narcotic prescription or other medication, please call before your infusion appointment.

## 2025-05-14 ENCOUNTER — OFFICE VISIT (OUTPATIENT)
Dept: RADIATION ONCOLOGY | Facility: CLINIC | Age: 40
End: 2025-05-14
Attending: INTERNAL MEDICINE
Payer: COMMERCIAL

## 2025-05-14 ENCOUNTER — VIRTUAL VISIT (OUTPATIENT)
Dept: ONCOLOGY | Facility: CLINIC | Age: 40
End: 2025-05-14
Attending: INTERNAL MEDICINE
Payer: COMMERCIAL

## 2025-05-14 ENCOUNTER — PRE VISIT (OUTPATIENT)
Dept: RADIATION ONCOLOGY | Facility: CLINIC | Age: 40
End: 2025-05-14
Payer: COMMERCIAL

## 2025-05-14 VITALS — HEIGHT: 64 IN | WEIGHT: 158 LBS | BODY MASS INDEX: 26.98 KG/M2

## 2025-05-14 VITALS
BODY MASS INDEX: 27.29 KG/M2 | WEIGHT: 159 LBS | DIASTOLIC BLOOD PRESSURE: 89 MMHG | SYSTOLIC BLOOD PRESSURE: 140 MMHG | OXYGEN SATURATION: 100 % | HEART RATE: 81 BPM

## 2025-05-14 DIAGNOSIS — C50.812 MALIGNANT NEOPLASM OF OVERLAPPING SITES OF LEFT BREAST IN FEMALE, ESTROGEN RECEPTOR POSITIVE (H): ICD-10-CM

## 2025-05-14 DIAGNOSIS — Z17.0 MALIGNANT NEOPLASM OF OVERLAPPING SITES OF LEFT BREAST IN FEMALE, ESTROGEN RECEPTOR POSITIVE (H): Primary | ICD-10-CM

## 2025-05-14 DIAGNOSIS — I89.0 LYMPHEDEMA: ICD-10-CM

## 2025-05-14 DIAGNOSIS — C50.812 MALIGNANT NEOPLASM OF OVERLAPPING SITES OF LEFT BREAST IN FEMALE, ESTROGEN RECEPTOR POSITIVE (H): Primary | ICD-10-CM

## 2025-05-14 DIAGNOSIS — L27.0 DRUG RASH: ICD-10-CM

## 2025-05-14 DIAGNOSIS — Z17.0 MALIGNANT NEOPLASM OF OVERLAPPING SITES OF LEFT BREAST IN FEMALE, ESTROGEN RECEPTOR POSITIVE (H): ICD-10-CM

## 2025-05-14 PROCEDURE — 99215 OFFICE O/P EST HI 40 MIN: CPT | Performed by: RADIOLOGY

## 2025-05-14 PROCEDURE — G2211 COMPLEX E/M VISIT ADD ON: HCPCS | Mod: 95 | Performed by: PHYSICIAN ASSISTANT

## 2025-05-14 PROCEDURE — 98006 SYNCH AUDIO-VIDEO EST MOD 30: CPT | Mod: 24 | Performed by: PHYSICIAN ASSISTANT

## 2025-05-14 PROCEDURE — 1126F AMNT PAIN NOTED NONE PRSNT: CPT | Performed by: PHYSICIAN ASSISTANT

## 2025-05-14 RX ORDER — HEPARIN SODIUM (PORCINE) LOCK FLUSH IV SOLN 100 UNIT/ML 100 UNIT/ML
5 SOLUTION INTRAVENOUS
OUTPATIENT
Start: 2025-05-21

## 2025-05-14 RX ORDER — ALBUTEROL SULFATE 0.83 MG/ML
2.5 SOLUTION RESPIRATORY (INHALATION)
OUTPATIENT
Start: 2025-06-04

## 2025-05-14 RX ORDER — ALBUTEROL SULFATE 0.83 MG/ML
2.5 SOLUTION RESPIRATORY (INHALATION)
OUTPATIENT
Start: 2025-05-28

## 2025-05-14 RX ORDER — DIPHENHYDRAMINE HYDROCHLORIDE 50 MG/ML
50 INJECTION, SOLUTION INTRAMUSCULAR; INTRAVENOUS
Start: 2025-06-04

## 2025-05-14 RX ORDER — LORAZEPAM 2 MG/ML
0.5 INJECTION INTRAMUSCULAR EVERY 4 HOURS PRN
OUTPATIENT
Start: 2025-05-28

## 2025-05-14 RX ORDER — EPINEPHRINE 1 MG/ML
0.3 INJECTION, SOLUTION INTRAMUSCULAR; SUBCUTANEOUS EVERY 5 MIN PRN
OUTPATIENT
Start: 2025-05-21

## 2025-05-14 RX ORDER — DIPHENHYDRAMINE HYDROCHLORIDE 50 MG/ML
50 INJECTION, SOLUTION INTRAMUSCULAR; INTRAVENOUS
Start: 2025-05-28

## 2025-05-14 RX ORDER — ONDANSETRON 2 MG/ML
8 INJECTION INTRAMUSCULAR; INTRAVENOUS ONCE
OUTPATIENT
Start: 2025-06-04

## 2025-05-14 RX ORDER — ONDANSETRON 2 MG/ML
8 INJECTION INTRAMUSCULAR; INTRAVENOUS ONCE
OUTPATIENT
Start: 2025-05-28

## 2025-05-14 RX ORDER — ONDANSETRON 2 MG/ML
8 INJECTION INTRAMUSCULAR; INTRAVENOUS ONCE
OUTPATIENT
Start: 2025-05-21

## 2025-05-14 RX ORDER — ALBUTEROL SULFATE 90 UG/1
1-2 INHALANT RESPIRATORY (INHALATION)
Start: 2025-05-28

## 2025-05-14 RX ORDER — DIPHENHYDRAMINE HYDROCHLORIDE 50 MG/ML
25 INJECTION, SOLUTION INTRAMUSCULAR; INTRAVENOUS
Start: 2025-06-04

## 2025-05-14 RX ORDER — DIPHENHYDRAMINE HYDROCHLORIDE 50 MG/ML
25 INJECTION, SOLUTION INTRAMUSCULAR; INTRAVENOUS
Start: 2025-05-28

## 2025-05-14 RX ORDER — LORAZEPAM 2 MG/ML
0.5 INJECTION INTRAMUSCULAR EVERY 4 HOURS PRN
OUTPATIENT
Start: 2025-06-04

## 2025-05-14 RX ORDER — DIPHENHYDRAMINE HYDROCHLORIDE 50 MG/ML
25 INJECTION, SOLUTION INTRAMUSCULAR; INTRAVENOUS
Start: 2025-05-21

## 2025-05-14 RX ORDER — ACETAMINOPHEN 325 MG/1
650 TABLET ORAL
OUTPATIENT
Start: 2025-05-21

## 2025-05-14 RX ORDER — MEPERIDINE HYDROCHLORIDE 25 MG/ML
25 INJECTION INTRAMUSCULAR; INTRAVENOUS; SUBCUTANEOUS
OUTPATIENT
Start: 2025-06-04

## 2025-05-14 RX ORDER — METHYLPREDNISOLONE SODIUM SUCCINATE 40 MG/ML
40 INJECTION INTRAMUSCULAR; INTRAVENOUS
Start: 2025-06-04

## 2025-05-14 RX ORDER — EPINEPHRINE 1 MG/ML
0.3 INJECTION, SOLUTION INTRAMUSCULAR; SUBCUTANEOUS EVERY 5 MIN PRN
OUTPATIENT
Start: 2025-06-04

## 2025-05-14 RX ORDER — HEPARIN SODIUM (PORCINE) LOCK FLUSH IV SOLN 100 UNIT/ML 100 UNIT/ML
5 SOLUTION INTRAVENOUS
OUTPATIENT
Start: 2025-05-28

## 2025-05-14 RX ORDER — ALBUTEROL SULFATE 0.83 MG/ML
2.5 SOLUTION RESPIRATORY (INHALATION)
OUTPATIENT
Start: 2025-05-21

## 2025-05-14 RX ORDER — ALBUTEROL SULFATE 90 UG/1
1-2 INHALANT RESPIRATORY (INHALATION)
Start: 2025-05-21

## 2025-05-14 RX ORDER — ACETAMINOPHEN 325 MG/1
650 TABLET ORAL
OUTPATIENT
Start: 2025-06-04

## 2025-05-14 RX ORDER — METHYLPREDNISOLONE SODIUM SUCCINATE 40 MG/ML
40 INJECTION INTRAMUSCULAR; INTRAVENOUS
Start: 2025-05-28

## 2025-05-14 RX ORDER — METHYLPREDNISOLONE SODIUM SUCCINATE 40 MG/ML
40 INJECTION INTRAMUSCULAR; INTRAVENOUS
Start: 2025-05-21

## 2025-05-14 RX ORDER — ALBUTEROL SULFATE 90 UG/1
1-2 INHALANT RESPIRATORY (INHALATION)
Start: 2025-06-04

## 2025-05-14 RX ORDER — DIPHENHYDRAMINE HYDROCHLORIDE 50 MG/ML
50 INJECTION, SOLUTION INTRAMUSCULAR; INTRAVENOUS
Start: 2025-05-21

## 2025-05-14 RX ORDER — DIPHENHYDRAMINE HCL 25 MG
50 CAPSULE ORAL
Start: 2025-06-04

## 2025-05-14 RX ORDER — DIPHENHYDRAMINE HCL 25 MG
50 CAPSULE ORAL
Start: 2025-05-28

## 2025-05-14 RX ORDER — MEPERIDINE HYDROCHLORIDE 25 MG/ML
25 INJECTION INTRAMUSCULAR; INTRAVENOUS; SUBCUTANEOUS
OUTPATIENT
Start: 2025-05-21

## 2025-05-14 RX ORDER — DIPHENHYDRAMINE HCL 25 MG
50 CAPSULE ORAL
Start: 2025-05-21

## 2025-05-14 RX ORDER — TRIAMCINOLONE ACETONIDE 5 MG/G
OINTMENT TOPICAL
Qty: 45 G | Refills: 1 | Status: SHIPPED | OUTPATIENT
Start: 2025-05-14

## 2025-05-14 RX ORDER — MEPERIDINE HYDROCHLORIDE 25 MG/ML
25 INJECTION INTRAMUSCULAR; INTRAVENOUS; SUBCUTANEOUS
OUTPATIENT
Start: 2025-05-28

## 2025-05-14 RX ORDER — ACETAMINOPHEN 325 MG/1
650 TABLET ORAL
OUTPATIENT
Start: 2025-05-28

## 2025-05-14 RX ORDER — EPINEPHRINE 1 MG/ML
0.3 INJECTION, SOLUTION INTRAMUSCULAR; SUBCUTANEOUS EVERY 5 MIN PRN
OUTPATIENT
Start: 2025-05-28

## 2025-05-14 RX ORDER — LORAZEPAM 2 MG/ML
0.5 INJECTION INTRAMUSCULAR EVERY 4 HOURS PRN
OUTPATIENT
Start: 2025-05-21

## 2025-05-14 RX ORDER — HEPARIN SODIUM,PORCINE 10 UNIT/ML
5-20 VIAL (ML) INTRAVENOUS DAILY PRN
OUTPATIENT
Start: 2025-05-21

## 2025-05-14 RX ORDER — HEPARIN SODIUM (PORCINE) LOCK FLUSH IV SOLN 100 UNIT/ML 100 UNIT/ML
5 SOLUTION INTRAVENOUS
OUTPATIENT
Start: 2025-06-04

## 2025-05-14 RX ORDER — HEPARIN SODIUM,PORCINE 10 UNIT/ML
5-20 VIAL (ML) INTRAVENOUS DAILY PRN
OUTPATIENT
Start: 2025-06-04

## 2025-05-14 RX ORDER — HEPARIN SODIUM,PORCINE 10 UNIT/ML
5-20 VIAL (ML) INTRAVENOUS DAILY PRN
OUTPATIENT
Start: 2025-05-28

## 2025-05-14 ASSESSMENT — PAIN SCALES - GENERAL: PAINLEVEL_OUTOF10: NO PAIN (0)

## 2025-05-14 NOTE — PROGRESS NOTES
"Oncology Rooming Note    May 14, 2025 1:36 PM   Lesley Mahmood is a 40 year old female who presents for:    Chief Complaint   Patient presents with    Breast Cancer     Radiation consult     Initial Vitals: BP (!) 140/89   Pulse 81   Wt 72.1 kg (159 lb)   LMP 04/14/2025   SpO2 100%   BMI 27.29 kg/m   Estimated body mass index is 27.29 kg/m  as calculated from the following:    Height as of an earlier encounter on 5/14/25: 1.626 m (5' 4\").    Weight as of this encounter: 72.1 kg (159 lb). Body surface area is 1.8 meters squared.  No Pain (0) Comment: Data Unavailable   Patient's last menstrual period was 04/14/2025.  Allergies reviewed: Yes  Medications reviewed: Yes    Medications: Medication refills not needed today.  Pharmacy name entered into [facility]: [facility] - [place], [place] - [address]    Frailty Screening:   Is the patient here for a new oncology consult visit in cancer care? 2. No    PHQ9:  Did this patient require a PHQ9?: No      Clinical concerns: Here for consultation     Considerations for radiation treatment   Pregnancy status: Chemo induced  Implanted Cardiac Devices: No   Any previous radiation therapy: No      Natacha Preciado RN              "

## 2025-05-14 NOTE — LETTER
5/14/2025      Lesley Mahmood  [address] [facility] [id number]      Dear Colleague,    Thank you for referring your patient, Lesley Mahmood, to the [facility]. Please see a copy of my visit note below.    Virtual Visit Details    Type of service:  Video Visit     Originating Location (pt. Location): Home    Distant Location (provider location):  Off-site  Platform used for Video Visit: [facility]      Oncology Visit:  Date on this visit: May 14, 2025    Diagnosis:  Clinical prognostic stage Ia, [id number], grade 2, ER positive, CO positive, HER-2 positive left breast cancer.    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  Routine bilateral screening mammograms on 1/6/2025 showed fine pleomorphic calcifications throughout the left breast and possible medial breast focal asymmetry.  Ultrasound showed a mass at 10 o'clock, 9 cm from the nipple measuring 1 cm and multiple areas with conglomerate of ducts and dilated ducts with associated calcifications in all 4 quadrants of the breast. Ultrasound of the left axilla was without lymphadenopathy.  Pathology of biopsy of the mass at 10 o'clock was c/w grade 3 invasive ductal carcinoma with associated high grade DCIS.  Invasive carcinoma is ER positive (71-80%), CO positive (51-60%), HER2 equivocal by IHC and positive by FISH (HER2 signals/cell of 11.1 and HER2/CEP17 ratio of 4.0).  Pathology of biopsy of calcifications at 3 o'clock is c/w grade 2 invasive lobular carcinoma with associated high grade DCIS and LCIS.  Invasive carcinoma is ER positive (%), CO positive (%), and HER2 negative (IHC 1+).  Breast MRI on 1/23/2025 was with marked background parenchymal enhancement with extensive nonfocal enhancement greater than the right breast.  There were some areas of progressive enhancement greater than background enhanced but no focal mass.  MRI  was without lymphadenopathy.  No focal abnormality in the right breast was seen.    She underwent bilateral skin sparing mastectomies and left axillary sentinel lymph node biopsy with Dr. Perea on 2/17/2025.  She had immediate tissue expander placement.  Pathology showed a total of at least 12 foci invasive carcinoma.  At least 4 foci of high grade invasive ductal carcinoma in the left breast.  The largest focus measured 1.2 cm, examples of additional foci measuring 1 cm, 2.1 mm, 1.1 mm.  In addition, there were at least 7 microscopic foci of grade 2 invasive lobular carcinoma.  This was in a background of extensive high grade DCIS, spanning 12 cm.  LCIS was also present.  The anterior, superior margin was involved by DCIS.  Pathology of the right breast was negative for malignancy.  3 left axillary sentinel lymph nodes were negative for malignancy.    She met with Dr. Moser who ultimately recommended adjuvant therapy with THP x 12 weeks followed by Herceptin x 1 year. Consideration of adjuvant radiation given extensive DCIS and positive margin.     She started treatment on 3/19/25.     Interval History: Lesley is feeling well today. She has had nine  weeks of THP so far. She is feeling more tired every week. She is having to pace herself more and prioritize sleep, exercise, and eating well. She is managing work okay but is a little behind.     Her skin rash is a little worse. She is having itchy papules on thighs, ankles, elbows. Hands are better. Could be using triamcinolone more regularly. Skin is overall dry. She is moisturizing.     Bloody noses have stopped now but she does have a drippy nose.     Neuropathy is stable. She has a minor sensation change in fingertips without numbness or tingling.     Has some mild dysgeusia.     Has some chronic numbness tip of nose that is a little worse.     She has started to have swelling along L arm with some cording. She has been doing some lymphedema exercises online  and this helped initially.     No other concerns today.     Past Medical/Surgical History:  Past Medical History:   Diagnosis Date     Invasive ductal carcinoma of breast, female, left (H)      Past Surgical History:   Procedure Laterality Date     BIOPSY NODE SENTINEL Left 2/17/2025    Procedure: SENTINEL LYMPH NODE BIOPSY;  Surgeon: Jose Perea MD;  Location: [facility] OR     birthmark removal  2001     EXTRACTION(S) DENTAL  1995     INSERT PORT VASCULAR ACCESS Right 3/18/2025    Procedure: Insert port vascular access;  Surgeon: Julio César Bird MD;  Location: [facility] OR     INSERT TISSUE EXPANDER BREAST Bilateral 2/17/2025    Procedure: Bilateral breast reconstruction with expanders and SPY;  Surgeon: IAN Taveras MD;  Location: [facility] OR     IR CHEST PORT PLACEMENT > 5 YRS OF AGE  3/18/2025     MASTECTOMY SIMPLE Bilateral 2/17/2025    Procedure: SKIN-SPARING MASTECTOMY;  Surgeon: Jose Perea MD;  Location: [facility] OR     Allergies:  Allergies as of 05/14/2025     (No Known Allergies)     Current Medications:  Current Outpatient Medications   Medication Sig Dispense Refill     acetaminophen (TYLENOL) 500 MG tablet Take 500-1,000 mg by mouth every 6 hours as needed for mild pain.       clindamycin (CLEOCIN-T) 1 % external gel Apply a thin layer to affected areas twice daily. 45 g 1     lidocaine-prilocaine (EMLA) 2.5-2.5 % external cream Apply to skin overlying port site at least 30 minutes prior to port access for labs or infusions. 30 g 3     loperamide (IMODIUM A-D) 2 MG tablet Take 1 tablet (2 mg) by mouth 4 times daily as needed for diarrhea. 60 tablet 0     Nutritional Supplements (VITAMIN D BOOSTER PO) Take 1,000 Units by mouth every morning.       ondansetron (ZOFRAN) 8 MG tablet Take 1 tablet (8 mg) by mouth every 8 hours as needed for nausea. 30 tablet 3     oxyCODONE (ROXICODONE) 5 MG tablet Take 1-2 tablets (5-10 mg) by mouth every 4 hours as needed for pain. 15 tablet 0     prochlorperazine  (COMPAZINE) 10 MG tablet Take 1 tablet (10 mg) by mouth every 6 hours as needed for nausea or vomiting. 30 tablet 2     triamcinolone (KENALOG) 0.5 % external ointment Apply a thin layer over affected areas twice daily. 45 g 1        Physical Exam:  [facility] 04/14/2025   Video physical exam  General: Patient appears well in no acute distress.   Skin: No visualized rash or lesions on visualized skin  Eyes: EOMI, no erythema, sclera icterus or discharge noted  Resp: Appears to be breathing comfortably without accessory muscle usage, speaking in full sentences, no cough  MSK: Appears to have normal range of motion based on visualized movements  Neurologic: No apparent tremors, facial movements symmetric  Psych: affect and mood congruent, alert and oriented      Laboratory/Imaging Studies   04/21/25 08:27 04/29/25 06:54 05/06/25 08:45 05/13/25 07:10   Sodium  137     Potassium  4.2     Chloride  105     Carbon Dioxide (CO2)  25     Urea Nitrogen  13.3     Creatinine  0.78     GFR Estimate  >90     Calcium  8.8     Anion Gap  7     Albumin  3.9     Protein Total  6.4     Alkaline Phosphatase  61     ALT  11     AST  16     Bilirubin Total  <0.2     Glucose  92     WBC 4.7 4.4 5.6 4.6   Hemoglobin 11.2 (L) 11.2 (L) 11.7 11.2 (L)   Hematocrit 33.3 (L) 33.3 (L) 34.8 (L) 34.2 (L)   Platelet Count 367 352 340 335   RBC Count 3.88 3.87 4.06 3.98   MCV 86 86 86 86   MCH 28.9 28.9 28.8 28.1   MCHC 33.6 33.6 33.6 32.7   RDW 12.2 12.5 12.5 12.5   % Neutrophils 54 42 59 47   % Lymphocytes 36 45 30 40   % Monocytes 7 9 7 7   % Eosinophils 2 2 1 3   % Basophils 1 1 1 2   % Immature Granulocytes 1 1 1 2   NRBC/W 0 0 0 0   Absolute Neutrophil 2.5 1.8 3.3 2.1   Absolute Lymphocytes 1.7 2.0 1.7 1.8   Absolute Monocytes 0.3 0.4 0.4 0.3   Absolute Eosinophils 0.1 0.1 0.1 0.1   Absolute Basophils 0.1 0.1 0.1 0.1   Absolute Immature Granulocytes 0.1 0.0 0.1 0.1   Absolute NRBCs 0.0 0.0 0.0 0.0       ECHO 3/18/25  Interpretation Summary  Left  ventricular size, wall motion and function are normal. The ejection  fraction is 55-60%. Global peak LV longitudinal strain is averaged at -19.4%.  This is within reported normal limits (normal <-18%).  Global right ventricular function is normal.  No significant valvular abnormalities were noted.  No pericardial effusion is present.  Previous study not available for comparison.    ASSESSMENT/PLAN:  Patient is a 41 yo female with multifocal left breast cancer.  She has a [id number], grade 3, ER positive, AR positive, HER2 positive invasive ductal carcinoma and a [id number], grade 2, ER positive, AR positive, HER2 negative invasive lobular carcinoma of the left breast.       Left breast invasive ductal and lobular breast cancers:  -Pathology from the bilateral mastectomies and left axillary sentinel lymph node procedure performed on 2/17/2025.  Pathology of the left breast showed at least 4 foci of invasive ductal carcinoma, the largest measuring 1.2 cm.  Additional foci of IDC measuring 1 cm, 2.1 mm and 1.1 mm.  In addition there were at least 7 microscopic foci of invasive and microinvasive lobular carcinoma.  Three sentinel lymph nodes were negative for malignancy. Anterior superior margin was positive for DCIS.  There was extensive DCIS spanning 12 cm.    - Dr. Moser recommended systemic therapy with THP followed by Herceptin to complete a year of treatment.   -She is s/p 9 weeks of this and tolerating well with manageable side effects. Fatigue is progressive and as expected.   - Baseline echo WNL. We will monitor once every 3 months while on HER2 therapy.    - We plan to treat with an adjuvant course of endocrine therapy.  Dr. Moser recommended ovarian suppression plus an aromatase inhibitor, total treatment duration of 10 years. This will be started after the first 12 weeks of treatment.   -She will have a consult with radiation oncology to discuss adjuvant radiation given extensive DCIS with positive anterior  margin. This is scheduled for later today.     2.  RBM8A pathogenic mutation; BRCA1 varian of undetermined significance:  She has a personal history of two different types of breast cancer.  I reviewed Caleb Estevez's 1/30/2025 genetic counseling note.  Dr. Mahmood elected to proceed with BRCA 1/g genetic analysis with reflext to Breast Actionable, Common Hereditary Cancer and Hereditary Hematologic Malignancy panels.  This testing showed a pathogenic gene mutation in RBM8A.  This is a common recessive variant and in the absence of a second RBM8A mutation, represents a carrier status.  She was also found to have a variant of undetermined significance in BRCA1.  We discussed variants of undetermined significance do not change cancer screening recommendations.    3. Acneform rash-->now more macular: I think initially pustular rash was from dex versus perjeta and now macular rash is coming more from Taxol. Use clindamycin on any pustular areas. For macular, pruritic rash, use triamcinolone 0.5 % BID. Has more BSA involvement now but still manageable to apply topical steroid. Refilled.     4. Dry nose/drippy nose: Humidifier in bedroom at night, saline nasal spray BID, vaseline to nares before bed. This is helping.     5. Loose stools: Imodium PRN working well.     6. Possible tongue mucositis: Trial of s/s rinses BID.     7. Neuropathy: Very minor. Grade 1. Monitor. She is icing hands and feet during Taxol.     30 minutes spent on the date of the encounter doing chart review, review of test results, interpretation of tests, patient visit, and documentation     The longitudinal plan of care for the diagnosis(es)/condition(s) as documented were addressed during this visit. Due to the added complexity in care, I will continue to support Lesley in the subsequent management and with ongoing continuity of care.    Carole Salas PA-C                 Again, thank you for allowing me to participate in the care of your patient.         Sincerely,        Carole Salas PA-C    Electronically signed

## 2025-05-14 NOTE — NURSING NOTE
Current patient location: [address] [place] [id number]    Is the patient currently in the state of [place]? YES    Visit mode: VIDEO    If the visit is dropped, the patient can be reconnected by:VIDEO VISIT: Text to cell phone:   Telephone Information:   Mobile [phone]    and VIDEO VISIT: Send to e-mail at: [email or web address]    Will anyone else be joining the visit? NO  (If patient encounters technical issues they should call [phone]    Are changes needed to the allergy or medication list? No    Patient denies any changes since echeck-in completion and states all information entered during echeck-in remains accurate.    Are refills needed on medications prescribed by this physician? NO    Rooming Documentation:  Questionnaire(s) not done per department protocol    Reason for visit: TERRIE Richmond MA VVF

## 2025-05-14 NOTE — PROGRESS NOTES
INITIAL PATIENT ASSESSMENT    Diagnosis: Breast cancer. 2/17/25 bilateral mastectomies with expanders. Good ROM    Prior radiation therapy: None    Prior chemotherapy: Dr. Moser 9/12 rounds of chemo, Details in chart    Prior hormonal therapy:OCP then IUD    Pain Eval:  Denies    Psychosocial  Living arrangements: Family  Fall Risk: independent   referral needs: Not needed    Advanced Directive: No, paper work given  Implantable Cardiac Device? No    Onset of menarche: age 11  LMP: Patient's last menstrual period was 04/14/2025.  Onset of menopause: chemo induced  Abnormal vaginal bleeding/discharge: No  Are you pregnant? No  Reproductive note: 2 children

## 2025-05-14 NOTE — LETTER
2025      Lesley Mahmood  [address] [facility] [id number]      Dear Colleague,    Thank you for referring your patient, Lesley Mahmood, to the [facility] RADIATION ONCOLOGY. Please see a copy of my visit note below.    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      was seen for initial consultation in the Dept of Radiation Oncology on 2025 at the request of Dr. Moser.     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 Her oncologic history is the followin2025: Screening mammogram showed possible calcifications in the left breast, upper outer, lower outer and medial quadrants mid to posterior depth. Possible medial breast focal asymmetry in the CC view mid depth.     1/10/2025: Diagnostic mammogram showed heterogeneous dense breasts with calcifications in all 4 quadrants.  On the US, an irregular hypoechoic mass with spiculated margins measuring 1 cm was noted at 10:00, 9 cm from the nipple. Three areas of conglomerate of ducts with calcifications were seen at 11:00, 7 cm from the nipple, 2:00, 7 cm from the nipple, 8:00, 4 cm from the nipple, all around 1 cm. These dilated ducts correspond to mammogram in all 4 quadrants.     In retrospect, she has noticed her left breast to be slightly larger than the right. She also developed an itching sensation adjacent to the left nipple with milky discharge since fall.     2025: US guided bx & stereotactic  bx  Left breast, 10:00, 9 cm from nipple: invasive ductal carcinoma, [place] garde 3, ER+/MT+/HER2 2+ by IHC, Group 1 by FISH (HER2 copy number 11.1, ratio 4.0; associated DCIS, nuclear grade 3 (globe clip)  Left breast, 3:00: invasive lobular carcinoma, [place] grade 2, ER+/MT+/HER2 1+, associated LCIS, classic type and DCIS (top hat clip)    1/20/2025: She saw Dr. Perea. She was not felt to be a candidate for breast conservation.     Hereditary panel with Breast Actionable Panel showed a VUS in BRCA1 gene     1/23/2025: She established care with Dr. Moser. Because the extent of the invasive disease was not entirely clear, additional imaging and upfront surgery was recommended to guide the choice of HER2-based systemic therapy.     MRI of the breast showed extensive nonfocal enhancement through the left breast significantly greater than the right, consistent with the extent of calcifications seen on mammography. No lymphadenopathy.     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     2/11/2025: Pre-op visit with Dr. Taveras. Plan is stage I expander base reconstruction and stage II EYAD flap reconstruction.     2/17/2025: Bilateral skin sparing mastectomies with left SLN Bx by Dr. Perea with immediate reconstruction with prepectoral tissue expanders by Dr. Taveras.   Pathology demonstrated:  Left breast:  Invasive components:  4 foci of invasive ductal carcinoma: Largest 1.2 cm at 7:00, grade 3 (previously biopsied focus; 1.0 cm at 11:00, 1.2 mm at 3:00, 1.1 mm at 3:00  8 foci of invasive lobular carcinoma: 7 foci at 3:00, ranging from 0.6 to 2.0 mm, one at 7:00, measuring 1.1 mm. [place] grade 2.   DCIS component:  Spanning at least 12 cm, involving upper outer, upper inner and lower inner quadrant and nipple  Comedo, cribriform,  "micropapillary, and papillary subtypes  Nuclear grade 3  Central expansive \"comedo\" necrosis present  LCIS: present   LVSI: not identified  Dermal lymphatic invasion: not identified.   Margins: closest invasive 1.9 mm anterior and superior; DCIS present at margin (anterior superior at 9:00-10:00 over 1-2 mm). Posterior margins were at least 5 mm.   SLN: 0/3  Stage: pT1c(m)N0(sn)    Right breast:  Fibrocystic changes, no atypia or malignancy    3/3/2025: Follow up with Dr. Moser. She was recommended THP followed by Herceptin x 13 cycles for a total of 1 year HER-2 based therapy followed by 10 year of endocrine therapy.     3/26/2025: Lesley began chemo  infusion with cold capping. Last infusion is scheduled on 6/9/2025.     Lesley is here today to discuss pros and cons of adjuvant radiation therapy. She is with her  Mauro. On interview, Lesley states that she is tolerating chemotherapy quite well. She is cutting back from work a bit, but still has her regular clinic. She would prefer not to have to have radiation, but remains quite open minded.       PAST MEDICAL HISTORY:   Past Medical History:   Diagnosis Date     Invasive ductal carcinoma of breast, female, left (H)       Past Surgical History:   Procedure Laterality Date     BIOPSY NODE SENTINEL Left 2/17/2025    Procedure: SENTINEL LYMPH NODE BIOPSY;  Surgeon: Jose Perea MD;  Location:  OR     birthmark removal  2001     EXTRACTION(S) DENTAL  1995     INSERT PORT VASCULAR ACCESS Right 3/18/2025    Procedure: Insert port vascular access;  Surgeon: Julio César Bird MD;  [place] [facility] [place]     INSERT TISSUE EXPANDER BREAST Bilateral 2/17/2025    Procedure: Bilateral breast reconstruction with expanders and SPY;  Surgeon: IAN Taveras MD;  Location: [facility] OR     IR CHEST PORT PLACEMENT > 5 YRS OF AGE  3/18/2025     MASTECTOMY SIMPLE Bilateral 2/17/2025    Procedure: SKIN-SPARING MASTECTOMY;  Surgeon: Jose Perea MD;  Location:  OR    "     CHEMOTHERAPY HISTORY: THP     PAST RADIATION THERAPY HISTORY: None     MEDICATIONS:    Current Outpatient Medications   Medication Sig Dispense Refill     acetaminophen (TYLENOL) 500 MG tablet Take 500-1,000 mg by mouth every 6 hours as needed for mild pain.       clindamycin (CLEOCIN-T) 1 % external gel Apply a thin layer to affected areas twice daily. 45 g 1     lidocaine-prilocaine (EMLA) 2.5-2.5 % external cream Apply to skin overlying port site at least 30 minutes prior to port access for labs or infusions. 30 g 3     loperamide (IMODIUM A-D) 2 MG tablet Take 1 tablet (2 mg) by mouth 4 times daily as needed for diarrhea. 60 tablet 0     Nutritional Supplements (VITAMIN D BOOSTER PO) Take 1,000 Units by mouth every morning.       ondansetron (ZOFRAN) 8 MG tablet Take 1 tablet (8 mg) by mouth every 8 hours as needed for nausea. 30 tablet 3     oxyCODONE (ROXICODONE) 5 MG tablet Take 1-2 tablets (5-10 mg) by mouth every 4 hours as needed for pain. 15 tablet 0     prochlorperazine (COMPAZINE) 10 MG tablet Take 1 tablet (10 mg) by mouth every 6 hours as needed for nausea or vomiting. 30 tablet 2     triamcinolone (KENALOG) 0.5 % external ointment Apply a thin layer over affected areas twice daily. 45 g 1       ALLERGIES:  no known allergies.    SOCIAL HISTORY:    Social History     Socioeconomic History     Marital status:      Spouse name: Mauro Pena     Number of children: 1     Years of education: Not on file     Highest education level: Not on file   Occupational History     Occupation: endocrine      Comment: FT   Tobacco Use     Smoking status: Never     Passive exposure: Never     Smokeless tobacco: Never   Vaping Use     Vaping status: Never Used   Substance and Sexual Activity     Alcohol use: Yes     Alcohol/week: 4.0 standard drinks of alcohol     Types: 4 Standard drinks or equivalent per week     Comment: occasional     Drug use: Not Currently     Comment: past THC gummies      Sexual activity: Yes     Partners: Male     Birth control/protection: I.U.D.     Comment: IUD now removed   Other Topics Concern     Parent/sibling w/ CABG, MI or angioplasty before 65F 55M? Not Asked      Service No     Blood Transfusions No     Caffeine Concern Yes     Comment: 1 cup      Occupational Exposure No     Hobby Hazards No     Sleep Concern No     Stress Concern No     Weight Concern No     Special Diet No     Back Care No     Exercise Yes     Comment: yoga 2 times and walking     Bike Helmet Yes     Seat Belt Yes     Self-Exams Yes     Comment: occ   Social History Narrative    How much exercise per week? Yoga and walking    How much calcium per day? In foods and supplements       How much caffeine per day? 1    How much vitamin D per day? In foods    Do you/your family wear seatbelts?  Yes    Do you/your family use safety helmets? Yes    Do you/your family use sunscreen? Yes    Do you/your family keep firearms in the home? No    Do you/your family have a smoke detector(s)? Yes        Do you feel safe in your home? Yes    Has anyone ever touched you in an unwanted manner? No    [facility]     Social Drivers of Health     Financial Resource Strain: Not on file   Food Insecurity: Not on file   Transportation Needs: Not on file   Physical Activity: Not on file   Stress: Not on file   Social Connections: Not on file   Interpersonal Safety: Low Risk  (3/18/2025)    Interpersonal Safety      Do you feel physically and emotionally safe where you currently live?: Yes      Within the past 12 months, have you been hit, slapped, kicked or otherwise physically hurt by someone?: No      Within the past 12 months, have you been humiliated or emotionally abused in other ways by your partner or ex-partner?: No   Housing Stability: Not on file   2 children, age 5 and 9   to  Mauro  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   Recently obtained an R tereso to fund research.  "    FAMILY HISTORY:    family history includes Cancer in her father; Cardiovascular in her maternal grandmother; Cerebrovascular Disease in her paternal grandmother; Hemochromatosis in her cousin; Hypertension in her maternal grandfather, maternal grandmother, mother, paternal grandfather, and paternal grandmother; Macular Degeneration in her maternal grandfather; Other Cancer in her paternal grandfather; Unknown/Adopted in her brother and sister.  Father: CLL and prostate cancer  Paternal grandfather: CLL and prostate cancer    REVIEW OF SYMPTOMS:  A full 14-point review of systems was performed. See HPI for details.     IUD prior to diagnosis  2 children  Breast fed each for 3 years  No fertility treatment      PHYSICAL EXAMINATION:     BP (!) 140/89   Pulse 81   Wt 72.1 kg (159 lb)   LMP 04/14/2025   SpO2 100%   BMI 27.29 kg/m     Gen: alert and oriented, no acute distress  HEENT: unremarkable. No significant hair loss with cold cap.   CV: well perfused  Resp: breathing comfortably on room air  Neuro: grossly intact  Pelvic: deferred  Breasts: Absence of breasts consistent with bilateral mastectomies. Well healed surgical scars. Good symmetry.     FUNCTIONAL STATUS:     SHOULDER RANGE OF MOTION:    Adequate for radiation positioning       LYMPHEDEMA RISK:    She has mild lymphedema and will undergo lymphedema therapy.        IMAGING:          ASSESSMENT AND PLAN: In summary,  Timoteo is a 41 yo female with a multifocal carcinoma of the left breast in the context of extensive DCIS. She is status post bilateral skin sparing mastectomies with immediate reconstruction with tissue expanders. She is currently receiving adjuvant THP.     I reviewed the imaging and pathology in great details. The indications for post-mastectomy radiation therapy fall into a \"gray zone\".     If we are to consider the invasive components only, she has a T1c(m) tumor. We discussed that in the mastectomy setting, T1-2N0 tumors " generally have a low locoregional recurrence. However, a subset of women with risk factors may have a higher LRR. The established risk factors include close margins (<= 2 mm), T2 or larger tumors, premenopausal status (or age <= 50), LVSI and no systemic therapy.  The LRR for women with 3 or more risk factors has been reported to be at least 20% (Jagsi 2025; IJROBP 62: 4425-8509; Victoria-Sterling 2011 IJROBP 81: y262-361). Lesley's risk factors are primarily her young age and close margin, technically not meeting the criteria for predicting a 20% local recurrence. However, her pathology showed features not included in the aforementioned studies that are worth noting. The number of invasive foci are at least 12 with 4 being ductal and 8 lobular, occupying near all quadrants of her left breast as well as the nipple. The largest ductal component was that of a high grade. On the other hand, HER-2 based therapies were not routinely used in these old studies. Her receiving THP as well as the plan for endocrine therapy may help reduce the LRR.    If we are to consider the DCIS components, she has 12 cm of DCIS that is nuclear grade 3 with comedonecrosis. There was focal involvement of the anterior superior margin over 1-2 mm. A few retrospective studies showed that among women with pure DCIS, presence of a focal margin may not necessarily lead to a local recurrence. Even with positive or close mastectomy margins, the rates of chest wall recurrences were quite low (generally < 5 %). (Rock 2011 IJROBP 80: 25-30; Maksim 2012 IJROBP).     Adding the invasive and DCIS components together, Lesley's case is not that clear cut with respect to the indication of postmastectomy radiation therapy. The upside of treatment is improved local control and the downside certainly is the side effects especially in the setting of reconstruction.     I discussed her case with my colleague, Dr. Martha Gilmore, who is leaning towards radiation therapy given the  "young age and focally positive DCIS margin. I additionally reached out to Dr. Dean Pastor, an international expert in breast radiation oncology. Here is his reply: \"I would fvaor with 12 cm grade 3 DCIS, positive margin and significant area of IDC and ILC.   It's not absolute but favor RT\".      If Lesley is agreeable to proceed with the treatment, I would include the left chest wall only as she has no  imaging evidence of abnormal lymph nodes and 3 negative SLN. The axilla will receive some incidental radiation by virtue of being close to the chest wall. If the dose to the lung is acceptable, I may be inclined to include at least some internal mammary nodes coverage as her dominant ductal carcinoma is in the inner quadrant.     We discussed the option of treating with a conventional frationation of 5000 cGy in 25 fractions to the above field vs. a more moderate fractionated regimen of 4250 cGy in 16 fractions. Recent evidence shows that the latter is efficacious in treating regional lymph nodes and does not seem to increase the complication rate or compromise cosmesis for reconstruction, though data are emerging with relatively short follow up.     With regard to the boost, the 9:00-10:00 area of involved margin will be boosted with an additional 10 Gy in 4-5 fractions.     We discussed the logistics and the side effects of the treatment in great details.  Deep inspiration breath hold will be used to decrease the dose to the heart and lung. The adverse effect of radiation on reconstruction was discussed. With EYAD flap, the complication rate will be lower compared to an implant based reconstruction.     Following a long discussion, Lesley has decided to move forward with radiation therapy to optimize local control. Her last infusion is scheduled on 6/9/2025.     PLAN:  She will complete THP on 6/9/2025.   Simulation for radiation the week of 6/23. She will require a breast angle board due to her anatomy. [facility] will " "be employed for heart and lung sparing. Treatment will include the left chest wall +/- IM depending on the lung dose.   Safe to continue HER2-based therapy during radiation therapy.  She will require deflation of the contralateral expander during the radiation therapy.        Thank you for allowing us to participate in this patient's care.  Please feel free to call with any questions or concerns.       Telma Cazares M.D./Ph.D.  Radiation Oncologist   Department of Radiation Oncology  [facility]  Phone: [phone]          I reviewed patient's chart, internal/external medical records, imaging studies (including actual images), labs and pathology reports.  I interviewed and counseled the patient face to face.  I additionally discussed the case with patient's referring physicians and care team.            Telma Cazares MD     Oncology Rooming Note    May 14, 2025 1:36 PM   Lesley Mahmood is a 40 year old female who presents for:    Chief Complaint   Patient presents with     Breast Cancer     Radiation consult     Initial Vitals: BP (!) 140/89   Pulse 81   Wt 72.1 kg (159 lb)   LMP 04/14/2025   SpO2 100%   BMI 27.29 kg/m   Estimated body mass index is 27.29 kg/m  as calculated from the following:    Height as of an earlier encounter on 5/14/25: 1.626 m (5' 4\").    Weight as of this encounter: 72.1 kg (159 lb). Body surface area is 1.8 meters squared.  No Pain (0) Comment: Data Unavailable   Patient's last menstrual period was 04/14/2025.  Allergies reviewed: Yes  Medications reviewed: Yes    Medications: Medication refills not needed today.  Pharmacy name entered into [facility]: [facility] - [place], [place] - [address]    Frailty Screening:   Is the patient here for a new oncology consult visit in cancer care? 2. No    PHQ9:  Did this patient require a PHQ9?: No      Clinical concerns: Here for consultation     Considerations for radiation treatment   Pregnancy " status: Chemo induced  Implanted Cardiac Devices: No   Any previous radiation therapy: No      Natacha Preciado RN                INITIAL PATIENT ASSESSMENT    Diagnosis: Breast cancer. 2/17/25 bilateral mastectomies with expanders. Good ROM    Prior radiation therapy: None    Prior chemotherapy: Dr. Moser 9/12 rounds of chemo, Details in chart    Prior hormonal therapy:OCP then IUD    Pain Eval:  Denies    Psychosocial  Living arrangements: Family  Fall Risk: independent   referral needs: Not needed    Advanced Directive: No, paper work given  Implantable Cardiac Device? No    Onset of menarche: age 11  LMP: Patient's last menstrual period was 04/14/2025.  Onset of menopause: chemo induced  Abnormal vaginal bleeding/discharge: No  Are you pregnant? No  Reproductive note: 2 children      Again, thank you for allowing me to participate in the care of your patient.        Sincerely,        Telma Cazares MD    Electronically signed

## 2025-05-20 ENCOUNTER — INFUSION THERAPY VISIT (OUTPATIENT)
Dept: ONCOLOGY | Facility: CLINIC | Age: 40
End: 2025-05-20
Attending: INTERNAL MEDICINE
Payer: COMMERCIAL

## 2025-05-20 VITALS
OXYGEN SATURATION: 100 % | DIASTOLIC BLOOD PRESSURE: 82 MMHG | BODY MASS INDEX: 26.97 KG/M2 | HEART RATE: 82 BPM | WEIGHT: 157.1 LBS | SYSTOLIC BLOOD PRESSURE: 136 MMHG | TEMPERATURE: 98 F | RESPIRATION RATE: 16 BRPM

## 2025-05-20 DIAGNOSIS — Z17.0 MALIGNANT NEOPLASM OF OVERLAPPING SITES OF LEFT BREAST IN FEMALE, ESTROGEN RECEPTOR POSITIVE (H): ICD-10-CM

## 2025-05-20 DIAGNOSIS — C50.812 MALIGNANT NEOPLASM OF OVERLAPPING SITES OF LEFT BREAST IN FEMALE, ESTROGEN RECEPTOR POSITIVE (H): ICD-10-CM

## 2025-05-20 DIAGNOSIS — Z51.11 ENCOUNTER FOR ANTINEOPLASTIC CHEMOTHERAPY: Primary | ICD-10-CM

## 2025-05-20 LAB
ALBUMIN SERPL BCG-MCNC: 4.2 G/DL (ref 3.5–5.2)
ALP SERPL-CCNC: 67 U/L (ref 40–150)
ALT SERPL W P-5'-P-CCNC: 13 U/L (ref 0–50)
ANION GAP SERPL CALCULATED.3IONS-SCNC: 12 MMOL/L (ref 7–15)
AST SERPL W P-5'-P-CCNC: 16 U/L (ref 0–45)
BASOPHILS # BLD AUTO: 0.1 10E3/UL (ref 0–0.2)
BASOPHILS NFR BLD AUTO: 1 %
BILIRUB SERPL-MCNC: <0.2 MG/DL
BUN SERPL-MCNC: 13.9 MG/DL (ref 6–20)
CALCIUM SERPL-MCNC: 9.2 MG/DL (ref 8.8–10.4)
CHLORIDE SERPL-SCNC: 104 MMOL/L (ref 98–107)
CREAT SERPL-MCNC: 0.8 MG/DL (ref 0.51–0.95)
EGFRCR SERPLBLD CKD-EPI 2021: >90 ML/MIN/1.73M2
EOSINOPHIL # BLD AUTO: 0.1 10E3/UL (ref 0–0.7)
EOSINOPHIL NFR BLD AUTO: 2 %
ERYTHROCYTE [DISTWIDTH] IN BLOOD BY AUTOMATED COUNT: 12.6 % (ref 10–15)
GLUCOSE SERPL-MCNC: 80 MG/DL (ref 70–99)
HCO3 SERPL-SCNC: 22 MMOL/L (ref 22–29)
HCT VFR BLD AUTO: 35.1 % (ref 35–47)
HGB BLD-MCNC: 11.7 G/DL (ref 11.7–15.7)
IMM GRANULOCYTES # BLD: 0.1 10E3/UL
IMM GRANULOCYTES NFR BLD: 1 %
LYMPHOCYTES # BLD AUTO: 2.2 10E3/UL (ref 0.8–5.3)
LYMPHOCYTES NFR BLD AUTO: 44 %
MCH RBC QN AUTO: 28.6 PG (ref 26.5–33)
MCHC RBC AUTO-ENTMCNC: 33.3 G/DL (ref 31.5–36.5)
MCV RBC AUTO: 86 FL (ref 78–100)
MONOCYTES # BLD AUTO: 0.4 10E3/UL (ref 0–1.3)
MONOCYTES NFR BLD AUTO: 8 %
NEUTROPHILS # BLD AUTO: 2.2 10E3/UL (ref 1.6–8.3)
NEUTROPHILS NFR BLD AUTO: 44 %
NRBC # BLD AUTO: 0 10E3/UL
NRBC BLD AUTO-RTO: 0 /100
PLATELET # BLD AUTO: 332 10E3/UL (ref 150–450)
POTASSIUM SERPL-SCNC: 4 MMOL/L (ref 3.4–5.3)
PROT SERPL-MCNC: 6.8 G/DL (ref 6.4–8.3)
RBC # BLD AUTO: 4.09 10E6/UL (ref 3.8–5.2)
SODIUM SERPL-SCNC: 138 MMOL/L (ref 135–145)
WBC # BLD AUTO: 4.9 10E3/UL (ref 4–11)

## 2025-05-20 PROCEDURE — 36591 DRAW BLOOD OFF VENOUS DEVICE: CPT | Performed by: PHYSICIAN ASSISTANT

## 2025-05-20 PROCEDURE — 96417 CHEMO IV INFUS EACH ADDL SEQ: CPT

## 2025-05-20 PROCEDURE — 258N000003 HC RX IP 258 OP 636: Performed by: PHYSICIAN ASSISTANT

## 2025-05-20 PROCEDURE — 85004 AUTOMATED DIFF WBC COUNT: CPT | Performed by: PHYSICIAN ASSISTANT

## 2025-05-20 PROCEDURE — 96375 TX/PRO/DX INJ NEW DRUG ADDON: CPT

## 2025-05-20 PROCEDURE — 96413 CHEMO IV INFUSION 1 HR: CPT

## 2025-05-20 PROCEDURE — 250N000011 HC RX IP 250 OP 636: Performed by: PHYSICIAN ASSISTANT

## 2025-05-20 PROCEDURE — 80053 COMPREHEN METABOLIC PANEL: CPT | Performed by: PHYSICIAN ASSISTANT

## 2025-05-20 PROCEDURE — 250N000011 HC RX IP 250 OP 636: Performed by: INTERNAL MEDICINE

## 2025-05-20 RX ORDER — HEPARIN SODIUM (PORCINE) LOCK FLUSH IV SOLN 100 UNIT/ML 100 UNIT/ML
5 SOLUTION INTRAVENOUS DAILY PRN
Status: DISCONTINUED | OUTPATIENT
Start: 2025-05-20 | End: 2025-05-20 | Stop reason: HOSPADM

## 2025-05-20 RX ORDER — ONDANSETRON 2 MG/ML
8 INJECTION INTRAMUSCULAR; INTRAVENOUS ONCE
Status: COMPLETED | OUTPATIENT
Start: 2025-05-20 | End: 2025-05-20

## 2025-05-20 RX ORDER — HEPARIN SODIUM (PORCINE) LOCK FLUSH IV SOLN 100 UNIT/ML 100 UNIT/ML
5 SOLUTION INTRAVENOUS
Status: DISCONTINUED | OUTPATIENT
Start: 2025-05-20 | End: 2025-05-20 | Stop reason: HOSPADM

## 2025-05-20 RX ADMIN — Medication 5 ML: at 06:48

## 2025-05-20 RX ADMIN — PACLITAXEL 143 MG: 6 INJECTION, SOLUTION INTRAVENOUS at 08:56

## 2025-05-20 RX ADMIN — PERTUZUMAB 420 MG: 30 INJECTION, SOLUTION, CONCENTRATE INTRAVENOUS at 07:46

## 2025-05-20 RX ADMIN — SODIUM CHLORIDE 250 ML: 0.9 INJECTION, SOLUTION INTRAVENOUS at 07:40

## 2025-05-20 RX ADMIN — SODIUM CHLORIDE 140 MG: 0.9 INJECTION, SOLUTION INTRAVENOUS at 08:22

## 2025-05-20 RX ADMIN — HEPARIN 5 ML: 100 SYRINGE at 10:03

## 2025-05-20 RX ADMIN — ONDANSETRON 8 MG: 2 INJECTION INTRAMUSCULAR; INTRAVENOUS at 07:33

## 2025-05-20 ASSESSMENT — PAIN SCALES - GENERAL: PAINLEVEL_OUTOF10: NO PAIN (0)

## 2025-05-20 NOTE — PATIENT INSTRUCTIONS
[facility] Triage and after hours / weekends / holidays:  [phone] option 5, option 2    Please call the triage or after hours line if you experience a temperature greater than or equal to 100.4, shaking chills, have uncontrolled nausea, vomiting and/or diarrhea, dizziness, shortness of breath, chest pain, bleeding, unexplained bruising, or if you have any other new/concerning symptoms, questions or concerns.      If you are having any concerning symptoms or wish to speak to a provider before your next infusion visit, please call triage to notify your care team so we can adequately serve you.     If you need a refill on a narcotic prescription or other medication, please call before your infusion appointment.      May 2025      Ike Monday Tuesday Wednesday Thursday Friday Saturday                       1     2     3       4     5     6    Lab Peripheral   8:00 AM   (15 min.)    [facility] DRAW   [facility]    Infusion 300   8:30 AM   (300 min.)   [facility] ONC INFUSION NURSE   [facility] 7     8     9     10       11     12     13    Lab Peripheral   6:30 AM   (15 min.)   [facility]   [facility]    Infusion 300   7:00 AM   (300 min.)    ONC INFUSION NURSE   [facility] 14    Return Patient   9:45 AM   (45 min.)   Carole Salas PA-C   [facility]    New Radiation Oncology   1:00 PM   (90 min.)   Telma Cazares MD   [facility] Radiation Oncology 15     16     17       18     19     20    Lab Peripheral   6:30 AM   (15 min.)   [facility]   [facility]    Infusion 300   7:00 AM   (300 min.)    ONC INFUSION NURSE   [facility] 21     22     23     24       25     26     27    Lab Peripheral   6:30 AM   (15 min.)    [facility] DRAW   [facility]  Clinic    Infusion 300   7:00 AM   (300 min.)   UC ONC INFUSION NURSE   [facility] Cancer [facility] 28    Return Patient  10:45 AM   (45 min.)   Carole Salas PA-C   [facility] 29     30 31 June 2025 Sunday Monday Tuesday Wednesday Thursday Friday Saturday   1     2    PT Lymphedema Evaluation   8:45 AM   (60 min.)   Jayashree Winslow, PT   [facility] 3    Lab Peripheral   8:00 AM   (15 min.)   [facility]   [facility]    Infusion 300   8:30 AM   (300 min.)   UC ONC INFUSION NURSE   [facility] 4     5     6     7       8     9    Return Patient   8:45 AM   (30 min.)   Geovanna Moser MD   [facility]    Infusion 120   9:30 AM   (120 min.)   [facility] ONC INFUSION NURSE   [facility]    PT Lymphedema Treatment   3:30 PM   (60 min.)   Jayashree Winslow, PT   [facility] 10    Return Plastic Surgery   9:15 AM   (30 min.)   IAN Taveras MD   [facility] Breast [facility] 11     12     13     14       15     16     17    ECHO COMPLETE   9:45 AM   (60 min.)   [id number]   [facility] Heart [facility] 18    PT Lymphedema Treatment   9:00 AM   (60 min.)   Jayashree Winslow, PT   [facility] 19     20     21       22     23     24     25    Return Radiation Oncology  12:30 PM   (30 min.)   Telma Cazares MD   [facility] Radiation Oncology    CT SIM   1:00 PM   (60 min.)   Telma Cazares MD   [facility] Radiation Oncology 26     27     28       29     30                                                 Recent Results (from the past 24 hours)   Comprehensive metabolic panel    Collection Time: 05/20/25  6:54 AM   Result Value Ref Range    Sodium 138 135 - 145 mmol/L     Potassium 4.0 3.4 - 5.3 mmol/L    Carbon Dioxide (CO2) 22 22 - 29 mmol/L    Anion Gap 12 7 - 15 mmol/L    Urea Nitrogen 13.9 6.0 - 20.0 mg/dL    Creatinine 0.80 0.51 - 0.95 mg/dL    GFR Estimate >90 >60 mL/min/1.73m2    Calcium 9.2 8.8 - 10.4 mg/dL    Chloride 104 98 - 107 mmol/L    Glucose 80 70 - 99 mg/dL    Alkaline Phosphatase 67 40 - 150 U/L    AST 16 0 - 45 U/L    ALT 13 0 - 50 U/L    Protein Total 6.8 6.4 - 8.3 g/dL    Albumin 4.2 3.5 - 5.2 g/dL    Bilirubin Total <0.2 <=1.2 mg/dL   CBC with platelets and differential    Collection Time: 05/20/25  6:54 AM   Result Value Ref Range    WBC Count 4.9 4.0 - 11.0 10e3/uL    RBC Count 4.09 3.80 - 5.20 10e6/uL    Hemoglobin 11.7 11.7 - 15.7 g/dL    Hematocrit 35.1 35.0 - 47.0 %    MCV 86 78 - 100 fL    MCH 28.6 26.5 - 33.0 pg    MCHC 33.3 31.5 - 36.5 g/dL    RDW 12.6 10.0 - 15.0 %    Platelet Count 332 150 - 450 10e3/uL    % Neutrophils 44 %    % Lymphocytes 44 %    % Monocytes 8 %    % Eosinophils 2 %    % Basophils 1 %    % Immature Granulocytes 1 %    NRBCs per 100 WBC 0 <1 /100    Absolute Neutrophils 2.2 1.6 - 8.3 10e3/uL    Absolute Lymphocytes 2.2 0.8 - 5.3 10e3/uL    Absolute Monocytes 0.4 0.0 - 1.3 10e3/uL    Absolute Eosinophils 0.1 0.0 - 0.7 10e3/uL    Absolute Basophils 0.1 0.0 - 0.2 10e3/uL    Absolute Immature Granulocytes 0.1 <=0.4 10e3/uL    Absolute NRBCs 0.0 10e3/uL

## 2025-05-20 NOTE — PROGRESS NOTES
Infusion Nursing Note:  Lesley Mahmood presents today for D1, C4 Perjeta, Trazimera, & Taxol.    Patient seen by provider today: No   present during visit today: Not Applicable.    Note: Lesley comes to infusion today with no questions or concerns.  Pt has no pain today and denies any need for intervention at this appointment.  Pt reports increased fatigue and nausea since last cycle but it has been manageable with home medications. She reports her lymphedema in her L arm and her rash have been the same through out her treatment. Pt has been afebrile and denies signs and symptoms of infection including: cough, SOB, sore throat, worsening diarrhea, vomiting, rash, or pain with urination.  Pt wishes to proceed with today's planned treatment.    Patient uses ice on hands and feet & chews ice during Taxol. Pt cold caps for 30 minutes before, during and 1 hour after Taxol.    Intravenous Access:  Implanted Port.    Treatment Conditions:  Lab Results   Component Value Date    HGB 11.7 05/20/2025    WBC 4.9 05/20/2025    ANEU 2.2 05/20/2025     05/20/2025        Lab Results   Component Value Date     05/20/2025    POTASSIUM 4.0 05/20/2025    CR 0.80 05/20/2025    GRECIA 9.2 05/20/2025    BILITOTAL <0.2 05/20/2025    ALBUMIN 4.2 05/20/2025    ALT 13 05/20/2025    AST 16 05/20/2025       Results reviewed, labs MET treatment parameters, ok to proceed with treatment.  ECHO/MUGA completed 3/18/25  EF 55-60%.      Post Infusion Assessment:  Patient tolerated infusion without incident.  Blood return noted pre and post infusion.  Site patent and intact, free from redness, edema or discomfort.  No evidence of extravasations.  Access discontinued per protocol.       Discharge Plan:   Patient declined prescription refills.  Discharge instructions reviewed with: Patient.  Patient and/or family verbalized understanding of discharge instructions and all questions answered.  AVS to patient via [employer].  Patient will  return 5/27/25 for next appointment.   Patient discharged in stable condition accompanied by: self.  Departure Mode: Ambulatory.      Kishan Tang RN

## 2025-05-27 ENCOUNTER — INFUSION THERAPY VISIT (OUTPATIENT)
Dept: ONCOLOGY | Facility: CLINIC | Age: 40
End: 2025-05-27
Attending: INTERNAL MEDICINE
Payer: COMMERCIAL

## 2025-05-27 VITALS
RESPIRATION RATE: 16 BRPM | HEART RATE: 73 BPM | OXYGEN SATURATION: 98 % | SYSTOLIC BLOOD PRESSURE: 113 MMHG | DIASTOLIC BLOOD PRESSURE: 71 MMHG | BODY MASS INDEX: 27.24 KG/M2 | TEMPERATURE: 98.1 F | WEIGHT: 158.7 LBS

## 2025-05-27 DIAGNOSIS — Z17.0 MALIGNANT NEOPLASM OF OVERLAPPING SITES OF LEFT BREAST IN FEMALE, ESTROGEN RECEPTOR POSITIVE (H): ICD-10-CM

## 2025-05-27 DIAGNOSIS — Z51.11 ENCOUNTER FOR ANTINEOPLASTIC CHEMOTHERAPY: Primary | ICD-10-CM

## 2025-05-27 DIAGNOSIS — C50.812 MALIGNANT NEOPLASM OF OVERLAPPING SITES OF LEFT BREAST IN FEMALE, ESTROGEN RECEPTOR POSITIVE (H): ICD-10-CM

## 2025-05-27 LAB
BASOPHILS # BLD AUTO: 0.1 10E3/UL (ref 0–0.2)
BASOPHILS NFR BLD AUTO: 1 %
EOSINOPHIL # BLD AUTO: 0.1 10E3/UL (ref 0–0.7)
EOSINOPHIL NFR BLD AUTO: 2 %
ERYTHROCYTE [DISTWIDTH] IN BLOOD BY AUTOMATED COUNT: 12.9 % (ref 10–15)
HCT VFR BLD AUTO: 35 % (ref 35–47)
HGB BLD-MCNC: 11.6 G/DL (ref 11.7–15.7)
IMM GRANULOCYTES # BLD: 0.1 10E3/UL
IMM GRANULOCYTES NFR BLD: 1 %
LYMPHOCYTES # BLD AUTO: 2.5 10E3/UL (ref 0.8–5.3)
LYMPHOCYTES NFR BLD AUTO: 44 %
MCH RBC QN AUTO: 28.6 PG (ref 26.5–33)
MCHC RBC AUTO-ENTMCNC: 33.1 G/DL (ref 31.5–36.5)
MCV RBC AUTO: 86 FL (ref 78–100)
MONOCYTES # BLD AUTO: 0.5 10E3/UL (ref 0–1.3)
MONOCYTES NFR BLD AUTO: 8 %
NEUTROPHILS # BLD AUTO: 2.5 10E3/UL (ref 1.6–8.3)
NEUTROPHILS NFR BLD AUTO: 44 %
NRBC # BLD AUTO: 0 10E3/UL
NRBC BLD AUTO-RTO: 0 /100
PLATELET # BLD AUTO: 334 10E3/UL (ref 150–450)
RBC # BLD AUTO: 4.05 10E6/UL (ref 3.8–5.2)
WBC # BLD AUTO: 5.8 10E3/UL (ref 4–11)

## 2025-05-27 PROCEDURE — 250N000011 HC RX IP 250 OP 636: Performed by: INTERNAL MEDICINE

## 2025-05-27 PROCEDURE — 85025 COMPLETE CBC W/AUTO DIFF WBC: CPT | Performed by: PHYSICIAN ASSISTANT

## 2025-05-27 PROCEDURE — 36591 DRAW BLOOD OFF VENOUS DEVICE: CPT | Performed by: PHYSICIAN ASSISTANT

## 2025-05-27 PROCEDURE — 258N000003 HC RX IP 258 OP 636: Performed by: PHYSICIAN ASSISTANT

## 2025-05-27 PROCEDURE — 96413 CHEMO IV INFUSION 1 HR: CPT

## 2025-05-27 PROCEDURE — 250N000011 HC RX IP 250 OP 636: Performed by: PHYSICIAN ASSISTANT

## 2025-05-27 PROCEDURE — 96375 TX/PRO/DX INJ NEW DRUG ADDON: CPT

## 2025-05-27 PROCEDURE — 96417 CHEMO IV INFUS EACH ADDL SEQ: CPT

## 2025-05-27 RX ORDER — HEPARIN SODIUM (PORCINE) LOCK FLUSH IV SOLN 100 UNIT/ML 100 UNIT/ML
5 SOLUTION INTRAVENOUS
Status: DISCONTINUED | OUTPATIENT
Start: 2025-05-27 | End: 2025-05-27 | Stop reason: HOSPADM

## 2025-05-27 RX ORDER — ONDANSETRON 2 MG/ML
8 INJECTION INTRAMUSCULAR; INTRAVENOUS ONCE
Status: COMPLETED | OUTPATIENT
Start: 2025-05-27 | End: 2025-05-27

## 2025-05-27 RX ADMIN — Medication 5 ML: at 06:54

## 2025-05-27 RX ADMIN — ONDANSETRON 8 MG: 2 INJECTION INTRAMUSCULAR; INTRAVENOUS at 07:25

## 2025-05-27 RX ADMIN — SODIUM CHLORIDE 140 MG: 0.9 INJECTION, SOLUTION INTRAVENOUS at 07:44

## 2025-05-27 RX ADMIN — Medication 5 ML: at 09:52

## 2025-05-27 RX ADMIN — PACLITAXEL 143 MG: 6 INJECTION, SOLUTION INTRAVENOUS at 08:38

## 2025-05-27 ASSESSMENT — PAIN SCALES - GENERAL: PAINLEVEL_OUTOF10: NO PAIN (0)

## 2025-05-27 NOTE — NURSING NOTE
Chief Complaint   Patient presents with    Port Draw     Vitals taken, port accessed per protocol, labs drawn, heparin locked, checked into next appt     /71 (BP Location: Left arm, Patient Position: Sitting, Cuff Size: Adult Regular)   Pulse 73   Temp 98.1  F (36.7  C) (Oral)   Resp 16   Wt 72 kg (158 lb 11.2 oz)   LMP 04/14/2025   SpO2 98%   BMI 27.24 kg/m    Anderson Solomon RN on 5/27/2025 at 6:45 AM

## 2025-05-27 NOTE — PROGRESS NOTES
Infusion Nursing Note:  Lesley Mahmood presents today for Cycle 4 Day 8 Trazimera and Taxol.   Patient seen by provider today: No   present during visit today: Not Applicable.    Note: Lesley comes in doing well . She says her fatigue is about the same level it was last week. She has been napping and resting more lately which helps. In the last week she has been waking up with very mild hot flashes during the night. She is unsure whether these are true hot flashes are not. She also mentions some skin issues/rash that are ongoing and not changed. Left arm lymphedema. She has no pain or s/s of infection and otherwise feels well. No N/V/D/C, mouth sores, neuropathy, diet issues, etc.    Patient ices hands and feet and chews on ice chips during taxol infusion. Patient cold caps 30 minutes prior to taxol, during taxol, and a hour post taxol infusion.        Intravenous Access:  Implanted Port.    Treatment Conditions:  Lab Results   Component Value Date    HGB 11.6 (L) 05/27/2025    WBC 5.8 05/27/2025    ANEU 2.5 05/27/2025     05/27/2025        Results reviewed, labs MET treatment parameters, ok to proceed with treatment.  ECHO/MUGA completed 3/18/25  EF 55-60%.      Post Infusion Assessment:  Patient tolerated infusion without incident.  Blood return noted pre and post infusion.  Site patent and intact, free from redness, edema or discomfort.  No evidence of extravasations.  Access discontinued per protocol.       Discharge Plan:   Patient declined prescription refills.  Discharge instructions reviewed with: Patient.  Patient and/or family verbalized understanding of discharge instructions and all questions answered.  AVS to patient via [employer].  Patient will return 6/3 for next appointment.   Patient discharged in stable condition accompanied by: self.  Departure Mode: Ambulatory.      Alesia Ely RN

## 2025-05-27 NOTE — PATIENT INSTRUCTIONS
[facility] Triage and after hours / weekends / holidays:  [phone]    Please call the triage or after hours line if you experience a temperature greater than or equal to 100.4, shaking chills, have uncontrolled nausea, vomiting and/or diarrhea, dizziness, shortness of breath, chest pain, bleeding, unexplained bruising, or if you have any other new/concerning symptoms, questions or concerns.      If you are having any concerning symptoms or wish to speak to a provider before your next infusion visit, please call triage to notify them so we can adequately serve you.     If you need a refill on a narcotic prescription or other medication, please call before your infusion appointment.

## 2025-05-28 ENCOUNTER — VIRTUAL VISIT (OUTPATIENT)
Dept: ONCOLOGY | Facility: CLINIC | Age: 40
End: 2025-05-28
Attending: PHYSICIAN ASSISTANT
Payer: COMMERCIAL

## 2025-05-28 VITALS — HEIGHT: 64 IN | WEIGHT: 158 LBS | BODY MASS INDEX: 26.98 KG/M2

## 2025-05-28 DIAGNOSIS — Z17.0 MALIGNANT NEOPLASM OF OVERLAPPING SITES OF LEFT BREAST IN FEMALE, ESTROGEN RECEPTOR POSITIVE (H): Primary | ICD-10-CM

## 2025-05-28 DIAGNOSIS — C50.812 MALIGNANT NEOPLASM OF OVERLAPPING SITES OF LEFT BREAST IN FEMALE, ESTROGEN RECEPTOR POSITIVE (H): Primary | ICD-10-CM

## 2025-05-28 PROCEDURE — 98005 SYNCH AUDIO-VIDEO EST LOW 20: CPT | Performed by: PHYSICIAN ASSISTANT

## 2025-05-28 PROCEDURE — G2211 COMPLEX E/M VISIT ADD ON: HCPCS | Mod: 95 | Performed by: PHYSICIAN ASSISTANT

## 2025-05-28 PROCEDURE — 1126F AMNT PAIN NOTED NONE PRSNT: CPT | Mod: 95 | Performed by: PHYSICIAN ASSISTANT

## 2025-05-28 ASSESSMENT — PAIN SCALES - GENERAL: PAINLEVEL_OUTOF10: NO PAIN (0)

## 2025-05-28 NOTE — NURSING NOTE
Patient confirms medications and allergies are accurate via patients echeck in completion, and or denies any changes since last reviewed/verified.     Current patient location: [address] [facility] [address]    Is the patient currently in the state of [place]? YES    Visit mode: VIDEO    If the visit is dropped, the patient can be reconnected by:VIDEO VISIT: Text to cell phone:   Telephone Information:   Mobile [phone]       Will anyone else be joining the visit? NO  (If patient encounters technical issues they should call [phone]    Are changes needed to the allergy or medication list? No    Are refills needed on medications prescribed by this physician? NO    Rooming Documentation:  Questionnaire(s) not done per department protocol    Reason for visit: RECHECK    Linda JAIMES

## 2025-05-28 NOTE — LETTER
5/28/2025      Lesley Mahmood  [address] [facility] [address] [address]      Dear Colleague,    Thank you for referring your patient, Lesley Mahmood, to the [facility]. Please see a copy of my visit note below.    Virtual Visit Details    Type of service:  Video Visit     Originating Location (pt. Location): Home    Distant Location (provider location):  Off-site  Platform used for Video Visit: [facility]      Oncology Visit:  Date on this visit: May 28, 2025    Diagnosis:  Clinical prognostic stage Ia, [id number], grade 2, ER positive, PA positive, HER-2 positive left breast cancer.    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  Routine bilateral screening mammograms on 1/6/2025 showed fine pleomorphic calcifications throughout the left breast and possible medial breast focal asymmetry.  Ultrasound showed a mass at 10 o'clock, 9 cm from the nipple measuring 1 cm and multiple areas with conglomerate of ducts and dilated ducts with associated calcifications in all 4 quadrants of the breast. Ultrasound of the left axilla was without lymphadenopathy.  Pathology of biopsy of the mass at 10 o'clock was c/w grade 3 invasive ductal carcinoma with associated high grade DCIS.  Invasive carcinoma is ER positive (71-80%), PA positive (51-60%), HER2 equivocal by IHC and positive by FISH (HER2 signals/cell of 11.1 and HER2/CEP17 ratio of 4.0).  Pathology of biopsy of calcifications at 3 o'clock is c/w grade 2 invasive lobular carcinoma with associated high grade DCIS and LCIS.  Invasive carcinoma is ER positive (%), PA positive (%), and HER2 negative (IHC 1+).  Breast MRI on 1/23/2025 was with marked background parenchymal enhancement with extensive nonfocal enhancement greater than the right breast.  There were some areas of progressive enhancement greater than background enhanced but no focal mass.  MRI  was without lymphadenopathy.  No focal abnormality in the right breast was seen.    She underwent bilateral skin sparing mastectomies and left axillary sentinel lymph node biopsy with Dr. Perea on 2/17/2025.  She had immediate tissue expander placement.  Pathology showed a total of at least 12 foci invasive carcinoma.  At least 4 foci of high grade invasive ductal carcinoma in the left breast.  The largest focus measured 1.2 cm, examples of additional foci measuring 1 cm, 2.1 mm, 1.1 mm.  In addition, there were at least 7 microscopic foci of grade 2 invasive lobular carcinoma.  This was in a background of extensive high grade DCIS, spanning 12 cm.  LCIS was also present.  The anterior, superior margin was involved by DCIS.  Pathology of the right breast was negative for malignancy.  3 left axillary sentinel lymph nodes were negative for malignancy.    She met with Dr. Moser who ultimately recommended adjuvant therapy with THP x 12 weeks followed by Herceptin x 1 year. Consideration of adjuvant radiation given extensive DCIS and positive margin.     She started treatment on 3/19/25.     Interval History: Lesley is feeling well today. She has had eleven weeks of THP so far. The last few weeks have been better with less side effects. Rash has overall been better with using triamcinolone more regularly. Fatigue has overall been better but this may be because she is getting more rest. No nausea. She has some dysgeusia but is eating okay and maintaining her weight. No mucositis. Bowels have been okay. No overt neuropathy.     She has agreed to adjuvant radiation. Sim for this is end of June.     Past Medical/Surgical History:  Past Medical History:   Diagnosis Date     Invasive ductal carcinoma of breast, female, left (H) 01/2025     Past Surgical History:   Procedure Laterality Date     BIOPSY NODE SENTINEL Left 2/17/2025    Procedure: SENTINEL LYMPH NODE BIOPSY;  Surgeon: Jose Perea MD;  Location:  OR      "birthmark removal  2001     EXTRACTION(S) DENTAL  1995     INSERT PORT VASCULAR ACCESS Right 3/18/2025    Procedure: Insert port vascular access;  Surgeon: Julio César Bird MD;  Location: [facility] OR     INSERT TISSUE EXPANDER BREAST Bilateral 2/17/2025    Procedure: Bilateral breast reconstruction with expanders and SPY;  Surgeon: IAN Taveras MD;  Location: [facility] OR     IR CHEST PORT PLACEMENT > 5 YRS OF AGE  3/18/2025     MASTECTOMY SIMPLE Bilateral 2/17/2025    Procedure: SKIN-SPARING MASTECTOMY;  Surgeon: Jose Perea MD;  Location: [facility] OR     Allergies:  Allergies as of 05/28/2025     (No Known Allergies)     Current Medications:  Current Outpatient Medications   Medication Sig Dispense Refill     acetaminophen (TYLENOL) 500 MG tablet Take 500-1,000 mg by mouth every 6 hours as needed for mild pain.       clindamycin (CLEOCIN-T) 1 % external gel Apply a thin layer to affected areas twice daily. 45 g 1     lidocaine-prilocaine (EMLA) 2.5-2.5 % external cream Apply to skin overlying port site at least 30 minutes prior to port access for labs or infusions. 30 g 3     loperamide (IMODIUM A-D) 2 MG tablet Take 1 tablet (2 mg) by mouth 4 times daily as needed for diarrhea. 60 tablet 0     Nutritional Supplements (VITAMIN D BOOSTER PO) Take 1,000 Units by mouth every morning.       ondansetron (ZOFRAN) 8 MG tablet Take 1 tablet (8 mg) by mouth every 8 hours as needed for nausea. 30 tablet 3     oxyCODONE (ROXICODONE) 5 MG tablet Take 1-2 tablets (5-10 mg) by mouth every 4 hours as needed for pain. 15 tablet 0     prochlorperazine (COMPAZINE) 10 MG tablet Take 1 tablet (10 mg) by mouth every 6 hours as needed for nausea or vomiting. 30 tablet 2     triamcinolone (KENALOG) 0.5 % external ointment Apply a thin layer over affected areas twice daily. 45 g 1        Physical Exam:  Ht 1.626 m (5' 4\")   Wt 71.7 kg (158 lb)   LMP 04/14/2025   BMI 27.12 kg/m    Video physical exam  General: Patient appears well in " no acute distress.   Skin: No visualized rash or lesions on visualized skin  Eyes: EOMI, no erythema, sclera icterus or discharge noted  Resp: Appears to be breathing comfortably without accessory muscle usage, speaking in full sentences, no cough  MSK: Appears to have normal range of motion based on visualized movements  Neurologic: No apparent tremors, facial movements symmetric  Psych: affect and mood congruent, alert and oriented      Laboratory/Imaging Studies   05/13/25 07:10 05/20/25 06:54 05/27/25 06:53   Sodium  138    Potassium  4.0    Chloride  104    Carbon Dioxide (CO2)  22    Urea Nitrogen  13.9    Creatinine  0.80    GFR Estimate  >90    Calcium  9.2    Anion Gap  12    Albumin  4.2    Protein Total  6.8    Alkaline Phosphatase  67    ALT  13    AST  16    Bilirubin Total  <0.2    Glucose  80    WBC 4.6 4.9 5.8   Hemoglobin 11.2 (L) 11.7 11.6 (L)   Hematocrit 34.2 (L) 35.1 35.0   Platelet Count 335 332 334   RBC Count 3.98 4.09 4.05   MCV 86 86 86   MCH 28.1 28.6 28.6   MCHC 32.7 33.3 33.1   RDW 12.5 12.6 12.9   % Neutrophils 47 44 44   % Lymphocytes 40 44 44   % Monocytes 7 8 8   % Eosinophils 3 2 2   % Basophils 2 1 1   % Immature Granulocytes 2 1 1   NRBC/W 0 0 0   Absolute Neutrophil 2.1 2.2 2.5   Absolute Lymphocytes 1.8 2.2 2.5   Absolute Monocytes 0.3 0.4 0.5   Absolute Eosinophils 0.1 0.1 0.1   Absolute Basophils 0.1 0.1 0.1   Absolute Immature Granulocytes 0.1 0.1 0.1   Absolute NRBCs 0.0 0.0 0.0       ECHO 3/18/25  Interpretation Summary  Left ventricular size, wall motion and function are normal. The ejection  fraction is 55-60%. Global peak LV longitudinal strain is averaged at -19.4%.  This is within reported normal limits (normal <-18%).  Global right ventricular function is normal.  No significant valvular abnormalities were noted.  No pericardial effusion is present.  Previous study not available for comparison.    ASSESSMENT/PLAN:  Patient is a 39 yo female with multifocal left breast  cancer.  She has a [id number], grade 3, ER positive, LA positive, HER2 positive invasive ductal carcinoma and a [id number], grade 2, ER positive, LA positive, HER2 negative invasive lobular carcinoma of the left breast.       Left breast invasive ductal and lobular breast cancers:  -Pathology from the bilateral mastectomies and left axillary sentinel lymph node procedure performed on 2/17/2025.  Pathology of the left breast showed at least 4 foci of invasive ductal carcinoma, the largest measuring 1.2 cm.  Additional foci of IDC measuring 1 cm, 2.1 mm and 1.1 mm.  In addition there were at least 7 microscopic foci of invasive and microinvasive lobular carcinoma.  Three sentinel lymph nodes were negative for malignancy. Anterior superior margin was positive for DCIS.  There was extensive DCIS spanning 12 cm.    - Dr. Moser recommended systemic therapy with THP followed by Herceptin to complete a year of treatment.   -She is s/p 11 weeks of this and tolerating well with manageable side effects.  - Baseline echo WNL. We will monitor once every 3 months while on HER2 therapy. Next due and scheduled for mid June.   - We plan to treat with an adjuvant course of endocrine therapy.  Dr. Moser recommended ovarian suppression plus an aromatase inhibitor, total treatment duration of 10 years. This will be started after the first 12 weeks of treatment.   -She had consult with radiation oncology to discuss adjuvant radiation given extensive DCIS with positive anterior margin. Ultimately radiation was recommended and Lesley agreed to move forward. Sim is scheduled for late June.     2.  RBM8A pathogenic mutation; BRCA1 varian of undetermined significance:  She has a personal history of two different types of breast cancer.  I reviewed Caleb Estevez's 1/30/2025 genetic counseling note.  Dr. Mahmood elected to proceed with BRCA 1/g genetic analysis with reflext to Breast Actionable, Common Hereditary Cancer and Hereditary  Hematologic Malignancy panels.  This testing showed a pathogenic gene mutation in RBM8A.  This is a common recessive variant and in the absence of a second RBM8A mutation, represents a carrier status.  She was also found to have a variant of undetermined significance in BRCA1.  We discussed variants of undetermined significance do not change cancer screening recommendations.    3. Acneform rash-->now more macular: I think initially pustular rash was from dex versus perjeta and now macular rash is coming more from Taxol. Use clindamycin on any pustular areas. For macular, pruritic rash, use triamcinolone 0.5 % BID. Has more BSA involvement now but still manageable to apply topical steroid. Refilled.     4. Dysgeusia: Discussed daily zinc and/or miracle berry supplement PRN.     5. Loose stools: Imodium PRN working well.     6. Neuropathy: Very minor. Grade 1. Monitor. She is icing hands and feet during Taxol.     7. Lymphedema/cording: Undergoing lymphedema PT.     20 minutes spent on the date of the encounter doing chart review, review of test results, interpretation of tests, patient visit, and documentation     The longitudinal plan of care for the diagnosis(es)/condition(s) as documented were addressed during this visit. Due to the added complexity in care, I will continue to support Lesley in the subsequent management and with ongoing continuity of care.    Carole Salas PA-C                   Again, thank you for allowing me to participate in the care of your patient.        Sincerely,        Carole Salas PA-C    Electronically signed

## 2025-05-28 NOTE — PROGRESS NOTES
Virtual Visit Details    Type of service:  Video Visit     Originating Location (pt. Location): Home    Distant Location (provider location):  Off-site  Platform used for Video Visit: [facility]      Oncology Visit:  Date on this visit: May 28, 2025    Diagnosis:  Clinical prognostic stage Ia, [id number], grade 2, ER positive, VA positive, HER-2 positive left breast cancer.    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  Routine bilateral screening mammograms on 1/6/2025 showed fine pleomorphic calcifications throughout the left breast and possible medial breast focal asymmetry.  Ultrasound showed a mass at 10 o'clock, 9 cm from the nipple measuring 1 cm and multiple areas with conglomerate of ducts and dilated ducts with associated calcifications in all 4 quadrants of the breast. Ultrasound of the left axilla was without lymphadenopathy.  Pathology of biopsy of the mass at 10 o'clock was c/w grade 3 invasive ductal carcinoma with associated high grade DCIS.  Invasive carcinoma is ER positive (71-80%), VA positive (51-60%), HER2 equivocal by IHC and positive by FISH (HER2 signals/cell of 11.1 and HER2/CEP17 ratio of 4.0).  Pathology of biopsy of calcifications at 3 o'clock is c/w grade 2 invasive lobular carcinoma with associated high grade DCIS and LCIS.  Invasive carcinoma is ER positive (%), VA positive (%), and HER2 negative (IHC 1+).  Breast MRI on 1/23/2025 was with marked background parenchymal enhancement with extensive nonfocal enhancement greater than the right breast.  There were some areas of progressive enhancement greater than background enhanced but no focal mass.  MRI was without lymphadenopathy.  No focal abnormality in the right breast was seen.    She underwent bilateral skin sparing mastectomies and left axillary sentinel lymph node biopsy with Dr. Perea on 2/17/2025.  She had immediate tissue  expander placement.  Pathology showed a total of at least 12 foci invasive carcinoma.  At least 4 foci of high grade invasive ductal carcinoma in the left breast.  The largest focus measured 1.2 cm, examples of additional foci measuring 1 cm, 2.1 mm, 1.1 mm.  In addition, there were at least 7 microscopic foci of grade 2 invasive lobular carcinoma.  This was in a background of extensive high grade DCIS, spanning 12 cm.  LCIS was also present.  The anterior, superior margin was involved by DCIS.  Pathology of the right breast was negative for malignancy.  3 left axillary sentinel lymph nodes were negative for malignancy.    She met with Dr. Moser who ultimately recommended adjuvant therapy with THP x 12 weeks followed by Herceptin x 1 year. Consideration of adjuvant radiation given extensive DCIS and positive margin.     She started treatment on 3/19/25.     Interval History: Lesley is feeling well today. She has had eleven weeks of THP so far. The last few weeks have been better with less side effects. Rash has overall been better with using triamcinolone more regularly. Fatigue has overall been better but this may be because she is getting more rest. No nausea. She has some dysgeusia but is eating okay and maintaining her weight. No mucositis. Bowels have been okay. No overt neuropathy.     She has agreed to adjuvant radiation. Sim for this is end of June.     Past Medical/Surgical History:  Past Medical History:   Diagnosis Date    Invasive ductal carcinoma of breast, female, left (H) 01/2025     Past Surgical History:   Procedure Laterality Date    BIOPSY NODE SENTINEL Left 2/17/2025    Procedure: SENTINEL LYMPH NODE BIOPSY;  Surgeon: Jose Perea MD;  Location:  OR    birthmark removal  2001    EXTRACTION(S) DENTAL  1995    INSERT PORT VASCULAR ACCESS Right 3/18/2025    Procedure: Insert port vascular access;  Surgeon: Julio César Bird MD;  Location: [facility] OR    INSERT TISSUE EXPANDER BREAST Bilateral  "2/17/2025    Procedure: Bilateral breast reconstruction with expanders and SPY;  Surgeon: IAN Taveras MD;  Location: [facility] OR    IR CHEST PORT PLACEMENT > 5 YRS OF AGE  3/18/2025    MASTECTOMY SIMPLE Bilateral 2/17/2025    Procedure: SKIN-SPARING MASTECTOMY;  Surgeon: Jose Perea MD;  Location: [facility] OR     Allergies:  Allergies as of 05/28/2025    (No Known Allergies)     Current Medications:  Current Outpatient Medications   Medication Sig Dispense Refill    acetaminophen (TYLENOL) 500 MG tablet Take 500-1,000 mg by mouth every 6 hours as needed for mild pain.      clindamycin (CLEOCIN-T) 1 % external gel Apply a thin layer to affected areas twice daily. 45 g 1    lidocaine-prilocaine (EMLA) 2.5-2.5 % external cream Apply to skin overlying port site at least 30 minutes prior to port access for labs or infusions. 30 g 3    loperamide (IMODIUM A-D) 2 MG tablet Take 1 tablet (2 mg) by mouth 4 times daily as needed for diarrhea. 60 tablet 0    Nutritional Supplements (VITAMIN D BOOSTER PO) Take 1,000 Units by mouth every morning.      ondansetron (ZOFRAN) 8 MG tablet Take 1 tablet (8 mg) by mouth every 8 hours as needed for nausea. 30 tablet 3    oxyCODONE (ROXICODONE) 5 MG tablet Take 1-2 tablets (5-10 mg) by mouth every 4 hours as needed for pain. 15 tablet 0    prochlorperazine (COMPAZINE) 10 MG tablet Take 1 tablet (10 mg) by mouth every 6 hours as needed for nausea or vomiting. 30 tablet 2    triamcinolone (KENALOG) 0.5 % external ointment Apply a thin layer over affected areas twice daily. 45 g 1        Physical Exam:  Ht 1.626 m (5' 4\")   Wt 71.7 kg (158 lb)   LMP 04/14/2025   BMI 27.12 kg/m    Video physical exam  General: Patient appears well in no acute distress.   Skin: No visualized rash or lesions on visualized skin  Eyes: EOMI, no erythema, sclera icterus or discharge noted  Resp: Appears to be breathing comfortably without accessory muscle usage, speaking in full sentences, no cough  MSK: " Appears to have normal range of motion based on visualized movements  Neurologic: No apparent tremors, facial movements symmetric  Psych: affect and mood congruent, alert and oriented      Laboratory/Imaging Studies   05/13/25 07:10 05/20/25 06:54 05/27/25 06:53   Sodium  138    Potassium  4.0    Chloride  104    Carbon Dioxide (CO2)  22    Urea Nitrogen  13.9    Creatinine  0.80    GFR Estimate  >90    Calcium  9.2    Anion Gap  12    Albumin  4.2    Protein Total  6.8    Alkaline Phosphatase  67    ALT  13    AST  16    Bilirubin Total  <0.2    Glucose  80    WBC 4.6 4.9 5.8   Hemoglobin 11.2 (L) 11.7 11.6 (L)   Hematocrit 34.2 (L) 35.1 35.0   Platelet Count 335 332 334   RBC Count 3.98 4.09 4.05   MCV 86 86 86   MCH 28.1 28.6 28.6   MCHC 32.7 33.3 33.1   RDW 12.5 12.6 12.9   % Neutrophils 47 44 44   % Lymphocytes 40 44 44   % Monocytes 7 8 8   % Eosinophils 3 2 2   % Basophils 2 1 1   % Immature Granulocytes 2 1 1   NRBC/W 0 0 0   Absolute Neutrophil 2.1 2.2 2.5   Absolute Lymphocytes 1.8 2.2 2.5   Absolute Monocytes 0.3 0.4 0.5   Absolute Eosinophils 0.1 0.1 0.1   Absolute Basophils 0.1 0.1 0.1   Absolute Immature Granulocytes 0.1 0.1 0.1   Absolute NRBCs 0.0 0.0 0.0       ECHO 3/18/25  Interpretation Summary  Left ventricular size, wall motion and function are normal. The ejection  fraction is 55-60%. Global peak LV longitudinal strain is averaged at -19.4%.  This is within reported normal limits (normal <-18%).  Global right ventricular function is normal.  No significant valvular abnormalities were noted.  No pericardial effusion is present.  Previous study not available for comparison.    ASSESSMENT/PLAN:  Patient is a 39 yo female with multifocal left breast cancer.  She has a V6bN4V4, grade 3, ER positive, NH positive, HER2 positive invasive ductal carcinoma and a C6mD3R3, grade 2, ER positive, NH positive, HER2 negative invasive lobular carcinoma of the left breast.       Left breast invasive ductal and  lobular breast cancers:  -Pathology from the bilateral mastectomies and left axillary sentinel lymph node procedure performed on 2/17/2025.  Pathology of the left breast showed at least 4 foci of invasive ductal carcinoma, the largest measuring 1.2 cm.  Additional foci of IDC measuring 1 cm, 2.1 mm and 1.1 mm.  In addition there were at least 7 microscopic foci of invasive and microinvasive lobular carcinoma.  Three sentinel lymph nodes were negative for malignancy. Anterior superior margin was positive for DCIS.  There was extensive DCIS spanning 12 cm.    - Dr. Moser recommended systemic therapy with THP followed by Herceptin to complete a year of treatment.   -She is s/p 11 weeks of this and tolerating well with manageable side effects.  - Baseline echo WNL. We will monitor once every 3 months while on HER2 therapy. Next due and scheduled for mid June.   - We plan to treat with an adjuvant course of endocrine therapy.  Dr. Moser recommended ovarian suppression plus an aromatase inhibitor, total treatment duration of 10 years. This will be started after the first 12 weeks of treatment.   -She had consult with radiation oncology to discuss adjuvant radiation given extensive DCIS with positive anterior margin. Ultimately radiation was recommended and Lesley agreed to move forward. Sim is scheduled for late June.     2.  RBM8A pathogenic mutation; BRCA1 varian of undetermined significance:  She has a personal history of two different types of breast cancer.  I reviewed Caleb Estevez's 1/30/2025 genetic counseling note.  Dr. Mahmood elected to proceed with BRCA 1/g genetic analysis with reflext to Breast Actionable, Common Hereditary Cancer and Hereditary Hematologic Malignancy panels.  This testing showed a pathogenic gene mutation in RBM8A.  This is a common recessive variant and in the absence of a second RBM8A mutation, represents a carrier status.  She was also found to have a variant of undetermined  significance in BRCA1.  We discussed variants of undetermined significance do not change cancer screening recommendations.    3. Acneform rash-->now more macular: I think initially pustular rash was from dex versus perjeta and now macular rash is coming more from Taxol. Use clindamycin on any pustular areas. For macular, pruritic rash, use triamcinolone 0.5 % BID. Has more BSA involvement now but still manageable to apply topical steroid. Refilled.     4. Dysgeusia: Discussed daily zinc and/or miracle berry supplement PRN.     5. Loose stools: Imodium PRN working well.     6. Neuropathy: Very minor. Grade 1. Monitor. She is icing hands and feet during Taxol.     7. Lymphedema/cording: Undergoing lymphedema PT.     20 minutes spent on the date of the encounter doing chart review, review of test results, interpretation of tests, patient visit, and documentation     The longitudinal plan of care for the diagnosis(es)/condition(s) as documented were addressed during this visit. Due to the added complexity in care, I will continue to support Lesley in the subsequent management and with ongoing continuity of care.    Carole Salas PA-C

## 2025-06-03 ENCOUNTER — INFUSION THERAPY VISIT (OUTPATIENT)
Dept: ONCOLOGY | Facility: CLINIC | Age: 40
End: 2025-06-03
Attending: INTERNAL MEDICINE
Payer: COMMERCIAL

## 2025-06-03 VITALS
SYSTOLIC BLOOD PRESSURE: 118 MMHG | TEMPERATURE: 97.8 F | HEART RATE: 72 BPM | WEIGHT: 158.5 LBS | OXYGEN SATURATION: 100 % | DIASTOLIC BLOOD PRESSURE: 77 MMHG | RESPIRATION RATE: 16 BRPM | BODY MASS INDEX: 27.21 KG/M2

## 2025-06-03 DIAGNOSIS — Z51.11 ENCOUNTER FOR ANTINEOPLASTIC CHEMOTHERAPY: Primary | ICD-10-CM

## 2025-06-03 DIAGNOSIS — Z17.0 MALIGNANT NEOPLASM OF OVERLAPPING SITES OF LEFT BREAST IN FEMALE, ESTROGEN RECEPTOR POSITIVE (H): ICD-10-CM

## 2025-06-03 DIAGNOSIS — C50.812 MALIGNANT NEOPLASM OF OVERLAPPING SITES OF LEFT BREAST IN FEMALE, ESTROGEN RECEPTOR POSITIVE (H): ICD-10-CM

## 2025-06-03 LAB
BASOPHILS # BLD AUTO: 0 10E3/UL (ref 0–0.2)
BASOPHILS NFR BLD AUTO: 1 %
EOSINOPHIL # BLD AUTO: 0.1 10E3/UL (ref 0–0.7)
EOSINOPHIL NFR BLD AUTO: 2 %
ERYTHROCYTE [DISTWIDTH] IN BLOOD BY AUTOMATED COUNT: 12.9 % (ref 10–15)
HCT VFR BLD AUTO: 34.2 % (ref 35–47)
HGB BLD-MCNC: 11.4 G/DL (ref 11.7–15.7)
IMM GRANULOCYTES # BLD: 0.1 10E3/UL
IMM GRANULOCYTES NFR BLD: 1 %
LYMPHOCYTES # BLD AUTO: 2 10E3/UL (ref 0.8–5.3)
LYMPHOCYTES NFR BLD AUTO: 42 %
MCH RBC QN AUTO: 28.6 PG (ref 26.5–33)
MCHC RBC AUTO-ENTMCNC: 33.3 G/DL (ref 31.5–36.5)
MCV RBC AUTO: 86 FL (ref 78–100)
MONOCYTES # BLD AUTO: 0.4 10E3/UL (ref 0–1.3)
MONOCYTES NFR BLD AUTO: 8 %
NEUTROPHILS # BLD AUTO: 2.1 10E3/UL (ref 1.6–8.3)
NEUTROPHILS NFR BLD AUTO: 46 %
NRBC # BLD AUTO: 0 10E3/UL
NRBC BLD AUTO-RTO: 0 /100
PLATELET # BLD AUTO: 298 10E3/UL (ref 150–450)
RBC # BLD AUTO: 3.99 10E6/UL (ref 3.8–5.2)
WBC # BLD AUTO: 4.7 10E3/UL (ref 4–11)

## 2025-06-03 PROCEDURE — 96417 CHEMO IV INFUS EACH ADDL SEQ: CPT

## 2025-06-03 PROCEDURE — 250N000011 HC RX IP 250 OP 636: Performed by: PHYSICIAN ASSISTANT

## 2025-06-03 PROCEDURE — 96375 TX/PRO/DX INJ NEW DRUG ADDON: CPT

## 2025-06-03 PROCEDURE — 250N000011 HC RX IP 250 OP 636: Performed by: INTERNAL MEDICINE

## 2025-06-03 PROCEDURE — 85014 HEMATOCRIT: CPT | Performed by: PHYSICIAN ASSISTANT

## 2025-06-03 PROCEDURE — 96413 CHEMO IV INFUSION 1 HR: CPT

## 2025-06-03 PROCEDURE — 258N000003 HC RX IP 258 OP 636: Performed by: PHYSICIAN ASSISTANT

## 2025-06-03 PROCEDURE — 36591 DRAW BLOOD OFF VENOUS DEVICE: CPT | Performed by: PHYSICIAN ASSISTANT

## 2025-06-03 RX ORDER — ALBUTEROL SULFATE 0.83 MG/ML
2.5 SOLUTION RESPIRATORY (INHALATION)
OUTPATIENT
Start: 2025-06-11

## 2025-06-03 RX ORDER — HEPARIN SODIUM,PORCINE 10 UNIT/ML
5-20 VIAL (ML) INTRAVENOUS DAILY PRN
OUTPATIENT
Start: 2025-06-11

## 2025-06-03 RX ORDER — ONDANSETRON 2 MG/ML
8 INJECTION INTRAMUSCULAR; INTRAVENOUS ONCE
Status: COMPLETED | OUTPATIENT
Start: 2025-06-03 | End: 2025-06-03

## 2025-06-03 RX ORDER — MEPERIDINE HYDROCHLORIDE 25 MG/ML
25 INJECTION INTRAMUSCULAR; INTRAVENOUS; SUBCUTANEOUS
OUTPATIENT
Start: 2025-06-11

## 2025-06-03 RX ORDER — ALBUTEROL SULFATE 90 UG/1
1-2 INHALANT RESPIRATORY (INHALATION)
Start: 2025-06-11

## 2025-06-03 RX ORDER — DIPHENHYDRAMINE HYDROCHLORIDE 50 MG/ML
25 INJECTION, SOLUTION INTRAMUSCULAR; INTRAVENOUS
Start: 2025-06-11

## 2025-06-03 RX ORDER — DIPHENHYDRAMINE HYDROCHLORIDE 50 MG/ML
50 INJECTION, SOLUTION INTRAMUSCULAR; INTRAVENOUS
Start: 2025-06-11

## 2025-06-03 RX ORDER — HEPARIN SODIUM (PORCINE) LOCK FLUSH IV SOLN 100 UNIT/ML 100 UNIT/ML
5 SOLUTION INTRAVENOUS ONCE
Status: COMPLETED | OUTPATIENT
Start: 2025-06-03 | End: 2025-06-03

## 2025-06-03 RX ORDER — HEPARIN SODIUM (PORCINE) LOCK FLUSH IV SOLN 100 UNIT/ML 100 UNIT/ML
5 SOLUTION INTRAVENOUS
OUTPATIENT
Start: 2025-06-11

## 2025-06-03 RX ORDER — HEPARIN SODIUM (PORCINE) LOCK FLUSH IV SOLN 100 UNIT/ML 100 UNIT/ML
5 SOLUTION INTRAVENOUS
Status: DISCONTINUED | OUTPATIENT
Start: 2025-06-03 | End: 2025-06-03 | Stop reason: HOSPADM

## 2025-06-03 RX ORDER — EPINEPHRINE 1 MG/ML
0.3 INJECTION, SOLUTION INTRAMUSCULAR; SUBCUTANEOUS EVERY 5 MIN PRN
OUTPATIENT
Start: 2025-06-11

## 2025-06-03 RX ORDER — METHYLPREDNISOLONE SODIUM SUCCINATE 40 MG/ML
40 INJECTION INTRAMUSCULAR; INTRAVENOUS
Start: 2025-06-11

## 2025-06-03 RX ORDER — LORAZEPAM 2 MG/ML
0.5 INJECTION INTRAMUSCULAR EVERY 4 HOURS PRN
OUTPATIENT
Start: 2025-06-11

## 2025-06-03 RX ADMIN — Medication 5 ML: at 08:16

## 2025-06-03 RX ADMIN — ONDANSETRON 8 MG: 2 INJECTION INTRAMUSCULAR; INTRAVENOUS at 08:50

## 2025-06-03 RX ADMIN — PACLITAXEL 143 MG: 6 INJECTION, SOLUTION INTRAVENOUS at 09:45

## 2025-06-03 RX ADMIN — Medication 5 ML: at 10:50

## 2025-06-03 RX ADMIN — SODIUM CHLORIDE 140 MG: 0.9 INJECTION, SOLUTION INTRAVENOUS at 09:10

## 2025-06-03 ASSESSMENT — PAIN SCALES - GENERAL: PAINLEVEL_OUTOF10: NO PAIN (0)

## 2025-06-03 NOTE — PROGRESS NOTES
Infusion Nursing Note:  Lesley Mahmood presents today for C4D15 trastuzumab-qyyp and Taxol.    Patient seen by provider today: No   present during visit today: Not Applicable.    Note: Lesley presents to infusion today feeling well. She denies any pain, infectious symptoms, or other concerns and wishes to proceed today. She cold capped before, during, and 60 minutes after Taxol and iced her hands and feet during Taxol.     Intravenous Access:  Implanted Port.    Treatment Conditions:  Lab Results   Component Value Date    HGB 11.4 (L) 06/03/2025    WBC 4.7 06/03/2025    ANEU 2.1 06/03/2025     06/03/2025     Results reviewed, labs MET treatment parameters, ok to proceed with treatment.  ECHO/MUGA completed 3/18/25  EF 55-60%.    Post Infusion Assessment:  Patient tolerated infusion without incident.  Blood return noted pre and post infusion.  Site patent and intact, free from redness, edema or discomfort.  No evidence of extravasations.  Access discontinued per protocol.     Discharge Plan:   Patient declined prescription refills.  Discharge instructions reviewed with: Patient.  Patient and/or family verbalized understanding of discharge instructions and all questions answered.  AVS to patient via [employer].  Patient will return 6/9 for next appointment.   Patient discharged in stable condition accompanied by: self.  Departure Mode: Ambulatory.      Mable Wharton RN

## 2025-06-08 NOTE — PROGRESS NOTES
Oncology Visit:  Date on this visit: Jun 9, 2025    Diagnosis:  Clinical prognostic stage Ia, [id number], grade 2, ER positive, VA positive, HER-2 positive left breast cancer.    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  Routine bilateral screening mammograms on 1/6/2025 showed fine pleomorphic calcifications throughout the left breast and possible medial breast focal asymmetry.  Ultrasound showed a mass at 10 o'clock, 9 cm from the nipple measuring 1 cm and multiple areas with conglomerate of ducts and dilated ducts with associated calcifications in all 4 quadrants of the breast. Ultrasound of the left axilla was without lymphadenopathy.  Pathology of biopsy of the mass at 10 o'clock was c/w  grade 3 invasive ductal carcinoma with associated high grade DCIS.  Invasive carcinoma is ER positive (71-80%), VA positive (51-60%), HER2 equivocal by IHC and positive by FISH (HER2 signals/cell of 11.1 and HER2/CEP17 ratio of 4.0).  Pathology of biopsy of calcifications at 3 o'clock is c/w grade 2 invasive lobular carcinoma with associated high grade DCIS and LCIS.  Invasive carcinoma is ER positive (%), VA positive (%), and HER2 negative (IHC 1+).  Breast MRI on 1/23/2025 was with marked background parenchymal enhancement with extensive nonfocal enhancement greater than the right breast.  There were some areas of progressive enhancement greater than background enhanced but no focal mass.  MRI was without lymphadenopathy.  No focal abnormality in the right breast was seen.    She underwent bilateral skin sparing mastectomies and left axillary sentinel lymph node biopsy with Dr. Perea on 2/17/2025.  She had immediate tissue expander placement.  Pathology showed a total of at least 12 foci invasive carcinoma.  At least 4 foci of high grade invasive ductal carcinoma in the left breast.  The largest focus measured 1.2 cm,  examples of additional foci measuring 1 cm, 2.1 mm, 1.1 mm.  In addition, there were at least 7 microscopic foci of grade 2 invasive lobular carcinoma.  This was in a background of extensive high grade DCIS, spanning 12 cm.  LCIS was also present.  The anterior, superior margin was involved by DCIS.  Pathology of the right breast was negative for malignancy.  3 left axillary sentinel lymph nodes were negative for malignancy.    BRCA 1/2 genetic analysis with reflex to Breast Actionable, Common Hereditary Cancer and Hereditary Hematologic Malignancy panels showed a pathogenic gene mutation in RBM8A and a variant of undetermined significance in BRCA1.    She met with Dr. Moser who ultimately recommended adjuvant therapy with THP x 12 weeks followed by Herceptin x 1 year. Consideration of adjuvant radiation given extensive DCIS and positive margin and adjuvant endocrine therapy x 7-10 years.    She is s/p 12 weeks of paclitaxel, trastuzumab, and pertuzumab from 3/19/25 - 6/3/2025.     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  Since her last visit, she completed 12 weeks of paclitaxel, trastuzumab, and pertuzumab.  In general, she tolerated this treatment well.  She did develop significant rash involving the face, chest, and back.  The rash by far is worse on the back.  She continues to have rash at this time.  It consists of papular lesions, some of them pustules.  She has been applying clindamycin as well as triamcinolone cream to them.  Her health has been good.  Today she has no fevers, chills, or symptoms of infection.  She has developed cumulative fatigue during her chemotherapy.  She has returned to work and does feel exhausted at the end of the days that she is working.  Despite this she is she is able to perform all of her usual activities.  She denies peripheral neuropathy.  She notes having hot flashes, she describes them as tolerable at this time.  She does  have some mild vaginal symptoms and has been talking with Dr. Martines about using a vaginal estradiol cream.  She reports that now that her treatment is changing she does have some anxiety.  She also worries that because all of these treatments are being recommended she worries that her tumor is more aggressive and she is worried about that.    Past Medical/Surgical History:  Past Medical History:   Diagnosis Date    Invasive ductal carcinoma of breast, female, left (H) 01/2025     Past Surgical History:   Procedure Laterality Date    BIOPSY NODE SENTINEL Left 2/17/2025    Procedure: SENTINEL LYMPH NODE BIOPSY;  Surgeon: Jose Perea MD;  Location: [facility] OR    birthmark removal  2001    EXTRACTION(S) DENTAL  1995    INSERT PORT VASCULAR ACCESS Right 3/18/2025    Procedure: Insert port vascular access;  Surgeon: Julio César Bird MD;  Location: [facility] OR    INSERT TISSUE EXPANDER BREAST Bilateral 2/17/2025    Procedure: Bilateral breast reconstruction with expanders and SPY;  Surgeon: IAN Taveras MD;  Location: [facility] OR    IR CHEST PORT PLACEMENT > 5 YRS OF AGE  3/18/2025    MASTECTOMY SIMPLE Bilateral 2/17/2025    Procedure: SKIN-SPARING MASTECTOMY;  Surgeon: Jose Perea MD;  Location: [facility] OR     Allergies:  Allergies as of 06/09/2025    (No Known Allergies)     Current Medications:  Current Outpatient Medications   Medication Sig Dispense Refill    acetaminophen (TYLENOL) 500 MG tablet Take 500-1,000 mg by mouth every 6 hours as needed for mild pain.      clindamycin (CLEOCIN-T) 1 % external gel Apply a thin layer to affected areas twice daily. 45 g 1    lidocaine-prilocaine (EMLA) 2.5-2.5 % external cream Apply to skin overlying port site at least 30 minutes prior to port access for labs or infusions. 30 g 3    loperamide (IMODIUM A-D) 2 MG tablet Take 1 tablet (2 mg) by mouth 4 times daily as needed for diarrhea. 60 tablet 0    Nutritional Supplements (VITAMIN D BOOSTER PO) Take 1,000 Units by mouth  every morning.      oxyCODONE (ROXICODONE) 5 MG tablet Take 1-2 tablets (5-10 mg) by mouth every 4 hours as needed for pain. 15 tablet 0    triamcinolone (KENALOG) 0.5 % external ointment Apply a thin layer over affected areas twice daily. 45 g 1        Physical Exam:  General:  Well appearing, well-nourished adult female in NAD.  HEENT:  Normocephalic.  Sclera anicteric.  MMM.  No lesions of the oropharynx.  Lymph:  No cervical, supraclavicular, or axillary LAD.  Chest:  CTA bilaterally.  No wheezes or crackles.  CV:  RRR.  Nl S1 and S2.  No m/r/g.  Abd:  Soft/NT/ND.  BSs normoactive.  No hepatosplenomegaly.  Ext:  No pitting edema of the bilateral lower extremities.  Pulses 2+ and symmetric.  Musculo:  Strength 5/5 throughout.  Neuro:  Cranial nerves grossly intact.  Psych:  Mood and affect appear normal.      Laboratory/Imaging Studies    3/3/2025 Echocardiogram:  Interpretation Summary  Left ventricular size, wall motion and function are normal. The ejection  fraction is 55-60%. Global peak LV longitudinal strain is averaged at -19.4%.  This is within reported normal limits (normal <-18%).  Global right ventricular function is normal.  No significant valvular abnormalities were noted.  No pericardial effusion is present.  Previous study not available for comparison.    6/3/2025 Labs:   Latest Reference Range & Units 06/03/25 08:16   WBC 4.0 - 11.0 10e3/uL 4.7   Hemoglobin 11.7 - 15.7 g/dL 11.4 (L)   Hematocrit 35.0 - 47.0 % 34.2 (L)   Platelet Count 150 - 450 10e3/uL 298   (L): Data is abnormally low      ASSESSMENT/PLAN:  Patient is a 39 yo female with multifocal left breast cancer.  She has a [id number], grade 3, ER positive, TX positive, HER2 positive invasive ductal carcinoma and a [id number], grade 2, ER positive, TX positive, HER2 negative invasive lobular carcinoma of the left breast.  She is s/p bilateral mastectomies, left axillary sentinel lymph node biopsy, and 12 weeks of adjuvant paclitaxel, trastuzumab,  and pertuzumab.     Left breast invasive ductal and lobular breast cancers:  -  Since our last visit, she has completed adjuvant THP x 12 weeks as planned.  - Plan to start once every 3 week Herceptin infusions today.  Will treat with a total 13 infusions.  Monitoring an echocardiogram approximately once every 3 months.  Next echo is scheduled for 6/17/2025.  - Plan to proceed with adjuvant radiation therapy, she is scheduled for simulation on 6/25/2025.  - I also recommend initiating treatment with endocrine therapy.  Will start treatment with zoladex and letrozole.  Recommend starting Zoladex now and starting letrozole upon completion of radiation.  Reviewed Zoladex is administered as an intradermal injection monthly.  Letrozole is a pill taken daily.  Potential side effects of this regimen include myalgias, arthralgias, hot flashes, vaginal dryness, mood disorder, and risk of early osteoporosis.    2. Acneform rash:  Secondary to THP.   - paclitaxel and pertuzumab discontinued as above.  - continues on Herceptin  - continue to apply triamcinone cream BID until resolved.     3. Neuropathy: Very minor. Grade 1. Monitor. Has now completed paclitaxel.  Symptom may improve in the upcoming months.    4. Lymphedema/cording:   - ongoing follow up with the lymphedema clinic.    5.  Follow Up:  Herceptin every 3 weeks x 6.  Labs and Zoladex injection within 1-2 weeks, then every 4 weeks x 4  Visit with Carole betancur I, and Zoladex injection in 6-8 weeks.  Echocardiogram prior to 9/24/2025 Herceptin infusion.                 the large area of DCIS with positive anterior superior margin respectively.  These recommendations were discussed with the breast team at the [place] breast tumor conference at the time of her treatment planning and agreed upon; I am confident with these treatments, her risk of recurrence is very low.  We will continue to see her in clinic once every 2 months while on Herceptin.    2. Acneform rash:  Suspect pertuzumab or paclitaxel as the most likely cause.  She has now completed both of these treatments.  - paclitaxel and pertuzumab discontinued as above.  - continues on Herceptin  - continue to apply triamcinolone cream BID until resolved.  Continue to apply clindamycin gel to the pustular lesions.    3. Neuropathy: Grade 1. Monitor. Has now completed paclitaxel.  Symptom may improve in the upcoming months.    4. Lymphedema/cording:   - ongoing follow up with the lymphedema clinic.    5.  Follow Up:  Herceptin every 3 weeks x 6.  Labs and Zoladex injection within 1-2 weeks, then every 4 weeks x 4  Visit with Carole or SATHISH, and Zoladex injection in 6-8 weeks.  Echocardiogram prior to 9/24/2025 Herceptin infusion.    The longitudinal plan of care for the diagnosis(es)/condition(s) as documented were addressed during this visit. Due to the added complexity in care, I will continue to support Lesley in the subsequent management and with ongoing continuity of care.    I spent 47 minutes on the date of the encounter doing chart review, review of test results, interpretation of tests, patient visit, and documentation

## 2025-06-09 ENCOUNTER — INFUSION THERAPY VISIT (OUTPATIENT)
Dept: ONCOLOGY | Facility: CLINIC | Age: 40
End: 2025-06-09
Attending: INTERNAL MEDICINE
Payer: COMMERCIAL

## 2025-06-09 ENCOUNTER — THERAPY VISIT (OUTPATIENT)
Dept: PHYSICAL THERAPY | Facility: CLINIC | Age: 40
End: 2025-06-09
Attending: PHYSICIAN ASSISTANT
Payer: COMMERCIAL

## 2025-06-09 VITALS
TEMPERATURE: 98.1 F | HEART RATE: 101 BPM | WEIGHT: 156.4 LBS | SYSTOLIC BLOOD PRESSURE: 134 MMHG | RESPIRATION RATE: 16 BRPM | BODY MASS INDEX: 26.85 KG/M2 | DIASTOLIC BLOOD PRESSURE: 90 MMHG | OXYGEN SATURATION: 100 %

## 2025-06-09 DIAGNOSIS — C80.1 ANXIETY ASSOCIATED WITH CANCER DIAGNOSIS (H): ICD-10-CM

## 2025-06-09 DIAGNOSIS — Z51.11 ENCOUNTER FOR ANTINEOPLASTIC CHEMOTHERAPY: ICD-10-CM

## 2025-06-09 DIAGNOSIS — F41.1 ANXIETY ASSOCIATED WITH CANCER DIAGNOSIS (H): ICD-10-CM

## 2025-06-09 DIAGNOSIS — C50.812 MALIGNANT NEOPLASM OF OVERLAPPING SITES OF LEFT BREAST IN FEMALE, ESTROGEN RECEPTOR POSITIVE (H): Primary | ICD-10-CM

## 2025-06-09 DIAGNOSIS — L27.0 DERMATITIS DUE TO DRUG REACTION: ICD-10-CM

## 2025-06-09 DIAGNOSIS — Z17.0 MALIGNANT NEOPLASM OF OVERLAPPING SITES OF LEFT BREAST IN FEMALE, ESTROGEN RECEPTOR POSITIVE (H): Primary | ICD-10-CM

## 2025-06-09 DIAGNOSIS — I89.0 LYMPHEDEMA: ICD-10-CM

## 2025-06-09 DIAGNOSIS — I97.2 POST-MASTECTOMY LYMPHEDEMA SYNDROME: Primary | ICD-10-CM

## 2025-06-09 DIAGNOSIS — T45.1X5D ADVERSE EFFECT OF CHEMOTHERAPY, SUBSEQUENT ENCOUNTER: ICD-10-CM

## 2025-06-09 PROCEDURE — 258N000003 HC RX IP 258 OP 636: Performed by: INTERNAL MEDICINE

## 2025-06-09 PROCEDURE — 97110 THERAPEUTIC EXERCISES: CPT | Mod: GP | Performed by: PHYSICAL THERAPIST

## 2025-06-09 PROCEDURE — 250N000011 HC RX IP 250 OP 636: Performed by: INTERNAL MEDICINE

## 2025-06-09 PROCEDURE — 97161 PT EVAL LOW COMPLEX 20 MIN: CPT | Mod: GP | Performed by: PHYSICAL THERAPIST

## 2025-06-09 PROCEDURE — 97140 MANUAL THERAPY 1/> REGIONS: CPT | Mod: GP | Performed by: PHYSICAL THERAPIST

## 2025-06-09 PROCEDURE — 99213 OFFICE O/P EST LOW 20 MIN: CPT | Performed by: INTERNAL MEDICINE

## 2025-06-09 RX ORDER — HEPARIN SODIUM (PORCINE) LOCK FLUSH IV SOLN 100 UNIT/ML 100 UNIT/ML
5 SOLUTION INTRAVENOUS
Status: DISCONTINUED | OUTPATIENT
Start: 2025-06-09 | End: 2025-06-09 | Stop reason: HOSPADM

## 2025-06-09 RX ADMIN — Medication 5 ML: at 10:53

## 2025-06-09 RX ADMIN — SODIUM CHLORIDE 430 MG: 0.9 INJECTION, SOLUTION INTRAVENOUS at 10:20

## 2025-06-09 ASSESSMENT — PAIN SCALES - GENERAL: PAINLEVEL_OUTOF10: NO PAIN (0)

## 2025-06-09 NOTE — LETTER
chemotherapy is normal.  I reassured her that outcomes for patients with tumors <2 cm, treated with THP are excellent. We discussed the recommendations for treatment with THP and adjuvant therapy were due to the multifocal nature of her invasive carcinoma and the large area of DCIS with positive anterior superior margin respectively.  These recommendations were discussed with the breast team at the [place] breast tumor conference at the time of her treatment planning and agreed upon; I am confident with these treatments, her risk of recurrence is very low.  We will continue to see her in clinic once every 2 months while on Herceptin.    2. Acneform rash:  Suspect pertuzumab or paclitaxel as the most likely cause.  She has now completed both of these treatments.  - paclitaxel and pertuzumab discontinued as above.  - continues on Herceptin  - continue to apply triamcinolone cream BID until resolved.  Continue to apply clindamycin gel to the pustular lesions.    3. Neuropathy: Grade 1. Monitor. Has now completed paclitaxel.  Symptom may improve in the upcoming months.    4. Lymphedema/cording:   - ongoing follow up with the lymphedema clinic.    5.  Follow Up:  Herceptin every 3 weeks x 6.  Labs and Zoladex injection within 1-2 weeks, then every 4 weeks x 4  Visit with Carole betancur I, and Zoladex injection in 6-8 weeks.  Echocardiogram prior to 9/24/2025 Herceptin infusion.    The longitudinal plan of care for the diagnosis(es)/condition(s) as documented were addressed during this visit. Due to the added complexity in care, I will continue to support Lesley in the subsequent management and with ongoing continuity of care.    I spent 47 minutes on the date of the encounter doing chart review, review of test results, interpretation of tests, patient visit, and documentation                       Again, thank you for allowing me to participate in the care of your patient.        Sincerely,        Geovanna Elias  MD Ehsan    Electronically signed

## 2025-06-09 NOTE — PATIENT INSTRUCTIONS
[facility] Triage and after hours / weekends / holidays:  [phone]    Please call the triage or after hours line if you experience a temperature greater than or equal to 100.4, shaking chills, have uncontrolled nausea, vomiting and/or diarrhea, dizziness, shortness of breath, chest pain, bleeding, unexplained bruising, or if you have any other new/concerning symptoms, questions or concerns.      If you are having any concerning symptoms or wish to speak to a provider before your next infusion visit, please call triage to notify them so we can adequately serve you.     If you need a refill on a narcotic prescription or other medication, please call before your infusion appointment.

## 2025-06-09 NOTE — Clinical Note
6/9/2025      Lesley Mahmood  [address] [address]      Dear Colleague,    Thank you for referring your patient, Lesley Mahmood, to the [facility]. Please see a copy of my visit note below.    Oncology Visit:  Date on this visit: Jun 9, 2025    Diagnosis:  Clinical prognostic stage Ia, [id number], grade 2, ER positive, CT positive, HER-2 positive left breast cancer.    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  Routine bilateral screening mammograms on 1/6/2025 showed fine pleomorphic calcifications throughout the left breast and possible medial breast focal asymmetry.  Ultrasound showed a mass at 10 o'clock, 9 cm from the nipple measuring 1 cm and multiple areas with conglomerate of ducts and dilated ducts with associated calcifications in all 4 quadrants of the breast. Ultrasound of the left axilla was without lymphadenopathy.  Pathology of biopsy of the mass at 10 o'clock was c/w  grade 3 invasive ductal carcinoma with associated high grade DCIS.  Invasive carcinoma is ER positive (71-80%), CT positive (51-60%), HER2 equivocal by IHC and positive by FISH (HER2 signals/cell of 11.1 and HER2/CEP17 ratio of 4.0).  Pathology of biopsy of calcifications at 3 o'clock is c/w grade 2 invasive lobular carcinoma with associated high grade DCIS and LCIS.  Invasive carcinoma is ER positive (%), CT positive (%), and HER2 negative (IHC 1+).  Breast MRI on 1/23/2025 was with marked background parenchymal enhancement with extensive nonfocal enhancement greater than the right breast.  There were some areas of progressive enhancement greater than background enhanced but no focal mass.  MRI was without lymphadenopathy.  No focal abnormality in the right breast was seen.    She underwent bilateral skin sparing mastectomies and left axillary sentinel lymph node biopsy with Dr. Perea on  2/17/2025.  She had immediate tissue expander placement.  Pathology showed a total of at least 12 foci invasive carcinoma.  At least 4 foci of high grade invasive ductal carcinoma in the left breast.  The largest focus measured 1.2 cm, examples of additional foci measuring 1 cm, 2.1 mm, 1.1 mm.  In addition, there were at least 7 microscopic foci of grade 2 invasive lobular carcinoma.  This was in a background of extensive high grade DCIS, spanning 12 cm.  LCIS was also present.  The anterior, superior margin was involved by DCIS.  Pathology of the right breast was negative for malignancy.  3 left axillary sentinel lymph nodes were negative for malignancy.    BRCA 1/2 genetic analysis with reflex to Breast Actionable, Common Hereditary Cancer and Hereditary Hematologic Malignancy panels showed a pathogenic gene mutation in RBM8A and a variant of undetermined significance in BRCA1.    She met with Dr. Moser who ultimately recommended adjuvant therapy with THP x 12 weeks followed by Herceptin x 1 year. Consideration of adjuvant radiation given extensive DCIS and positive margin and adjuvant endocrine therapy x 7-10 years.    She is s/p 12 weeks of paclitaxel, trastuzumab, and pertuzumab from 3/19/25 - 6/3/2025.     Interval History:       Past Medical/Surgical History:  Past Medical History:   Diagnosis Date    Invasive ductal carcinoma of breast, female, left (H) 01/2025     Past Surgical History:   Procedure Laterality Date    BIOPSY NODE SENTINEL Left 2/17/2025    Procedure: SENTINEL LYMPH NODE BIOPSY;  Surgeon: Jose Perea MD;  Location:  OR    birthmark removal  2001    EXTRACTION(S) DENTAL  1995    INSERT PORT VASCULAR ACCESS Right 3/18/2025    Procedure: Insert port vascular access;  Surgeon: Julio César Bird MD;  [place] [facility] [place] TISSUE EXPANDER BREAST Bilateral 2/17/2025    Procedure: Bilateral breast reconstruction with expanders and SPY;  Surgeon: IAN Taveras MD;  Location:  [facility] OR    IR CHEST PORT PLACEMENT > 5 YRS OF AGE  3/18/2025    MASTECTOMY SIMPLE Bilateral 2/17/2025    Procedure: SKIN-SPARING MASTECTOMY;  Surgeon: Jose Perea MD;  Location: [facility] OR     Allergies:  Allergies as of 06/09/2025    (No Known Allergies)     Current Medications:  Current Outpatient Medications   Medication Sig Dispense Refill    acetaminophen (TYLENOL) 500 MG tablet Take 500-1,000 mg by mouth every 6 hours as needed for mild pain.      clindamycin (CLEOCIN-T) 1 % external gel Apply a thin layer to affected areas twice daily. 45 g 1    lidocaine-prilocaine (EMLA) 2.5-2.5 % external cream Apply to skin overlying port site at least 30 minutes prior to port access for labs or infusions. 30 g 3    loperamide (IMODIUM A-D) 2 MG tablet Take 1 tablet (2 mg) by mouth 4 times daily as needed for diarrhea. 60 tablet 0    Nutritional Supplements (VITAMIN D BOOSTER PO) Take 1,000 Units by mouth every morning.      oxyCODONE (ROXICODONE) 5 MG tablet Take 1-2 tablets (5-10 mg) by mouth every 4 hours as needed for pain. 15 tablet 0    triamcinolone (KENALOG) 0.5 % external ointment Apply a thin layer over affected areas twice daily. 45 g 1        Physical Exam:  General:  Well appearing, well-nourished adult female in NAD.  HEENT:  Normocephalic.  Sclera anicteric.  MMM.  No lesions of the oropharynx.  Lymph:  No cervical, supraclavicular, or axillary LAD.  Chest:  CTA bilaterally.  No wheezes or crackles.  CV:  RRR.  Nl S1 and S2.  No m/r/g.  Abd:  Soft/NT/ND.  BSs normoactive.  No hepatosplenomegaly.  Ext:  No pitting edema of the bilateral lower extremities.  Pulses 2+ and symmetric.  Musculo:  Strength 5/5 throughout.  Neuro:  Cranial nerves grossly intact.  Psych:  Mood and affect appear normal.      Laboratory/Imaging Studies    3/3/2025 Echocardiogram:  Interpretation Summary  Left ventricular size, wall motion and function are normal. The ejection  fraction is 55-60%. Global peak LV longitudinal strain is  averaged at -19.4%.  This is within reported normal limits (normal <-18%).  Global right ventricular function is normal.  No significant valvular abnormalities were noted.  No pericardial effusion is present.  Previous study not available for comparison.    6/3/2025 Labs:   Latest Reference Range & Units 06/03/25 08:16   WBC 4.0 - 11.0 10e3/uL 4.7   Hemoglobin 11.7 - 15.7 g/dL 11.4 (L)   Hematocrit 35.0 - 47.0 % 34.2 (L)   Platelet Count 150 - 450 10e3/uL 298   (L): Data is abnormally low      ASSESSMENT/PLAN:  Patient is a 41 yo female with multifocal left breast cancer.  She has a [id number], grade 3, ER positive, RI positive, HER2 positive invasive ductal carcinoma and a [id number], grade 2, ER positive, RI positive, HER2 negative invasive lobular carcinoma of the left breast.  She is s/p bilateral mastectomies, left axillary sentinel lymph node biopsy, and 12 weeks of adjuvant paclitaxel, trastuzumab, and pertuzumab.     Left breast invasive ductal and lobular breast cancers:  -  Since our last visit, she has completed adjuvant THP x 12 weeks as planned.  - Plan to start once every 3 week Herceptin infusions today.  Will treat with a total 13 infusions.  Monitoring an echocardiogram approximately once every 3 months.  Next echo is scheduled for 6/17/2025.  - Plan to proceed with adjuvant radiation therapy, she is scheduled for simulation on 6/25/2025.  - I also recommend initiating treatment with endocrine therapy.  Will start treatment with zoladex and letrozole.  Recommend starting Zoladex now and starting letrozole upon completion of radiation.  Reviewed Zoladex is administered as an intradermal injection monthly.  Letrozole is a pill taken daily.  Potential side effects of this regimen include myalgias, arthralgias, hot flashes, vaginal dryness, mood disorder, and risk of early osteoporosis.    2. Acneform rash:  Secondary to THP.   - paclitaxel and pertuzumab discontinued as above.  - continues on Herceptin  -  continue to apply triamcinone cream BID until resolved.     3. Neuropathy: Very minor. Grade 1. Monitor. Has now completed paclitaxel.  Symptom may improve in the upcoming months.    4. Lymphedema/cording:   - ongoing follow up with the lymphedema clinic.    5.  Follow Up:  Herceptin every 3 weeks x 6.  Labs and Zoladex injection within 1-2 weeks, then every 4 weeks x 4  Visit with Carole betancur I, and Zoladex injection in 6-8 weeks.  Echocardiogram prior to 9/24/2025 Herceptin infusion.                    Again, thank you for allowing me to participate in the care of your patient.        Sincerely,        Geovanna Moser MD    Electronically signed

## 2025-06-09 NOTE — PROGRESS NOTES
PHYSICAL THERAPY EVALUATION  Type of Visit: Evaluation       Fall Risk Screen:  Have you fallen 2 or more times in the past year?: No  Have you fallen and had an injury in the past year?: No    Subjective   Pt currently presents post B mastectomy (immediate expander placement) and chemo with upcoming radiation planned. She has concerns about her left arm with s/s of edema present along with axillary web syndrome. Shoulder ROM functional, but remains much tighter than prior to surgery. She found stretches online and had been working on them, but slowed down with return to work due to fatigue.          Presenting condition or subjective complaint: arm lyphedema after mastectomy  Date of onset: 02/17/25    Relevant medical history:   Breast cancer (Invasive ductal carcinoma of breast)  Dates & types of surgery:  Mastectomy with SLNB    Prior diagnostic imaging/testing results:       Prior therapy history for the same diagnosis, illness or injury: No      Prior Level of Function  Transfers: Independent  Ambulation: Independent  ADL: Independent  IADL: Childcare, Driving, Finances, Housekeeping, Laundry, Meal preparation, Medication management, Work    Living Environment  Social support: With a significant other or spouse   Type of home: House   Stairs to enter the home: Yes 3 Is there a railing: Yes     Ramp: No   Stairs inside the home: Yes   Is there a railing: Yes     Help at home: Self Cares (home health aide/personal care attendant, family, etc)  Equipment owned:       Employment: Yes    Hobbies/Interests:      Patient goals for therapy: wear my wartch,have full rom and function in my arm    Pain assessment: Pain denied         Objective       EDEMA EVALUATION  Additional history:  Body part affected by edema:  left arm  If cancer related, treatment:  mastectomy with SLNB, chemo (upcoming radiation)  If not cancer related, problems with veins or cause of swelling:    Distance able to walk:  Not limited  Time able  to stand:  not limited  Sensation problems in hands/feet:      Edema etiology: Cancer with lymph node dissection, Radiation, Surgery ) Radiation upcoming    FUNCTIONAL SCALES   Lymphedema Life Impact Scale (LLIS): 10    Cognitive Status Examination  Orientation: Oriented to person, place and time   Level of Consciousness: Alert  Follows Commands and Answers Questions: 100% of the time  Personal Safety and Judgement: Intact  Memory: Intact    EDEMA  Skin Condition: Intact, Non-pitting  Scar: Chest wall mastectomy, healed well, expanders in place fully inflated  Capillary Refill: Symmetrical  Radial Pulse:   Dorsal Pedal Pulse:   Stemmer Sign: -  Ulceration: No    GIRTH MEASUREMENTS: Refer to separate girth measurement flowsheet for specific measurements.    VOLUME UE  Right UE Total Volume (mL): 1861.12  Left UE Total Volume (mL): 1796.72  UE Limb Comparison: -3.6%  Identify AFFECTED Limb Volume-Vi (ml): 1796.7  Identify UNAFFECTED Limb Volume-Vu (ml): 1861.1    RANGE OF MOTION:   (Degrees) Left AROM Left PROM Right AROM  Right PROM   Shoulder Flexion 160  160    Shoulder Extension       Shoulder Abduction 170+  170+      STRENGTH: UE Strength WFL  POSTURE: WFL  PALPATION: Non-tender. Mildly boggy for posterior forearm  ACTIVITIES OF DAILY LIVING: IND- has assist from spouse as needed  BED MOBILITY: Independent  TRANSFERS: Independent  GAIT/LOCOMOTION: IND  BALANCE: No noted concerns, denies neuropathy concerns  SENSATION: decreased to surgical area of chest wall as expected, intact to UE    Assessment & Plan   CLINICAL IMPRESSIONS  Medical Diagnosis: Postmastectomy lymphedema syndrome    Treatment Diagnosis: Lymphedema   Impression/Assessment: Patient is a 40 year old female with lymphedema and axillary web syndrome complaints.  The following significant findings have been identified: Pain, Decreased ROM/flexibility, Decreased joint mobility, Impaired sensation, Edema, and Impaired muscle performance. These  impairments interfere with their ability to perform work tasks, recreational activities, and household chores as compared to previous level of function.     Clinical Decision Making (Complexity):  Clinical Presentation: Stable/Uncomplicated  Clinical Presentation Rationale: based on medical and personal factors listed in PT evaluation  Clinical Decision Making (Complexity): Low complexity    PLAN OF CARE  Treatment Interventions:  Interventions: Manual Therapy, Therapeutic Exercise, Self-Care/Home Management, Gradient Compression Bandaging    Long Term Goals     PT Goal 1  Goal Identifier: Lymphedema Home Program  Goal Description: Pt will be independent with drainage exercise, self massage, and skin care to best manage swelling to decrease symptoms of lymphedema  Target Date: 09/07/25  PT Goal 2  Goal Identifier: Volume  Goal Description: Pt will have stable BUE volume without lymphedema stage progression to maintain tissue integrity, decrease risk of infection, and improved fit of clothing  Target Date: 09/08/25  PT Goal 3  Goal Identifier: Shoulder ROM  Goal Description: Pt will demonstrate bilateral shoulder flexion and abduction to at least 170 degrees to allow return to all previous leisure activities  Target Date: 09/08/25  PT Goal 4  Goal Identifier: Maintenance  Goal Description: Pt will use compression garments as instructed AND home management tools/program for long term management of chronic condition to prevent tissue fibrosis, infection, wound and other secondary sequelae  Target Date: 09/08/25      Frequency of Treatment: 1x a week  Duration of Treatment: 90 days    Recommended Referrals to Other Professionals:   Education Assessment:   Learner/Method: Patient;Listening;Demonstration;Reading;No Barriers to Learning    Risks and benefits of evaluation/treatment have been explained.   Patient/Family/caregiver agrees with Plan of Care.     Evaluation Time:     PT Eval, Low Complexity Minutes (41909):  16     Signing Clinician: Jayashree Winslow, PT

## 2025-06-09 NOTE — PROGRESS NOTES
Infusion Nursing Note:  Lesley Mahmood presents today for Cycle 1 Day 1 Trastuzumab.    Patient seen and examined by Dr Moser in clinic prior to infusion      Intravenous Access:  Implanted Port.  Positive blood return pre and post herceptin.  No evidence of extravasation       Treatment Conditions:  ECHO/MUGA completed 3/17/25  EF 55-60%.  Next echo is scheduled for 6/18/25      Post Infusion Assessment:  Patient tolerated infusion without incident.       Discharge Plan:   Patient declined prescription refills.  AVS to patient via [employer].    Per Dr Moser's check out orders pt should return in one week for her first zoladex injection.  She will return in three weeks for her next trastuzumab infusion.  Pt is aware that these appts need to be scheduled.   Patient discharged in stable condition accompanied by: self.  Departure Mode: Ambulatory.      Hannah Venegas RN

## 2025-06-09 NOTE — NURSING NOTE
"Oncology Rooming Note    June 9, 2025 8:58 AM   Lesley Mahmood is a 40 year old female who presents for:    Chief Complaint   Patient presents with    Oncology Clinic Visit     Malignant neoplasm of overlapping sites of left breast in female, estrogen receptor positive     Initial Vitals: BP (!) 134/90 (BP Location: Right arm, Patient Position: Sitting, Cuff Size: Adult Regular)   Pulse 101   Temp 98.1  F (36.7  C) (Oral)   Resp 16   Wt 70.9 kg (156 lb 6.4 oz)   LMP 04/14/2025   SpO2 100%   BMI 26.85 kg/m   Estimated body mass index is 26.85 kg/m  as calculated from the following:    Height as of 5/28/25: 1.626 m (5' 4\").    Weight as of this encounter: 70.9 kg (156 lb 6.4 oz). Body surface area is 1.79 meters squared.  No Pain (0) Comment: Data Unavailable   Patient's last menstrual period was 04/14/2025.  Allergies reviewed: Yes  Medications reviewed: Yes    Medications: Medication refills not needed today.  Pharmacy name entered into [facility]: [facility] - [place], [place] - [address]    Frailty Screening:   Is the patient here for a new oncology consult visit in cancer care? 2. No    PHQ9:  Did this patient require a PHQ9?: No      Clinical concerns: none       Tiffanie Schwartz"

## 2025-06-10 ENCOUNTER — OFFICE VISIT (OUTPATIENT)
Dept: PLASTIC SURGERY | Facility: CLINIC | Age: 40
End: 2025-06-10
Payer: COMMERCIAL

## 2025-06-10 VITALS
SYSTOLIC BLOOD PRESSURE: 127 MMHG | RESPIRATION RATE: 16 BRPM | OXYGEN SATURATION: 99 % | HEART RATE: 84 BPM | DIASTOLIC BLOOD PRESSURE: 88 MMHG | TEMPERATURE: 98.3 F | WEIGHT: 157.5 LBS | BODY MASS INDEX: 27.03 KG/M2

## 2025-06-10 DIAGNOSIS — Z98.890 S/P BREAST RECONSTRUCTION, BILATERAL: Primary | ICD-10-CM

## 2025-06-10 PROCEDURE — 99213 OFFICE O/P EST LOW 20 MIN: CPT | Performed by: PLASTIC SURGERY

## 2025-06-10 ASSESSMENT — PAIN SCALES - GENERAL: PAINLEVEL_OUTOF10: NO PAIN (0)

## 2025-06-10 NOTE — NURSING NOTE
"Oncology Rooming Note    Miranda 10, 2025 10:11 AM   Lesley Mahmood is a 40 year old female who presents for:    Chief Complaint   Patient presents with    Oncology Clinic Visit     Malignant neoplasm of overlapping sites of left breast in female, estrogen receptor positive     Initial Vitals: /88 (BP Location: Right arm, Patient Position: Sitting, Cuff Size: Adult Regular)   Pulse 84   Temp 98.3  F (36.8  C) (Oral)   Resp 16   Wt 71.4 kg (157 lb 8 oz)   LMP 04/14/2025   SpO2 99%   BMI 27.03 kg/m   Estimated body mass index is 27.03 kg/m  as calculated from the following:    Height as of 5/28/25: 1.626 m (5' 4\").    Weight as of this encounter: 71.4 kg (157 lb 8 oz). Body surface area is 1.8 meters squared.  No Pain (0) Comment: Data Unavailable   Patient's last menstrual period was 04/14/2025.  Allergies reviewed: Yes  Medications reviewed: Yes    Medications: Medication refills not needed today.  Pharmacy name entered into [facility]: [facility] - [place], [place] - [address]    Frailty Screening:   Is the patient here for a new oncology consult visit in cancer care? 2. No    PHQ9:  Did this patient require a PHQ9?: No      Clinical concerns: none      Angel Salgado              "

## 2025-06-10 NOTE — PROGRESS NOTES
PRESENTING COMPLAINT:  Post-operative visit status post left breast cancer underwent bilateral nipple nonsparing mastectomy and immediate expander based reconstruction on 2/17/2025     HISTORY OF PRESENTING COMPLAINT: The patient is here for post-operative visit.  The patient is being seen in the presence of my nurse.      The patient is overall doing well.  Chemotherapy has ended.  Radiation therapy is to start.  She was fully inflated bilaterally to the size she wants and slightly larger.     On exam: Vital signs stable afebrile.  Both sides are fully healed.  Symmetric.     ASSESSMENT AND PLAN:  Based upon the above findings, the patient is here for post-operative visit.      The plan now is to proceed with radiation therapy to the left side.  We will deflate her right side per recommendations of radiation oncology.  She will see us back thereafter for reinflation after radiation is completed.  She will see me back after her radiation is completed.  We will plan her Abbi flaps in January February 2026.     All questions were answered.  The patient was happy with the visit.

## 2025-06-11 PROBLEM — I97.2 POST-MASTECTOMY LYMPHEDEMA SYNDROME: Status: ACTIVE | Noted: 2025-06-11

## 2025-06-12 ENCOUNTER — TELEPHONE (OUTPATIENT)
Dept: PLASTIC SURGERY | Facility: CLINIC | Age: 40
End: 2025-06-12
Payer: COMMERCIAL

## 2025-06-12 NOTE — TELEPHONE ENCOUNTER
Received message from Dr. Taveras with instructions to reschedule surgery to January or February 2026    Left voicemail for patient regarding rescheduling surgery     Removed surgery + appointments from schedule    Provided direct contact line to discuss scheduling  P: [phone]    Pema Lenz on 6/12/2025 at 2:51 PM

## 2025-06-17 ENCOUNTER — RESULTS FOLLOW-UP (OUTPATIENT)
Dept: ONCOLOGY | Facility: CLINIC | Age: 40
End: 2025-06-17

## 2025-06-17 ENCOUNTER — INFUSION THERAPY VISIT (OUTPATIENT)
Dept: ONCOLOGY | Facility: CLINIC | Age: 40
End: 2025-06-17
Attending: INTERNAL MEDICINE
Payer: COMMERCIAL

## 2025-06-17 ENCOUNTER — ANCILLARY PROCEDURE (OUTPATIENT)
Dept: CARDIOLOGY | Facility: CLINIC | Age: 40
End: 2025-06-17
Attending: PHYSICIAN ASSISTANT
Payer: COMMERCIAL

## 2025-06-17 VITALS
BODY MASS INDEX: 26.9 KG/M2 | DIASTOLIC BLOOD PRESSURE: 86 MMHG | RESPIRATION RATE: 18 BRPM | TEMPERATURE: 97.9 F | WEIGHT: 156.7 LBS | HEART RATE: 82 BPM | OXYGEN SATURATION: 99 % | SYSTOLIC BLOOD PRESSURE: 119 MMHG

## 2025-06-17 DIAGNOSIS — C50.812 MALIGNANT NEOPLASM OF OVERLAPPING SITES OF LEFT BREAST IN FEMALE, ESTROGEN RECEPTOR POSITIVE (H): ICD-10-CM

## 2025-06-17 DIAGNOSIS — Z17.0 MALIGNANT NEOPLASM OF OVERLAPPING SITES OF LEFT BREAST IN FEMALE, ESTROGEN RECEPTOR POSITIVE (H): Primary | ICD-10-CM

## 2025-06-17 DIAGNOSIS — Z17.0 MALIGNANT NEOPLASM OF OVERLAPPING SITES OF LEFT BREAST IN FEMALE, ESTROGEN RECEPTOR POSITIVE (H): ICD-10-CM

## 2025-06-17 DIAGNOSIS — Z51.11 ENCOUNTER FOR ANTINEOPLASTIC CHEMOTHERAPY: ICD-10-CM

## 2025-06-17 DIAGNOSIS — C50.812 MALIGNANT NEOPLASM OF OVERLAPPING SITES OF LEFT BREAST IN FEMALE, ESTROGEN RECEPTOR POSITIVE (H): Primary | ICD-10-CM

## 2025-06-17 LAB
ESTRADIOL SERPL-MCNC: <5 PG/ML
FSH SERPL IRP2-ACNC: 105 MIU/ML
LVEF ECHO: NORMAL

## 2025-06-17 PROCEDURE — 36415 COLL VENOUS BLD VENIPUNCTURE: CPT

## 2025-06-17 PROCEDURE — 83001 ASSAY OF GONADOTROPIN (FSH): CPT

## 2025-06-17 PROCEDURE — 93306 TTE W/DOPPLER COMPLETE: CPT | Performed by: INTERNAL MEDICINE

## 2025-06-17 PROCEDURE — 250N000011 HC RX IP 250 OP 636: Mod: JZ | Performed by: INTERNAL MEDICINE

## 2025-06-17 PROCEDURE — 96402 CHEMO HORMON ANTINEOPL SQ/IM: CPT

## 2025-06-17 PROCEDURE — 82670 ASSAY OF TOTAL ESTRADIOL: CPT

## 2025-06-17 PROCEDURE — 93356 MYOCRD STRAIN IMG SPCKL TRCK: CPT | Performed by: INTERNAL MEDICINE

## 2025-06-17 RX ADMIN — GOSERELIN ACETATE 3.6 MG: 3.6 IMPLANT SUBCUTANEOUS at 12:51

## 2025-06-17 NOTE — PROGRESS NOTES
Infusion Nursing Note:  Lesley Mahmood presents today for C1 D1 Zoladex.    Patient seen by provider today: No   present during visit today: Not Applicable.    Note: Patient presents to the infusion center feeling well today. Denies fevers, chills, cough, SOB, or chest pain. Patient reports hot flashes are manageable at home.  Currently seeing PT for management and treatment of lymphedema on right arm. Via Oncology educational handout given and reviewed with patient. Patient does not offer any additional questions or concerns at this time and wishes to proceed with injection.     Patients ices prior to injection.     Intravenous Access:  No Intravenous access at this visit.    Treatment Conditions:  Not Applicable.      Post Infusion Assessment:  Patient tolerated Zoladex injection to the RLQ of the abdomen without incident.       Discharge Plan:   Patient declined prescription refills.  Discharge instructions reviewed with: Patient.  Patient and/or family verbalized understanding of discharge instructions and all questions answered.  AVS to patient via [employer].  Patient will return 06/30/2025 for next appointment.   Patient discharged in stable condition accompanied by: self.  Departure Mode: Ambulatory.      Briseida Laws RN

## 2025-06-17 NOTE — NURSING NOTE
Chief Complaint   Patient presents with    Blood Draw     Labs drawn via  by RN. VS taken.     Labs collected from venipuncture by RN. Vitals taken. Checked in for appointment(s).     Twan Johnson RN

## 2025-06-17 NOTE — PATIENT INSTRUCTIONS
[facility] Triage and after hours / weekends / holidays:  [phone]    Please call the triage or after hours line if you experience a temperature greater than or equal to 100.4, shaking chills, have uncontrolled nausea, vomiting and/or diarrhea, dizziness, shortness of breath, chest pain, bleeding, unexplained bruising, or if you have any other new/concerning symptoms, questions or concerns.      If you are having any concerning symptoms or wish to speak to a provider before your next infusion visit, please call triage to notify them so we can adequately serve you.     If you need a refill on a narcotic prescription or other medication, please call before your infusion appointment.

## 2025-06-18 ENCOUNTER — THERAPY VISIT (OUTPATIENT)
Dept: PHYSICAL THERAPY | Facility: CLINIC | Age: 40
End: 2025-06-18
Attending: PHYSICIAN ASSISTANT
Payer: COMMERCIAL

## 2025-06-18 DIAGNOSIS — I97.2 POST-MASTECTOMY LYMPHEDEMA SYNDROME: Primary | ICD-10-CM

## 2025-06-18 PROCEDURE — 97110 THERAPEUTIC EXERCISES: CPT | Mod: GP | Performed by: PHYSICAL THERAPIST

## 2025-06-18 PROCEDURE — 97140 MANUAL THERAPY 1/> REGIONS: CPT | Mod: GP | Performed by: PHYSICAL THERAPIST

## 2025-06-24 NOTE — PROGRESS NOTES
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 Our plan is to deliver 42.4 Gy in 16 fractions to the left chest wall with or without IM nodes, followed by a boost of 10 Gy in 4 fractions to the 9:00-10:00 area where DCIS margin was involved.     Since last seen, Lesley completed THP on 6/9/2025.     Logistics and side effects were again explained. A consent was obtained.     She then proceeded with simulation. She will have the contralateral tissue expander deflated by about 200 ml.         10 minutes were spent on the date of the encounter doing chart review, history and exam, documentation and further activities as noted above.       Telma Cazares MD

## 2025-06-25 ENCOUNTER — OFFICE VISIT (OUTPATIENT)
Dept: RADIATION ONCOLOGY | Facility: CLINIC | Age: 40
End: 2025-06-25
Attending: INTERNAL MEDICINE
Payer: COMMERCIAL

## 2025-06-25 DIAGNOSIS — C50.812 MALIGNANT NEOPLASM OF OVERLAPPING SITES OF LEFT BREAST IN FEMALE, ESTROGEN RECEPTOR POSITIVE (H): Primary | ICD-10-CM

## 2025-06-25 DIAGNOSIS — Z17.0 MALIGNANT NEOPLASM OF OVERLAPPING SITES OF LEFT BREAST IN FEMALE, ESTROGEN RECEPTOR POSITIVE (H): Primary | ICD-10-CM

## 2025-06-25 PROCEDURE — 77332 RADIATION TREATMENT AID(S): CPT | Performed by: RADIOLOGY

## 2025-06-25 PROCEDURE — 99207 PR NO BILLABLE SERVICE THIS VISIT: CPT | Performed by: RADIOLOGY

## 2025-06-25 PROCEDURE — 77290 THER RAD SIMULAJ FIELD CPLX: CPT | Performed by: RADIOLOGY

## 2025-06-25 NOTE — LETTER
6/25/2025      Lesley Mahmood  [address] [address]      Dear Colleague,    Thank you for referring your patient, Lesley Mahmood, to the [facility] RADIATION ONCOLOGY. Please see a copy of my visit note below.    Radiation Therapy Patient Education    Person involved with teaching: Patient    Patient educational needs for self management of treatment-related side effects assessment completed.  EPIC Patient Ed tab contains Patient Learning Assessment    Education Materials Given  Skin Care During Radiation Treatment, SIM pamphlet and schedule    Educational Topics Discussed  Side effects expected, Skin care, Activity, and When to call MD/RN    Response To Teaching  Verbalizes understanding    GYN Only  Vaginal Dilator-given and educated: N/A    Referrals sent: None    Chemotherapy?  Yes: Herceptin every 3 weeks      Again, thank you for allowing me to participate in the care of your patient.        Sincerely,        Telma Cazares MD    Electronically signed

## 2025-06-25 NOTE — LETTER
6/25/2025      Lesley Mahmood  [address] [address]      Dear Colleague,    Thank you for referring your patient, Lesley Mahmood, to the [facility] RADIATION ONCOLOGY. Please see a copy of my visit note below.    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 Our plan is to deliver 42.4 Gy in 16 fractions to the left chest wall with or without IM nodes, followed by a boost of 10 Gy in 4 fractions to the 9:00-10:00 area where DCIS margin was involved.     Since last seen, Lesley completed THP on 6/9/2025.     Logistics and side effects were again explained. A consent was obtained.     She then proceeded with simulation. She will have the contralateral tissue expander deflated by about 200 ml.         10 minutes were spent on the date of the encounter doing chart review, history and exam, documentation and further activities as noted above.       Telma Cazares MD          Again, thank you for allowing me to participate in the care of your patient.        Sincerely,        Telma Cazares MD    Electronically signed

## 2025-06-25 NOTE — PROGRESS NOTES
Radiation Therapy Patient Education    Person involved with teaching: Patient    Patient educational needs for self management of treatment-related side effects assessment completed.  [facility] Patient Ed tab contains Patient Learning Assessment    Education Materials Given  Skin Care During Radiation Treatment, SIM pamphlet and schedule    Educational Topics Discussed  Side effects expected, Skin care, Activity, and When to call MD/RN    Response To Teaching  Verbalizes understanding    GYN Only  Vaginal Dilator-given and educated: N/A    Referrals sent: None    Chemotherapy?  Yes: Herceptin every 3 weeks

## 2025-06-29 RX ORDER — ACETAMINOPHEN 325 MG/1
650 TABLET ORAL
Status: CANCELLED
Start: 2025-07-02

## 2025-06-29 RX ORDER — ALBUTEROL SULFATE 90 UG/1
1-2 INHALANT RESPIRATORY (INHALATION)
Status: CANCELLED
Start: 2025-07-02

## 2025-06-29 RX ORDER — DIPHENHYDRAMINE HYDROCHLORIDE 50 MG/ML
50 INJECTION, SOLUTION INTRAMUSCULAR; INTRAVENOUS
Status: CANCELLED | OUTPATIENT
Start: 2025-07-02

## 2025-06-29 RX ORDER — HEPARIN SODIUM,PORCINE 10 UNIT/ML
5-20 VIAL (ML) INTRAVENOUS DAILY PRN
Status: CANCELLED | OUTPATIENT
Start: 2025-07-02

## 2025-06-29 RX ORDER — LORAZEPAM 2 MG/ML
0.5 INJECTION INTRAMUSCULAR EVERY 4 HOURS PRN
Status: CANCELLED | OUTPATIENT
Start: 2025-07-02

## 2025-06-29 RX ORDER — DIPHENHYDRAMINE HYDROCHLORIDE 50 MG/ML
25 INJECTION, SOLUTION INTRAMUSCULAR; INTRAVENOUS
Status: CANCELLED
Start: 2025-07-02

## 2025-06-29 RX ORDER — HEPARIN SODIUM (PORCINE) LOCK FLUSH IV SOLN 100 UNIT/ML 100 UNIT/ML
5 SOLUTION INTRAVENOUS
Status: CANCELLED | OUTPATIENT
Start: 2025-07-02

## 2025-06-29 RX ORDER — EPINEPHRINE 1 MG/ML
0.3 INJECTION, SOLUTION INTRAMUSCULAR; SUBCUTANEOUS EVERY 5 MIN PRN
Status: CANCELLED | OUTPATIENT
Start: 2025-07-02

## 2025-06-29 RX ORDER — DIPHENHYDRAMINE HCL 25 MG
50 CAPSULE ORAL
Status: CANCELLED
Start: 2025-07-02

## 2025-06-29 RX ORDER — MEPERIDINE HYDROCHLORIDE 25 MG/ML
25 INJECTION INTRAMUSCULAR; INTRAVENOUS; SUBCUTANEOUS
Status: CANCELLED | OUTPATIENT
Start: 2025-07-02

## 2025-06-29 RX ORDER — METHYLPREDNISOLONE SODIUM SUCCINATE 40 MG/ML
40 INJECTION INTRAMUSCULAR; INTRAVENOUS
Status: CANCELLED
Start: 2025-07-02

## 2025-06-29 RX ORDER — DIPHENHYDRAMINE HYDROCHLORIDE 50 MG/ML
50 INJECTION, SOLUTION INTRAMUSCULAR; INTRAVENOUS
Status: CANCELLED
Start: 2025-07-02

## 2025-06-29 RX ORDER — ALBUTEROL SULFATE 0.83 MG/ML
2.5 SOLUTION RESPIRATORY (INHALATION)
Status: CANCELLED | OUTPATIENT
Start: 2025-07-02

## 2025-06-30 ENCOUNTER — INFUSION THERAPY VISIT (OUTPATIENT)
Dept: ONCOLOGY | Facility: CLINIC | Age: 40
End: 2025-06-30
Attending: INTERNAL MEDICINE
Payer: COMMERCIAL

## 2025-06-30 VITALS
DIASTOLIC BLOOD PRESSURE: 72 MMHG | BODY MASS INDEX: 26.97 KG/M2 | SYSTOLIC BLOOD PRESSURE: 108 MMHG | HEART RATE: 82 BPM | WEIGHT: 157.1 LBS | OXYGEN SATURATION: 96 % | RESPIRATION RATE: 16 BRPM

## 2025-06-30 DIAGNOSIS — Z17.0 MALIGNANT NEOPLASM OF OVERLAPPING SITES OF LEFT BREAST IN FEMALE, ESTROGEN RECEPTOR POSITIVE (H): Primary | ICD-10-CM

## 2025-06-30 DIAGNOSIS — Z51.11 ENCOUNTER FOR ANTINEOPLASTIC CHEMOTHERAPY: ICD-10-CM

## 2025-06-30 DIAGNOSIS — C50.812 MALIGNANT NEOPLASM OF OVERLAPPING SITES OF LEFT BREAST IN FEMALE, ESTROGEN RECEPTOR POSITIVE (H): Primary | ICD-10-CM

## 2025-06-30 PROCEDURE — 96413 CHEMO IV INFUSION 1 HR: CPT

## 2025-06-30 PROCEDURE — 250N000011 HC RX IP 250 OP 636: Performed by: INTERNAL MEDICINE

## 2025-06-30 PROCEDURE — 258N000003 HC RX IP 258 OP 636: Performed by: INTERNAL MEDICINE

## 2025-06-30 RX ORDER — HEPARIN SODIUM (PORCINE) LOCK FLUSH IV SOLN 100 UNIT/ML 100 UNIT/ML
5 SOLUTION INTRAVENOUS
Status: DISCONTINUED | OUTPATIENT
Start: 2025-06-30 | End: 2025-06-30 | Stop reason: HOSPADM

## 2025-06-30 RX ADMIN — SODIUM CHLORIDE 430 MG: 0.9 INJECTION, SOLUTION INTRAVENOUS at 13:57

## 2025-06-30 RX ADMIN — Medication 5 ML: at 14:34

## 2025-06-30 RX ADMIN — SODIUM CHLORIDE 250 ML: 0.9 INJECTION, SOLUTION INTRAVENOUS at 13:37

## 2025-06-30 ASSESSMENT — PAIN SCALES - GENERAL: PAINLEVEL_OUTOF10: NO PAIN (0)

## 2025-06-30 NOTE — PROGRESS NOTES
Infusion Nursing Note:  Lesley Mahmood presents today for Cycle 2 Day 1 Trazimera.    Patient seen by provider today: No   present during visit today: Not Applicable.    Note: Patient arrives to infusion feeling well.  Patient denies any fevers, chills, cough, or other infectious symptoms.  Denies any shortness of breath, dizziness, lightheadedness or chest pain.   No nausea, vomiting, diarrhea, constipation, or urinary symptoms. Wishes to proceed with planned treatment today.     Intravenous Access:  Implanted Port.    Treatment Conditions:  ECHO/MUGA completed 6/17/2025  EF 60-65%.    Post Infusion Assessment:  Patient tolerated infusion without incident.  Blood return noted pre and post infusion.  Access discontinued per protocol.       Discharge Plan:   Patient declined prescription refills.  AVS to patient via [employer].  Patient will return 7/21 for next appointment.   Patient discharged in stable condition accompanied by: self.  Departure Mode: Ambulatory.      Katherine Alvarado RN

## 2025-07-01 DIAGNOSIS — C50.812 MALIGNANT NEOPLASM OF OVERLAPPING SITES OF LEFT BREAST IN FEMALE, ESTROGEN RECEPTOR POSITIVE (H): ICD-10-CM

## 2025-07-01 DIAGNOSIS — Z51.11 ENCOUNTER FOR ANTINEOPLASTIC CHEMOTHERAPY: Primary | ICD-10-CM

## 2025-07-01 DIAGNOSIS — Z17.0 MALIGNANT NEOPLASM OF OVERLAPPING SITES OF LEFT BREAST IN FEMALE, ESTROGEN RECEPTOR POSITIVE (H): ICD-10-CM

## 2025-07-07 ENCOUNTER — ALLIED HEALTH/NURSE VISIT (OUTPATIENT)
Dept: SURGERY | Facility: CLINIC | Age: 40
End: 2025-07-07
Payer: COMMERCIAL

## 2025-07-07 DIAGNOSIS — C50.812 MALIGNANT NEOPLASM OF OVERLAPPING SITES OF LEFT BREAST IN FEMALE, ESTROGEN RECEPTOR POSITIVE (H): Primary | ICD-10-CM

## 2025-07-07 DIAGNOSIS — Z17.0 MALIGNANT NEOPLASM OF OVERLAPPING SITES OF LEFT BREAST IN FEMALE, ESTROGEN RECEPTOR POSITIVE (H): Primary | ICD-10-CM

## 2025-07-08 NOTE — NURSING NOTE
Pt arrived at the request of Dr Taveras, who was on site and available if needed for removal of saline/deflation in her right breast expander prior to radiation    In a sterile technique, Lesley's skin was prepped and with a 21 G needed and syringe, 400 mL removed from right breast expander    Pt tolerated procedure well, and was provided with c and d cup soft bra filler.     Chyna Qiu RN

## 2025-07-09 ENCOUNTER — APPOINTMENT (OUTPATIENT)
Dept: RADIATION ONCOLOGY | Facility: CLINIC | Age: 40
End: 2025-07-09
Attending: INTERNAL MEDICINE
Payer: COMMERCIAL

## 2025-07-09 ENCOUNTER — THERAPY VISIT (OUTPATIENT)
Dept: PHYSICAL THERAPY | Facility: CLINIC | Age: 40
End: 2025-07-09
Payer: COMMERCIAL

## 2025-07-09 DIAGNOSIS — I97.2 POST-MASTECTOMY LYMPHEDEMA SYNDROME: Primary | ICD-10-CM

## 2025-07-09 PROCEDURE — 97140 MANUAL THERAPY 1/> REGIONS: CPT | Mod: GP | Performed by: PHYSICAL THERAPIST

## 2025-07-09 PROCEDURE — 77280 THER RAD SIMULAJ FIELD SMPL: CPT | Mod: 26 | Performed by: RADIOLOGY

## 2025-07-09 PROCEDURE — 77280 THER RAD SIMULAJ FIELD SMPL: CPT | Performed by: RADIOLOGY

## 2025-07-10 ENCOUNTER — APPOINTMENT (OUTPATIENT)
Dept: RADIATION ONCOLOGY | Facility: CLINIC | Age: 40
End: 2025-07-10
Attending: INTERNAL MEDICINE
Payer: COMMERCIAL

## 2025-07-10 PROCEDURE — 77331 SPECIAL RADIATION DOSIMETRY: CPT | Mod: 26 | Performed by: RADIOLOGY

## 2025-07-10 PROCEDURE — 77331 SPECIAL RADIATION DOSIMETRY: CPT | Performed by: RADIOLOGY

## 2025-07-10 PROCEDURE — 77412 RADIATION TX DELIVERY LVL 3: CPT | Performed by: RADIOLOGY

## 2025-07-10 PROCEDURE — G6002 STEREOSCOPIC X-RAY GUIDANCE: HCPCS | Mod: 26 | Performed by: RADIOLOGY

## 2025-07-10 PROCEDURE — 77387 GUIDANCE FOR RADJ TX DLVR: CPT | Performed by: RADIOLOGY

## 2025-07-11 ENCOUNTER — APPOINTMENT (OUTPATIENT)
Dept: RADIATION ONCOLOGY | Facility: CLINIC | Age: 40
End: 2025-07-11
Attending: INTERNAL MEDICINE
Payer: COMMERCIAL

## 2025-07-11 PROCEDURE — 77412 RADIATION TX DELIVERY LVL 3: CPT | Performed by: RADIOLOGY

## 2025-07-11 PROCEDURE — 77387 GUIDANCE FOR RADJ TX DLVR: CPT | Performed by: RADIOLOGY

## 2025-07-11 PROCEDURE — G6002 STEREOSCOPIC X-RAY GUIDANCE: HCPCS | Mod: 26 | Performed by: RADIOLOGY

## 2025-07-14 ENCOUNTER — APPOINTMENT (OUTPATIENT)
Dept: RADIATION ONCOLOGY | Facility: CLINIC | Age: 40
End: 2025-07-14
Attending: INTERNAL MEDICINE
Payer: COMMERCIAL

## 2025-07-14 VITALS
WEIGHT: 154 LBS | HEART RATE: 72 BPM | DIASTOLIC BLOOD PRESSURE: 86 MMHG | OXYGEN SATURATION: 100 % | SYSTOLIC BLOOD PRESSURE: 120 MMHG | BODY MASS INDEX: 26.43 KG/M2

## 2025-07-14 DIAGNOSIS — Z17.0 MALIGNANT NEOPLASM OF OVERLAPPING SITES OF LEFT BREAST IN FEMALE, ESTROGEN RECEPTOR POSITIVE (H): Primary | ICD-10-CM

## 2025-07-14 DIAGNOSIS — C50.812 MALIGNANT NEOPLASM OF OVERLAPPING SITES OF LEFT BREAST IN FEMALE, ESTROGEN RECEPTOR POSITIVE (H): Primary | ICD-10-CM

## 2025-07-14 PROCEDURE — 1126F AMNT PAIN NOTED NONE PRSNT: CPT | Performed by: RADIOLOGY

## 2025-07-14 PROCEDURE — 3074F SYST BP LT 130 MM HG: CPT | Performed by: RADIOLOGY

## 2025-07-14 PROCEDURE — G6002 STEREOSCOPIC X-RAY GUIDANCE: HCPCS | Mod: 26 | Performed by: RADIOLOGY

## 2025-07-14 PROCEDURE — 3079F DIAST BP 80-89 MM HG: CPT | Performed by: RADIOLOGY

## 2025-07-14 PROCEDURE — 77412 RADIATION TX DELIVERY LVL 3: CPT | Performed by: RADIOLOGY

## 2025-07-14 PROCEDURE — 77387 GUIDANCE FOR RADJ TX DLVR: CPT | Performed by: RADIOLOGY

## 2025-07-14 RX ORDER — METHYLPREDNISOLONE SODIUM SUCCINATE 40 MG/ML
40 INJECTION INTRAMUSCULAR; INTRAVENOUS
Start: 2025-07-23

## 2025-07-14 RX ORDER — ALBUTEROL SULFATE 0.83 MG/ML
2.5 SOLUTION RESPIRATORY (INHALATION)
OUTPATIENT
Start: 2025-07-23

## 2025-07-14 RX ORDER — LORAZEPAM 2 MG/ML
0.5 INJECTION INTRAMUSCULAR EVERY 4 HOURS PRN
OUTPATIENT
Start: 2025-07-23

## 2025-07-14 RX ORDER — ACETAMINOPHEN 325 MG/1
650 TABLET ORAL
Start: 2025-07-23

## 2025-07-14 RX ORDER — DIPHENHYDRAMINE HCL 25 MG
50 CAPSULE ORAL
Start: 2025-07-23

## 2025-07-14 RX ORDER — HEPARIN SODIUM (PORCINE) LOCK FLUSH IV SOLN 100 UNIT/ML 100 UNIT/ML
5 SOLUTION INTRAVENOUS
OUTPATIENT
Start: 2025-07-23

## 2025-07-14 RX ORDER — DIPHENHYDRAMINE HYDROCHLORIDE 50 MG/ML
25 INJECTION, SOLUTION INTRAMUSCULAR; INTRAVENOUS
Start: 2025-07-23

## 2025-07-14 RX ORDER — DIPHENHYDRAMINE HYDROCHLORIDE 50 MG/ML
50 INJECTION, SOLUTION INTRAMUSCULAR; INTRAVENOUS
Start: 2025-07-23

## 2025-07-14 RX ORDER — MEPERIDINE HYDROCHLORIDE 25 MG/ML
25 INJECTION INTRAMUSCULAR; INTRAVENOUS; SUBCUTANEOUS
OUTPATIENT
Start: 2025-07-23

## 2025-07-14 RX ORDER — EPINEPHRINE 1 MG/ML
0.3 INJECTION, SOLUTION INTRAMUSCULAR; SUBCUTANEOUS EVERY 5 MIN PRN
OUTPATIENT
Start: 2025-07-23

## 2025-07-14 RX ORDER — DIPHENHYDRAMINE HYDROCHLORIDE 50 MG/ML
50 INJECTION, SOLUTION INTRAMUSCULAR; INTRAVENOUS
OUTPATIENT
Start: 2025-07-23

## 2025-07-14 RX ORDER — HEPARIN SODIUM,PORCINE 10 UNIT/ML
5-20 VIAL (ML) INTRAVENOUS DAILY PRN
OUTPATIENT
Start: 2025-07-23

## 2025-07-14 RX ORDER — ALBUTEROL SULFATE 90 UG/1
1-2 INHALANT RESPIRATORY (INHALATION)
Start: 2025-07-23

## 2025-07-14 NOTE — PROGRESS NOTES
[facility] PHYSICIANS  SPECIALIZING IN BREAKTHROUGHS  Radiation Oncology    On Treatment Visit Note      Lesley Mahmood      Date: 2025   MRN: [id number]   : 1985         Reason for Visit:  On Radiation Treatment Visit     Treatment Summary to Date   Left CW + IM   795 / 5,240 cGy  3/20       Subjective: Lesley presents for her first on treatment visit.  She is tired from the weekend, but does not attribute this to radiation therapy.  She has been using Aquaphor over the treatment area.  She has not noticed a rash.    Objective:   LMP 2025   Gen: Appears well, in no acute distress  Skin: No erythema    Labs:  CBC RESULTS:   Recent Labs   Lab Test 25  0816   WBC 4.7   RBC 3.99   HGB 11.4*   HCT 34.2*   MCV 86   MCH 28.6   MCHC 33.3   RDW 12.9        ELECTROLYTES:  Recent Labs   Lab Test 25  0654      POTASSIUM 4.0   CHLORIDE 104   GRECIA 9.2   CO2 22   BUN 13.9   CR 0.80   GLC 80       Assessment:    Tolerating radiation therapy well.  All questions and concerns addressed.    Toxicities:  Fatigue: Grade 0: No toxicity  Pain: Grade 0: No toxicity  Dermatitis: Grade 0: No toxicity    Plan:   Continue current therapy.    Reviewed skin cares.  Continue Aquaphor.  Reviewed the radiation plan.       [employer] chart and setup information reviewed  Ports checked              Partha Garay MD  PGY-5 Radiation Oncology  Department of Radiation Oncology  [facility]  Phone: [phone]      Please do not send letter to referring physician.        I saw and examined the patient with the resident.  I have reviewed and edited the resident's note and agree with the plan of care.               Telma Cazares MD

## 2025-07-14 NOTE — PROGRESS NOTES
Oncology Visit:  Date on this visit: Jul 15, 2025    Diagnosis:  Clinical prognostic stage Ia, U1qH1S3, grade 2, ER positive, DE positive, HER-2 positive left breast cancer.    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  Routine bilateral screening mammograms on 1/6/2025 showed fine pleomorphic calcifications throughout the left breast and possible medial breast focal asymmetry.  Ultrasound showed a mass at 10 o'clock, 9 cm from the nipple measuring 1 cm and multiple areas with conglomerate of ducts and dilated ducts with associated calcifications in all 4 quadrants of the breast. Ultrasound of the left axilla was without lymphadenopathy.  Pathology of biopsy of the mass at 10 o'clock was c/w  grade 3 invasive ductal carcinoma with associated high grade DCIS.  Invasive carcinoma is ER positive (71-80%), DE positive (51-60%), HER2 equivocal by IHC and positive by FISH (HER2 signals/cell of 11.1 and HER2/CEP17 ratio of 4.0).  Pathology of biopsy of calcifications at 3 o'clock is c/w grade 2 invasive lobular carcinoma with associated high grade DCIS and LCIS.  Invasive carcinoma is ER positive (%), DE positive (%), and HER2 negative (IHC 1+).  Breast MRI on 1/23/2025 was with marked background parenchymal enhancement with extensive nonfocal enhancement greater than the right breast.  There were some areas of progressive enhancement greater than background enhanced but no focal mass.  MRI was without lymphadenopathy.  No focal abnormality in the right breast was seen.    She underwent bilateral skin sparing mastectomies and left axillary sentinel lymph node biopsy with Dr. Perea on 2/17/2025.  She had immediate tissue expander placement.  Pathology showed a total of at least 12 foci invasive carcinoma.  At least 4 foci of high grade invasive ductal carcinoma in the left breast.  The largest focus measured 1.2 cm,  examples of additional foci measuring 1 cm, 2.1 mm, 1.1 mm.  In addition, there were at least 7 microscopic foci of grade 2 invasive lobular carcinoma.  This was in a background of extensive high grade DCIS, spanning 12 cm.  LCIS was also present.  The anterior, superior margin was involved by DCIS.  Pathology of the right breast was negative for malignancy.  3 left axillary sentinel lymph nodes were negative for malignancy.    BRCA 1/2 genetic analysis with reflex to Breast Actionable, Common Hereditary Cancer and Hereditary Hematologic Malignancy panels showed a pathogenic gene mutation in RBM8A and a variant of undetermined significance in BRCA1.    She met with Dr. Moser who ultimately recommended adjuvant therapy with THP x 12 weeks followed by Herceptin x 1 year. Consideration of adjuvant radiation given extensive DCIS and positive margin and adjuvant endocrine therapy x 7-10 years.    She is s/p 12 weeks of paclitaxel, trastuzumab, and pertuzumab from 3/19/25 - 6/3/2025.  Adjuvant Herceptin initiated on 6/9/2025.    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  She is on adjuvant curative intent treatment with Zoladex and Herceptin.  In addition, since her last visit, she started adjuvant radiation therapy to the left chest wall.  Overall, she is tolerating treatment well.  She notes that her rash has resolved status post completion of paclitaxel and Perjeta.  She also is no longer having any loose bowel movements or diarrhea.  She tolerates the Herceptin well.  She continues to have symptoms of ovarian suppression.  Amongst these she has vaginal dryness and is open to starting a estradiol cream.  She has hot flashes, however describes them as tolerable.  She also notes that she has disruptive sleep.  Despite this, she denies daytime fatigue and is able to perform all of her daytime activities.  She declines a sleep aid at this time.  She notes a comment  was made at her radiation oncology appointment that her internal mammary nodes would not be able to be radiated.  She wants to discuss this further today.  She reports muscle tightness in her right SI/lower back area.  She states that she has had this in the past.  In the past it improves with activity.  She denies other new bone or joint aches or pains.    Past Medical/Surgical History:  Past Medical History:   Diagnosis Date    Invasive ductal carcinoma of breast, female, left (H) 01/2025     Past Surgical History:   Procedure Laterality Date    BIOPSY NODE SENTINEL Left 2/17/2025    Procedure: SENTINEL LYMPH NODE BIOPSY;  Surgeon: Jose Perea MD;  Location: [facility] OR    birthmark removal  2001    EXTRACTION(S) DENTAL  1995    INSERT PORT VASCULAR ACCESS Right 3/18/2025    Procedure: Insert port vascular access;  Surgeon: Julio César Bird MD;  Location: [facility] OR    INSERT TISSUE EXPANDER BREAST Bilateral 2/17/2025    Procedure: Bilateral breast reconstruction with expanders and SPY;  Surgeon: IAN Taveras MD;  Location: [facility] OR    IR CHEST PORT PLACEMENT > 5 YRS OF AGE  3/18/2025    MASTECTOMY SIMPLE Bilateral 2/17/2025    Procedure: SKIN-SPARING MASTECTOMY;  Surgeon: Jose Perea MD;  Location: [facility] OR     Allergies:  Allergies as of 07/15/2025    (No Known Allergies)     Current Medications:  Current Outpatient Medications   Medication Sig Dispense Refill    acetaminophen (TYLENOL) 500 MG tablet Take 500-1,000 mg by mouth every 6 hours as needed for mild pain. (Patient not taking: Reported on 6/30/2025)      clindamycin (CLEOCIN-T) 1 % external gel Apply a thin layer to affected areas twice daily. (Patient not taking: Reported on 6/30/2025) 45 g 1    lidocaine-prilocaine (EMLA) 2.5-2.5 % external cream Apply to skin overlying port site at least 30 minutes prior to port access for labs or infusions. 30 g 3    loperamide (IMODIUM A-D) 2 MG tablet Take 1 tablet (2 mg) by mouth 4 times daily as needed for  diarrhea. (Patient not taking: Reported on 6/30/2025) 60 tablet 0    Nutritional Supplements (VITAMIN D BOOSTER PO) Take 1,000 Units by mouth every morning.      triamcinolone (KENALOG) 0.5 % external ointment Apply a thin layer over affected areas twice daily. (Patient not taking: Reported on 6/30/2025) 45 g 1        Physical Exam:    General:  Well appearing adult female in NAD.  Alert and oriented x 3.  HEENT:  Normocephalic.  Sclera anicteric.  MMM.  No lesions of the oropharynx.  Lymph:  No palpable cervical, supraclavicular, or axillary LAD.  Chest:  CTA bilaterally.  No wheezes or crackles.  CV:  RRR.  Nl S1 and S2.  No m/r/g.  Breast:  Bilateral mastectomies with tissue expanders in place.  There are no concerning findings overlying either reconstructed breast.  Abd:  Soft/ND.  Ext:  No pitting edema of the bilateral lower extremities.    Musculo:  Full ROM of the bilateral upper extremities.  Neuro:  Cranial nerves grossly intact.  Psych:  Mood and affect appear normal.  Skin:  Papular rash over the entire back, some pustules.    Laboratory/Imaging Studies  I personally reviewed and interpreted the below images and laboratories:    6/17/2025 Echocardiogram:  Interpretation Summary  Global and regional left ventricular function is normal with an EF of 60-65%.  Global peak LV longitudinal strain is averaged at -19.5%. This is within  reported normal limits (normal <-18%).  Global right ventricular function is normal.  No significant valvular abnormalities present.     This study was compared with the study from 2012 .  No significant changes noted.    ASSESSMENT/PLAN:  Patient is a 39 yo female with multifocal left breast cancer.  She has a [id number], grade 3, ER positive, MD positive, HER2 positive invasive ductal carcinoma and a [id number], grade 2, ER positive, MD positive, HER2 negative invasive lobular carcinoma of the left breast.  She is s/p bilateral mastectomies, left axillary sentinel lymph node biopsy,  and 12 weeks of adjuvant paclitaxel, trastuzumab, and pertuzumab.     Left breast invasive ductal and lobular breast cancers:  -  On adjuvant curative intent treatment with Herceptin.  Next infusion, C3, is scheduled for 7/21/2025.  - I reviewed her 6/17/2025 echocardiogram which shows a normal LVEF of 60-65% and global peak LV longitudinal strain wnl.  - She has started left PMRT under the care of Dr. Cazares.  Tentative end date of 8/6/2025.  - Zoladex started on 6/16.  She will receive Zoladex injection today.  - Plan to start letrozole upon completion of radiation therapy.  - Will obtain a baseline DEXA bone density scan in preparation for treatment with letrozole.    2. Acneform rash:  Suspect pertuzumab or paclitaxel as the most likely cause.  She has now completed both of these treatments.  - paclitaxel and pertuzumab discontinued as above.  - continues on Herceptin  - continue to apply triamcinolone cream BID until resolved.  Continue to apply clindamycin gel to the pustular lesions.    3. Neuropathy: Grade 1. Monitor. Has now completed paclitaxel.  Symptom may improve in the upcoming months.    4. Lymphedema/cording:   - ongoing follow up with the lymphedema clinic.    5.  Follow Up:  Herceptin and Zoladex already scheduled through 10/13/2025.  DEXA bone density scan within one month.  Visit with Carole in 4-6 weeks.  Visit with me 8-9 weeks after visit with Carole.  Echocardiogram 9/17/2025 as already scheduled.

## 2025-07-14 NOTE — LETTER
2025      Lesley Mahmood  [address] [address]      Dear Colleague,    Thank you for referring your patient, Lesley Mahmood, to the [facility] RADIATION ONCOLOGY. Please see a copy of my visit note below.    [facility] PHYSICIANS  SPECIALIZING IN BREAKTHROUGHS  Radiation Oncology    On Treatment Visit Note      Lesley Mahmood      Date: 2025   MRN: [id number]   : 1985         Reason for Visit:  On Radiation Treatment Visit     Treatment Summary to Date   Left CW + IM   795 / 5,240 cGy  3/20       Subjective: Lesley presents for her first on treatment visit.  She is tired from the weekend, but does not attribute this to radiation therapy.  She has been using Aquaphor over the treatment area.  She has not noticed a rash.    Objective:   LMP 2025   Gen: Appears well, in no acute distress  Skin: No erythema    Labs:  CBC RESULTS:   Recent Labs   Lab Test 25  0816   WBC 4.7   RBC 3.99   HGB 11.4*   HCT 34.2*   MCV 86   MCH 28.6   MCHC 33.3   RDW 12.9        ELECTROLYTES:  Recent Labs   Lab Test 25  0654      POTASSIUM 4.0   CHLORIDE 104   GRECIA 9.2   CO2 22   BUN 13.9   CR 0.80   GLC 80       Assessment:    Tolerating radiation therapy well.  All questions and concerns addressed.    Toxicities:  Fatigue: Grade 0: No toxicity  Pain: Grade 0: No toxicity  Dermatitis: Grade 0: No toxicity    Plan:   Continue current therapy.    Reviewed skin cares.  Continue Aquaphor.  Reviewed the radiation plan.       [employer] chart and setup information reviewed  Ports checked              Partha Garay MD  PGY-5 Radiation Oncology  Department of Radiation Oncology  [facility]  Phone: [phone]      Please do not send letter to referring physician.        I saw and examined the patient with the resident.  I have reviewed and edited the resident's note and agree with the plan of care.               Telma Cazares MD       Again,  thank you for allowing me to participate in the care of your patient.        Sincerely,        Telma Cazares MD    Electronically signed

## 2025-07-15 ENCOUNTER — APPOINTMENT (OUTPATIENT)
Dept: RADIATION ONCOLOGY | Facility: CLINIC | Age: 40
End: 2025-07-15
Attending: INTERNAL MEDICINE
Payer: COMMERCIAL

## 2025-07-15 ENCOUNTER — MYC MEDICAL ADVICE (OUTPATIENT)
Dept: OBGYN | Facility: CLINIC | Age: 40
End: 2025-07-15
Payer: COMMERCIAL

## 2025-07-15 ENCOUNTER — ONCOLOGY VISIT (OUTPATIENT)
Dept: ONCOLOGY | Facility: CLINIC | Age: 40
End: 2025-07-15
Attending: INTERNAL MEDICINE
Payer: COMMERCIAL

## 2025-07-15 VITALS
TEMPERATURE: 97.5 F | BODY MASS INDEX: 26.43 KG/M2 | OXYGEN SATURATION: 100 % | DIASTOLIC BLOOD PRESSURE: 83 MMHG | SYSTOLIC BLOOD PRESSURE: 124 MMHG | RESPIRATION RATE: 16 BRPM | WEIGHT: 154 LBS | HEART RATE: 80 BPM

## 2025-07-15 DIAGNOSIS — N95.2 VAGINAL ATROPHY: ICD-10-CM

## 2025-07-15 DIAGNOSIS — Z17.0 MALIGNANT NEOPLASM OF OVERLAPPING SITES OF LEFT BREAST IN FEMALE, ESTROGEN RECEPTOR POSITIVE (H): Primary | ICD-10-CM

## 2025-07-15 DIAGNOSIS — C50.812 MALIGNANT NEOPLASM OF OVERLAPPING SITES OF LEFT BREAST IN FEMALE, ESTROGEN RECEPTOR POSITIVE (H): Primary | ICD-10-CM

## 2025-07-15 DIAGNOSIS — Z79.811 LONG TERM CURRENT USE OF AROMATASE INHIBITOR: ICD-10-CM

## 2025-07-15 PROCEDURE — 250N000011 HC RX IP 250 OP 636: Mod: JZ | Performed by: INTERNAL MEDICINE

## 2025-07-15 PROCEDURE — 77412 RADIATION TX DELIVERY LVL 3: CPT | Performed by: RADIOLOGY

## 2025-07-15 PROCEDURE — 77387 GUIDANCE FOR RADJ TX DLVR: CPT | Performed by: RADIOLOGY

## 2025-07-15 PROCEDURE — G6002 STEREOSCOPIC X-RAY GUIDANCE: HCPCS | Mod: 26 | Performed by: STUDENT IN AN ORGANIZED HEALTH CARE EDUCATION/TRAINING PROGRAM

## 2025-07-15 RX ADMIN — GOSERELIN ACETATE 3.6 MG: 3.6 IMPLANT SUBCUTANEOUS at 09:26

## 2025-07-15 ASSESSMENT — PAIN SCALES - GENERAL: PAINLEVEL_OUTOF10: MILD PAIN (2)

## 2025-07-15 NOTE — NURSING NOTE
"Oncology Rooming Note    July 15, 2025 8:45 AM   Lesley Mahmood is a 40 year old female who presents for:    Chief Complaint   Patient presents with    Oncology Clinic Visit     Malignant neoplasm of overlapping sites of left breast in female, estrogen receptor positive     Initial Vitals: /83 (BP Location: Right arm, Patient Position: Sitting, Cuff Size: Adult Regular)   Pulse 80   Temp 97.5  F (36.4  C) (Oral)   Resp 16   Wt 69.9 kg (154 lb)   LMP 04/14/2025   SpO2 100%   BMI 26.43 kg/m   Estimated body mass index is 26.43 kg/m  as calculated from the following:    Height as of 5/28/25: 1.626 m (5' 4\").    Weight as of this encounter: 69.9 kg (154 lb). Body surface area is 1.78 meters squared.  Mild Pain (2) Comment: Data Unavailable   Patient's last menstrual period was 04/14/2025.  Allergies reviewed: Yes  Medications reviewed: Yes    Medications: Medication refills not needed today.  Pharmacy name entered into [facility]: [facility] - [place], [place] - [address]    PHQ9:  Did this patient require a PHQ9?: No      Clinical concerns: Pt would like to begin vaginal estrogen.      Angel Salgado              "

## 2025-07-16 ENCOUNTER — APPOINTMENT (OUTPATIENT)
Dept: RADIATION ONCOLOGY | Facility: CLINIC | Age: 40
End: 2025-07-16
Attending: INTERNAL MEDICINE
Payer: COMMERCIAL

## 2025-07-16 PROCEDURE — 77336 RADIATION PHYSICS CONSULT: CPT | Performed by: RADIOLOGY

## 2025-07-16 PROCEDURE — G6002 STEREOSCOPIC X-RAY GUIDANCE: HCPCS | Mod: 26 | Performed by: RADIOLOGY

## 2025-07-16 PROCEDURE — 77427 RADIATION TX MANAGEMENT X5: CPT | Mod: GC | Performed by: RADIOLOGY

## 2025-07-16 PROCEDURE — 77412 RADIATION TX DELIVERY LVL 3: CPT | Performed by: RADIOLOGY

## 2025-07-16 PROCEDURE — 77387 GUIDANCE FOR RADJ TX DLVR: CPT | Performed by: RADIOLOGY

## 2025-07-17 ENCOUNTER — APPOINTMENT (OUTPATIENT)
Dept: RADIATION ONCOLOGY | Facility: CLINIC | Age: 40
End: 2025-07-17
Attending: INTERNAL MEDICINE
Payer: COMMERCIAL

## 2025-07-17 PROCEDURE — 77387 GUIDANCE FOR RADJ TX DLVR: CPT | Performed by: RADIOLOGY

## 2025-07-17 PROCEDURE — G6002 STEREOSCOPIC X-RAY GUIDANCE: HCPCS | Mod: 26 | Performed by: RADIOLOGY

## 2025-07-17 PROCEDURE — 77412 RADIATION TX DELIVERY LVL 3: CPT | Performed by: RADIOLOGY

## 2025-07-18 ENCOUNTER — APPOINTMENT (OUTPATIENT)
Dept: RADIATION ONCOLOGY | Facility: CLINIC | Age: 40
End: 2025-07-18
Attending: INTERNAL MEDICINE
Payer: COMMERCIAL

## 2025-07-18 PROCEDURE — G6002 STEREOSCOPIC X-RAY GUIDANCE: HCPCS | Mod: 26 | Performed by: RADIOLOGY

## 2025-07-18 PROCEDURE — 77387 GUIDANCE FOR RADJ TX DLVR: CPT | Performed by: RADIOLOGY

## 2025-07-18 PROCEDURE — 77412 RADIATION TX DELIVERY LVL 3: CPT | Performed by: RADIOLOGY

## 2025-07-21 ENCOUNTER — INFUSION THERAPY VISIT (OUTPATIENT)
Dept: ONCOLOGY | Facility: CLINIC | Age: 40
End: 2025-07-21
Attending: INTERNAL MEDICINE
Payer: COMMERCIAL

## 2025-07-21 ENCOUNTER — APPOINTMENT (OUTPATIENT)
Dept: RADIATION ONCOLOGY | Facility: CLINIC | Age: 40
End: 2025-07-21
Attending: INTERNAL MEDICINE
Payer: COMMERCIAL

## 2025-07-21 VITALS
WEIGHT: 154 LBS | DIASTOLIC BLOOD PRESSURE: 83 MMHG | SYSTOLIC BLOOD PRESSURE: 131 MMHG | BODY MASS INDEX: 26.43 KG/M2 | OXYGEN SATURATION: 100 % | HEART RATE: 68 BPM

## 2025-07-21 VITALS
DIASTOLIC BLOOD PRESSURE: 78 MMHG | WEIGHT: 154.6 LBS | OXYGEN SATURATION: 97 % | TEMPERATURE: 97.8 F | SYSTOLIC BLOOD PRESSURE: 122 MMHG | BODY MASS INDEX: 26.54 KG/M2 | HEART RATE: 74 BPM | RESPIRATION RATE: 16 BRPM

## 2025-07-21 DIAGNOSIS — C50.812 MALIGNANT NEOPLASM OF OVERLAPPING SITES OF LEFT BREAST IN FEMALE, ESTROGEN RECEPTOR POSITIVE (H): Primary | ICD-10-CM

## 2025-07-21 DIAGNOSIS — Z51.11 ENCOUNTER FOR ANTINEOPLASTIC CHEMOTHERAPY: ICD-10-CM

## 2025-07-21 DIAGNOSIS — Z17.0 MALIGNANT NEOPLASM OF OVERLAPPING SITES OF LEFT BREAST IN FEMALE, ESTROGEN RECEPTOR POSITIVE (H): Primary | ICD-10-CM

## 2025-07-21 PROCEDURE — 77412 RADIATION TX DELIVERY LVL 3: CPT | Performed by: RADIOLOGY

## 2025-07-21 PROCEDURE — G6002 STEREOSCOPIC X-RAY GUIDANCE: HCPCS | Mod: 26 | Performed by: RADIOLOGY

## 2025-07-21 PROCEDURE — 77387 GUIDANCE FOR RADJ TX DLVR: CPT | Performed by: RADIOLOGY

## 2025-07-21 PROCEDURE — 258N000003 HC RX IP 258 OP 636: Performed by: INTERNAL MEDICINE

## 2025-07-21 PROCEDURE — 3075F SYST BP GE 130 - 139MM HG: CPT | Performed by: RADIOLOGY

## 2025-07-21 PROCEDURE — 1126F AMNT PAIN NOTED NONE PRSNT: CPT | Performed by: RADIOLOGY

## 2025-07-21 PROCEDURE — 96413 CHEMO IV INFUSION 1 HR: CPT

## 2025-07-21 PROCEDURE — 250N000011 HC RX IP 250 OP 636: Performed by: INTERNAL MEDICINE

## 2025-07-21 PROCEDURE — 3079F DIAST BP 80-89 MM HG: CPT | Performed by: RADIOLOGY

## 2025-07-21 RX ORDER — MOMETASONE FUROATE 1 MG/G
CREAM TOPICAL
Qty: 45 G | Refills: 1 | Status: SHIPPED | OUTPATIENT
Start: 2025-07-21

## 2025-07-21 RX ORDER — HEPARIN SODIUM (PORCINE) LOCK FLUSH IV SOLN 100 UNIT/ML 100 UNIT/ML
5 SOLUTION INTRAVENOUS
Status: DISCONTINUED | OUTPATIENT
Start: 2025-07-21 | End: 2025-07-21 | Stop reason: HOSPADM

## 2025-07-21 RX ADMIN — SODIUM CHLORIDE 430 MG: 0.9 INJECTION, SOLUTION INTRAVENOUS at 13:04

## 2025-07-21 ASSESSMENT — PAIN SCALES - GENERAL: PAINLEVEL_OUTOF10: NO PAIN (0)

## 2025-07-21 NOTE — PROGRESS NOTES
Infusion Nursing Note:  Lesley Mahmood presents today for Cycle 3 Day 1 Trastuzumab-qyyp.    Patient seen by provider today: No   present during visit today: Not Applicable.    Note:     No issues or concerns today; wishes to proceed with chemo.  Zoladex received last 7/15. Due with next infusion  Intravenous Access:  Implanted Port.    Treatment Conditions:  Not Applicable.    Left ventricular size, wall motion and function are normal. The ejection  fraction is 55-60%.     Post Infusion Assessment:  Patient tolerated infusion without incident.  Blood return noted pre and post infusion.  No evidence of extravasations.  Access discontinued per protocol.       Discharge Plan:   Patient declined prescription refills.  Patient and/or family verbalized understanding of discharge instructions and all questions answered.  Patient will return 8/12 for next appointment.   Patient discharged in stable condition accompanied by: arley Iverson RN

## 2025-07-21 NOTE — PROGRESS NOTES
[facility] PHYSICIANS  SPECIALIZING IN BREAKTHROUGHS  Radiation Oncology    On Treatment Visit Note      Lesley Mahmood      Date: 2025  MRN: [id number]   : 1985         Reason for Visit:  On Radiation Treatment Visit   Treatment Summary to Date  Treatment Site: Lt chestwall, IMN Current Dose: 2120/5240 cGy Fractions:       Chemotherapy  Chemo concurrent with radx?: No    Subjective: Lesley presents for her on treatment visit.  She is tired and has noticed more redness and sensitivity over her left chest wall skin. She endorses some pruritus on sternal skin present prior to start of radiation.     Objective:   /83   Pulse 68   Wt 69.9 kg (154 lb)   LMP 2025   SpO2 100%   BMI 26.43 kg/m    Gen: Appears well, in no acute distress  Skin: Mild erythema.     Labs:  CBC RESULTS:   Recent Labs   Lab Test 25  0816   WBC 4.7   RBC 3.99   HGB 11.4*   HCT 34.2*   MCV 86   MCH 28.6   MCHC 33.3   RDW 12.9        ELECTROLYTES:  Recent Labs   Lab Test 25  0654      POTASSIUM 4.0   CHLORIDE 104   GRECIA 9.2   CO2 22   BUN 13.9   CR 0.80   GLC 80         Assessment:    Tolerating radiation therapy well.  All questions and concerns addressed.    Toxicities:  Fatigue: Grade 1: Fatigue relieved by rest  Pain: Grade 0: No toxicity  Dermatitis: Grade 1: Faint erythema or dry desquamation    Plan:   Continue current therapy.    Reviewed skin cares.  Continue Aquaphor. Mometazone prescribed. Apply daily 15 minutes prior to aquaphor.   Reviewed the radiation plan.     [employer] chart and setup information reviewed  Ports checked              Partha Garay MD  PGY-5 Radiation Oncology  Department of Radiation Oncology  [facility]  Phone: [phone]      Please do not send letter to referring physician.        I saw and examined the patient with the resident.  I have reviewed and edited the resident's note and agree with the plan of care.                Telma Cazares MD

## 2025-07-21 NOTE — LETTER
2025      Lesley Mahmood  [address] [address]      Dear Colleague,    Thank you for referring your patient, Lesley Mahmood, to the [facility] RADIATION ONCOLOGY. Please see a copy of my visit note below.    [facility] PHYSICIANS  SPECIALIZING IN BREAKTHROUGHS  Radiation Oncology    On Treatment Visit Note      Lesley Mahmood      Date: 2025  MRN: [id number]   : 1985         Reason for Visit:  On Radiation Treatment Visit   Treatment Summary to Date  Treatment Site: Lt chestwall, IMN Current Dose: 2120/5240 cGy Fractions:       Chemotherapy  Chemo concurrent with radx?: No    Subjective: Lesley presents for her on treatment visit.  She is tired and has noticed more redness and sensitivity over her left chest wall skin. She endorses some pruritus on sternal skin present prior to start of radiation.     Objective:   /83   Pulse 68   Wt 69.9 kg (154 lb)   LMP 2025   SpO2 100%   BMI 26.43 kg/m    Gen: Appears well, in no acute distress  Skin: Mild erythema.     Labs:  CBC RESULTS:   Recent Labs   Lab Test 25  0816   WBC 4.7   RBC 3.99   HGB 11.4*   HCT 34.2*   MCV 86   MCH 28.6   MCHC 33.3   RDW 12.9        ELECTROLYTES:  Recent Labs   Lab Test 25  0654      POTASSIUM 4.0   CHLORIDE 104   GRECIA 9.2   CO2 22   BUN 13.9   CR 0.80   GLC 80         Assessment:    Tolerating radiation therapy well.  All questions and concerns addressed.    Toxicities:  Fatigue: Grade 1: Fatigue relieved by rest  Pain: Grade 0: No toxicity  Dermatitis: Grade 1: Faint erythema or dry desquamation    Plan:   Continue current therapy.    Reviewed skin cares.  Continue Aquaphor. Mometazone prescribed. Apply daily 15 minutes prior to aquaphor.   Reviewed the radiation plan.     [facility] chart and setup information reviewed  Ports checked              Partha Garay MD  PGY-5 Radiation Oncology  Department of Radiation Oncology  [facility]  [facility]  Phone: [phone]      Please do not send letter to referring physician.        I saw and examined the patient with the resident.  I have reviewed and edited the resident's note and agree with the plan of care.               Telma Cazares MD       Again, thank you for allowing me to participate in the care of your patient.        Sincerely,        Telma Cazares MD    Electronically signed

## 2025-07-22 ENCOUNTER — APPOINTMENT (OUTPATIENT)
Dept: RADIATION ONCOLOGY | Facility: CLINIC | Age: 40
End: 2025-07-22
Attending: INTERNAL MEDICINE
Payer: COMMERCIAL

## 2025-07-22 PROCEDURE — 77412 RADIATION TX DELIVERY LVL 3: CPT | Performed by: RADIOLOGY

## 2025-07-22 PROCEDURE — G6002 STEREOSCOPIC X-RAY GUIDANCE: HCPCS | Mod: 26 | Performed by: RADIOLOGY

## 2025-07-22 PROCEDURE — 77387 GUIDANCE FOR RADJ TX DLVR: CPT | Performed by: RADIOLOGY

## 2025-07-23 ENCOUNTER — APPOINTMENT (OUTPATIENT)
Dept: RADIATION ONCOLOGY | Facility: CLINIC | Age: 40
End: 2025-07-23
Attending: INTERNAL MEDICINE
Payer: COMMERCIAL

## 2025-07-23 PROCEDURE — 77427 RADIATION TX MANAGEMENT X5: CPT | Mod: GC | Performed by: RADIOLOGY

## 2025-07-23 PROCEDURE — 77412 RADIATION TX DELIVERY LVL 3: CPT | Performed by: RADIOLOGY

## 2025-07-23 PROCEDURE — 77387 GUIDANCE FOR RADJ TX DLVR: CPT | Performed by: RADIOLOGY

## 2025-07-23 PROCEDURE — 77336 RADIATION PHYSICS CONSULT: CPT | Performed by: RADIOLOGY

## 2025-07-24 ENCOUNTER — APPOINTMENT (OUTPATIENT)
Dept: RADIATION ONCOLOGY | Facility: CLINIC | Age: 40
End: 2025-07-24
Attending: INTERNAL MEDICINE
Payer: COMMERCIAL

## 2025-07-24 PROCEDURE — 77412 RADIATION TX DELIVERY LVL 3: CPT | Performed by: RADIOLOGY

## 2025-07-24 PROCEDURE — G6002 STEREOSCOPIC X-RAY GUIDANCE: HCPCS | Mod: 26 | Performed by: RADIOLOGY

## 2025-07-24 PROCEDURE — 77387 GUIDANCE FOR RADJ TX DLVR: CPT | Performed by: RADIOLOGY

## 2025-07-25 ENCOUNTER — APPOINTMENT (OUTPATIENT)
Dept: RADIATION ONCOLOGY | Facility: CLINIC | Age: 40
End: 2025-07-25
Attending: INTERNAL MEDICINE
Payer: COMMERCIAL

## 2025-07-25 PROCEDURE — 77387 GUIDANCE FOR RADJ TX DLVR: CPT | Performed by: RADIOLOGY

## 2025-07-25 PROCEDURE — 77412 RADIATION TX DELIVERY LVL 3: CPT | Performed by: RADIOLOGY

## 2025-07-25 PROCEDURE — G6002 STEREOSCOPIC X-RAY GUIDANCE: HCPCS | Mod: 26 | Performed by: RADIOLOGY

## 2025-07-28 ENCOUNTER — APPOINTMENT (OUTPATIENT)
Dept: RADIATION ONCOLOGY | Facility: CLINIC | Age: 40
End: 2025-07-28
Attending: INTERNAL MEDICINE
Payer: COMMERCIAL

## 2025-07-28 VITALS
BODY MASS INDEX: 26.09 KG/M2 | OXYGEN SATURATION: 100 % | SYSTOLIC BLOOD PRESSURE: 128 MMHG | HEART RATE: 76 BPM | DIASTOLIC BLOOD PRESSURE: 88 MMHG | WEIGHT: 152 LBS

## 2025-07-28 DIAGNOSIS — Z17.0 MALIGNANT NEOPLASM OF OVERLAPPING SITES OF LEFT BREAST IN FEMALE, ESTROGEN RECEPTOR POSITIVE (H): Primary | ICD-10-CM

## 2025-07-28 DIAGNOSIS — C50.812 MALIGNANT NEOPLASM OF OVERLAPPING SITES OF LEFT BREAST IN FEMALE, ESTROGEN RECEPTOR POSITIVE (H): Primary | ICD-10-CM

## 2025-07-28 PROCEDURE — 77412 RADIATION TX DELIVERY LVL 3: CPT | Performed by: RADIOLOGY

## 2025-07-28 PROCEDURE — 77300 RADIATION THERAPY DOSE PLAN: CPT | Performed by: RADIOLOGY

## 2025-07-28 PROCEDURE — 3074F SYST BP LT 130 MM HG: CPT | Performed by: RADIOLOGY

## 2025-07-28 PROCEDURE — 77300 RADIATION THERAPY DOSE PLAN: CPT | Mod: 26 | Performed by: RADIOLOGY

## 2025-07-28 PROCEDURE — 77280 THER RAD SIMULAJ FIELD SMPL: CPT | Performed by: RADIOLOGY

## 2025-07-28 PROCEDURE — 3079F DIAST BP 80-89 MM HG: CPT | Performed by: RADIOLOGY

## 2025-07-28 PROCEDURE — 77334 RADIATION TREATMENT AID(S): CPT | Mod: 26 | Performed by: RADIOLOGY

## 2025-07-28 PROCEDURE — 77334 RADIATION TREATMENT AID(S): CPT | Performed by: RADIOLOGY

## 2025-07-28 NOTE — PROGRESS NOTES
[facility] PHYSICIANS  SPECIALIZING IN BREAKTHROUGHS  Radiation Oncology    On Treatment Visit Note      Lesley Mahmood      Date: 2025  MRN: [id number]   : 1985         Reason for Visit:  On Radiation Treatment Visit     Treatment Summary to Date  Treatment Site: Lt chestwall, IMN Current Dose: 3345/5240 cGy Fractions:       Chemotherapy  Chemo concurrent with radx?: No      Subjective: Lesley presents for her on treatment visit.  Her fatigue is stable.  She notices soreness in her breast, more apparent in the underside, along the inframammary fold, 2/10.  She does have twinges of pain, associated with her bras rubbing, which escalate her pain.  This inhibits her ability go on walks.  She continues Aquaphor and mometasone.  She has no more itching over the breast.    Objective:   /88   Pulse 76   Wt 68.9 kg (152 lb)   LMP 2025   SpO2 100%   BMI 26.09 kg/m    Gen: Appears well, in no acute distress  Skin: Mild erythema overall. Papular rash towards the upper chest wall.     Labs:  CBC RESULTS:   Recent Labs   Lab Test 25  0816   WBC 4.7   RBC 3.99   HGB 11.4*   HCT 34.2*   MCV 86   MCH 28.6   MCHC 33.3   RDW 12.9        ELECTROLYTES:  Recent Labs   Lab Test 25  0654      POTASSIUM 4.0   CHLORIDE 104   GRECIA 9.2   CO2 22   BUN 13.9   CR 0.80   GLC 80         Assessment:    Tolerating radiation therapy well.  All questions and concerns addressed.    Toxicities:  Fatigue: Grade 1: Fatigue relieved by rest  Pain: Grade 1: Mild pain  Dermatitis: Grade 1: Faint erythema or dry desquamation    Plan:   Continue current therapy.    Reviewed skin cares.  Continue Aquaphor. Continue Mometazone.  Mepilex provided to be placed at the inframammary fold.       [employer] chart and setup information reviewed  Ports checked       Partha Garay MD  PGY-5 Radiation Oncology  Department of Radiation Oncology  [facility]  Phone: [phone]      Please  do not send letter to referring physician.        I saw and examined the patient with the resident.  I have reviewed and edited the resident's note and agree with the plan of care.       Telma Cazares MD

## 2025-07-28 NOTE — LETTER
2025      Lesley Mahmood  [address] [address]      Dear Colleague,    Thank you for referring your patient, Lesley Mahmood, to the [facility] RADIATION ONCOLOGY. Please see a copy of my visit note below.    [facility] PHYSICIANS  SPECIALIZING IN BREAKTHROUGHS  Radiation Oncology    On Treatment Visit Note      Lesley Mahmood      Date: 2025  MRN: [id number]   : 1985         Reason for Visit:  On Radiation Treatment Visit     Treatment Summary to Date  Treatment Site: Lt chestwall, IMN Current Dose: 3345/5240 cGy Fractions:       Chemotherapy  Chemo concurrent with radx?: No      Subjective: Lesley presents for her on treatment visit.  Her fatigue is stable.  She notices soreness in her breast, more apparent in the underside, along the inframammary fold, 2/10.  She does have twinges of pain, associated with her bras rubbing, which escalate her pain.  This inhibits her ability go on walks.  She continues Aquaphor and mometasone.  She has no more itching over the breast.    Objective:   /88   Pulse 76   Wt 68.9 kg (152 lb)   LMP 2025   SpO2 100%   BMI 26.09 kg/m    Gen: Appears well, in no acute distress  Skin: Mild erythema overall. Papular rash towards the upper chest wall.     Labs:  CBC RESULTS:   Recent Labs   Lab Test 25  0816   WBC 4.7   RBC 3.99   HGB 11.4*   HCT 34.2*   MCV 86   MCH 28.6   MCHC 33.3   RDW 12.9        ELECTROLYTES:  Recent Labs   Lab Test 25  0654      POTASSIUM 4.0   CHLORIDE 104   GRECIA 9.2   CO2 22   BUN 13.9   CR 0.80   GLC 80         Assessment:    Tolerating radiation therapy well.  All questions and concerns addressed.    Toxicities:  Fatigue: Grade 1: Fatigue relieved by rest  Pain: Grade 1: Mild pain  Dermatitis: Grade 1: Faint erythema or dry desquamation    Plan:   Continue current therapy.    Reviewed skin cares.  Continue Aquaphor. Continue Mometazone.  Mepilex provided to be  placed at the inframammary fold.       [employer] chart and setup information reviewed  Ports checked       Partha Garay MD  PGY-5 Radiation Oncology  Department of Radiation Oncology  [facility] [place]  Phone: [phone]      Please do not send letter to referring physician.        I saw and examined the patient with the resident.  I have reviewed and edited the resident's note and agree with the plan of care.       Telma Cazares MD       Again, thank you for allowing me to participate in the care of your patient.        Sincerely,        Telma Cazares MD    Electronically signed

## 2025-07-29 ENCOUNTER — APPOINTMENT (OUTPATIENT)
Dept: RADIATION ONCOLOGY | Facility: CLINIC | Age: 40
End: 2025-07-29
Attending: INTERNAL MEDICINE
Payer: COMMERCIAL

## 2025-07-29 ENCOUNTER — ANCILLARY PROCEDURE (OUTPATIENT)
Dept: BONE DENSITY | Facility: CLINIC | Age: 40
End: 2025-07-29
Attending: INTERNAL MEDICINE
Payer: COMMERCIAL

## 2025-07-29 DIAGNOSIS — E28.39 SUPPRESSION OF OVARIAN SECRETION: ICD-10-CM

## 2025-07-29 DIAGNOSIS — Z79.811 LONG TERM CURRENT USE OF AROMATASE INHIBITOR: ICD-10-CM

## 2025-07-29 PROCEDURE — 77387 GUIDANCE FOR RADJ TX DLVR: CPT | Performed by: RADIOLOGY

## 2025-07-29 PROCEDURE — 77080 DXA BONE DENSITY AXIAL: CPT | Performed by: INTERNAL MEDICINE

## 2025-07-29 PROCEDURE — 77412 RADIATION TX DELIVERY LVL 3: CPT | Performed by: RADIOLOGY

## 2025-07-29 PROCEDURE — G6002 STEREOSCOPIC X-RAY GUIDANCE: HCPCS | Mod: 26 | Performed by: RADIOLOGY

## 2025-07-30 ENCOUNTER — APPOINTMENT (OUTPATIENT)
Dept: RADIATION ONCOLOGY | Facility: CLINIC | Age: 40
End: 2025-07-30
Attending: INTERNAL MEDICINE
Payer: COMMERCIAL

## 2025-07-30 PROCEDURE — G6002 STEREOSCOPIC X-RAY GUIDANCE: HCPCS | Mod: 26 | Performed by: RADIOLOGY

## 2025-07-30 PROCEDURE — 77387 GUIDANCE FOR RADJ TX DLVR: CPT | Performed by: RADIOLOGY

## 2025-07-30 PROCEDURE — 77427 RADIATION TX MANAGEMENT X5: CPT | Mod: GC | Performed by: RADIOLOGY

## 2025-07-30 PROCEDURE — 77336 RADIATION PHYSICS CONSULT: CPT | Performed by: RADIOLOGY

## 2025-07-30 PROCEDURE — 77412 RADIATION TX DELIVERY LVL 3: CPT | Performed by: RADIOLOGY

## 2025-07-31 ENCOUNTER — APPOINTMENT (OUTPATIENT)
Dept: RADIATION ONCOLOGY | Facility: CLINIC | Age: 40
End: 2025-07-31
Attending: INTERNAL MEDICINE
Payer: COMMERCIAL

## 2025-07-31 PROCEDURE — 77412 RADIATION TX DELIVERY LVL 3: CPT | Performed by: RADIOLOGY

## 2025-07-31 PROCEDURE — 77387 GUIDANCE FOR RADJ TX DLVR: CPT | Performed by: RADIOLOGY

## 2025-07-31 PROCEDURE — 99207 PR NO BILLABLE SERVICE THIS VISIT: CPT | Mod: 26 | Performed by: RADIOLOGY

## 2025-08-04 ENCOUNTER — APPOINTMENT (OUTPATIENT)
Dept: RADIATION ONCOLOGY | Facility: CLINIC | Age: 40
End: 2025-08-04
Attending: INTERNAL MEDICINE
Payer: COMMERCIAL

## 2025-08-04 VITALS
DIASTOLIC BLOOD PRESSURE: 85 MMHG | BODY MASS INDEX: 26.43 KG/M2 | WEIGHT: 154 LBS | SYSTOLIC BLOOD PRESSURE: 126 MMHG | HEART RATE: 68 BPM | OXYGEN SATURATION: 100 %

## 2025-08-04 DIAGNOSIS — C50.812 MALIGNANT NEOPLASM OF OVERLAPPING SITES OF LEFT BREAST IN FEMALE, ESTROGEN RECEPTOR POSITIVE (H): Primary | ICD-10-CM

## 2025-08-04 DIAGNOSIS — Z17.0 MALIGNANT NEOPLASM OF OVERLAPPING SITES OF LEFT BREAST IN FEMALE, ESTROGEN RECEPTOR POSITIVE (H): Primary | ICD-10-CM

## 2025-08-04 PROCEDURE — 3079F DIAST BP 80-89 MM HG: CPT | Performed by: RADIOLOGY

## 2025-08-04 PROCEDURE — 3074F SYST BP LT 130 MM HG: CPT | Performed by: RADIOLOGY

## 2025-08-04 PROCEDURE — 77412 RADIATION TX DELIVERY LVL 3: CPT | Performed by: RADIOLOGY

## 2025-08-05 ENCOUNTER — APPOINTMENT (OUTPATIENT)
Dept: RADIATION ONCOLOGY | Facility: CLINIC | Age: 40
End: 2025-08-05
Attending: INTERNAL MEDICINE
Payer: COMMERCIAL

## 2025-08-05 PROCEDURE — 77331 SPECIAL RADIATION DOSIMETRY: CPT | Performed by: RADIOLOGY

## 2025-08-05 PROCEDURE — 77412 RADIATION TX DELIVERY LVL 3: CPT | Performed by: RADIOLOGY

## 2025-08-05 PROCEDURE — 77331 SPECIAL RADIATION DOSIMETRY: CPT | Mod: 26 | Performed by: RADIOLOGY

## 2025-08-06 ENCOUNTER — APPOINTMENT (OUTPATIENT)
Dept: RADIATION ONCOLOGY | Facility: CLINIC | Age: 40
End: 2025-08-06
Attending: INTERNAL MEDICINE
Payer: COMMERCIAL

## 2025-08-07 ENCOUNTER — APPOINTMENT (OUTPATIENT)
Dept: RADIATION ONCOLOGY | Facility: CLINIC | Age: 40
End: 2025-08-07
Attending: INTERNAL MEDICINE
Payer: COMMERCIAL

## 2025-08-10 RX ORDER — DIPHENHYDRAMINE HYDROCHLORIDE 50 MG/ML
25 INJECTION, SOLUTION INTRAMUSCULAR; INTRAVENOUS
Status: CANCELLED
Start: 2025-08-13

## 2025-08-10 RX ORDER — ACETAMINOPHEN 325 MG/1
650 TABLET ORAL
Status: CANCELLED
Start: 2025-08-13

## 2025-08-10 RX ORDER — DIPHENHYDRAMINE HYDROCHLORIDE 50 MG/ML
50 INJECTION, SOLUTION INTRAMUSCULAR; INTRAVENOUS
Status: CANCELLED | OUTPATIENT
Start: 2025-08-13

## 2025-08-10 RX ORDER — DIPHENHYDRAMINE HYDROCHLORIDE 50 MG/ML
50 INJECTION, SOLUTION INTRAMUSCULAR; INTRAVENOUS
Status: CANCELLED
Start: 2025-08-13

## 2025-08-10 RX ORDER — DIPHENHYDRAMINE HCL 25 MG
50 CAPSULE ORAL
Status: CANCELLED
Start: 2025-08-13

## 2025-08-10 RX ORDER — METHYLPREDNISOLONE SODIUM SUCCINATE 40 MG/ML
40 INJECTION INTRAMUSCULAR; INTRAVENOUS
Status: CANCELLED
Start: 2025-08-13

## 2025-08-10 RX ORDER — ALBUTEROL SULFATE 0.83 MG/ML
2.5 SOLUTION RESPIRATORY (INHALATION)
Status: CANCELLED | OUTPATIENT
Start: 2025-08-13

## 2025-08-10 RX ORDER — EPINEPHRINE 1 MG/ML
0.3 INJECTION, SOLUTION INTRAMUSCULAR; SUBCUTANEOUS EVERY 5 MIN PRN
Status: CANCELLED | OUTPATIENT
Start: 2025-08-13

## 2025-08-10 RX ORDER — MEPERIDINE HYDROCHLORIDE 25 MG/ML
25 INJECTION INTRAMUSCULAR; INTRAVENOUS; SUBCUTANEOUS
Status: CANCELLED | OUTPATIENT
Start: 2025-08-13

## 2025-08-10 RX ORDER — ALBUTEROL SULFATE 90 UG/1
1-2 INHALANT RESPIRATORY (INHALATION)
Status: CANCELLED
Start: 2025-08-13

## 2025-08-10 RX ORDER — HEPARIN SODIUM (PORCINE) LOCK FLUSH IV SOLN 100 UNIT/ML 100 UNIT/ML
5 SOLUTION INTRAVENOUS
Status: CANCELLED | OUTPATIENT
Start: 2025-08-13

## 2025-08-10 RX ORDER — HEPARIN SODIUM,PORCINE 10 UNIT/ML
5-20 VIAL (ML) INTRAVENOUS DAILY PRN
Status: CANCELLED | OUTPATIENT
Start: 2025-08-13

## 2025-08-10 RX ORDER — LORAZEPAM 2 MG/ML
0.5 INJECTION INTRAMUSCULAR EVERY 4 HOURS PRN
Status: CANCELLED | OUTPATIENT
Start: 2025-08-13

## 2025-08-11 DIAGNOSIS — L58.0 ACUTE RADIATION DERMATITIS: Primary | ICD-10-CM

## 2025-08-11 RX ORDER — SILVER SULFADIAZINE 10 MG/G
CREAM TOPICAL DAILY
Qty: 25 G | Refills: 0 | Status: SHIPPED | OUTPATIENT
Start: 2025-08-11

## 2025-08-12 ENCOUNTER — INFUSION THERAPY VISIT (OUTPATIENT)
Dept: ONCOLOGY | Facility: CLINIC | Age: 40
End: 2025-08-12
Attending: INTERNAL MEDICINE
Payer: COMMERCIAL

## 2025-08-12 VITALS
SYSTOLIC BLOOD PRESSURE: 127 MMHG | RESPIRATION RATE: 16 BRPM | DIASTOLIC BLOOD PRESSURE: 82 MMHG | HEART RATE: 75 BPM | OXYGEN SATURATION: 99 % | TEMPERATURE: 98.2 F

## 2025-08-12 DIAGNOSIS — C50.812 MALIGNANT NEOPLASM OF OVERLAPPING SITES OF LEFT BREAST IN FEMALE, ESTROGEN RECEPTOR POSITIVE (H): Primary | ICD-10-CM

## 2025-08-12 DIAGNOSIS — Z51.11 ENCOUNTER FOR ANTINEOPLASTIC CHEMOTHERAPY: ICD-10-CM

## 2025-08-12 DIAGNOSIS — Z17.0 MALIGNANT NEOPLASM OF OVERLAPPING SITES OF LEFT BREAST IN FEMALE, ESTROGEN RECEPTOR POSITIVE (H): Primary | ICD-10-CM

## 2025-08-12 PROCEDURE — 96402 CHEMO HORMON ANTINEOPL SQ/IM: CPT

## 2025-08-12 PROCEDURE — 258N000003 HC RX IP 258 OP 636: Performed by: INTERNAL MEDICINE

## 2025-08-12 PROCEDURE — 96413 CHEMO IV INFUSION 1 HR: CPT

## 2025-08-12 PROCEDURE — 250N000011 HC RX IP 250 OP 636: Mod: JZ | Performed by: INTERNAL MEDICINE

## 2025-08-12 RX ORDER — HEPARIN SODIUM (PORCINE) LOCK FLUSH IV SOLN 100 UNIT/ML 100 UNIT/ML
5 SOLUTION INTRAVENOUS
Status: DISCONTINUED | OUTPATIENT
Start: 2025-08-12 | End: 2025-08-12 | Stop reason: HOSPADM

## 2025-08-12 RX ADMIN — SODIUM CHLORIDE 430 MG: 0.9 INJECTION, SOLUTION INTRAVENOUS at 12:44

## 2025-08-12 RX ADMIN — GOSERELIN ACETATE 3.6 MG: 3.6 IMPLANT SUBCUTANEOUS at 12:55

## 2025-08-12 RX ADMIN — Medication 5 ML: at 13:19

## 2025-08-13 ENCOUNTER — VIRTUAL VISIT (OUTPATIENT)
Dept: ONCOLOGY | Facility: CLINIC | Age: 40
End: 2025-08-13
Attending: PHYSICIAN ASSISTANT
Payer: COMMERCIAL

## 2025-08-13 VITALS — WEIGHT: 153 LBS | BODY MASS INDEX: 26.12 KG/M2 | HEIGHT: 64 IN

## 2025-08-13 DIAGNOSIS — C50.812 MALIGNANT NEOPLASM OF OVERLAPPING SITES OF LEFT BREAST IN FEMALE, ESTROGEN RECEPTOR POSITIVE (H): Primary | ICD-10-CM

## 2025-08-13 DIAGNOSIS — Z51.11 ENCOUNTER FOR ANTINEOPLASTIC CHEMOTHERAPY: ICD-10-CM

## 2025-08-13 DIAGNOSIS — Z17.0 MALIGNANT NEOPLASM OF OVERLAPPING SITES OF LEFT BREAST IN FEMALE, ESTROGEN RECEPTOR POSITIVE (H): Primary | ICD-10-CM

## 2025-08-13 PROCEDURE — 98006 SYNCH AUDIO-VIDEO EST MOD 30: CPT | Performed by: PHYSICIAN ASSISTANT

## 2025-08-13 PROCEDURE — G2211 COMPLEX E/M VISIT ADD ON: HCPCS | Mod: 95 | Performed by: PHYSICIAN ASSISTANT

## 2025-08-13 PROCEDURE — 1126F AMNT PAIN NOTED NONE PRSNT: CPT | Mod: 95 | Performed by: PHYSICIAN ASSISTANT

## 2025-08-13 RX ORDER — DIPHENHYDRAMINE HCL 25 MG
50 CAPSULE ORAL
Start: 2025-10-15

## 2025-08-13 RX ORDER — EPINEPHRINE 1 MG/ML
0.3 INJECTION, SOLUTION INTRAMUSCULAR; SUBCUTANEOUS EVERY 5 MIN PRN
OUTPATIENT
Start: 2025-10-15

## 2025-08-13 RX ORDER — MEPERIDINE HYDROCHLORIDE 25 MG/ML
25 INJECTION INTRAMUSCULAR; INTRAVENOUS; SUBCUTANEOUS
OUTPATIENT
Start: 2025-10-15

## 2025-08-13 RX ORDER — ALBUTEROL SULFATE 0.83 MG/ML
2.5 SOLUTION RESPIRATORY (INHALATION)
OUTPATIENT
Start: 2025-09-03

## 2025-08-13 RX ORDER — HEPARIN SODIUM,PORCINE 10 UNIT/ML
5-20 VIAL (ML) INTRAVENOUS DAILY PRN
OUTPATIENT
Start: 2025-09-24

## 2025-08-13 RX ORDER — EPINEPHRINE 1 MG/ML
0.3 INJECTION, SOLUTION INTRAMUSCULAR; SUBCUTANEOUS EVERY 5 MIN PRN
OUTPATIENT
Start: 2025-09-03

## 2025-08-13 RX ORDER — METHYLPREDNISOLONE SODIUM SUCCINATE 40 MG/ML
40 INJECTION INTRAMUSCULAR; INTRAVENOUS
Start: 2025-09-24

## 2025-08-13 RX ORDER — ACETAMINOPHEN 325 MG/1
650 TABLET ORAL
Start: 2025-09-03

## 2025-08-13 RX ORDER — EPINEPHRINE 1 MG/ML
0.3 INJECTION, SOLUTION INTRAMUSCULAR; SUBCUTANEOUS EVERY 5 MIN PRN
OUTPATIENT
Start: 2025-09-24

## 2025-08-13 RX ORDER — HEPARIN SODIUM,PORCINE 10 UNIT/ML
5-20 VIAL (ML) INTRAVENOUS DAILY PRN
OUTPATIENT
Start: 2025-09-03

## 2025-08-13 RX ORDER — LORAZEPAM 2 MG/ML
0.5 INJECTION INTRAMUSCULAR EVERY 4 HOURS PRN
OUTPATIENT
Start: 2025-09-03

## 2025-08-13 RX ORDER — ACETAMINOPHEN 325 MG/1
650 TABLET ORAL
Start: 2025-10-15

## 2025-08-13 RX ORDER — DIPHENHYDRAMINE HCL 25 MG
50 CAPSULE ORAL
Start: 2025-09-24

## 2025-08-13 RX ORDER — METHYLPREDNISOLONE SODIUM SUCCINATE 40 MG/ML
40 INJECTION INTRAMUSCULAR; INTRAVENOUS
Start: 2025-10-15

## 2025-08-13 RX ORDER — ALBUTEROL SULFATE 90 UG/1
1-2 INHALANT RESPIRATORY (INHALATION)
Start: 2025-09-24

## 2025-08-13 RX ORDER — HEPARIN SODIUM (PORCINE) LOCK FLUSH IV SOLN 100 UNIT/ML 100 UNIT/ML
5 SOLUTION INTRAVENOUS
OUTPATIENT
Start: 2025-09-24

## 2025-08-13 RX ORDER — DIPHENHYDRAMINE HYDROCHLORIDE 50 MG/ML
50 INJECTION, SOLUTION INTRAMUSCULAR; INTRAVENOUS
OUTPATIENT
Start: 2025-10-15

## 2025-08-13 RX ORDER — DIPHENHYDRAMINE HYDROCHLORIDE 50 MG/ML
25 INJECTION, SOLUTION INTRAMUSCULAR; INTRAVENOUS
Start: 2025-09-03

## 2025-08-13 RX ORDER — LORAZEPAM 2 MG/ML
0.5 INJECTION INTRAMUSCULAR EVERY 4 HOURS PRN
OUTPATIENT
Start: 2025-10-15

## 2025-08-13 RX ORDER — METHYLPREDNISOLONE SODIUM SUCCINATE 40 MG/ML
40 INJECTION INTRAMUSCULAR; INTRAVENOUS
Start: 2025-09-03

## 2025-08-13 RX ORDER — DIPHENHYDRAMINE HYDROCHLORIDE 50 MG/ML
50 INJECTION, SOLUTION INTRAMUSCULAR; INTRAVENOUS
Start: 2025-10-15

## 2025-08-13 RX ORDER — HEPARIN SODIUM (PORCINE) LOCK FLUSH IV SOLN 100 UNIT/ML 100 UNIT/ML
5 SOLUTION INTRAVENOUS
OUTPATIENT
Start: 2025-09-03

## 2025-08-13 RX ORDER — MEPERIDINE HYDROCHLORIDE 25 MG/ML
25 INJECTION INTRAMUSCULAR; INTRAVENOUS; SUBCUTANEOUS
OUTPATIENT
Start: 2025-09-03

## 2025-08-13 RX ORDER — ALBUTEROL SULFATE 90 UG/1
1-2 INHALANT RESPIRATORY (INHALATION)
Start: 2025-10-15

## 2025-08-13 RX ORDER — ALBUTEROL SULFATE 0.83 MG/ML
2.5 SOLUTION RESPIRATORY (INHALATION)
OUTPATIENT
Start: 2025-10-15

## 2025-08-13 RX ORDER — DIPHENHYDRAMINE HYDROCHLORIDE 50 MG/ML
50 INJECTION, SOLUTION INTRAMUSCULAR; INTRAVENOUS
Start: 2025-09-24

## 2025-08-13 RX ORDER — DIPHENHYDRAMINE HYDROCHLORIDE 50 MG/ML
25 INJECTION, SOLUTION INTRAMUSCULAR; INTRAVENOUS
Start: 2025-10-15

## 2025-08-13 RX ORDER — HEPARIN SODIUM (PORCINE) LOCK FLUSH IV SOLN 100 UNIT/ML 100 UNIT/ML
5 SOLUTION INTRAVENOUS
OUTPATIENT
Start: 2025-10-15

## 2025-08-13 RX ORDER — DIPHENHYDRAMINE HCL 25 MG
50 CAPSULE ORAL
Start: 2025-09-03

## 2025-08-13 RX ORDER — DIPHENHYDRAMINE HYDROCHLORIDE 50 MG/ML
50 INJECTION, SOLUTION INTRAMUSCULAR; INTRAVENOUS
Start: 2025-09-03

## 2025-08-13 RX ORDER — MEPERIDINE HYDROCHLORIDE 25 MG/ML
25 INJECTION INTRAMUSCULAR; INTRAVENOUS; SUBCUTANEOUS
OUTPATIENT
Start: 2025-09-24

## 2025-08-13 RX ORDER — LETROZOLE 2.5 MG/1
2.5 TABLET, FILM COATED ORAL DAILY
Qty: 90 TABLET | Refills: 3 | Status: SHIPPED | OUTPATIENT
Start: 2025-08-13

## 2025-08-13 RX ORDER — ALBUTEROL SULFATE 90 UG/1
1-2 INHALANT RESPIRATORY (INHALATION)
Start: 2025-09-03

## 2025-08-13 RX ORDER — HEPARIN SODIUM,PORCINE 10 UNIT/ML
5-20 VIAL (ML) INTRAVENOUS DAILY PRN
OUTPATIENT
Start: 2025-10-15

## 2025-08-13 RX ORDER — DIPHENHYDRAMINE HYDROCHLORIDE 50 MG/ML
25 INJECTION, SOLUTION INTRAMUSCULAR; INTRAVENOUS
Start: 2025-09-24

## 2025-08-13 RX ORDER — DIPHENHYDRAMINE HYDROCHLORIDE 50 MG/ML
50 INJECTION, SOLUTION INTRAMUSCULAR; INTRAVENOUS
OUTPATIENT
Start: 2025-09-24

## 2025-08-13 RX ORDER — ALBUTEROL SULFATE 0.83 MG/ML
2.5 SOLUTION RESPIRATORY (INHALATION)
OUTPATIENT
Start: 2025-09-24

## 2025-08-13 RX ORDER — ACETAMINOPHEN 325 MG/1
650 TABLET ORAL
Start: 2025-09-24

## 2025-08-13 RX ORDER — LORAZEPAM 2 MG/ML
0.5 INJECTION INTRAMUSCULAR EVERY 4 HOURS PRN
OUTPATIENT
Start: 2025-09-24

## 2025-08-13 RX ORDER — DIPHENHYDRAMINE HYDROCHLORIDE 50 MG/ML
50 INJECTION, SOLUTION INTRAMUSCULAR; INTRAVENOUS
OUTPATIENT
Start: 2025-09-03

## 2025-08-13 ASSESSMENT — PAIN SCALES - GENERAL: PAINLEVEL_OUTOF10: NO PAIN (0)

## 2025-08-18 ENCOUNTER — THERAPY VISIT (OUTPATIENT)
Dept: PHYSICAL THERAPY | Facility: CLINIC | Age: 40
End: 2025-08-18
Payer: COMMERCIAL

## 2025-08-18 DIAGNOSIS — I97.2 POST-MASTECTOMY LYMPHEDEMA SYNDROME: Primary | ICD-10-CM

## 2025-08-18 PROCEDURE — 97140 MANUAL THERAPY 1/> REGIONS: CPT | Mod: GP | Performed by: PHYSICAL THERAPIST

## 2025-08-18 PROCEDURE — 97110 THERAPEUTIC EXERCISES: CPT | Mod: GP | Performed by: PHYSICAL THERAPIST

## 2025-08-28 ENCOUNTER — ONCOLOGY VISIT (OUTPATIENT)
Dept: RADIATION ONCOLOGY | Facility: CLINIC | Age: 40
End: 2025-08-28
Payer: COMMERCIAL

## 2025-09-02 ENCOUNTER — INFUSION THERAPY VISIT (OUTPATIENT)
Dept: ONCOLOGY | Facility: CLINIC | Age: 40
End: 2025-09-02
Attending: INTERNAL MEDICINE
Payer: COMMERCIAL

## 2025-09-02 VITALS
SYSTOLIC BLOOD PRESSURE: 118 MMHG | DIASTOLIC BLOOD PRESSURE: 79 MMHG | OXYGEN SATURATION: 99 % | BODY MASS INDEX: 26.28 KG/M2 | TEMPERATURE: 97.9 F | HEART RATE: 66 BPM | RESPIRATION RATE: 16 BRPM | WEIGHT: 153.1 LBS

## 2025-09-02 DIAGNOSIS — C50.812 MALIGNANT NEOPLASM OF OVERLAPPING SITES OF LEFT BREAST IN FEMALE, ESTROGEN RECEPTOR POSITIVE (H): Primary | ICD-10-CM

## 2025-09-02 DIAGNOSIS — Z51.11 ENCOUNTER FOR ANTINEOPLASTIC CHEMOTHERAPY: ICD-10-CM

## 2025-09-02 DIAGNOSIS — Z17.0 MALIGNANT NEOPLASM OF OVERLAPPING SITES OF LEFT BREAST IN FEMALE, ESTROGEN RECEPTOR POSITIVE (H): Primary | ICD-10-CM

## 2025-09-02 PROCEDURE — 258N000003 HC RX IP 258 OP 636: Performed by: PHYSICIAN ASSISTANT

## 2025-09-02 PROCEDURE — 250N000011 HC RX IP 250 OP 636: Performed by: PHYSICIAN ASSISTANT

## 2025-09-02 PROCEDURE — 96413 CHEMO IV INFUSION 1 HR: CPT

## 2025-09-02 RX ORDER — HEPARIN SODIUM (PORCINE) LOCK FLUSH IV SOLN 100 UNIT/ML 100 UNIT/ML
5 SOLUTION INTRAVENOUS
Status: DISCONTINUED | OUTPATIENT
Start: 2025-09-02 | End: 2025-09-02 | Stop reason: HOSPADM

## 2025-09-02 RX ADMIN — Medication 5 ML: at 13:07

## 2025-09-02 RX ADMIN — SODIUM CHLORIDE 430 MG: 0.9 INJECTION, SOLUTION INTRAVENOUS at 12:30

## 2025-09-02 ASSESSMENT — PAIN SCALES - GENERAL: PAINLEVEL_OUTOF10: NO PAIN (0)

## (undated) DEVICE — KIT INTRODUCER FLUENT MICRO 5FRX10CM ECHO TIP KIT-038-04

## (undated) DEVICE — KNIFE HANDLE W/15 BLADE 371615

## (undated) DEVICE — DRSG PRIMAPORE 02X3" 7133

## (undated) DEVICE — SU ETHILON 3-0 PS-1 18" 1663H

## (undated) DEVICE — PHOTON GUIDE INVUITY WIDE FLAT 104015

## (undated) DEVICE — LINEN TOWEL PACK X5 5464

## (undated) DEVICE — DECANTER BAG 2002S

## (undated) DEVICE — DRAIN JACKSON PRATT CHANNEL 15FR ROUND HUBLESS SIL JP-2228

## (undated) DEVICE — SU DERMABOND ADVANCED .7ML DNX12

## (undated) DEVICE — CLIP HORIZON MED BLUE 002200

## (undated) DEVICE — DRAPE SHEET MED 44X70" 9355

## (undated) DEVICE — PREP CHLORAPREP 26ML TINTED HI-LITE ORANGE 930815

## (undated) DEVICE — SU STRATAFIX MONOCRYL 3-0 SPIRAL PS-2 45CM SXMP1B107

## (undated) DEVICE — ESU ELEC BLADE 2.75" COATED/INSULATED E1455

## (undated) DEVICE — CONNECTOR MALE TO MALE LL

## (undated) DEVICE — PACK CRANIOTOMY

## (undated) DEVICE — SU MONOCRYL 4-0 P-3 18" UND Y494G

## (undated) DEVICE — SU PROLENE 0 CT-1 30" 8424H

## (undated) DEVICE — SOL NACL 0.9% IRRIG 1000ML BOTTLE 2F7124

## (undated) DEVICE — NDL 25GA 2"  8881200441

## (undated) DEVICE — SU MONOCRYL 2-0 SH 27" UND Y417H

## (undated) DEVICE — GLOVE BIOGEL PI MICRO SZ 7.0 48570

## (undated) DEVICE — PACK CENTRAL LINE INSERTION SAN32CLFCG

## (undated) DEVICE — DRAIN JACKSON PRATT RESERVOIR 100ML SU130-1305

## (undated) DEVICE — SYR 50ML LL W/O NDL 309653

## (undated) DEVICE — Device

## (undated) DEVICE — KIT PROCEDURE SPY-PHI SGL HH9006

## (undated) DEVICE — SU VICRYL 0 CTX 36" J370H

## (undated) DEVICE — KIT SPY ELITE DISP LC3006

## (undated) DEVICE — DRAPE U SPLIT 74X120" 29440

## (undated) DEVICE — SU VICRYL 4-0 P-3 18" UND  J494H

## (undated) DEVICE — COVER ULTRASOUND PROBE GAMMA WIRELESS TMPH-2002

## (undated) DEVICE — LABEL MEDICATION SYSTEM 3303-P

## (undated) DEVICE — PROBE COVER INTRAOPERATIVE 5"X96" PC1308

## (undated) DEVICE — GLOVE BIOGEL PI ULTRATOUCH G SZ 8.0 42180

## (undated) DEVICE — SU SILK 3-0 SH 30" K832H

## (undated) DEVICE — GLOVE BIOGEL PI MICRO INDICATOR UNDERGLOVE SZ 7.5 48975

## (undated) DEVICE — SUCTION MANIFOLD NEPTUNE 2 SYS 4 PORT 0702-020-000

## (undated) DEVICE — CLIP HORIZON SM RED WIDE SLOT 001201

## (undated) DEVICE — SPONGE LAP 18X18" X8435

## (undated) DEVICE — GOWN XLG DISP 9545

## (undated) DEVICE — SET BREAST BIOPSY LOCALIZER 20 PROBE 8MM PENCIL 09-0006

## (undated) DEVICE — GLOVE BIOGEL PI ULTRATOUCH SZ 8.0 41180

## (undated) DEVICE — ESU GROUND PAD ADULT W/CORD E7507

## (undated) DEVICE — DRAPE IOBAN INCISE 23X17" 6650EZ

## (undated) DEVICE — DRAPE SHEET REV FOLD 3/4 9349

## (undated) DEVICE — BLADE KNIFE SURG 10 371110

## (undated) DEVICE — SOL WATER IRRIG 1000ML BOTTLE 2F7114

## (undated) DEVICE — LINEN TOWEL PACK X6 WHITE 5487

## (undated) DEVICE — LINEN GOWN XLG 5407

## (undated) DEVICE — DRSG KERLIX 4 1/2"X4YDS ROLL 6715

## (undated) DEVICE — SU DERMABOND PRINEO 42CM CLR422US

## (undated) DEVICE — LINEN TOWEL PACK X30 5481

## (undated) DEVICE — ESU PENCIL SMOKE EVAC W/ROCKER SWITCH 0703-047-000

## (undated) RX ORDER — CEFAZOLIN SODIUM 1 G/3ML
INJECTION, POWDER, FOR SOLUTION INTRAMUSCULAR; INTRAVENOUS
Status: DISPENSED
Start: 2025-02-17

## (undated) RX ORDER — ONDANSETRON 2 MG/ML
INJECTION INTRAMUSCULAR; INTRAVENOUS
Status: DISPENSED
Start: 2025-02-17

## (undated) RX ORDER — ACETAMINOPHEN 325 MG/1
TABLET ORAL
Status: DISPENSED
Start: 2025-03-18

## (undated) RX ORDER — CEFAZOLIN SODIUM/WATER 2 G/20 ML
SYRINGE (ML) INTRAVENOUS
Status: DISPENSED
Start: 2025-02-17

## (undated) RX ORDER — CEFAZOLIN SODIUM 2 G/50ML
SOLUTION INTRAVENOUS
Status: DISPENSED
Start: 2025-03-18

## (undated) RX ORDER — FENTANYL CITRATE 50 UG/ML
INJECTION, SOLUTION INTRAMUSCULAR; INTRAVENOUS
Status: DISPENSED
Start: 2025-02-17

## (undated) RX ORDER — APREPITANT 40 MG/1
CAPSULE ORAL
Status: DISPENSED
Start: 2025-02-17

## (undated) RX ORDER — PROPOFOL 10 MG/ML
INJECTION, EMULSION INTRAVENOUS
Status: DISPENSED
Start: 2025-02-17

## (undated) RX ORDER — FENTANYL CITRATE 50 UG/ML
INJECTION, SOLUTION INTRAMUSCULAR; INTRAVENOUS
Status: DISPENSED
Start: 2025-03-18

## (undated) RX ORDER — ETOMIDATE 2 MG/ML
INJECTION INTRAVENOUS
Status: DISPENSED
Start: 2025-02-17

## (undated) RX ORDER — ENOXAPARIN SODIUM 100 MG/ML
INJECTION SUBCUTANEOUS
Status: DISPENSED
Start: 2025-02-17

## (undated) RX ORDER — ONDANSETRON 2 MG/ML
INJECTION INTRAMUSCULAR; INTRAVENOUS
Status: DISPENSED
Start: 2025-03-18

## (undated) RX ORDER — ACETAMINOPHEN 325 MG/1
TABLET ORAL
Status: DISPENSED
Start: 2025-02-17

## (undated) RX ORDER — OXYMETAZOLINE HYDROCHLORIDE 0.05 G/100ML
SPRAY NASAL
Status: DISPENSED
Start: 2025-02-06

## (undated) RX ORDER — INDOCYANINE GREEN AND WATER 25 MG
KIT INJECTION
Status: DISPENSED
Start: 2025-02-17

## (undated) RX ORDER — DIPHENHYDRAMINE HYDROCHLORIDE 50 MG/ML
INJECTION, SOLUTION INTRAMUSCULAR; INTRAVENOUS
Status: DISPENSED
Start: 2025-03-18

## (undated) RX ORDER — FENTANYL CITRATE-0.9 % NACL/PF 10 MCG/ML
PLASTIC BAG, INJECTION (ML) INTRAVENOUS
Status: DISPENSED
Start: 2025-02-17

## (undated) RX ORDER — OXYCODONE HYDROCHLORIDE 5 MG/1
TABLET ORAL
Status: DISPENSED
Start: 2025-02-17

## (undated) RX ORDER — HYDROMORPHONE HYDROCHLORIDE 1 MG/ML
INJECTION, SOLUTION INTRAMUSCULAR; INTRAVENOUS; SUBCUTANEOUS
Status: DISPENSED
Start: 2025-02-17

## (undated) RX ORDER — BUPIVACAINE HYDROCHLORIDE 2.5 MG/ML
INJECTION, SOLUTION EPIDURAL; INFILTRATION; INTRACAUDAL
Status: DISPENSED
Start: 2025-02-17